# Patient Record
Sex: MALE | Race: WHITE | NOT HISPANIC OR LATINO | URBAN - NONMETROPOLITAN AREA
[De-identification: names, ages, dates, MRNs, and addresses within clinical notes are randomized per-mention and may not be internally consistent; named-entity substitution may affect disease eponyms.]

---

## 2017-02-27 ENCOUNTER — APPOINTMENT (RX ONLY)
Dept: URBAN - NONMETROPOLITAN AREA CLINIC 22 | Facility: CLINIC | Age: 62
Setting detail: DERMATOLOGY
End: 2017-02-27

## 2017-02-27 PROCEDURE — ? PRODUCT LINE (MOISTURIZERS)

## 2017-02-27 NOTE — PROCEDURE: PRODUCT LINE (MOISTURIZERS)
Product 69 Price (In Dollars - Numeric Only, No Special Characters Or $): 0.00
Product 30 Units: 0
Product 1 Units: 1
Product 1 Price (In Dollars - Numeric Only, No Special Characters Or $): 12.72
Render Product Pricing In Note: Yes
Name Of Product 1: ceraVe Healing Ointment
Detail Level: Zone

## 2020-03-03 ENCOUNTER — OFFICE VISIT (OUTPATIENT)
Dept: PULMONOLOGY | Age: 65
End: 2020-03-03
Payer: MEDICARE

## 2020-03-03 VITALS
TEMPERATURE: 98.1 F | RESPIRATION RATE: 16 BRPM | HEART RATE: 67 BPM | DIASTOLIC BLOOD PRESSURE: 62 MMHG | OXYGEN SATURATION: 96 % | HEIGHT: 69 IN | SYSTOLIC BLOOD PRESSURE: 112 MMHG | BODY MASS INDEX: 23.55 KG/M2 | WEIGHT: 159 LBS

## 2020-03-03 PROBLEM — J44.9 MODERATE COPD (CHRONIC OBSTRUCTIVE PULMONARY DISEASE) (HCC): Status: ACTIVE | Noted: 2020-03-03

## 2020-03-03 PROBLEM — F17.210 CIGARETTE NICOTINE DEPENDENCE WITHOUT COMPLICATION: Status: ACTIVE | Noted: 2020-03-03

## 2020-03-03 PROCEDURE — G8484 FLU IMMUNIZE NO ADMIN: HCPCS | Performed by: INTERNAL MEDICINE

## 2020-03-03 PROCEDURE — 99202 OFFICE O/P NEW SF 15 MIN: CPT | Performed by: INTERNAL MEDICINE

## 2020-03-03 PROCEDURE — G8926 SPIRO NO PERF OR DOC: HCPCS | Performed by: INTERNAL MEDICINE

## 2020-03-03 PROCEDURE — G8427 DOCREV CUR MEDS BY ELIG CLIN: HCPCS | Performed by: INTERNAL MEDICINE

## 2020-03-03 PROCEDURE — 4004F PT TOBACCO SCREEN RCVD TLK: CPT | Performed by: INTERNAL MEDICINE

## 2020-03-03 PROCEDURE — G8420 CALC BMI NORM PARAMETERS: HCPCS | Performed by: INTERNAL MEDICINE

## 2020-03-03 PROCEDURE — 3017F COLORECTAL CA SCREEN DOC REV: CPT | Performed by: INTERNAL MEDICINE

## 2020-03-03 PROCEDURE — 3023F SPIROM DOC REV: CPT | Performed by: INTERNAL MEDICINE

## 2020-03-03 RX ORDER — VALSARTAN AND HYDROCHLOROTHIAZIDE 160; 12.5 MG/1; MG/1
1 TABLET, FILM COATED ORAL
Status: ON HOLD | COMMUNITY
Start: 2019-09-13 | End: 2021-02-24 | Stop reason: HOSPADM

## 2020-03-03 RX ORDER — AMLODIPINE BESYLATE 5 MG/1
TABLET ORAL
Status: ON HOLD | COMMUNITY
Start: 2020-02-14 | End: 2021-02-24 | Stop reason: HOSPADM

## 2020-03-03 RX ORDER — POLYETHYLENE GLYCOL 3350 17 G
4 POWDER IN PACKET (EA) ORAL 4 TIMES DAILY PRN
Qty: 90 EACH | Refills: 3 | Status: ON HOLD | OUTPATIENT
Start: 2020-03-03 | End: 2021-02-24 | Stop reason: HOSPADM

## 2020-03-03 RX ORDER — ASPIRIN 325 MG
325 TABLET ORAL
Status: ON HOLD | COMMUNITY
End: 2021-02-24 | Stop reason: HOSPADM

## 2020-03-03 RX ORDER — ALBUTEROL SULFATE 2.5 MG/3ML
2.5 SOLUTION RESPIRATORY (INHALATION) EVERY 6 HOURS PRN
Qty: 120 EACH | Refills: 5 | Status: SHIPPED | OUTPATIENT
Start: 2020-03-03

## 2020-03-03 RX ORDER — ALBUTEROL SULFATE 90 UG/1
2 AEROSOL, METERED RESPIRATORY (INHALATION) EVERY 6 HOURS PRN
Qty: 1 INHALER | Refills: 5 | Status: SHIPPED | OUTPATIENT
Start: 2020-03-03

## 2020-03-03 SDOH — HEALTH STABILITY: MENTAL HEALTH: HOW OFTEN DO YOU HAVE A DRINK CONTAINING ALCOHOL?: NEVER

## 2020-03-03 NOTE — LETTER
[facility] Pulmonary, Critical Care, and Sleep  [address], [address] [place] [address]  Phone: [phone]  Fax: [phone]    March 3, 2020     Patient: Harman Gary   MR Number: <[id number]>   YOB: 1955   Date of Visit: 3/3/2020       Dear Dr. Alysia Patrick: Thank you for the request for consultation for Harman Gary to me for the evaluation of COPD. Below are the relevant portions of my assessment and plan of care. ASSESSMENT:  · Moderately severe COPD  · Cigarette abuse  · CAD with h/o MI s/p PCI  · Thoracic aneurysm s/p sternotomy with graft    PLAN:   · Start Stiolto -sample and instructions provided  · Start PRN albuterol  INH and nebs  · We discussed smoking cessation for >3 minutes. We discussed the association of smoking with the patient's current symptoms and the risks of continued use and the options for achieving cessation. The patient was advised to set a quit date and alert family members and remove smoking paraphernalia before the quit date. He is cutting back. The patient has not set a quit date. Nicotine replacement patches: allergic. Wellbutrin and chantix not tolerated per pt - mood changes. · Rx for lozenges 4mg  · Pulmonary rehab referral placed at [facility]  · LDCT not needed as he has regular CT chest for his aneurysm. I requested he have those results forwarded. · F/u 3 months    If you have questions, please do not hesitate to call me. I look forward to following Prudence Kinds along with you.     Sincerely,      Catalina Spear MD

## 2020-03-03 NOTE — PROGRESS NOTES
Chief Complaint: COPD    Consulting provider: Enrique Martin MD    HPI: 62 y.o. male patient is being seen at the request of Enrique Martin MD for a consultation regarding COPD. The patient has a history of  COPD. The patient does not have SOB at rest but has VALLES. Exercise tolerance on level ground is 2-3 blocks. The patient has a  daily intermittent cough that is usually dry or sometimes productive of sputum. The patient does wheeze most days. No hospitalizations for COPD. He is currently smoking 2-3 cigarettes per day. The patient has smoked 2 PPD for 50 years. Environmental/chemical exposure: Asbestos exposure: no    Patient worked in a [employer] factory  TB exposure: no    The information above included the review and summarization of old records. The history was obtained from these old records and from the patient directly. Outside information from the referring provider regarding the chief complaint was reviewed. I obtained and reviewed pulmonary function tests and his chest CT from outside facility. Shows COPD as well as a thoracic aneurysm. I reviewed outside progress notes from his cardiologist and prior pulmonologist discussing these diagnoses. REVIEW OF SYSTEMS:    See HPI and scanned Review of Systems sheet. Past Medical History:   Diagnosis Date    CAD (coronary artery disease)     [facility]     Heart attack ([facility] [facility]     2013    Hypertension     Lung disease      Past Surgical History      Procedure Laterality Date    DIAGNOSTIC CARDIAC CATH LAB PROCEDURE      THORACIC AORTIC ANEURYSM REPAIR  2000     Social History:    TOBACCO:   reports that he has been smoking cigarettes. He has a 100.00 pack-year smoking history. He has never used smokeless tobacco.  ETOH:   reports no history of alcohol use. Family History -No lung cancer  Allergies:  has no allergies on file.       Current Outpatient Medications:     aspirin 325 MG tablet, Take 325 mg by mouth, Disp: , Rfl:   amLODIPine (NORVASC) 5 MG tablet, TAKE ONE TABLET BY MOUTH DAILY, Disp: , Rfl:     valsartan-hydrochlorothiazide (DIOVAN-HCT) 160-12.5 MG per tablet, Take 1 tablet by mouth, Disp: , Rfl:     pitavastatin (LIVALO) 2 MG TABS tablet, Take 2 mg by mouth, Disp: , Rfl:     tiotropium (SPIRIVA RESPIMAT) 2.5 MCG/ACT AERS inhaler, Inhale 2 puffs into the lungs daily, Disp: , Rfl:     Objective:   PHYSICAL EXAM:   /62 (Site: Left Upper Arm, Position: Sitting)   Pulse 67   Temp 98.1 °F (36.7 °C) (Oral)   Resp 16   Ht 5' 9\" (1.753 m)   Wt 159 lb (72.1 kg)   SpO2 96% Comment: RA  BMI 23.48 kg/m²    Constitutional:  No acute distress. Eyes: PERRL. Conjunctivae anicteric. ENT: Normal nose. Normal tongue. Oropharynx is clear. Neck:  Trachea is midline. No thyroid tenderness. Respiratory: No accessory muscle usage. Decreased breath sounds. No wheezes. No rales. No Rhonchi. Cardiovascular: Normal S1S2. No digit clubbing. No digit cyanosis. No LE edema. Lymphatic: No cervical or supraclavicular adenopathy. Skin: No rash on the exposed extremities. No Nodules or induration on exposed extremities. Psychiatric: No anxiety or Agitation. Alert and Oriented to person, place and time. LABS:  The recent relevant labs were reviewed    PFTs 9/25/18 @ [address]  FVC  (80%) FEV1 1.87 (55%) FEV1/FVC ratio 0/53   TLC  (110%)  RV  (179%)   DLCO (46%) Bronchodilator response: borderline    6MWT: 95%1000ft    IMAGING:  I personally reviewed and interpreted the following imaging today in the office:   CXR:   Chest CT:    ASSESSMENT:  · Moderately severe COPD  · Cigarette abuse  · CAD with h/o MI s/p PCI  · Thoracic aneurysm s/p sternotomy with graft    PLAN:   · Start Stiolto  · Start PRN albuterol  INH and nebs  · We discussed smoking cessation for >3 minutes. We discussed the association of smoking with the patient's current symptoms and the risks of continued use and the options for achieving cessation.  The patient was advised to set a quit date and alert family members and remove smoking paraphernalia before the quit date. He is cutting back. The patient has not set a quit date. Nicotine replacement patches: allergic. Wellbutrin and chantix not tolerated per pt - mood changes. Rx for lozenges 4mg  · Pulmonary rehab referral placed at [facility]  · LDCT not needed as he has regular CT chest for his aneurysm. I requested he have those results forwarded.   · F/u 3 months

## 2020-03-10 ENCOUNTER — TELEPHONE (OUTPATIENT)
Dept: PULMONOLOGY | Age: 65
End: 2020-03-10

## 2020-03-12 RX ORDER — VARENICLINE TARTRATE 0.5 MG/1
TABLET, FILM COATED ORAL
Qty: 319 TABLET | Refills: 0 | OUTPATIENT
Start: 2020-03-12

## 2020-06-08 ENCOUNTER — TELEPHONE (OUTPATIENT)
Dept: PULMONOLOGY | Age: 65
End: 2020-06-08

## 2020-06-08 NOTE — TELEPHONE ENCOUNTER
Pt called and said that he had a CT of his abdomen and chest completed on 6/5/20 at [facility] in [facility]. Pt has upcoming VV on 6/11/20.

## 2020-06-11 ENCOUNTER — VIRTUAL VISIT (OUTPATIENT)
Dept: PULMONOLOGY | Age: 65
End: 2020-06-11
Payer: MEDICARE

## 2020-06-11 PROCEDURE — 99214 OFFICE O/P EST MOD 30 MIN: CPT | Performed by: INTERNAL MEDICINE

## 2020-06-11 RX ORDER — UMECLIDINIUM BROMIDE AND VILANTEROL TRIFENATATE 62.5; 25 UG/1; UG/1
1 POWDER RESPIRATORY (INHALATION) DAILY
Qty: 1 EACH | Refills: 3 | Status: SHIPPED | OUTPATIENT
Start: 2020-06-11 | End: 2022-08-30

## 2020-06-11 NOTE — PROGRESS NOTES
[place] Pulmonary, Sleep and Critical Care    Outpatient Follow Up Note    Chief Complaint: COPD  Consulting provider: No ref. provider found    Interval History: 62 y.o. male last visit the patient has quit smoking using Chantix. He feels like his shortness of breath has not improved despite that. He is using Stiolto daily. Initial HPI:The patient has a history of  COPD. The patient does not have SOB at rest but has VALLES. Exercise tolerance on level ground is 2-3 blocks. The patient has a  daily intermittent cough that is usually dry or sometimes productive of sputum. The patient does wheeze most days. No hospitalizations for COPD. He is currently smoking 2-3 cigarettes per day. The patient has smoked 2 PPD for 50 years. Environmental/chemical exposure: Asbestos exposure: no    Patient worked in a [employer] factory      Current Outpatient Medications:     aspirin 325 MG tablet, Take 325 mg by mouth, Disp: , Rfl:     amLODIPine (NORVASC) 5 MG tablet, TAKE ONE TABLET BY MOUTH DAILY, Disp: , Rfl:     Tiotropium Bromide-Olodaterol (STIOLTO RESPIMAT) 2.5-2.5 MCG/ACT AERS, 2 inhalations daily, Disp: 1 Inhaler, Rfl: 5    albuterol sulfate HFA (VENTOLIN HFA) 108 (90 Base) MCG/ACT inhaler, Inhale 2 puffs into the lungs every 6 hours as needed for Wheezing or Shortness of Breath, Disp: 1 Inhaler, Rfl: 5    albuterol (PROVENTIL) (2.5 MG/3ML) 0.083% nebulizer solution, Take 3 mLs by nebulization every 6 hours as needed for Wheezing, Disp: 120 each, Rfl: 5    valsartan-hydrochlorothiazide (DIOVAN-HCT) 160-12.5 MG per tablet, Take 1 tablet by mouth, Disp: , Rfl:     pitavastatin (LIVALO) 2 MG TABS tablet, Take 2 mg by mouth, Disp: , Rfl:     nicotine polacrilex (NICORETTE) 4 MG lozenge, Take 1 lozenge by mouth 4 times daily as needed for Smoking cessation (Patient not taking: Reported on 6/11/2020), Disp: 90 each, Rfl: 3    Objective:   PHYSICAL EXAM: There were no vitals taken for this visit.    Constitutional:

## 2020-10-06 ENCOUNTER — TELEPHONE (OUTPATIENT)
Dept: PULMONOLOGY | Age: 65
End: 2020-10-06

## 2020-10-06 NOTE — TELEPHONE ENCOUNTER
Patient did not show for PFT/6MW follow-up appointment  with  on 10/6/20. Patient did not show for same day PFT either. 10/1 lm to change to vv. ..need to move appt to next tues after PFT. ..elmira  10/5 lm to change to vv. ...lemira    Patient was also no show on: n/a    LOV   ASSESSMENT:6/11/20  · Moderately severe COPD  · Cigarette smoker  · CAD with h/o MI s/p PCI  · Thoracic aneurysm s/p sternotomy with graft     PLAN:   · Patient would like to trial in order to see if it is more effective than Stiolto   · PRN albuterol  INH and nebs  · Quit smoking 1 month ago on chantix  · Pulmonary rehab referral placed at [facility]  · F/u in Oct with PFT/6mwt and VV  THIS VISIT WAS COMPLETED VIRTUALLY USING DOXY. ME  Pursuant to the emergency declaration under the [facility], [facility] waiver authority and the [employer] Act, this Virtual  Visit was conducted, with patient's consent, to reduce the patient's risk of exposure to COVID-19 and provide continuity of care for an established patient.     Services were provided through a video synchronous discussion virtually to substitute for in-person clinic visit.

## 2020-10-13 NOTE — TELEPHONE ENCOUNTER
Unable to reach patient to nahomy appt. Partially impaired: cannot see medication labels or newsprint, but can see obstacles in path, and the surrounding layout; can count fingers at arm's length

## 2021-02-22 ENCOUNTER — HOSPITAL ENCOUNTER (INPATIENT)
Age: 66
LOS: 2 days | Discharge: HOME | DRG: 246 | End: 2021-02-24
Attending: EMERGENCY MEDICINE | Admitting: FAMILY MEDICINE
Payer: MEDICARE

## 2021-02-22 ENCOUNTER — APPOINTMENT (OUTPATIENT)
Dept: GENERAL RADIOLOGY | Age: 66
DRG: 246 | End: 2021-02-22
Payer: MEDICARE

## 2021-02-22 DIAGNOSIS — I21.3 ST ELEVATION MYOCARDIAL INFARCTION (STEMI), UNSPECIFIED ARTERY (HCC): Primary | ICD-10-CM

## 2021-02-22 LAB
ALBUMIN SERPL-MCNC: 3.6 G/DL (ref 3.4–5)
ANION GAP SERPL CALCULATED.3IONS-SCNC: 10 MMOL/L (ref 3–16)
BUN BLDV-MCNC: 12 MG/DL (ref 7–20)
CALCIUM SERPL-MCNC: 9.1 MG/DL (ref 8.3–10.6)
CHLORIDE BLD-SCNC: 104 MMOL/L (ref 99–110)
CO2: 25 MMOL/L (ref 21–32)
CREAT SERPL-MCNC: 1.4 MG/DL (ref 0.8–1.3)
EKG ATRIAL RATE: 50 BPM
EKG DIAGNOSIS: NORMAL
EKG P AXIS: 81 DEGREES
EKG P-R INTERVAL: 210 MS
EKG Q-T INTERVAL: 412 MS
EKG QRS DURATION: 76 MS
EKG QTC CALCULATION (BAZETT): 375 MS
EKG R AXIS: -4 DEGREES
EKG T AXIS: 91 DEGREES
EKG VENTRICULAR RATE: 50 BPM
GFR AFRICAN AMERICAN: >60
GFR NON-AFRICAN AMERICAN: 51
GLUCOSE BLD-MCNC: 105 MG/DL (ref 70–99)
HCT VFR BLD CALC: 38.9 % (ref 40.5–52.5)
HEMOGLOBIN: 13.1 G/DL (ref 13.5–17.5)
LV EF: 45 %
LVEF MODALITY: NORMAL
MCH RBC QN AUTO: 30.5 PG (ref 26–34)
MCHC RBC AUTO-ENTMCNC: 33.7 G/DL (ref 31–36)
MCV RBC AUTO: 90.4 FL (ref 80–100)
PDW BLD-RTO: 13 % (ref 12.4–15.4)
PHOSPHORUS: 3 MG/DL (ref 2.5–4.9)
PLATELET # BLD: 301 K/UL (ref 135–450)
PMV BLD AUTO: 6.5 FL (ref 5–10.5)
POTASSIUM SERPL-SCNC: 3.8 MMOL/L (ref 3.5–5.1)
RBC # BLD: 4.3 M/UL (ref 4.2–5.9)
SODIUM BLD-SCNC: 139 MMOL/L (ref 136–145)
WBC # BLD: 11.1 K/UL (ref 4–11)

## 2021-02-22 PROCEDURE — 93005 ELECTROCARDIOGRAM TRACING: CPT | Performed by: INTERNAL MEDICINE

## 2021-02-22 PROCEDURE — 6360000002 HC RX W HCPCS: Performed by: FAMILY MEDICINE

## 2021-02-22 PROCEDURE — 99284 EMERGENCY DEPT VISIT MOD MDM: CPT

## 2021-02-22 PROCEDURE — 2580000003 HC RX 258

## 2021-02-22 PROCEDURE — 92973 PRQ TRLUML C MCHN ASP THRMBC: CPT

## 2021-02-22 PROCEDURE — C1769 GUIDE WIRE: HCPCS

## 2021-02-22 PROCEDURE — C1894 INTRO/SHEATH, NON-LASER: HCPCS

## 2021-02-22 PROCEDURE — 6360000002 HC RX W HCPCS

## 2021-02-22 PROCEDURE — 93005 ELECTROCARDIOGRAM TRACING: CPT | Performed by: EMERGENCY MEDICINE

## 2021-02-22 PROCEDURE — 2709999900 HC NON-CHARGEABLE SUPPLY

## 2021-02-22 PROCEDURE — 99291 CRITICAL CARE FIRST HOUR: CPT | Performed by: INTERNAL MEDICINE

## 2021-02-22 PROCEDURE — 92941 PRQ TRLML REVSC TOT OCCL AMI: CPT | Performed by: INTERNAL MEDICINE

## 2021-02-22 PROCEDURE — 99152 MOD SED SAME PHYS/QHP 5/>YRS: CPT

## 2021-02-22 PROCEDURE — 93458 L HRT ARTERY/VENTRICLE ANGIO: CPT

## 2021-02-22 PROCEDURE — 2580000003 HC RX 258: Performed by: FAMILY MEDICINE

## 2021-02-22 PROCEDURE — C1753 CATH, INTRAVAS ULTRASOUND: HCPCS

## 2021-02-22 PROCEDURE — 6370000000 HC RX 637 (ALT 250 FOR IP): Performed by: EMERGENCY MEDICINE

## 2021-02-22 PROCEDURE — 6360000002 HC RX W HCPCS: Performed by: EMERGENCY MEDICINE

## 2021-02-22 PROCEDURE — C1874 STENT, COATED/COV W/DEL SYS: HCPCS

## 2021-02-22 PROCEDURE — C1887 CATHETER, GUIDING: HCPCS

## 2021-02-22 PROCEDURE — B2111ZZ FLUOROSCOPY OF MULTIPLE CORONARY ARTERIES USING LOW OSMOLAR CONTRAST: ICD-10-PCS | Performed by: INTERNAL MEDICINE

## 2021-02-22 PROCEDURE — 6360000002 HC RX W HCPCS: Performed by: INTERNAL MEDICINE

## 2021-02-22 PROCEDURE — C9606 PERC D-E COR REVASC W AMI S: HCPCS

## 2021-02-22 PROCEDURE — 93458 L HRT ARTERY/VENTRICLE ANGIO: CPT | Performed by: INTERNAL MEDICINE

## 2021-02-22 PROCEDURE — 99153 MOD SED SAME PHYS/QHP EA: CPT

## 2021-02-22 PROCEDURE — 6360000004 HC RX CONTRAST MEDICATION

## 2021-02-22 PROCEDURE — 2500000003 HC RX 250 WO HCPCS

## 2021-02-22 PROCEDURE — 6370000000 HC RX 637 (ALT 250 FOR IP): Performed by: FAMILY MEDICINE

## 2021-02-22 PROCEDURE — 4A023N7 MEASUREMENT OF CARDIAC SAMPLING AND PRESSURE, LEFT HEART, PERCUTANEOUS APPROACH: ICD-10-PCS | Performed by: INTERNAL MEDICINE

## 2021-02-22 PROCEDURE — 93010 ELECTROCARDIOGRAM REPORT: CPT | Performed by: INTERNAL MEDICINE

## 2021-02-22 PROCEDURE — 85027 COMPLETE CBC AUTOMATED: CPT

## 2021-02-22 PROCEDURE — C1725 CATH, TRANSLUMIN NON-LASER: HCPCS

## 2021-02-22 PROCEDURE — 71045 X-RAY EXAM CHEST 1 VIEW: CPT

## 2021-02-22 PROCEDURE — 92978 ENDOLUMINL IVUS OCT C 1ST: CPT | Performed by: INTERNAL MEDICINE

## 2021-02-22 PROCEDURE — 6370000000 HC RX 637 (ALT 250 FOR IP): Performed by: INTERNAL MEDICINE

## 2021-02-22 PROCEDURE — 92978 ENDOLUMINL IVUS OCT C 1ST: CPT

## 2021-02-22 PROCEDURE — B2151ZZ FLUOROSCOPY OF LEFT HEART USING LOW OSMOLAR CONTRAST: ICD-10-PCS | Performed by: INTERNAL MEDICINE

## 2021-02-22 PROCEDURE — 92973 PRQ TRLUML C MCHN ASP THRMBC: CPT | Performed by: INTERNAL MEDICINE

## 2021-02-22 PROCEDURE — 2720000010 HC SURG SUPPLY STERILE

## 2021-02-22 PROCEDURE — 80069 RENAL FUNCTION PANEL: CPT

## 2021-02-22 PROCEDURE — 85347 COAGULATION TIME ACTIVATED: CPT

## 2021-02-22 PROCEDURE — 36415 COLL VENOUS BLD VENIPUNCTURE: CPT

## 2021-02-22 PROCEDURE — B240ZZ3 ULTRASONOGRAPHY OF SINGLE CORONARY ARTERY, INTRAVASCULAR: ICD-10-PCS | Performed by: INTERNAL MEDICINE

## 2021-02-22 PROCEDURE — 96374 THER/PROPH/DIAG INJ IV PUSH: CPT

## 2021-02-22 PROCEDURE — 2000000000 HC ICU R&B

## 2021-02-22 PROCEDURE — 94640 AIRWAY INHALATION TREATMENT: CPT

## 2021-02-22 PROCEDURE — 027035Z DILATION OF CORONARY ARTERY, ONE ARTERY WITH TWO DRUG-ELUTING INTRALUMINAL DEVICES, PERCUTANEOUS APPROACH: ICD-10-PCS | Performed by: INTERNAL MEDICINE

## 2021-02-22 PROCEDURE — 02C03ZZ EXTIRPATION OF MATTER FROM CORONARY ARTERY, ONE ARTERY, PERCUTANEOUS APPROACH: ICD-10-PCS | Performed by: INTERNAL MEDICINE

## 2021-02-22 RX ORDER — HYDROXYZINE HYDROCHLORIDE 10 MG/1
25 TABLET, FILM COATED ORAL NIGHTLY PRN
Status: DISCONTINUED | OUTPATIENT
Start: 2021-02-22 | End: 2021-02-24 | Stop reason: HOSPADM

## 2021-02-22 RX ORDER — LOSARTAN POTASSIUM 25 MG/1
25 TABLET ORAL DAILY
Status: DISCONTINUED | OUTPATIENT
Start: 2021-02-22 | End: 2021-02-24 | Stop reason: HOSPADM

## 2021-02-22 RX ORDER — MIDAZOLAM HYDROCHLORIDE 5 MG/ML
INJECTION INTRAMUSCULAR; INTRAVENOUS
Status: COMPLETED | OUTPATIENT
Start: 2021-02-22 | End: 2021-02-22

## 2021-02-22 RX ORDER — HEPARIN SODIUM 1000 [USP'U]/ML
5000 INJECTION, SOLUTION INTRAVENOUS; SUBCUTANEOUS ONCE
Status: COMPLETED | OUTPATIENT
Start: 2021-02-22 | End: 2021-02-22

## 2021-02-22 RX ORDER — ALBUTEROL SULFATE 2.5 MG/3ML
2.5 SOLUTION RESPIRATORY (INHALATION) EVERY 6 HOURS PRN
Status: DISCONTINUED | OUTPATIENT
Start: 2021-02-22 | End: 2021-02-24 | Stop reason: HOSPADM

## 2021-02-22 RX ORDER — CARVEDILOL 6.25 MG/1
6.25 TABLET ORAL 2 TIMES DAILY WITH MEALS
Status: DISCONTINUED | OUTPATIENT
Start: 2021-02-22 | End: 2021-02-24 | Stop reason: HOSPADM

## 2021-02-22 RX ORDER — FENTANYL CITRATE 50 UG/ML
INJECTION, SOLUTION INTRAMUSCULAR; INTRAVENOUS
Status: COMPLETED | OUTPATIENT
Start: 2021-02-22 | End: 2021-02-22

## 2021-02-22 RX ORDER — ATORVASTATIN CALCIUM 80 MG/1
80 TABLET, FILM COATED ORAL NIGHTLY
Status: DISCONTINUED | OUTPATIENT
Start: 2021-02-22 | End: 2021-02-24 | Stop reason: HOSPADM

## 2021-02-22 RX ORDER — ASPIRIN 81 MG/1
81 TABLET, CHEWABLE ORAL DAILY
Status: DISCONTINUED | OUTPATIENT
Start: 2021-02-22 | End: 2021-02-24 | Stop reason: HOSPADM

## 2021-02-22 RX ORDER — EPTIFIBATIDE 0.75 MG/ML
2 INJECTION, SOLUTION INTRAVENOUS CONTINUOUS
Status: DISPENSED | OUTPATIENT
Start: 2021-02-22 | End: 2021-02-23

## 2021-02-22 RX ORDER — SODIUM CHLORIDE 9 MG/ML
INJECTION, SOLUTION INTRAVENOUS CONTINUOUS
Status: DISCONTINUED | OUTPATIENT
Start: 2021-02-22 | End: 2021-02-24 | Stop reason: HOSPADM

## 2021-02-22 RX ADMIN — HYDROXYZINE HYDROCHLORIDE 25 MG: 10 TABLET, FILM COATED ORAL at 20:54

## 2021-02-22 RX ADMIN — PHENYLEPHRINE HYDROCHLORIDE 25 MCG/MIN: 10 INJECTION INTRAVENOUS at 20:52

## 2021-02-22 RX ADMIN — EPTIFIBATIDE 2 MCG/KG/MIN: 0.75 INJECTION INTRAVENOUS at 22:29

## 2021-02-22 RX ADMIN — EPTIFIBATIDE 2 MCG/KG/MIN: 0.75 INJECTION INTRAVENOUS at 14:58

## 2021-02-22 RX ADMIN — FENTANYL CITRATE 25 MCG: 50 INJECTION, SOLUTION INTRAMUSCULAR; INTRAVENOUS at 14:45

## 2021-02-22 RX ADMIN — ATORVASTATIN CALCIUM 80 MG: 80 TABLET, FILM COATED ORAL at 20:53

## 2021-02-22 RX ADMIN — FENTANYL CITRATE 25 MCG: 50 INJECTION, SOLUTION INTRAMUSCULAR; INTRAVENOUS at 14:54

## 2021-02-22 RX ADMIN — SODIUM CHLORIDE: 9 INJECTION, SOLUTION INTRAVENOUS at 22:39

## 2021-02-22 RX ADMIN — FENTANYL CITRATE 50 MCG: 50 INJECTION, SOLUTION INTRAMUSCULAR; INTRAVENOUS at 14:33

## 2021-02-22 RX ADMIN — MIDAZOLAM HYDROCHLORIDE 1 MG: 5 INJECTION INTRAMUSCULAR; INTRAVENOUS at 14:54

## 2021-02-22 RX ADMIN — MIDAZOLAM HYDROCHLORIDE 1 MG: 5 INJECTION INTRAMUSCULAR; INTRAVENOUS at 15:26

## 2021-02-22 RX ADMIN — FENTANYL CITRATE 50 MCG: 50 INJECTION, SOLUTION INTRAMUSCULAR; INTRAVENOUS at 15:25

## 2021-02-22 RX ADMIN — ASPIRIN 81 MG: 81 TABLET, CHEWABLE ORAL at 18:09

## 2021-02-22 RX ADMIN — TICAGRELOR 180 MG: 90 TABLET ORAL at 14:10

## 2021-02-22 RX ADMIN — MIDAZOLAM HYDROCHLORIDE 2 MG: 5 INJECTION INTRAMUSCULAR; INTRAVENOUS at 14:33

## 2021-02-22 RX ADMIN — MIDAZOLAM HYDROCHLORIDE 1 MG: 5 INJECTION INTRAMUSCULAR; INTRAVENOUS at 14:45

## 2021-02-22 RX ADMIN — HEPARIN SODIUM 5000 UNITS: 1000 INJECTION, SOLUTION INTRAVENOUS; SUBCUTANEOUS at 14:10

## 2021-02-22 RX ADMIN — GLYCOPYRROLATE AND FORMOTEROL FUMARATE 2 PUFF: 9; 4.8 AEROSOL, METERED RESPIRATORY (INHALATION) at 20:45

## 2021-02-22 ASSESSMENT — PAIN DESCRIPTION - ORIENTATION: ORIENTATION: DISTAL

## 2021-02-22 ASSESSMENT — PAIN DESCRIPTION - PROGRESSION
CLINICAL_PROGRESSION: NOT CHANGED
CLINICAL_PROGRESSION: NOT CHANGED

## 2021-02-22 NOTE — ED NOTES
Bed: 04  Expected date:   Expected time:   Means of arrival:   Comments:  Air evac     Lacie Contreras RN  02/22/21 6032

## 2021-02-22 NOTE — H&P
Hospital Medicine History & Physical      PCP: No primary care provider on file. Date of Admission: 2/22/2021    Date of Service: Pt seen/examined on 02/22/2021 and Admitted to Inpatient with expected LOS greater than two midnights due to medical therapy. Chief Complaint:  Chest pain and tightness      History Of Present Illness:      60 y.o. male who presented to [facility] with chest pain and tightness that started this morning. Pt states he has been stressed over the past few days due to car issues. This morning while dealing with car issues he became very upset and decided to try and relax by drinking some coffee while sitting on the couch with his feet up. While doing this he began to feel a tightness in his chest. Pt states he has had MI in the past and immediatly knew this was a heart attack. Daniel Adames it felt the exact same as previous MI. He waited about 45 minutes for the tightness to go away, however it never went away so he called the squad. He was transported to [facility] where he was transported by helicopter to [facility]. He was taken to cath lab and cardiology performed cath through the wrist. I saw the pt soon after he was brought to the floor. Pt states he is still having some pain but he has greatly improved since he arrived. Past Medical History:          Diagnosis Date    CAD (coronary artery disease)     [facility]     Heart attack ([facility]     2013    Hypertension     Lung disease        Past Surgical History:          Procedure Laterality Date    DIAGNOSTIC CARDIAC CATH LAB PROCEDURE      THORACIC AORTIC ANEURYSM REPAIR  2000       Medications Prior to Admission:      Prior to Admission medications    Medication Sig Start Date End Date Taking?  Authorizing Provider   umeclidinium-vilanterol (ANORO ELLIPTA) 62.5-25 MCG/INH AEPB inhaler Inhale 1 puff into the lungs daily 6/11/20   Remus Jeans, MD   aspirin 325 MG tablet Take 325 mg by mouth Historical Provider, MD   amLODIPine (NORVASC) 5 MG tablet TAKE ONE TABLET BY MOUTH DAILY 2/14/20   Historical Provider, MD   valsartan-hydrochlorothiazide (DIOVAN-HCT) 160-12.5 MG per tablet Take 1 tablet by mouth 9/13/19 3/11/20  Historical Provider, MD   pitavastatin (LIVALO) 2 MG TABS tablet Take 2 mg by mouth 9/13/19 3/11/20  Historical Provider, MD   albuterol sulfate HFA (VENTOLIN HFA) 108 (90 Base) MCG/ACT inhaler Inhale 2 puffs into the lungs every 6 hours as needed for Wheezing or Shortness of Breath 3/3/20   Keisha Marcano MD   albuterol (PROVENTIL) (2.5 MG/3ML) 0.083% nebulizer solution Take 3 mLs by nebulization every 6 hours as needed for Wheezing 3/3/20   Keisha Marcano MD   nicotine polacrilex (NICORETTE) 4 MG lozenge Take 1 lozenge by mouth 4 times daily as needed for Smoking cessation  Patient not taking: Reported on 6/11/2020 3/3/20   Keisha Marcano MD       Allergies:  Patient has no known allergies. Social History:      The patient currently lives at home an independent     TOBACCO:   reports that he quit smoking about 9 months ago. His smoking use included cigarettes. He has a 100.00 pack-year smoking history. He has never used smokeless tobacco.  ETOH:   reports no history of alcohol use. E-Cigarettes/Vaping Use     Questions Responses    E-Cigarette/Vaping Use     Start Date     Passive Exposure     Quit Date     Counseling Given     Comments             Family History:       Reviewed in detail and negative for DM, CAD, Cancer, CVA. No family history on file. REVIEW OF SYSTEMS:   Pertinent positives as noted in the HPI. All other systems reviewed and negative. PHYSICAL EXAM PERFORMED:    /84   Pulse 75   Temp 97.1 °F (36.2 °C) (Oral)   Resp 13   Ht 5' 9\" (1.753 m)   Wt 145 lb (65.8 kg)   SpO2 100%   BMI 21.41 kg/m²     General appearance:  No apparent distress, appears stated age and cooperative. HEENT:  Normal cephalic, atraumatic without obvious deformity. Pupils equal, round, and reactive to light. Extra ocular muscles intact. Conjunctivae/corneas clear. Neck: Supple, with full range of motion. No jugular venous distention. Trachea midline. Respiratory:  Normal respiratory effort. Clear to auscultation, bilaterally without Rales/Wheezes/Rhonchi. Cardiovascular:  Regular rate and rhythm with normal S1/S2 without murmurs, rubs or gallops. Abdomen: Soft, non-tender, non-distended with normal bowel sounds. Musculoskeletal:  No clubbing, cyanosis or edema bilaterally. Full range of motion without deformity. Skin: Skin color, texture, turgor normal.  No rashes or lesions. Neurologic:  Neurovascularly intact without any focal sensory/motor deficits. Cranial nerves: II-XII intact, grossly non-focal.  Psychiatric:  Alert and oriented, thought content appropriate, normal insight  Capillary Refill: Brisk,< 3 seconds   Peripheral Pulses: +2 palpable, equal bilaterally       Labs:     No results for input(s): WBC, HGB, HCT, PLT in the last 72 hours. No results for input(s): NA, K, CL, CO2, BUN, CREATININE, CALCIUM, PHOS in the last 72 hours. Invalid input(s): MAGNES  No results for input(s): AST, ALT, BILIDIR, BILITOT, ALKPHOS in the last 72 hours. No results for input(s): INR in the last 72 hours. No results for input(s): Kari Vincenzo in the last 72 hours. Urinalysis:    No results found for: Dee Mendoza, BACTERIA, [address] [place] [address], [facility], [address], [facility]    Radiology:     CXR: I have reviewed the CXR with the following interpretation: Negative   EKG:  I have reviewed the EKG with the following interpretation: ST Elevation in inferior leads II, III, and AVF with reciprocal changes. XR CHEST PORTABLE   Final Result   Left basilar atelectasis.   No evidence of CHF             ASSESSMENT:    Active Hospital Problems    Diagnosis Date Noted    STEMI (ST elevation myocardial infarction) ([facility] [I21.3] 02/22/2021    Acute myocardial infarction ([facility] [address] [facility] [I21.9]

## 2021-02-22 NOTE — ED NOTES
ER attending and Dr. Foster Heads at bedside at time of arrival for patient eval.     Jarrell Foreman RN  02/22/21 0756

## 2021-02-22 NOTE — PROGRESS NOTES
Brief Pre-Op Note/Sedation Assessment      Yulia Winston  1955  Cath Pool Rm/NONE      [id number]  2:18 PM    Planned Procedure: Cardiac Catheterization Procedure    Post Procedure Plan: Return to same level of care    Consent: This was felt to be an emergency procedure. Chief Complaint: STEMI      Indications for Cath Procedure:  ACS <= 24 hrs  Anginal Classification within 2 weeks:  CCS IV - Inability to perform any activity without angina or angina at rest, i.e., severe limitation  NYHA Heart Failure Class within 2 weeks: No symptoms  Is Cath Lab Visit Valve-related?: No  Surgical Risk: N/A  Functional Type: N/A    Anti- Anginal Meds within 2 weeks:   Yes: Aspirin and Non-Statin (Any)    Stress or Imaging Studies Performed (within 6 months):  None     Vital Signs:  /74   Pulse 50   Temp 97.1 °F (36.2 °C) (Oral)   Resp 21   Ht 5' 9\" (1.753 m)   Wt 145 lb (65.8 kg)   SpO2 100%   BMI 21.41 kg/m²     Allergies:  No Known Allergies    Past Medical History:  Past Medical History:   Diagnosis Date    CAD (coronary artery disease)     [facility]     Heart attack ([facility] [address] [facility]     2013    Hypertension     Lung disease          Surgical History:  Past Surgical History:   Procedure Laterality Date    DIAGNOSTIC CARDIAC CATH LAB PROCEDURE      THORACIC AORTIC ANEURYSM REPAIR  2000         Medications:  Current Facility-Administered Medications   Medication Dose Route Frequency Provider Last Rate Last Admin    ticagrelor (BRILINTA) tablet 180 mg  180 mg Oral BID Isac Helms MD   180 mg at 02/22/21 1410           Pre-Sedation:    Pre-Sedation Documentation and Exam:  I have personally completed a history, physical exam & review of systems for this patient (see notes).     Prior History of Anesthesia Complications:   none    Modified Mallampati:  III (soft palate, base of uvula visible)    ASA Classification:  Class 4 - A patient with an incapacitating systemic disease that is a constant threat to life      Andres Scale: Activity:  2 - Able to move 4 extremities voluntarily on command  Respiration:  1 - Dyspnic, shallow, or limited breathing  Circulation:  2 - BP+/- 20mmHg of normal  Consciousness:  1 - Arousable on calling  Oxygen Saturation (color):  1 - Needs oxygen to maintain oxygen saturation >90%    Sedation/Anesthesia Plan:  Guard the patient's safety and welfare. Minimize physical discomfort and pain. Minimize negative psychological responses to treatment by providing sedation and analgesia and maximize the potential amnesia. Patient to meet pre-procedure discharge plan.     Medication Planned:  midazolam intravenously and fentanyl intravenously    Patient is an appropriate candidate for plan of sedation: yes      Electronically signed by Karly Dick MD on 2/22/2021 at 2:18 PM

## 2021-02-22 NOTE — ED PROVIDER NOTES
CHIEF COMPLAINT  Chest Pain (air evac from [address], inferior MI per EKG sent prior to arrival)      HISTORY OF PRESENT ILLNESS  Kelley Rush is a 60 y.o. male with a history of CAD, thoracic aortic aneurysm repair who presents to the ED complaining of chest pain. Patient reports onset of severe substernal chest pain around 11 AM.  He received a full dose of aspirin, 3 sublingual nitroglycerin and 1 inch of Nitropaste as well as 100 mcg of fentanyl with EMS prior to arrival.  Still reporting significant active pain. EMS EKG consistent with inferior STEMI. No other complaints, modifying factors or associated symptoms. I have reviewed the following from the nursing documentation. Past Medical History:   Diagnosis Date    CAD (coronary artery disease)     [facility]     Heart attack ([facility] [address] [facility]         Hypertension     Lung disease      Past Surgical History:   Procedure Laterality Date    DIAGNOSTIC CARDIAC CATH LAB PROCEDURE      THORACIC AORTIC ANEURYSM REPAIR       No family history on file.   Social History     Socioeconomic History    Marital status: Unknown     Spouse name: Not on file    Number of children: Not on file    Years of education: Not on file    Highest education level: Not on file   Occupational History    Not on file   Social Needs    Financial resource strain: Not on file    Food insecurity     Worry: Not on file     Inability: Not on file    Transportation needs     Medical: Not on file     Non-medical: Not on file   Tobacco Use    Smoking status: Former Smoker     Packs/day: 2.00     Years: 50.00     Pack years: 100.00     Types: Cigarettes     Quit date: 2020     Years since quittin.7    Smokeless tobacco: Never Used   Substance and Sexual Activity    Alcohol use: Never     Frequency: Never    Drug use: Not on file    Sexual activity: Not on file   Lifestyle    Physical activity     Days per week: Not on file     Minutes per session: Not on file    Stress: Not on file   Relationships    Social connections     Talks on phone: Not on file     Gets together: Not on file     Attends Holiness service: Not on file     Active member of club or organization: Not on file     Attends meetings of clubs or organizations: Not on file     Relationship status: Not on file    Intimate partner violence     Fear of current or ex partner: Not on file     Emotionally abused: Not on file     Physically abused: Not on file     Forced sexual activity: Not on file   Other Topics Concern    Not on file   Social History Narrative    Not on file     Current Facility-Administered Medications   Medication Dose Route Frequency Provider Last Rate Last Admin    ticagrelor (BRILINTA) tablet 180 mg  180 mg Oral BID Abimbola Seo MD   180 mg at 02/22/21 1410     Current Outpatient Medications   Medication Sig Dispense Refill    umeclidinium-vilanterol (ANORO ELLIPTA) 62.5-25 MCG/INH AEPB inhaler Inhale 1 puff into the lungs daily 1 each 3    aspirin 325 MG tablet Take 325 mg by mouth      amLODIPine (NORVASC) 5 MG tablet TAKE ONE TABLET BY MOUTH DAILY      valsartan-hydrochlorothiazide (DIOVAN-HCT) 160-12.5 MG per tablet Take 1 tablet by mouth      pitavastatin (LIVALO) 2 MG TABS tablet Take 2 mg by mouth      albuterol sulfate HFA (VENTOLIN HFA) 108 (90 Base) MCG/ACT inhaler Inhale 2 puffs into the lungs every 6 hours as needed for Wheezing or Shortness of Breath 1 Inhaler 5    albuterol (PROVENTIL) (2.5 MG/3ML) 0.083% nebulizer solution Take 3 mLs by nebulization every 6 hours as needed for Wheezing 120 each 5    nicotine polacrilex (NICORETTE) 4 MG lozenge Take 1 lozenge by mouth 4 times daily as needed for Smoking cessation (Patient not taking: Reported on 6/11/2020) 90 each 3     No Known Allergies    REVIEW OF SYSTEMS  10 systems reviewed, pertinent positives per HPI otherwise noted to be negative.     PHYSICAL EXAM  /74   Pulse 50 Temp 97.1 °F (36.2 °C) (Oral)   Resp 21   Ht 5' 9\" (1.753 m)   Wt 145 lb (65.8 kg)   SpO2 100%   BMI 21.41 kg/m²   GENERAL APPEARANCE: Awake and alert. Cooperative. Appears uncomfortable and seems to be trembling/shivering due to being cold. HEAD: Normocephalic. Atraumatic. EYES: PERRL. EOM's grossly intact. ENT: Mucous membranes are moist.   NECK: Supple, trachea midline. HEART: RRR. Normal S1S2, no rubs, gallops, or murmurs noted  LUNGS: Respirations unlabored. CTAB. Good air exchange. No wheezes, rales, or rhonchi. Speaking comfortably in full sentences. ABDOMEN: Soft. Non-distended. Non-tender. No guarding or rebound. Normal bowel sounds. EXTREMITIES: No peripheral edema. MAEE. No acute deformities. SKIN: Warm and dry. No acute rashes. NEUROLOGICAL: Alert and oriented X 3. CN II-XII intact. No gross facial drooping. Strength 5/5, sensation intact. PSYCHIATRIC: Normal mood and affect. LABS  I have reviewed all labs for this visit. No results found for this visit on 02/22/21. EKG  Normal sinus rhythm, rate 50, leftward axis, QTC within normal limits, acute ST elevation MI in the inferior leads with reciprocal depressions in the anterior leads. No priors available for comparison. RADIOLOGY  X-RAYS:  I have reviewed radiologic plain film image(s). ALL OTHER NON-PLAIN FILM IMAGES SUCH AS CT, ULTRASOUND AND MRI HAVE BEEN READ BY THE RADIOLOGIST. XR CHEST PORTABLE    (Results Pending)              Rechecks: Physical assessment performed. In pain    ED COURSE/MDM  Patient seen and evaluated. Old records reviewed. Labs and imaging reviewed and results discussed with patient. Patient was evaluated at bedside by Dr. Alphia Frankel immediately after his arrival.  Taken directly to the Cath Lab due to EKG demonstrating inferior STEMI as well as active substernal chest pain.     New Prescriptions    No medications on file       CLINICAL IMPRESSION  1. ST elevation myocardial infarction (STEMI), unspecified artery (HCC)        Blood pressure 137/74, pulse 50, temperature 97.1 °F (36.2 °C), temperature source Oral, resp. rate 21, height 5' 9\" (1.753 m), weight 145 lb (65.8 kg), SpO2 100 %. JOSSY Cruz was sent to the cath lab in stable condition.       Abimbola Seo MD  02/22/21 5046

## 2021-02-22 NOTE — ED NOTES
Initial EKG @ 1406  Interventional cardiologist called @ [facility] [id number]  Cath lab charge RN notified [phone]  page of CODE STEMI [address]  Cardiologist Dr. Tung Frias @ bedside Nghia Lubin  02/22/21 0692

## 2021-02-22 NOTE — CONSULTS
[facility]  [phone]      Attending Physician: Abdifatah Zamorano MD  Reason for Consultation/Chief Complaint: Chest pain    Subjective   History of Present Illness:  Man Funez is a 60 y.o. patient who presented to the hospital with complaints of chest pain that began today, patient was brought by helicopter from [facility] to [facility] emergency room, EKG done in route showed inferior ST elevation, code STEMI alert was initiated. Patient is in distress and history cannot be obtained from him. From review of records, patient does have a history of RCA stenting around 2012 at [facility] in [place], he also has had a history of thoracic aortic aneurysm status post repair at [facility] around 20 years ago. Patient states has not been taking any blood thinners. Takes medicine for high blood pressure. He has been getting his usual cardiac care now with Dr. Margarette Medina at [facility].    Past Medical History:   has a past medical history of CAD (coronary artery disease), Emphysema of lung ([facility] [address] Heart attack ([facility] [address] Hypertension, and Lung disease. Surgical History:   has a past surgical history that includes Diagnostic Cardiac Cath Lab Procedure and Thoracic aortic aneurysm repair (2000). Social History:   reports that he quit smoking about 9 months ago. His smoking use included cigarettes. He has a 100.00 pack-year smoking history. He has never used smokeless tobacco. He reports that he does not drink alcohol. Home Medications:  Were reviewed and are listed in nursing record and/or below  Prior to Admission medications    Medication Sig Start Date End Date Taking?  Authorizing Provider   umeclidinium-vilanterol (ANORO ELLIPTA) 62.5-25 MCG/INH AEPB inhaler Inhale 1 puff into the lungs daily 6/11/20   Fatuma Lu MD   aspirin 325 MG tablet Take 325 mg by mouth    Historical Provider, MD   amLODIPine (NORVASC) 5 MG tablet TAKE ONE TABLET BY MOUTH DAILY 2/14/20   Historical Provider, MD   valsartan-hydrochlorothiazide (DIOVAN-HCT) 160-12.5 MG per tablet Take 1 tablet by mouth 9/13/19 3/11/20  Historical Provider, MD   pitavastatin (LIVALO) 2 MG TABS tablet Take 2 mg by mouth 9/13/19 3/11/20  Historical Provider, MD   albuterol sulfate HFA (VENTOLIN HFA) 108 (90 Base) MCG/ACT inhaler Inhale 2 puffs into the lungs every 6 hours as needed for Wheezing or Shortness of Breath 3/3/20   Keisha Marcano MD   albuterol (PROVENTIL) (2.5 MG/3ML) 0.083% nebulizer solution Take 3 mLs by nebulization every 6 hours as needed for Wheezing 3/3/20   Keisha Marcano MD   nicotine polacrilex (NICORETTE) 4 MG lozenge Take 1 lozenge by mouth 4 times daily as needed for Smoking cessation  Patient not taking: Reported on 6/11/2020 3/3/20   Keisha Marcano MD        CURRENT Medications:      ticagrelor (BRILINTA) tablet 180 mg, BID        Allergies:  Patient has no known allergies. Review of Systems:   A 14 point review of symptoms completed. Pertinent positives identified in the HPI, all other review of symptoms negative as below. Objective   PHYSICAL EXAM:    Vitals:    02/22/21 1405   BP: 137/74   Pulse: 50   Resp: 21   Temp: 97.1 °F (36.2 °C)   SpO2: 100%    Weight: 145 lb (65.8 kg)         General Appearance:    In distress, appears stated age   Head:  Normocephalic, without obvious abnormality, atraumatic, positive alopecia   Eyes:  conjunctiva/corneas clear   Nose: Nares normal, no drainage or sinus tenderness   Throat: Lips, mucosa, and tongue dry   Neck: Supple, no JVP   Lungs:   Clear to auscultation bilaterally, respirations labored   Chest Wall:  No deformity or tenderness   Heart:  Regular rate and rhythm, S1, S2 normal, distant heart sounds, bradycardia   Abdomen:   Soft, non-tender, bowel sounds active all four quadrants,  no masses, no organomegaly   Extremities: Extremities normal, atraumatic, no cyanosis or edema   Pulses: 2+ and symmetric in upper extremities Skin: Skin color pale/cool   Pysch:  Anxious mood and affect   Neurologic: Normal gross motor and sensory exam.         Labs   CBC: No results found for: WBC, RBC, HGB, HCT, MCV, RDW, PLT  CMP:No results found for: NA, K, CL, CO2, BUN, CREATININE, GFRAA, AGRATIO, LABGLOM, GLUCOSE, PROT, CALCIUM, BILITOT, ALKPHOS, AST, ALT  PT/INR:  No results found for: PTINR  HgBA1c:No results found for: LABA1C  No results found for: CKTOTAL, CKMB, CKMBINDEX, TROPONINI      Cardiac Data     Last EKG: Normal sinus rhythm/sinus bradycardia, inferior ST elevation, reciprocal changes are noted    Echo:          Transthoracic Echocardiography    Patient:   Dimitris Sams  Gender:     M  MR #:       [id number]  Age:        64  Account:   [de-identified]  :        1955  Study Date: 2020  Room:  BP:         128 / 74    Referring Physician: Bindu Delacruz  Interpreting Physician: Richard Hightower   PERFORMING   Richard Soares   SONOGRAPHER  Rad Nash   ORDERING     Richard Soares   ATTENDING    Bindu Soares    -------------------------------------------------------------------    Procedure:2D ECHO COMPLETE PRN     Order: Accession  CONTR/BUBBLE/3D                           Number:[id number]    Facility: [facility]    -------------------------------------------------------------------  Indications:   Shortness of breath ().    -------------------------------------------------------------------  PMH:   Coronary artery disease.  Congestive heart failure.  Chronic  obstructive pulmonary disease.  PMH:   Myocardial infarction.  Risk  factors:   Current tobacco use. Hypertension. Dyslipidemia.    -------------------------------------------------------------------  Study data:   Study status:  Routine.  Procedure:  Transthoracic  echocardiography. Image quality was adequate.  Scanning was  performed from the parasternal, apical, and subcostal acoustic  windows.          Transthoracic echocardiography.  M-mode, complete  2D, complete spectral Doppler, and color Doppler.  Age:  Patient is  64yr old.  Sex: Miryam Borrero gender: male.  Body mass index:  BMI:  22.7kg/m^2.  Patient status:  Outpatient.  Study date:  Study date:  01/30/2020. Study time: 01:04 PM.  Location: [address] [place] [address]. -------------------------------------------------------------------  Study Conclusions    - Left ventricle: The cavity size is normal. Wall thickness is    normal. Systolic function was normal. The estimated ejection    fraction was in the range of 58% to 63%. Wall motion was normal;    there were no regional wall motion abnormalities. Normal    diastolic function.  - Aortic valve: Thickening, consistent with sclerosis. - Inferior vena cava: The vessel was normal in size; the    respirophasic diameter changes were in the normal range (&gt;= 50%);    findings are consistent with normal central venous pressure.    -------------------------------------------------------------------  Cardiac Anatomy    Left ventricle:    - The cavity size is normal. Wall thickness is normal. Systolic    function was normal. The estimated ejection fraction was in the    range of 58% to 63%. Wall motion was normal; there were no    regional wall motion abnormalities. - The transmitral flow pattern was normal. Normal diastolic    function. Aortic valve:    - The valve was trileaflet. Thickening, consistent with sclerosis.    Cusp separation was normal.    Doppler:    - Transvalvular velocity is within the normal range. There is no    stenosis. There was no significant regurgitation.    - The ratio of LVOT to aortic valve peak velocity is 0.67. AORTA:  Aortic root: The aortic root is not dilated. Mitral valve:    - The leaflets are mildly thickened. Leaflet separation was normal.    No echocardiographic evidence for prolapse. Doppler:    - Transvalvular velocity is within the normal range. There is no    evidence for stenosis. There was trivial regurgitation. Left atrium:    - The atrium is normal in size. Right ventricle:    - The cavity size is normal. Wall thickness is normal. Systolic    function is normal.    Pulmonic valve:    - Poorly visualized. The valve was structurally normal.    Doppler:    - There was trivial regurgitation. Tricuspid valve:    - The valve was structurally normal. Leaflet separation was normal.    Doppler:    - Transvalvular velocity is within the normal range. There is no    evidence for stenosis. There was trivial regurgitation. Pulmonary artery:    - Not well visualized. Right atrium:    - The atrium is normal in size. Pericardium:    - The pericardium was normal in appearance. There is no pericardial    effusion. Systemic veins:  Inferior vena cava:    - The vessel was normal in size; the respirophasic diameter changes    were in the normal range (&gt;= 50%); findings are consistent with    normal central venous pressure. Stress Test:    Cath:    2012    Raheem Witt MD - 03/27/2013  8:44 PM EDT  Formatting of this note might be different from the original.  2020 [facility], [facility] W/ PCI    Admission Date 03/27/2013 Discharge Date  Procedure Date 03/27/2013     CASE #      PROCEDURE  Left heart catheterization with PCI     PROCEDURE DETAILS   The risks and indications were explained, including the risk of heart attack, stroke, vascular damage, arrhythmias, allergy to dye, renal failure, hematoma and death. The patient understands and agrees to proceed. The patient was brought in a fasting state to the cardiac cath lab. Right groin was prepped and draped in usual sterile fashion. After local anesthesia was achieved, a 6-Solomon Islander sheath was placed percutaneously into the right femoral artery. Angiography of the right coronary artery was performed with a 3D right catheter.  Left was visualized with 6-[place] L4 catheter. Pigtail catheter was advanced into the left ventricular cavity. LV angiography was performed in the BACA projection. Pullback was obtained. All catheter exchanges were made with the use of a long wire. The patient has a prior history of descending aortic stenting. At this time, it was elected proceed with RCA stenting. This was performed with a 6-[place]an 3D right guide catheter. Lesion was crossed with a [place] wire. Initially, an [place] thrombectomy catheter was used. Significant amount of thrombus was aspirated. At this time, repeat angiograms now showed the right coronary was patent. It was then dilated with a 2.5 x 12 mm balloon. It was then stented with a 3.5 x 18 mm drug-eluting stent, Promus Element, taken to approximately 10 atmospheres. It was then post-dilated with a 12 mm x 3.5 Quantum high-pressure balloon taken to 14 atmospheres. Repeat angiogram was performed. The patient tolerated the procedure well. The patient received intracoronary nitroglycerin as well as IV Angiomax prior to initiation of the procedure. Postprocedure, femoral arteriography was performed and reviewed, and the arteriotomy was repaired with a Perclose technique. FINDINGS   Hemodynamic data:  Initial central aortic pressure was 119/55. LV pressure is 135/8 with left ventricular end-diastolic pressure of 22. There is no gradient on pullback across the aortic valve. Angiograms: The right coronary artery is mildly irregular; however, it is totally occluded after a large and small RV branch with evidence of residual filling defect. Left main is free of disease. Circumflex is a moderate-caliber vessel with 2 small PL branches and a third moderate-sized PL branch, which have minor irregularities. The LAD has a large diagonal branch in its proximal portion with a moderate-sized diagonal branch at its midportion and has minor irregularities.  There is collateral flow to the distal right from the left system. LV angiography:  Demonstrates a mild degree of inferior basilar hypokinesia. EF is estimated at 60%. There is a slight amount catheter-induced mitral insufficiency. After flow was re-established, the right coronary artery seemed to be a large dominant vessel with minor to moderate irregularities in its mid portion with the posterior descending and posterolateral branch filling well post. There was 0% stenosis of the residual stented segment. IMPRESSION  1. Recent non-Q-wave myocardial infarction. 2. History of prior descending aortic endograft. PLAN  Routine post MI and stent care with ongoing risk factor modification. The patient had ongoing recurrent chest pain suggesting active ischemia from the mid right total occlusion likely related to inadequate collats. Savita Sheehan MD  D: 03/27/2013   03:22 P  T: 03/27/2013 08:44 P  NOREEN/charlie/suri  [id number]  [id number]    Studies:     Chest x-ray, normal cardiac silhouette, normal mediastinum    I have reviewed labs and imaging/xray/diagnostic testing in this note. Assessment and Plan        Patient Active Problem List   Diagnosis    Moderate COPD (chronic obstructive pulmonary disease) ([address]    Cigarette nicotine dependence without complication       Acute inferior STEMI, plan on emergency heart catheterization, patient given heparin and Brilinta in the emergency room    Critical care time 31 minutes    Thank you for allowing us to participate in the care of Mercedes Fernandez. Please call me with any questions [phone].     Katey Ospina MD, [facility]   Interventional Cardiologist  [facility]  [phone] [facility]  [phone] [address]  2/22/2021 2:14 PM

## 2021-02-22 NOTE — ED NOTES
Pt transferred from ED to cath lab. Stable at time of transfer.      Saritha Gaines RN  02/22/21 8916

## 2021-02-22 NOTE — PROCEDURES
CARDIAC CATHETERIZATION REPORT     Procedure Date:  2021  Patient Name: Maritza Sotelo  MRN: [id number] : 1955      INDICATION     Acute inferior STEMI    PROCEDURES PERFORMED     Left heart catheterization  LVgram  Coronary angiogam  Coronary cath  Mechanical thrombectomy of RCA  Atherectomy of RCA  IVUS of RCA  PCI of RCA with 2 drug-eluting stents          PROCEDURE DESCRIPTION   This was felt to be an emergency procedure. Patient was prepped draped in the usual sterile fashion. Local anaesthetic was applied over puncture site. Using a back wall technique, a 6 [place] Terumo sheath was inserted into right radial artery. Nicardipine, nitroglycerin were administered through the sheath. Heparin was administered. Diagnostic 5 Djib[place]ian Medtronic pigtail, ultra catheters were used for diagnostic angiograms which were obtained after PCI, initially a guide catheter was taken and PCI was performed of RCA, see below for details. At the conclusion of the procedure, a TR band was placed over the puncture site and hemostasis was obtained. There were no immediate complications. I supervised sedation with versed 5 mg/fentanyl 150 mcg during the procedure. 210 cc contrast was utilized. <20cc EBL. I attempted to call patient's family on phone numbers are listed, there was no answer, voicemail left to call us back. FINDINGS         LVGRAM    LVEDP  30   GRADIENT ACROSS AORTIC VALVE  less than 10 mmHg   LV FUNCTION EF 45%   WALL MOTION  basal inferior hypokinesis   MITRAL REGURGITATION  mild         CORONARY ARTERIES    LM Less than 10% proximalmiddistal stenosis         LAD Calcified, proximalmid 70% stenosis, mid vessel bridging is noted, distal 20% stenosis. D1 is a medium to large size vessel with proximalmid 80% stenosis.        LCX  10 to 20% proximal stenosis, middistal 30 to 40% stenosis       RCA Dominant, proximalmiddistal 100% stenosis with thrombus           PERCUTANEOUS INTERVENTION DESCRIPTION     Patient was given heparin and Brilinta in the emergency room, initially a 6 Western Nimo JR4 guiding catheter was used to intubate the RCA. A choice floppy wire was initially taken however lesion cannot be crossed with this and was ultimately crossed with an HubHuman prowater wire. Lesion was then treated with mechanical thrombectomy with the penumbra device. Lesion was then treated with angioplasty with 3 mm compliant and noncompliant balloons. Atherectomy was also performed with 3 and 3.5 mm cutting balloons. IVUS was taken but would not advance into the distal vessel and as such the 6 Western Nimo guide catheter was removed and the radial sheath was upsized to a 6/7 Western Nimo sheath and then a 7 Western Nimo AL 0.75 guiding catheter was used to reintubate the RCA. A workhorse wire was then taken and the lesion was crossed again. With this equipment as well as a guide extension catheter, IVUS was able to be performed which showed minimal luminal diameter distally of 3 to 3.5 mm and proximally 3.5 to 4 mm. As such the lesions were stented from distal to proximal with Medtronic resolute Termo 3 x 38 mm drug-eluting stent as well as a 3 x 34 mm drug-eluting stent. Stents were postdilated with 3, 3.5 and 4 mm noncompliant balloons. Follow-up IVUS showed good stent apposition/expansion. There was 0% residual stenosis. There was RADHA 0 flow before PCI and RADHA-3 flow after PCI. Integrilin was given during the procedure, this will run for 12 hours. During procedure, there is bradycardia and hypotension, patient was given atropine and supported with phenylephrine, this was able to be weaned at the end of the procedure. CONCLUSIONS:     Successful PCI of RCA with 2 drug-eluting stents    Consider staged PCI of LAD and D1 as outpatient, patient would like to follow-up with [facility] pending discharge in the next 1 to 2 days.

## 2021-02-22 NOTE — PROGRESS NOTES
2 mL of air removed from TR band. Pt immediately started bleeding. 2 mL of air put back in. Will monitor.

## 2021-02-23 PROBLEM — E43 SEVERE MALNUTRITION (HCC): Chronic | Status: ACTIVE | Noted: 2021-02-23

## 2021-02-23 LAB
ANION GAP SERPL CALCULATED.3IONS-SCNC: 8 MMOL/L (ref 3–16)
BASOPHILS ABSOLUTE: 0.2 K/UL (ref 0–0.2)
BASOPHILS RELATIVE PERCENT: 2.1 %
BUN BLDV-MCNC: 12 MG/DL (ref 7–20)
CALCIUM SERPL-MCNC: 8.7 MG/DL (ref 8.3–10.6)
CHLORIDE BLD-SCNC: 106 MMOL/L (ref 99–110)
CO2: 25 MMOL/L (ref 21–32)
CREAT SERPL-MCNC: 1.3 MG/DL (ref 0.8–1.3)
EKG ATRIAL RATE: 80 BPM
EKG ATRIAL RATE: 83 BPM
EKG DIAGNOSIS: NORMAL
EKG DIAGNOSIS: NORMAL
EKG P AXIS: 61 DEGREES
EKG P AXIS: 69 DEGREES
EKG P-R INTERVAL: 146 MS
EKG P-R INTERVAL: 152 MS
EKG Q-T INTERVAL: 370 MS
EKG Q-T INTERVAL: 384 MS
EKG QRS DURATION: 74 MS
EKG QRS DURATION: 76 MS
EKG QTC CALCULATION (BAZETT): 434 MS
EKG QTC CALCULATION (BAZETT): 442 MS
EKG R AXIS: -35 DEGREES
EKG R AXIS: -35 DEGREES
EKG T AXIS: 76 DEGREES
EKG T AXIS: 79 DEGREES
EKG VENTRICULAR RATE: 80 BPM
EKG VENTRICULAR RATE: 83 BPM
EOSINOPHILS ABSOLUTE: 0.2 K/UL (ref 0–0.6)
EOSINOPHILS RELATIVE PERCENT: 1.6 %
GFR AFRICAN AMERICAN: >60
GFR NON-AFRICAN AMERICAN: 55
GLUCOSE BLD-MCNC: 92 MG/DL (ref 70–99)
HCT VFR BLD CALC: 38.4 % (ref 40.5–52.5)
HEMOGLOBIN: 12.8 G/DL (ref 13.5–17.5)
LYMPHOCYTES ABSOLUTE: 1.8 K/UL (ref 1–5.1)
LYMPHOCYTES RELATIVE PERCENT: 15.8 %
MCH RBC QN AUTO: 30.4 PG (ref 26–34)
MCHC RBC AUTO-ENTMCNC: 33.2 G/DL (ref 31–36)
MCV RBC AUTO: 91.4 FL (ref 80–100)
MONOCYTES ABSOLUTE: 0.7 K/UL (ref 0–1.3)
MONOCYTES RELATIVE PERCENT: 6.1 %
NEUTROPHILS ABSOLUTE: 8.5 K/UL (ref 1.7–7.7)
NEUTROPHILS RELATIVE PERCENT: 74.4 %
PDW BLD-RTO: 13.3 % (ref 12.4–15.4)
PLATELET # BLD: 272 K/UL (ref 135–450)
PMV BLD AUTO: 6.6 FL (ref 5–10.5)
POTASSIUM SERPL-SCNC: 4.4 MMOL/L (ref 3.5–5.1)
RBC # BLD: 4.2 M/UL (ref 4.2–5.9)
SODIUM BLD-SCNC: 139 MMOL/L (ref 136–145)
WBC # BLD: 11.5 K/UL (ref 4–11)

## 2021-02-23 PROCEDURE — 99232 SBSQ HOSP IP/OBS MODERATE 35: CPT | Performed by: NURSE PRACTITIONER

## 2021-02-23 PROCEDURE — 94640 AIRWAY INHALATION TREATMENT: CPT

## 2021-02-23 PROCEDURE — 6370000000 HC RX 637 (ALT 250 FOR IP): Performed by: INTERNAL MEDICINE

## 2021-02-23 PROCEDURE — 94761 N-INVAS EAR/PLS OXIMETRY MLT: CPT

## 2021-02-23 PROCEDURE — 6360000002 HC RX W HCPCS

## 2021-02-23 PROCEDURE — 2700000000 HC OXYGEN THERAPY PER DAY

## 2021-02-23 PROCEDURE — 6370000000 HC RX 637 (ALT 250 FOR IP): Performed by: FAMILY MEDICINE

## 2021-02-23 PROCEDURE — 97166 OT EVAL MOD COMPLEX 45 MIN: CPT

## 2021-02-23 PROCEDURE — 36415 COLL VENOUS BLD VENIPUNCTURE: CPT

## 2021-02-23 PROCEDURE — 2580000003 HC RX 258: Performed by: FAMILY MEDICINE

## 2021-02-23 PROCEDURE — 97162 PT EVAL MOD COMPLEX 30 MIN: CPT

## 2021-02-23 PROCEDURE — 2000000000 HC ICU R&B

## 2021-02-23 PROCEDURE — 93010 ELECTROCARDIOGRAM REPORT: CPT | Performed by: INTERNAL MEDICINE

## 2021-02-23 PROCEDURE — 85025 COMPLETE CBC W/AUTO DIFF WBC: CPT

## 2021-02-23 PROCEDURE — 80048 BASIC METABOLIC PNL TOTAL CA: CPT

## 2021-02-23 RX ORDER — ONDANSETRON 2 MG/ML
INJECTION INTRAMUSCULAR; INTRAVENOUS
Status: COMPLETED
Start: 2021-02-23 | End: 2021-02-23

## 2021-02-23 RX ADMIN — MUPIROCIN: 20 OINTMENT TOPICAL at 19:54

## 2021-02-23 RX ADMIN — TICAGRELOR 90 MG: 90 TABLET ORAL at 00:45

## 2021-02-23 RX ADMIN — TICAGRELOR 90 MG: 90 TABLET ORAL at 19:54

## 2021-02-23 RX ADMIN — TICAGRELOR 90 MG: 90 TABLET ORAL at 08:59

## 2021-02-23 RX ADMIN — SODIUM CHLORIDE: 9 INJECTION, SOLUTION INTRAVENOUS at 07:42

## 2021-02-23 RX ADMIN — ASPIRIN 81 MG: 81 TABLET, CHEWABLE ORAL at 08:59

## 2021-02-23 RX ADMIN — LOSARTAN POTASSIUM 25 MG: 25 TABLET, FILM COATED ORAL at 08:58

## 2021-02-23 RX ADMIN — GLYCOPYRROLATE AND FORMOTEROL FUMARATE 2 PUFF: 9; 4.8 AEROSOL, METERED RESPIRATORY (INHALATION) at 20:17

## 2021-02-23 RX ADMIN — ONDANSETRON 4 MG: 2 INJECTION INTRAMUSCULAR; INTRAVENOUS at 06:48

## 2021-02-23 RX ADMIN — CARVEDILOL 6.25 MG: 6.25 TABLET, FILM COATED ORAL at 17:45

## 2021-02-23 RX ADMIN — MUPIROCIN: 20 OINTMENT TOPICAL at 08:59

## 2021-02-23 RX ADMIN — ATORVASTATIN CALCIUM 80 MG: 80 TABLET, FILM COATED ORAL at 19:54

## 2021-02-23 RX ADMIN — CARVEDILOL 6.25 MG: 6.25 TABLET, FILM COATED ORAL at 08:59

## 2021-02-23 RX ADMIN — GLYCOPYRROLATE AND FORMOTEROL FUMARATE 2 PUFF: 9; 4.8 AEROSOL, METERED RESPIRATORY (INHALATION) at 08:41

## 2021-02-23 ASSESSMENT — ENCOUNTER SYMPTOMS
GASTROINTESTINAL NEGATIVE: 1
RESPIRATORY NEGATIVE: 1

## 2021-02-23 ASSESSMENT — PAIN SCALES - GENERAL
PAINLEVEL_OUTOF10: 0

## 2021-02-23 ASSESSMENT — PAIN DESCRIPTION - PROGRESSION: CLINICAL_PROGRESSION: NOT CHANGED

## 2021-02-23 NOTE — CARE COORDINATION
CASE MANAGEMENT INITIAL ASSESSMENT      Reviewed chart and completed assessment via telephone with:  Explained Case Management role/services. To patient    Primary contact information:see below    Health Care Decision Maker :   Primary Decision Maker: Bertha Bautista - Brother/Sister - [phone]    Secondary Decision Maker: Adán Rhodes - Brother/Sister - [phone]          Can this person be reached and be able to respond quickly, such as within a few minutes or hours? Yes    Admit date/status:Inpatient 2/22/2021  Diagnosis:STEMI   Is this a Readmission?:  No      Insurance:Medicare   Precert required for SNF: No       3 night stay required: Yes    Living arrangements, Adls, care needs, prior to admission:Lives alone still drives    Transportation:TBD    Durable Medical Equipment at home:  Walker__Cane_X_RTS__ BSC__Shower Chair__  02__ HHN__ CPAP__  BiPap__  Hospital Bed__ W/C___ Other__________    Services in the home and/or outpatient, prior to admission:None identified    PT/OT recs:N/A    Hospital Exemption Notification (HEN):N/A    Barriers to discharge:None identified    Plan/comments: To return home to previous level of support and independence. Patient denies need for services. He sees Dr. Jillian Santiago at [facility]. Patient here for STEMI with 2 SAMM to RCA. Subsequently he also is having nausea and anorexia and GI was consulted for possible EGD in AM. Will follow and assist with discharge plan as needed.      ECOC on chart for MD signature

## 2021-02-23 NOTE — CONSULTS
Gastroenterology Consult Note    Patient:   Sukhdeep Torres   YOB: 1955   Facility:   [facility]   Referring/PCP: No primary care provider on file. Date:     2/23/2021  Consultant:   Holly Meza MD    Subjective: This 60 y.o. male was admitted 2/22/2021 with a diagnosis of \"STEMI (ST elevation myocardial infarction) ([facility] [address] [I21.3]\" and is seen in consultation regarding \"nausea\". Information was obtained from interview of  the patient, examination of the patient, and review of records. I did  update the past medical, surgical, social and / or family history. Nausea for months moderate in GI tract assoc w wt loss    Current status  Present  Diet Order: DIET CARDIAC; Dietary Nutrition Supplements: Frozen Oral Supplement and he is tolerating diet. Recently, he has experienced no abdominal  Pain and he has not required Intravenous narcotic analgesics. The patient has also experienced no diarrhea, fever, hematochezia, melena and vomiting      Prior to Admission medications    Medication Sig Start Date End Date Taking?  Authorizing Provider   umeclidinium-vilanterol (ANORO ELLIPTA) 62.5-25 MCG/INH AEPB inhaler Inhale 1 puff into the lungs daily 6/11/20   Vinod Isaac MD   aspirin 325 MG tablet Take 325 mg by mouth    Historical Provider, MD   amLODIPine (NORVASC) 5 MG tablet TAKE ONE TABLET BY MOUTH DAILY 2/14/20   Historical Provider, MD   valsartan-hydrochlorothiazide (DIOVAN-HCT) 160-12.5 MG per tablet Take 1 tablet by mouth 9/13/19 3/11/20  Historical Provider, MD   pitavastatin (LIVALO) 2 MG TABS tablet Take 2 mg by mouth 9/13/19 3/11/20  Historical Provider, MD   albuterol sulfate HFA (VENTOLIN HFA) 108 (90 Base) MCG/ACT inhaler Inhale 2 puffs into the lungs every 6 hours as needed for Wheezing or Shortness of Breath 3/3/20   Vinod Isaac MD   albuterol (PROVENTIL) (2.5 MG/3ML) 0.083% nebulizer solution Take 3 mLs by nebulization every 6 hours as needed for Wheezing 3/3/20   Salvador Eden MD   nicotine polacrilex (NICORETTE) 4 MG lozenge Take 1 lozenge by mouth 4 times daily as needed for Smoking cessation  Patient not taking: Reported on 2020 3/3/20   Salvador Eden MD      Scheduled Medications:    mupirocin   Nasal BID    aspirin  81 mg Oral Daily    ticagrelor  90 mg Oral BID    losartan  25 mg Oral Daily    carvedilol  6.25 mg Oral BID WC    atorvastatin  80 mg Oral Nightly    glycopyrrolate-formoterol  2 puff Inhalation BID     Infusions:    phenylephrine (WENDY-SYNEPHRINE) 50mg/250mL infusion Stopped (21 2300)    sodium chloride 100 mL/hr at 21 0742     PRN Medications: perflutren lipid microspheres, albuterol, hydrOXYzine  Allergies: No Known Allergies    Past Medical History:   Diagnosis Date    CAD (coronary artery disease)     Emphyse[facility] lung ([facility] [address]     Heart attack ([facility] [address]     2013    Hypertension     Lung disease      Past Surgical History:   Procedure Laterality Date    DIAGNOSTIC CARDIAC CATH LAB PROCEDURE      THORACIC AORTIC ANEURYSM REPAIR         Social:   Social History     Tobacco Use    Smoking status: Former Smoker     Packs/day: 2.00     Years: 50.00     Pack years: 100.00     Types: Cigarettes     Quit date: 2020     Years since quittin.7    Smokeless tobacco: Never Used   Substance Use Topics    Alcohol use: Never     Frequency: Never     Family: No family history on file. ROS: Pertinent items are noted in HPI.     Objective:   Vital Signs:  Temp (24hrs), Av °F (36.7 °C), Min:97.7 °F (36.5 °C), Max:98.7 °F (92.0 °C)     Systolic (29GBN), PQD:811 , Min:83 , HHZ:137      Diastolic (71SGW), VU, Min:40, Max:92     Pulse  Av.3  Min: 55  Max: 93  /79   Pulse 55   Temp 98.7 °F (37.1 °C) (Axillary)   Resp 16   Ht 5' 9\" (1.753 m)   Wt 139 lb 1.8 oz (63.1 kg)   SpO2 98%   BMI 20.54 kg/m²      Physical Exam:   BP (!) 158/97   Pulse 61   Temp 98.8 °F (37.1 °C) (Oral)   Resp 16 Ht 5' 9\" (1.753 m)   Wt 141 lb 1.5 oz (64 kg)   SpO2 97%   BMI 20.84 kg/m²   General appearance: alert, appears stated age and cooperative  Lungs: clear to auscultation bilaterally  Chest wall: no tenderness  Heart: regular rate and rhythm, S1, S2 normal, no murmur, click, rub or gallop  Abdomen: soft, non-tender; bowel sounds normal; no masses,  no organomegaly  Extremities: extremities normal, atraumatic, no cyanosis or edema  Skin: Skin color, texture, turgor normal. No rashes or lesions  Neurologic: Grossly normal    Lab and Imaging Review   Recent Labs     02/22/21 2136 02/23/21  0419   WBC 11.1* 11.5*   HGB 13.1* 12.8*   MCV 90.4 91.4    272    139   K 3.8 4.4    106   CO2 25 25   BUN 12 12   CREATININE 1.4* 1.3   GLUCOSE 105* 92   CALCIUM 9.1 8.7   LABALBU 3.6  --        Assessment:     Patient Active Problem List    Diagnosis Date Noted    STEMI (ST elevation myocardial infarction) ([facility] [address] 02/22/2021    Acute myocardial infarction (HCC)     Mod[facility]ch[facility] 03/03/2020    Cigarette nicotine dependence without complication 23/54/1253     61 yo w COPD, HTN, CAD admitted for STEMI and underwent PCI/stent placement on 2/22/21. He reports many months of nausea, anorexia and wt loss. Had reportedly colon polyps 9 yrs ago. Plan:   1. Agree w supportive care  2. Needs wt loss w/u starting w EGD when cleared by Cardiology to do in am, as well as CT and colonoscopy in near future  3.  Will follow    Rafael Aldana MD       O) [phone]

## 2021-02-23 NOTE — PROGRESS NOTES
Consult received and appreciated  Full note to follow    61 yo w COPD, HTN, CAD admitted for STEMI and underwent PCI/stent placement on 2/22/21. He reports many months of nausea, anorexia and wt loss. Plan:   1. Agree w supportive care  2. Needs wt loss w/u starting w EGD when cleared by Cardiology, as well as CT and colonoscopy if not recently done  3.  Will follow    Mer Louis MD       (O) [phone]

## 2021-02-23 NOTE — PROGRESS NOTES
RESPIRATORY THERAPY ASSESSMENT    Name:  Lebron Almonte  Medical Record Number:  [id number]  Age: 60 y.o. Gender: male  : 1955  Today's Date:  2021  Room:  [address] [place] [address]-    Assessment     Is the patient being admitted for a COPD or Asthma exacerbation? No   (If yes the patient will be seen every 4 hours for the first 24 hours and then reassessed)    Patient Admission Diagnosis      Allergies  No Known Allergies    Minimum Predicted Vital Capacity:     n/a          Actual Vital Capacity:      n/a              Pulmonary History:COPD and CAD  Home Oxygen Therapy:  room air  Home Respiratory Therapy:Albuterol 06prn, Anora Ellipta  Current Respiratory Therapy:  Albuterol q6prn HHN, Bevespi BID  Treatment Type: MDI  Medications: Formoterol Fumarate/Glycopyrrolate    Respiratory Severity Index(RSI)   Patients with orders for inhalation medications, oxygen, or any therapeutic treatment modality will be placed on Respiratory Protocol. They will be assessed with the first treatment and at least every 72 hours thereafter. The following severity scale will be used to determine frequency of treatment intervention.     Smoking History: Pulmonary Disease or Smoking History, Greater than 15 pack year = 2    Social History  Social History     Tobacco Use    Smoking status: Former Smoker     Packs/day: 2.00     Years: 50.00     Pack years: 100.00     Types: Cigarettes     Quit date: 2020     Years since quittin.7    Smokeless tobacco: Never Used   Substance Use Topics    Alcohol use: Never     Frequency: Never    Drug use: Not on file       Recent Surgical History: Surgery of Extremities = 1  Past Surgical History  Past Surgical History:   Procedure Laterality Date    DIAGNOSTIC CARDIAC CATH LAB PROCEDURE      THORACIC AORTIC ANEURYSM REPAIR         Level of Consciousness: Alert, Oriented, and Cooperative = 0    Level of Activity: Walking unassisted = 0    Respiratory Pattern: Regular Pattern; RR 8-20 = 0    Breath Sounds: Clear = 0    Sputum   ,  ,    Cough: Strong, spontaneous, non-productive = 0    Vital Signs   /67   Pulse 61   Temp 97.9 °F (36.6 °C) (Axillary)   Resp 21   Ht 5' 9\" (1.753 m)   Wt 145 lb (65.8 kg)   SpO2 99%   BMI 21.41 kg/m²   SPO2 (COPD values may differ): Greater than or equal to 92% on room air = 0    Peak Flow (asthma only): not applicable = 0    RSI: 5-6 = Q4hr PRN (every four hours as needed) for dyspnea        Plan       Goals: medication delivery    Patient/caregiver was educated on the proper method of use for Respiratory Care Devices:  Yes      Level of patient/caregiver understanding able to:   ? Verbalize understanding   ? Demonstrate understanding       ? Teach back        ? Needs reinforcement       ? No available caregiver               ? Other:     Response to education:  Excellent     Is patient being placed on Home Treatment Regimen? Yes     Does the patient have everything they need prior to discharge? NA     Comments: Patient and chart assessed    Plan of Care: Home regimen    Electronically signed by Cassi Castillo RCP on 2/22/2021 at 8:50 PM    Respiratory Protocol Guidelines     1. Assessment and treatment by Respiratory Therapy will be initiated for medication and therapeutic interventions upon initiation of aerosolized medication. 2. Physician will be contacted for respiratory rate (RR) greater than 35 breaths per minute. Therapy will be held for heart rate (HR) greater than 140 beats per minute, pending direction from physician. 3. Bronchodilators will be administered via Metered Dose Inhaler (MDI) with spacer when the following criteria are met:  a. Alert and cooperative     b. HR < 140 bpm  c. RR < 30 bpm                d. Can demonstrate a 2-3 second inspiratory hold  4. Bronchodilators will be administered via Hand Held Nebulizer [facility]) to patients when ANY of the following criteria are met  a. Incognizant or uncooperative          b.  Patients

## 2021-02-23 NOTE — PROGRESS NOTES
Occupational Therapy   Occupational Therapy Initial Assessment  Date: 2021   Patient Name: Yulia Maloney  MRN: [id number]     : 1955    Date of Service: 2021    Discharge Recommendations:  Subacute/Skilled Nursing Facility       Assessment   Performance deficits / Impairments: Decreased balance;Decreased safe awareness;Decreased functional mobility ; Decreased ADL status; Decreased endurance;Decreased high-level IADLs;Decreased strength  Assessment: pt reports normally independent with high level IADL's & functional mobility without AD, but falling at home; now s/p STEMI requiring mod assist with bathroom mobility with significant Loss of balance & increased SOB with minimal exertion; pt to benefit from skilled OT services  OT Education: OT Role;Plan of Care  REQUIRES OT FOLLOW UP: Yes  Activity Tolerance  Activity Tolerance: Patient limited by fatigue  Activity Tolerance: vitals stable on RA:  BP = supine:  BP = 122/92, 99 % O 2 sats on RA, HR = 103;  sitting EOB: BP = 122/79, HR = 61, 99 % O 2 sats on RA  Safety Devices  Safety Devices in place: Yes  Type of devices: Call light within reach; Left in bed           Patient Diagnosis(es): The encounter diagnosis was ST elevation myocardial infarction (STEMI), unspecified artery ([facility] [address] has a past medical history of CAD (coronary artery disease), Emphysema of lung ([facility] [address] Heart attack ([facility] [address] Hypertension, and Lung disease. has a past surgical history that includes Diagnostic Cardiac Cath Lab Procedure and Thoracic aortic aneurysm repair ().            Restrictions  Restrictions/Precautions  Restrictions/Precautions: General Precautions, Fall Risk  Position Activity Restriction  Other position/activity restrictions: IV, telemetry    Subjective   General  Chart Reviewed: Yes  Patient assessed for rehabilitation services?: Yes  Family / Caregiver Present: No  Referring Practitioner: Dr. Bianca Martinez  Diagnosis: STEMI; s/p cardiac cath 21  General Comment  Comments: RN clerared pt for OT eval; pt resting in bed, agreeable to OT eval  Patient Currently in Pain: Denies    Social/Functional History  Social/Functional History  Lives With: Alone  Type of Home: House  Home Layout: One level, Laundry in basement  Home Access: Stairs to enter with rails  Entrance Stairs - Number of Steps: 3 JUDI  Bathroom Shower/Tub: Tub/Shower unit  Bathroom Toilet: Standard  Bathroom Equipment: Grab bars in shower, Shower chair  ADL Assistance: Independent  Homemaking Assistance: Independent  Homemaking Responsibilities: Yes  Ambulation Assistance: Independent  Transfer Assistance: Independent  Active : Yes  Occupation: On disability  Additional Comments: Pt reports having several falls in last several months       Objective        Orientation  Overall Orientation Status: Within Functional Limits     Balance  Sitting Balance: Supervision  Standing Balance: Moderate assistance(due to multiple significant loss of balance with turns)  Standing Balance  Time: 30 seconds x 2  Activity: to/from bathroom  Functional Mobility  Functional - Mobility Device: No device(refused at this time)  Activity: To/from bathroom  Assist Level: Moderate assistance  Toilet Transfers  Toilet - Technique: Ambulating(mod assist without AD)  Equipment Used: Standard toilet  Toilet Transfer: Minimal assistance  ADL  Feeding: Independent  LE Dressing:  Moderate assistance  Toileting: Contact guard assistance(to min assist due to significant loss of balance with turns)    RUE Tone: Normotonic  LUE Tone: Normotonic  Coordination  Movements Are Fluid And Coordinated: No  Coordination and Movement description: Gross motor impairments;Right UE(due IV arm board on Right UE)        Transfers  Sit to stand: Minimal assistance  Stand to sit: Minimal assistance  Transfer Comments: loss of balance with postural changes     Vision - Basic Assessment  Prior Vision: Wears glasses only for reading     Cognition  Overall
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
## 2021-02-23 NOTE — PLAN OF CARE
TR Band air weaned off and band removed. No bleeding, oozing, no hematoma, and a small amount of bruising is present. Peripheral pulses WNL +2 with good capillary refill. Cleaned the area with Chloraprep and placed a sterile dressing over the PCI site. Placed a wrist stabilizer on the patient , as he continues to bend , flex, push and pull with his wrist despite continued education. Patient is not receptive to education at this time. Will continue to monitor.

## 2021-02-23 NOTE — PLAN OF CARE
Problem: Falls - Risk of:  Goal: Will remain free from falls  Description: Will remain free from falls  Outcome: Ongoing - patient's bed is in the lowest and locked position. Hourly rounding for pain, potty, and positioning      Problem: Pain:  Goal: Pain level will decrease  Description: Pain level will decrease  Outcome: Ongoing - patient not complaining of pain, but he his is very grumpy and not communicating his needs right now     Problem: Fluid Volume - Imbalance:  Goal: Absence of imbalanced fluid volume signs and symptoms  Description: Absence of imbalanced fluid volume signs and symptoms  Outcome: Ongoing - patient is receiveing 100ml wor     Problem: Anxiety:  Goal: Level of anxiety will decrease  Description: Level of anxiety will decrease  Outcome: Ongoing - patient is verbally combative toward discussing his plan of care for the shift.  Hopefully, this will resolve with t a good night      Problem: Tissue Perfusion - Cardiopulmonary, Altered:  Goal: Absence of angina  Description: Absence of angina  Outcome: Ongoing - patient without complaints of chest pain

## 2021-02-23 NOTE — PROGRESS NOTES
Comprehensive Nutrition Assessment    Type and Reason for Visit:  Positive Nutrition Screen(Significant weight loss (14-23lbs), decreased appetite)    Nutrition Recommendations/Plan:   1. Continue current diet (Cardiac Diet)   2. Begin Magic Cups TID (breakfast, lunch, dinner, preferably chocolate per pt request)  3. Monitor PO intake, nutrition adequacy, pertinent labs, bowel habits, wt changes, and clinical progress    Nutrition Assessment:  Pt admitted d/t chest pain. Hx of CAD, HTN. Cardiac cath procedure 2/22. C/o chronic constipation and nausea that has been occuring ~6 months with ~ 25# wt loss per pt report. Pt lives alone and says he usually cooks for self and saves the left overs for later, but says in the past few months that he has not been cooking much or saving food for later. Discussed with pt the importance of PO intake and the possibiliy of ONS. Pt says he is favorable to try ONS, but is wary. Continue to monitor. Malnutrition Assessment:  Malnutrition Status:  Severe malnutrition    Context:  Chronic Illness     Findings of the 6 clinical characteristics of malnutrition:  Energy Intake:  7 - 75% or less estimated energy requirements for 1 month or longer  Weight Loss:  7 - Greater than 10% over 6 months       Estimated Daily Nutrient Needs:  Energy (kcal):  1446-1808kcal; Weight Used for Energy Requirements:  Ideal(72.3kg)     Protein (g):  70-101g; Weight Used for Protein Requirements:  Ideal(1.0-1.4g/kg)        Fluid (ml/day):   ; Method Used for Fluid Requirements:  1 ml/kcal      Nutrition Related Findings:  NC @ 2L/min. BM 2/21 per pt report. Wounds:  Surgical Incision       Current Nutrition Therapies:    DIET CARDIAC;   Dietary Nutrition Supplements: Frozen Oral Supplement    Anthropometric Measures:  · Height: 5' 9\" (175.3 cm)  · Current Body Weight: 139 lb (63 kg)   · Usual Body Weight: 162 lb (73.5 kg)     · Ideal Body Weight: 160 lbs; % Ideal Body Weight 86.9 %   · BMI: 20.5  · BMI Categories: Normal Weight (BMI 18.5-24. 9)       Nutrition Diagnosis:   · Inadequate oral intake related to pain, acute injury/trauma as evidenced by poor intake prior to admission, intake 0-25%, weight loss, nausea(14% weight loss in 6 months)      Nutrition Interventions:   Food and/or Nutrient Delivery:  Continue Oral Nutrition Supplement  Nutrition Education/Counseling:  No recommendation at this time   Coordination of Nutrition Care:  Continue to monitor while inpatient    Goals:  Patient will consume at least 50% of meals and ONS this admission. Nutrition Monitoring and Evaluation:   Behavioral-Environmental Outcomes:  None Identified   Food/Nutrient Intake Outcomes:  Food and Nutrient Intake, Supplement Intake  Physical Signs/Symptoms Outcomes:  Constipation, GI Status, Nausea or Vomiting, Weight, Biochemical Data     Discharge Planning:     Too soon to determine     Electronically signed by Latha Gunter Dietetic Intern on 2/23/21 at 2:48 PM EST    Contact: [phone]

## 2021-02-23 NOTE — PROCEDURES
CARDIAC CATHETERIZATION REPORT     Procedure Date:  2021  Patient Name: Laya Wright  MRN: [id number] : 1955      INDICATION     Acute inferior STEMI    PROCEDURES PERFORMED     Left heart catheterization  LVgram  Coronary angiogam  Coronary cath  Mechanical thrombectomy of RCA  Atherectomy of RCA  IVUS of RCA  PCI of RCA with 2 drug-eluting stents          PROCEDURE DESCRIPTION   This was felt to be an emergency procedure. Patient was prepped draped in the usual sterile fashion. Local anaesthetic was applied over puncture site. Using a back wall technique, a 6 Arabic Terumo sheath was inserted into right radial artery. Nicardipine, nitroglycerin were administered through the sheath. Heparin was administered. Diagnostic 5 [place]ic Medtronic pigtail, ultra catheters were used for diagnostic angiograms which were obtained after PCI, initially a guide catheter was taken and PCI was performed of RCA, see below for details. At the conclusion of the procedure, a TR band was placed over the puncture site and hemostasis was obtained. There were no immediate complications. I supervised sedation with versed 5 mg/fentanyl 150 mcg during the procedure. 210 cc contrast was utilized. <20cc EBL. I attempted to call patient's family on phone numbers are listed, there was no answer, voicemail left to call us back. FINDINGS         LVGRAM    LVEDP  30   GRADIENT ACROSS AORTIC VALVE  less than 10 mmHg   LV FUNCTION EF 45%   WALL MOTION  basal inferior hypokinesis   MITRAL REGURGITATION  mild         CORONARY ARTERIES    LM Less than 10% thkilxek-wxy-dofevs stenosis         LAD Calcified, proximal-mid 70% stenosis, mid vessel bridging is noted, distal 20% stenosis. D1 is a medium to large size vessel with proximal-mid 80% stenosis.        LCX  10 to 20% proximal stenosis, mid-distal 30 to 40% stenosis       RCA Dominant, [id number]-ezg-tjqqma 100% stenosis with thrombus           PERCUTANEOUS INTERVENTION DESCRIPTION     Patient was given heparin and Brilinta in the emergency room, initially a 6 Western Nimo JR4 guiding catheter was used to intubate the RCA. A choice floppy wire was initially taken however lesion cannot be crossed with this and was ultimately crossed with an [employer] prowater wire. Lesion was then treated with mechanical thrombectomy with the penumbra device. Lesion was then treated with angioplasty with 3 mm compliant and noncompliant balloons. Atherectomy was also performed with 3 and 3.5 mm cutting balloons. IVUS was taken but would not advance into the distal vessel and as such the 6 Western Nimo guide catheter was removed and the radial sheath was upsized to a 6/7 Western Nimo sheath and then a 7 Western Nimo AL 0.75 guiding catheter was used to reintubate the RCA. A workhorse wire was then taken and the lesion was crossed again. With this equipment as well as a guide extension catheter, IVUS was able to be performed which showed minimal luminal diameter distally of 3 to 3.5 mm and proximally 3.5 to 4 mm. As such the lesions were stented from distal to proximal with Medtronic resolute [place] 3 x 38 mm drug-eluting stent as well as a 3 x 34 mm drug-eluting stent. Stents were postdilated with 3, 3.5 and 4 mm noncompliant balloons. Follow-up IVUS showed good stent apposition/expansion. There was 0% residual stenosis. There was RADHA 0 flow before PCI and RADHA-3 flow after PCI. Integrilin was given during the procedure, this will run for 12 hours. During procedure, there is bradycardia and hypotension, patient was given atropine and supported with phenylephrine, this was able to be weaned at the end of the procedure. CONCLUSIONS:     Successful PCI of RCA with 2 drug-eluting stents    Consider staged PCI of LAD and D1 as outpatient, patient would like to follow-up with [facility] pending discharge in the next 1 to 2 days.

## 2021-02-23 NOTE — PROGRESS NOTES
Physical Therapy    Facility/Department: [facility] C2 CARD TELEMETRY  Initial Assessment    NAME: Brando Marquis  : 1955  MRN: [id number]    Date of Service: 2021    Discharge Recommendations:  Subacute/Skilled Nursing Facility   PT Equipment Recommendations  Equipment Needed: No    Assessment   Body structures, Functions, Activity limitations: Decreased functional mobility ; Decreased balance;Decreased safe awareness;Decreased cognition;Decreased endurance  Assessment: Pt is a 59 y/o male who presents with STEMI. Pt was independent prior to admit living alone in single story home. Pt has a history of multiple falls. Pt currently requires CGA for transfers and up to mod A for ambulation without device secondary to loss of balance. Pt is unsteady and fatigues quickly with activity. Pt would benefit from continued skilled PT to address these limitations. Recommend SNF for continued therapy due to current limitations. Treatment Diagnosis: impaired functional mobility  Prognosis: Good  Decision Making: Medium Complexity  PT Education: Goals;PT Role;General Safety; Disease Specific Education; Functional Mobility Training;Transfer Training;Energy Conservation  Patient Education: Educated on initiating rest breaks secondary to fatigue and limited endurance - will require reinforcement  REQUIRES PT FOLLOW UP: Yes  Activity Tolerance  Activity Tolerance: Patient limited by fatigue;Patient limited by endurance  Activity Tolerance: /92, HR 64, SpO2 100%       Patient Diagnosis(es): The encounter diagnosis was ST elevation myocardial infarction (STEMI), unspecified artery ([facility] [address] has a past medical history of CAD (coronary artery disease), Emphysema of lung ([facility] [address] Heart attack ([facility] [address] Hypertension, and Lung disease. has a past surgical history that includes Diagnostic Cardiac Cath Lab Procedure and Thoracic aortic aneurysm repair ().     Restrictions  Restrictions/Precautions  Restrictions/Precautions: General Precautions, Fall Risk  Position Activity Restriction  Other position/activity restrictions: IV, telemetry  Vision/Hearing  Vision: Impaired  Vision Exceptions: Wears glasses for reading  Hearing: Within functional limits     Subjective  General  Chart Reviewed: Yes  Patient assessed for rehabilitation services?: Yes  Additional Pertinent Hx: Per H&P, \"59 y.o. male who presented to [facility] with chest pain and tightness that started this morning. Pt states he has been stressed over the past few days due to car issues. This morning while dealing with car issues he became very upset and decided to try and relax by drinking some coffee while sitting on the couch with his feet up. While doing this he began to feel a tightness in his chest. Pt states he has had MI in the past and immediatly knew this was a heart attack. Janet Greens it felt the exact same as previous MI. He waited about 45 minutes for the tightness to go away, however it never went away so he called the squad. He was transported to [facility] where he was transported by helicopter to [facility]. He was taken to cath lab and cardiology performed cath through the wrist. I saw the pt soon after he was brought to the floor. Pt states he is still having some pain but he has greatly improved since he arrived\"  Family / Caregiver Present: No  Referring Practitioner: Cheryl Brasher MD  Referral Date : 02/23/21  Diagnosis: STEMI  Follows Commands: Within Functional Limits  General Comment  Comments: Pt supine in bed upon arrival. RN cleared pt for therapy. Subjective  Subjective: Pt agreeable to minimal activity. Kept eyes closed for most of session.   Pain Screening  Patient Currently in Pain: Denies  Vital Signs  Patient Currently in Pain: Denies     Orientation  Orientation  Overall Orientation Status: Within Functional Limits  Social/Functional History  Social/Functional History  Lives With: Alone  Type of Home: House  Home Layout: One Score  AM[facility] Inpatient Mobility Raw Score : 16 (02/23/21 1617)  AM-PAC Inpatient T-Scale Score : 40.78 (02/23/21 1617)  Mobility Inpatient CMS 0-100% Score: 54.16 (02/23/21 1617)  Mobility Inpatient CMS G-Code Modifier : CK (02/23/21 1617)        Goals  Short term goals  Time Frame for Short term goals: 3/2/21  Short term goal 1: Pt will perform bed mobility mod I  Short term goal 2: Pt will perform transfers with supervision  Short term goal 3: Pt will ambulate 100' with appropriate AD and SBA  Short term goal 4: By 2/26/21, pt will tolerate 15 reps of LE ther ex for improved strength and activity tolerance  Patient Goals   Patient goals : \"to go back home\"     Therapy Time   Individual Concurrent Group Co-treatment   Time In [facility]         Time Out 272 [facility]         Minutes [address],   [facility]

## 2021-02-23 NOTE — PLAN OF CARE
Patient OOB to chair, with c/o nausea and SOB Resolved with 4mg Zofran and placed the patient on 2L nc for comfort.

## 2021-02-23 NOTE — PROGRESS NOTES
[facility]  Cardiology  Progress Note    Admission date:  2021    Reason for follow up visit:     HPI/CC: Lebron Almonte is a 60 y.o. male who was admitted 2021 for chest pain. EKG showed inferior STEMI. LHC showed 100% RCA treated with thrombectomy and SAMM x2. EF 45% on LV gram. Rhythm has been sinus. Subjective: Denies chest pain, shortness of breath, palpitations and dizziness. Vitals:  Blood pressure 110/62, pulse 70, temperature 98.7 °F (37.1 °C), temperature source Axillary, resp. rate 17, height 5' 9\" (1.753 m), weight 139 lb 1.8 oz (63.1 kg), SpO2 98 %.   Temp  Av.9 °F (36.6 °C)  Min: 97.1 °F (36.2 °C)  Max: 98.7 °F (37.1 °C)  Pulse  Av.6  Min: 50  Max: 93  BP  Min: 83/40  Max: 142/73  SpO2  Av.8 %  Min: 97 %  Max: 100 %    24 hour I/O    Intake/Output Summary (Last 24 hours) at 2021 1044  Last data filed at 2021 0400  Gross per 24 hour   Intake 273.13 ml   Output 1225 ml   Net -951.87 ml     Current Facility-Administered Medications   Medication Dose Route Frequency Provider Last Rate Last Admin    mupirocin (BACTROBAN) 2 % ointment   Nasal BID Oren Correa MD   Given at 21 0859    perflutren lipid microspheres (DEFINITY) injection 1.65 mg  1.5 mL Intravenous ONCE PRN Karly Dick MD        aspirin chewable tablet 81 mg  81 mg Oral Daily Karly Dick MD   81 mg at 21 8070    ticagrelor (BRILINTA) tablet 90 mg  90 mg Oral BID Karly Dick MD   90 mg at 21 4981    losartan (COZAAR) tablet 25 mg  25 mg Oral Daily Karly Dick MD   25 mg at 21 0858    carvedilol (COREG) tablet 6.25 mg  6.25 mg Oral BID  Karly Dick MD   6.25 mg at 21 0859    atorvastatin (LIPITOR) tablet 80 mg  80 mg Oral Nightly Karly Dick MD   80 mg at 21    albuterol (PROVENTIL) nebulizer solution 2.5 mg  2.5 mg Nebulization Q6H PRN Oren Correa MD        glycopyrrolate-formoterol (BEVESPI) 9-4.8 MCG/ACT inhaler 2 puff  2 puff Inhalation BID Armani Gage MD   2 puff at 02/23/21 0841    hydrOXYzine (ATARAX) tablet 25 mg  25 mg Oral Nightly PRN Armani Gage MD   25 mg at 02/22/21 2054    phenylephrine (WENDY-SYNEPHRINE) 50 mg in dextrose 5 % 250 mL infusion   mcg/min Intravenous Continuous Armani Gage MD   Stopped at 02/22/21 2300    0.9 % sodium chloride infusion   Intravenous Continuous Armani Gage  mL/hr at 02/23/21 0742 New Bag at 02/23/21 4409     Review of Systems   Constitutional: Negative. Respiratory: Negative. Cardiovascular: Negative. Gastrointestinal: Negative. Neurological: Negative. Objective:     Telemetry monitor: SR    Physical Exam:  Constitutional:  Comfortable and alert, NAD, appears stated age  Eyes: PERRL, sclera nonicteric  Neck:  Supple, no masses, no thyroidmegaly, no JVD  Skin:  Warm and dry; no rash or lesions  Heart: Regular, normal apex, S1 and S2 normal, no M/G/R  Lungs:  Normal respiratory effort; clear; no wheezing/rhonchi/rales  Abdomen: soft, non tender, + bowel sounds  Extremities:  No edema or cyanosis; no clubbing  Neuro: alert and oriented, moves legs and arms equally, normal mood and affect  Right radial site soft, no hematoma, 2+ pulse    Data Reviewed:    Coronary angiogram 2/22/2021:  Acute inferior STEMI  PROCEDURES PERFORMED    Left heart catheterization  LVgram  Coronary angiogam  Coronary cath  Mechanical thrombectomy of RCA  Atherectomy of RCA  IVUS of RCA  PCI of RCA with 2 drug-eluting stents  PROCEDURE DESCRIPTION   This was felt to be an emergency procedure. Patient was prepped draped in the usual sterile fashion. Local anaesthetic was applied over puncture site. Using a back wall technique, a 6 Kyrgyz Terumo sheath was inserted into right radial artery. Nicardipine, nitroglycerin were administered through the sheath. Heparin was administered.   Diagnostic 5 Cypriot Medtronic pigtail, ultra catheters were used for diagnostic angiograms which were obtained after PCI, initially a guide catheter was taken and PCI was performed of RCA, see below for details. At the conclusion of the procedure, a TR band was placed over the puncture site and hemostasis was obtained. There were no immediate complications. I supervised sedation with versed 5 mg/fentanyl 150 mcg during the procedure. 210 cc contrast was utilized. <20cc EBL.  I attempted to call patient's family on phone numbers are listed, there was no answer, voicemail left to call us back. FINDINGS     LVEDP  30   GRADIENT ACROSS AORTIC VALVE  less than 10 mmHg   LV FUNCTION EF 45%   WALL MOTION  basal inferior hypokinesis   MITRAL REGURGITATION  mild     LM Less than 10% qrajmiqr-ebu-gpjqee stenosis            LAD Calcified, proximal-mid 70% stenosis, mid vessel bridging is noted, distal 20% stenosis.     D1 is a medium to large size vessel with proximal-mid 80% stenosis.       LCX  10 to 20% proximal stenosis, mid-distal 30 to 40% stenosis         RCA Dominant, mfhdyycj-hig-usnwkf 100% stenosis with thrombus      PERCUTANEOUS INTERVENTION DESCRIPTION    Patient was given heparin and Brilinta in the emergency room, initially a 6 [place] guiding catheter was used to intubate the RCA. A choice floppy wire was initially taken however lesion cannot be crossed with this and was ultimately crossed with an [email or web address] wire. Lesion was then treated with mechanical thrombectomy with the penumbra device. Lesion was then treated with angioplasty with 3 mm compliant and noncompliant balloons. Atherectomy was also performed with 3 and 3.5 mm cutting balloons. IVUS was taken but would not advance into the distal vessel and as such the 6 Western Nimo guide catheter was removed and the radial sheath was upsized to a 6/7 Western Nimo sheath and then a 7 Western Nimo AL 0.75 guiding catheter was used to reintubate the RCA. A workhorse wire was then taken and the lesion was crossed again.   With this equipment as well as a guide extension catheter, IVUS was able to be performed which showed minimal luminal diameter distally of 3 to 3.5 mm and proximally 3.5 to 4 mm. As such the lesions were stented from distal to proximal with Medtronic resolute [place] 3 x 38 mm drug-eluting stent as well as a 3 x 34 mm drug-eluting stent. Stents were postdilated with 3, 3.5 and 4 mm noncompliant balloons. Follow-up IVUS showed good stent apposition/expansion. There was 0% residual stenosis. There was RADHA 0 flow before PCI and RADHA-3 flow after PCI. Integrilin was given during the procedure, this will run for 12 hours. During procedure, there is bradycardia and hypotension, patient was given atropine and supported with phenylephrine, this was able to be weaned at the end of the procedure. CONCLUSIONS:    Successful PCI of RCA with 2 drug-eluting stents  Consider staged PCI of LAD and D1 as outpatient, patient would like to follow-up with [facility] pending discharge in the next 1 to 2 days. Echo 1/2020:  - Left ventricle: The cavity size is normal. Wall thickness is    normal. Systolic function was normal. The estimated ejection    fraction was in the range of 58% to 63%. Wall motion was normal;    there were no regional wall motion abnormalities. Normal    diastolic function.  - Aortic valve: Thickening, consistent with sclerosis. - Inferior vena cava: The vessel was normal in size; the    respirophasic diameter changes were in the normal range (&gt;= 50%);    findings are consistent with normal central venous pressure.     Lab Reviewed:     Renal Profile:  Lab Results   Component Value Date    CREATININE 1.3 02/23/2021    BUN 12 02/23/2021     02/23/2021    K 4.4 02/23/2021     02/23/2021    CO2 25 02/23/2021     CBC:    Lab Results   Component Value Date    WBC 11.5 02/23/2021    RBC 4.20 02/23/2021    HGB 12.8 02/23/2021    HCT 38.4 02/23/2021    MCV 91.4 02/23/2021    RDW 13.3 02/23/2021     02/23/2021     BNP:  No results found for: PROBNP  Fasting Lipid Panel:  No results found for: CHOL, HDL, TRIG  Cardiac Enzymes:  CK/MbTroponinNo results found for: CKTOTAL, CKMB, CKMBINDEX, TROPONINI  PT/ INR No results found for: INR, PROTIME  PTT No results found for: PTT No results found for: MG No results found for: TSH    All labs and imaging reviewed today    Assessment:  CAD: angina resolved; s/p thrombectomy and SAMM x2 RCA 2/22/2021; s/p PCI RCA 2012  Ischemic cardiomyopathy: EF 45% on LV gram, await echo  HTN: stable  HLD: need labs, continue statin  History of remote TAA repair  Nausea/anorexia/weight loss: GI evaluation    Plan:   1. Await echo  2. Continue aspirin, statin, carvedilol, losartan, brilinta  3. Check lipids  4. [facility] for GI testing/evaluation; do not hold aspirin or brilinta  5. Ok to discharge from cardiology perspective when ok with others. Follow up will be arranged.      Rafal Awad, SKYLAR-CNP  [facility]  [phone]

## 2021-02-24 VITALS
HEIGHT: 69 IN | SYSTOLIC BLOOD PRESSURE: 159 MMHG | RESPIRATION RATE: 16 BRPM | WEIGHT: 141.09 LBS | DIASTOLIC BLOOD PRESSURE: 83 MMHG | BODY MASS INDEX: 20.9 KG/M2 | OXYGEN SATURATION: 97 % | HEART RATE: 65 BPM | TEMPERATURE: 98.8 F

## 2021-02-24 LAB
ANION GAP SERPL CALCULATED.3IONS-SCNC: 5 MMOL/L (ref 3–16)
BUN BLDV-MCNC: 11 MG/DL (ref 7–20)
CALCIUM SERPL-MCNC: 8.7 MG/DL (ref 8.3–10.6)
CHLORIDE BLD-SCNC: 110 MMOL/L (ref 99–110)
CO2: 25 MMOL/L (ref 21–32)
CREAT SERPL-MCNC: 1.4 MG/DL (ref 0.8–1.3)
GFR AFRICAN AMERICAN: >60
GFR NON-AFRICAN AMERICAN: 51
GLUCOSE BLD-MCNC: 87 MG/DL (ref 70–99)
LV EF: 48 %
LVEF MODALITY: NORMAL
POTASSIUM SERPL-SCNC: 5 MMOL/L (ref 3.5–5.1)
SODIUM BLD-SCNC: 140 MMOL/L (ref 136–145)

## 2021-02-24 PROCEDURE — 80048 BASIC METABOLIC PNL TOTAL CA: CPT

## 2021-02-24 PROCEDURE — 6360000004 HC RX CONTRAST MEDICATION: Performed by: INTERNAL MEDICINE

## 2021-02-24 PROCEDURE — 6370000000 HC RX 637 (ALT 250 FOR IP): Performed by: INTERNAL MEDICINE

## 2021-02-24 PROCEDURE — C8929 TTE W OR WO FOL WCON,DOPPLER: HCPCS

## 2021-02-24 PROCEDURE — 99232 SBSQ HOSP IP/OBS MODERATE 35: CPT | Performed by: NURSE PRACTITIONER

## 2021-02-24 PROCEDURE — 94640 AIRWAY INHALATION TREATMENT: CPT

## 2021-02-24 PROCEDURE — 36415 COLL VENOUS BLD VENIPUNCTURE: CPT

## 2021-02-24 PROCEDURE — 2580000003 HC RX 258: Performed by: FAMILY MEDICINE

## 2021-02-24 PROCEDURE — 6370000000 HC RX 637 (ALT 250 FOR IP): Performed by: FAMILY MEDICINE

## 2021-02-24 RX ORDER — CARVEDILOL 6.25 MG/1
6.25 TABLET ORAL 2 TIMES DAILY WITH MEALS
Qty: 60 TABLET | Refills: 3 | Status: SHIPPED | OUTPATIENT
Start: 2021-02-24 | End: 2021-08-13

## 2021-02-24 RX ORDER — LOSARTAN POTASSIUM 25 MG/1
25 TABLET ORAL DAILY
Qty: 30 TABLET | Refills: 3 | Status: SHIPPED | OUTPATIENT
Start: 2021-02-25 | End: 2021-08-13

## 2021-02-24 RX ORDER — ATORVASTATIN CALCIUM 80 MG/1
80 TABLET, FILM COATED ORAL NIGHTLY
Qty: 30 TABLET | Refills: 3 | Status: ON HOLD | OUTPATIENT
Start: 2021-02-24 | End: 2021-08-11 | Stop reason: SDUPTHER

## 2021-02-24 RX ORDER — ASPIRIN 81 MG/1
81 TABLET, CHEWABLE ORAL DAILY
Qty: 30 TABLET | Refills: 3 | Status: SHIPPED | OUTPATIENT
Start: 2021-02-25

## 2021-02-24 RX ADMIN — CARVEDILOL 6.25 MG: 6.25 TABLET, FILM COATED ORAL at 08:11

## 2021-02-24 RX ADMIN — GLYCOPYRROLATE AND FORMOTEROL FUMARATE 2 PUFF: 9; 4.8 AEROSOL, METERED RESPIRATORY (INHALATION) at 07:35

## 2021-02-24 RX ADMIN — TICAGRELOR 90 MG: 90 TABLET ORAL at 08:11

## 2021-02-24 RX ADMIN — PERFLUTREN 1.65 MG: 6.52 INJECTION, SUSPENSION INTRAVENOUS at 07:28

## 2021-02-24 RX ADMIN — SODIUM CHLORIDE: 9 INJECTION, SOLUTION INTRAVENOUS at 03:45

## 2021-02-24 RX ADMIN — MUPIROCIN: 20 OINTMENT TOPICAL at 08:11

## 2021-02-24 RX ADMIN — LOSARTAN POTASSIUM 25 MG: 25 TABLET, FILM COATED ORAL at 08:11

## 2021-02-24 RX ADMIN — ASPIRIN 81 MG: 81 TABLET, CHEWABLE ORAL at 08:11

## 2021-02-24 ASSESSMENT — ENCOUNTER SYMPTOMS
RESPIRATORY NEGATIVE: 1
GASTROINTESTINAL NEGATIVE: 1

## 2021-02-24 NOTE — DISCHARGE SUMMARY
Hospital Medicine Discharge Summary    Patient ID: Siva Mares      Patient's PCP: No primary care provider on file. Admit Date: 2/22/2021     Discharge Date: 2/24/2021      Admitting Physician: Praveena Briscoe MD     Discharge Physician: Mirna Cee MD     Discharge Diagnoses: Active Hospital Problems    Diagnosis    Severe malnutrition ([facility] [E43]    STEMI (ST elevation myocardial infarction) ([facility] [I21.3]    Acute myocardial infarction [facility]) [I21.9]       The patient was seen and examined on day of discharge and this discharge summary is in conjunction with any daily progress note from day of discharge. Hospital Course:     60 y.o. male who presented to [facility] with chest pain and tightness that started this morning. Pt states he has been stressed over the past few days due to car issues. This morning while dealing with car issues he became very upset and decided to try and relax by drinking some coffee while sitting on the couch with his feet up. While doing this he began to feel a tightness in his chest. Pt states he has had MI in the past and immediatly knew this was a heart attack. Madeleine Castelan it felt the exact same as previous MI. He waited about 45 minutes for the tightness to go away, however it never went away so he called the squad. He was transported to [facility] where he was transported by helicopter to [facility]. He was taken to cath lab and cardiology performed cath through the wrist. I saw the pt soon after he was brought to the floor. Pt states he is still having some pain but he has greatly improved since he arrived. 1. STEMI-PCI of RCA with 2 SAMM. Consider staged PCI of LAD and D1 as outpatient. Cardiology followed. Continued aspirin, statin, beta-blocker, brilinta. ECHO reviewed.      2. COPD- Stable on room air. Continued home ellipta. Albuterol as needed.     3. Tobacco use- encouraged cessation.     4. HTN- Controlled on current meds.      5. follow-up the following most recent labs are provided:      CBC:    Lab Results   Component Value Date    WBC 11.5 02/23/2021    HGB 12.8 02/23/2021    HCT 38.4 02/23/2021     02/23/2021       Renal:    Lab Results   Component Value Date     02/24/2021    K 5.0 02/24/2021     02/24/2021    CO2 25 02/24/2021    BUN 11 02/24/2021    CREATININE 1.4 02/24/2021    CALCIUM 8.7 02/24/2021    PHOS 3.0 02/22/2021         Significant Diagnostic Studies    Radiology:   XR CHEST PORTABLE   Final Result   Left basilar atelectasis. No evidence of CHF                Consults:     IP CONSULT TO HOSPITALIST  IP CONSULT TO GI  IP CONSULT TO HOME CARE NEEDS    Disposition:  Home with home healthcare     Condition at Discharge: Stable    Discharge Instructions/Follow-up: Follow up with GI as out patient. Follow up with cardiology established appointments.      Code Status:  Full Code    Activity: activity as tolerated    Diet: regular diet      Discharge Medications:     Discharge Medication List as of 2/24/2021 12:24 PM           Details   aspirin 81 MG chewable tablet Take 1 tablet by mouth daily, Disp-30 tablet, R-3Normal      atorvastatin (LIPITOR) 80 MG tablet Take 1 tablet by mouth nightly, Disp-30 tablet, R-3Normal      losartan (COZAAR) 25 MG tablet Take 1 tablet by mouth daily, Disp-30 tablet, R-3Normal      carvedilol (COREG) 6.25 MG tablet Take 1 tablet by mouth 2 times daily (with meals), Disp-60 tablet, R-3Normal      ticagrelor (BRILINTA) 90 MG TABS tablet Take 1 tablet by mouth 2 times daily, Disp-60 tablet, R-1Normal              Details   umeclidinium-vilanterol (ANORO ELLIPTA) 62.5-25 MCG/INH AEPB inhaler Inhale 1 puff into the lungs daily, Disp-1 each, R-3Normal      albuterol sulfate HFA (VENTOLIN HFA) 108 (90 Base) MCG/ACT inhaler Inhale 2 puffs into the lungs every 6 hours as needed for Wheezing or Shortness of Breath, Disp-1 Inhaler, R-5Normal      albuterol (PROVENTIL) (2.5 MG/3ML) 0.083% nebulizer solution Take 3 mLs by nebulization every 6 hours as needed for Wheezing, Disp-120 each, R-5Normal             Time Spent on discharge is more than 30 minutes in the examination, evaluation, counseling and review of medications and discharge plan. Signed:    Gustavo Celeste MD   2/24/2021      Thank you No primary care provider on file. for the opportunity to be involved in this patient's care. If you have any questions or concerns please feel free to contact me at [phone].

## 2021-02-24 NOTE — PROGRESS NOTES
[address]  Cardiology  Progress Note    Admission date:  2021    Reason for follow up visit:     HPI/CC: Kalia Reynoso is a 60 y.o. male who was admitted 2021 for chest pain. EKG showed inferior STEMI. LHC showed 100% RCA treated with thrombectomy and SAMM x2. Echo showed EF 45-50%. Rhythm has been sinus. Subjective: Brief shortness of breath today, no chest pain. Vitals:  Blood pressure (!) 159/83, pulse 65, temperature 98.8 °F (37.1 °C), temperature source Oral, resp. rate 16, height 5' 9\" (1.753 m), weight 141 lb 1.5 oz (64 kg), SpO2 97 %.   Temp  Av °F (36.7 °C)  Min: 97.6 °F (36.4 °C)  Max: 98.8 °F (37.1 °C)  Pulse  Av  Min: 54  Max: 79  BP  Min: 100/67  Max: 159/83  SpO2  Av %  Min: 95 %  Max: 99 %    24 hour I/O    Intake/Output Summary (Last 24 hours) at 2021 1227  Last data filed at 2021 0810  Gross per 24 hour   Intake 2640 ml   Output 975 ml   Net 1665 ml     Current Facility-Administered Medications   Medication Dose Route Frequency Provider Last Rate Last Admin    mupirocin (BACTROBAN) 2 % ointment   Nasal BID Radha Rolon MD   Given at 21 2721    aspirin chewable tablet 81 mg  81 mg Oral Daily Jocelin Griffin MD   81 mg at 21 8090    ticagrelor (BRILINTA) tablet 90 mg  90 mg Oral BID Jocelin Griffin MD   90 mg at 21 3118    losartan (COZAAR) tablet 25 mg  25 mg Oral Daily Jocelin Griffin MD   25 mg at 21 5929    carvedilol (COREG) tablet 6.25 mg  6.25 mg Oral BID WC Jocelin Griffin MD   6.25 mg at 21    atorvastatin (LIPITOR) tablet 80 mg  80 mg Oral Nightly Jocelin Griffin MD   80 mg at 21    albuterol (PROVENTIL) nebulizer solution 2.5 mg  2.5 mg Nebulization Q6H PRN Radha Rolon MD        glycopyrrolate-formoterol (BEVESPI) 9-4.8 MCG/ACT inhaler 2 puff  2 puff Inhalation BID Radha Rolon MD   2 puff at 21 0735    hydrOXYzine (ATARAX) tablet 25 mg  25 mg Oral Nightly PRN Olive MURRELL MD Cristóbal   25 mg at 02/22/21 2054    phenylephrine (WENDY-SYNEPHRINE) 50 mg in dextrose 5 % 250 mL infusion   mcg/min Intravenous Continuous Mirna Perez MD   Stopped at 02/22/21 2300    0.9 % sodium chloride infusion   Intravenous Continuous Mirna Perez  mL/hr at 02/24/21 0345 New Bag at 02/24/21 0345     Review of Systems   Constitutional: Negative. Respiratory: Negative. Cardiovascular: Negative. Gastrointestinal: Negative. Neurological: Negative. Objective:     Telemetry monitor: SR    Physical Exam:  Constitutional:  Comfortable and alert, NAD, appears stated age  Eyes: PERRL, sclera nonicteric  Neck:  Supple, no masses, no thyroidmegaly, no JVD  Skin:  Warm and dry; no rash or lesions  Heart: Regular, normal apex, S1 and S2 normal, no M/G/R  Lungs:  Normal respiratory effort; clear; no wheezing/rhonchi/rales  Abdomen: soft, non tender, + bowel sounds  Extremities:  No edema or cyanosis; no clubbing  Neuro: alert and oriented, moves legs and arms equally, normal mood and affect  Right radial site soft, no hematoma, 2+ pulse    Data Reviewed:    Echo 2/24/2021:  The left ventricular systolic function is mildly reduced with an ejection   fraction of 45 - 50 %. There is hypokinesis of the inferolateral, inferior and basal inferior   walls. Left ventricular diastolic filling pressure is normal.   Mild mitral regurgitation. Coronary angiogram 2/22/2021:  Acute inferior STEMI  PROCEDURES PERFORMED    Left heart catheterization  LVgram  Coronary angiogam  Coronary cath  Mechanical thrombectomy of RCA  Atherectomy of RCA  IVUS of RCA  PCI of RCA with 2 drug-eluting stents  PROCEDURE DESCRIPTION   This was felt to be an emergency procedure. Patient was prepped draped in the usual sterile fashion. Local anaesthetic was applied over puncture site. Using a back wall technique, a 6 Icelandic Terumo sheath was inserted into right radial artery.   Nicardipine, nitroglycerin were administered through the sheath. Heparin was administered. Diagnostic 5 Upper sorbian Medtronic pigtail, ultra catheters were used for diagnostic angiograms which were obtained after PCI, initially a guide catheter was taken and PCI was performed of RCA, see below for details. At the conclusion of the procedure, a TR band was placed over the puncture site and hemostasis was obtained. There were no immediate complications. I supervised sedation with versed 5 mg/fentanyl 150 mcg during the procedure. 210 cc contrast was utilized. <20cc EBL.  I attempted to call patient's family on phone numbers are listed, there was no answer, voicemail left to call us back. FINDINGS     LVEDP  30   GRADIENT ACROSS AORTIC VALVE  less than 10 mmHg   LV FUNCTION EF 45%   WALL MOTION  basal inferior hypokinesis   MITRAL REGURGITATION  mild     LM Less than 10% clhhwstw-zqm-kenflc stenosis            LAD Calcified, proximal-mid 70% stenosis, mid vessel bridging is noted, distal 20% stenosis.     D1 is a medium to large size vessel with proximal-mid 80% stenosis.       LCX  10 to 20% proximal stenosis, mid-distal 30 to 40% stenosis         RCA Dominant, imudoxfv-nzb-pvanho 100% stenosis with thrombus      PERCUTANEOUS INTERVENTION DESCRIPTION    Patient was given heparin and Brilinta in the emergency room, initially a 6 Banjo guiding catheter was used to intubate the RCA. A choice floppy wire was initially taken however lesion cannot be crossed with this and was ultimately crossed with an Corsair wire. Lesion was then treated with mechanical thrombectomy with the penumbra device. Lesion was then treated with angioplasty with 3 mm compliant and noncompliant balloons. Atherectomy was also performed with 3 and 3.5 mm cutting balloons.   IVUS was taken but would not advance into the distal vessel and as such the 6 Western Nimo guide catheter was removed and the radial sheath was upsized to a 6/7 Western Nimo sheath and then a 7 Western Nimo AL 0.75 guiding catheter was used to reintubate the RCA. A workhorse wire was then taken and the lesion was crossed again. With this equipment as well as a guide extension catheter, IVUS was able to be performed which showed minimal luminal diameter distally of 3 to 3.5 mm and proximally 3.5 to 4 mm. As such the lesions were stented from distal to proximal with Medtronic resolute [place] 3 x 38 mm drug-eluting stent as well as a 3 x 34 mm drug-eluting stent. Stents were postdilated with 3, 3.5 and 4 mm noncompliant balloons. Follow-up IVUS showed good stent apposition/expansion. There was 0% residual stenosis. There was RADHA 0 flow before PCI and ARDHA-3 flow after PCI. Integrilin was given during the procedure, this will run for 12 hours. During procedure, there is bradycardia and hypotension, patient was given atropine and supported with phenylephrine, this was able to be weaned at the end of the procedure. CONCLUSIONS:    Successful PCI of RCA with 2 drug-eluting stents  Consider staged PCI of LAD and D1 as outpatient, [facility] in the next 1 to 2 days. Echo 1/2020:  - Left ventricle: The cavity size is normal. Wall thickness is    normal. Systolic function was normal. The estimated ejection    fraction was in the range of 58% to 63%. Wall motion was normal;    there were no regional wall motion abnormalities. Normal    diastolic function.  - Aortic valve: Thickening, consistent with sclerosis. - Inferior vena cava: The vessel was normal in size; the    respirophasic diameter changes were in the normal range (&gt;= 50%);    findings are consistent with normal central venous pressure.     Lab Reviewed:     Renal Profile:  Lab Results   Component Value Date    CREATININE 1.4 02/24/2021    BUN 11 02/24/2021     02/24/2021    K 5.0 02/24/2021     02/24/2021    CO2 25 02/24/2021     CBC:    Lab Results   Component Value Date    WBC 11.5 02/23/2021    RBC 4.20 02/23/2021    HGB 12.8 02/23/2021    HCT 38.4 02/23/2021    MCV 91.4 02/23/2021    RDW 13.3 02/23/2021     02/23/2021     BNP:  No results found for: PROBNP  Fasting Lipid Panel:  No results found for: CHOL, HDL, TRIG  Cardiac Enzymes:  CK/MbTroponinNo results found for: CKTOTAL, CKMB, CKMBINDEX, TROPONINI  PT/ INR No results found for: INR, PROTIME  PTT No results found for: PTT No results found for: MG No results found for: TSH    All labs and imaging reviewed today    Assessment:  Dyspnea: possibly related to brilinta  CAD: angina resolved; s/p thrombectomy and SAMM x2 RCA 2/22/2021; s/p PCI RCA 2012  Ischemic cardiomyopathy: EF 45-50% on echo 2/2021  HTN: stable  HLD: need labs, continue statin  History of remote TAA repair  Nausea/anorexia/weight loss: GI evaluation    Plan:   1. Change brilinta if dyspnea worsens  2. Continue aspirin, statin, carvedilol, losartan  3. Check lipids  4. Consider staged PCI LAD/Diag in follow up  5. [address] Lisa Krause for GI testing/evaluation; do not hold aspirin or brilinta  6. Ok to discharge from cardiology perspective. Follow up as planned 3/2/2021 in [address] per patient preference. Previously followed by Dr. Ej Graec.      Yesi Jaeger, APRN-CNP  [facility]  [phone]

## 2021-02-24 NOTE — DISCHARGE INSTR - COC
ADLs:058299900:::0}  Bathing  {CHP DME ADLs:008991084:::0}  Dressing  {CHP DME ADLs:636218587:::0}  Toileting  {CHP DME ADLs:684108790:::0}  Feeding  {CHP DME ADLs:826058355:::0}  Med Admin  {CHP DME ADLs:433120022:::0}  Med Delivery   { BERTARND MED Delivery:326867625:::0}    Wound Care Documentation and Therapy:        Elimination:  Continence:   · Bowel: {YES / KH:88201}  · Bladder: {YES / RX:09070}  Urinary Catheter: {Urinary Catheter:763775378:::0}   Colostomy/Ileostomy/Ileal Conduit: {YES / KA:45992}       Date of Last BM: ***    Intake/Output Summary (Last 24 hours) at 2021 1224  Last data filed at 2021 0810  Gross per 24 hour   Intake 2640 ml   Output 975 ml   Net 1665 ml     I/O last 3 completed shifts:   In: 2640 [I.V.:2640]  Out: 775 [Urine:775]    Safety Concerns:     [address] Safety Concerns:425762438:::0}    Impairments/Disabilities:      [address] Impairments/Disabilities:753387697:::0}    Nutrition Therapy:  Current Nutrition Therapy:   [address] Diet List:610701471:::0}    Routes of Feeding: {CHP DME Other Feedings:040502114:::0}  Liquids: {Slp liquid thickness:15855}  Daily Fluid Restriction: {CHP DME Yes amt example:585699531:::0}  Last Modified Barium Swallow with Video (Video Swallowing Test): {Done Not Done QOPU:794543702:::4}    Treatments at the Time of Hospital Discharge:   Respiratory Treatments: ***  Oxygen Therapy:  {Therapy; copd oxygen:72530:::0}  Ventilator:    { CC Vent List:403108578:::0}    Rehab Therapies: {THERAPEUTIC INTERVENTION:9974998992}  Weight Bearing Status/Restrictions: [address]  Weight Bearin:::0}  Other Medical Equipment (for information only, NOT a DME order):  {EQUIPMENT:710801211}  Other Treatments: ***    Patient's personal belongings (please select all that are sent with patient):  {CHP DME Belongings:240553252:::0}    RN SIGNATURE:  {Esignature:816284559:::0}    CASE MANAGEMENT/SOCIAL WORK SECTION    Inpatient Status Date: 21    Readmission Risk Assessment Score:  Readmission Risk              Risk of Unplanned Readmission:        11           Discharging to Facility/ Agency   · Name: [facility]   · Address:  · Phone:  · Fax:    Dialysis Facility (if applicable)   · Name:  · Address:  · Dialysis Schedule:  · Phone:  · Fax:    / signature: Electronically signed by Slade Medina RN on 2/24/21 at 12:37 PM EST    PHYSICIAN SECTION    Prognosis: Good    Condition at Discharge: Stable    Rehab Potential (if transferring to Rehab): Good    Recommended Labs or Other Treatments After Discharge: PT/OT, monitor vitals    Physician Certification: I certify the above information and transfer of Maribell Dean  is necessary for the continuing treatment of the diagnosis listed and that he requires [address] for less 30 days.      Update Admission H&P: No change in H&P    PHYSICIAN SIGNATURE:  Electronically signed by Jayson Cornelius MD on 2/24/21 at 12:25 PM EST

## 2021-02-24 NOTE — CARE COORDINATION
CM aware of discharge orders. Spoke to [facility] [employer] who states that patient has been threatening to leave AMA most of the morning. He is anxious to return home. Lives alone. CM spoke to patient via phone. Discussed therapy recommendations of SNF. Pt declined stating he has a  and friends that check on him. CM offered HHC as alternative. Pt agreeable without preference on agency. Call placed to home health of [facility] with referral.  Due to patient previous recommendations of SNF and history of falls they are unable to accept patient. Writer made referral to [facility] and able to accept patient. CASE MANAGEMENT DISCHARGE SUMMARY      Discharge to: home with [address] Equipment ordered/agency:     Transportation: private   Family/car: family to transport home. Confirmed discharge plan with:   Patient: yes   Family, name and contact number: pt to contact family   Facility/Agency, name:  [facility] faxed to [facility] by carmella Galvan RN, name:  Maryanne Molina RN awre of the above.        Roberto Roman RN

## 2021-02-24 NOTE — PROGRESS NOTES
Physical Therapy  Attempted to see pt for therapy. Breakfast tray arriving and pt requesting to eat breakfast prior to therapy. Declines concerns about mobility at home. Will re-attempt as schedule permits. Thank you.   Jenna Hansen, PT, DPT

## 2021-02-24 NOTE — PROGRESS NOTES
Discharge instructions given to pt including cardiology follow up appointment. Prescriptions picked up by pt at outpatient pharmacy. Discharged home with friend via private vehicle.

## 2021-02-25 ENCOUNTER — CARE COORDINATION (OUTPATIENT)
Dept: CASE MANAGEMENT | Age: 66
End: 2021-02-25

## 2021-02-25 LAB
POC ACT LR: 234 SEC
POC ACT LR: 266 SEC
POC ACT LR: >400 SEC
POC ACT LR: >400 SEC

## 2021-02-25 NOTE — CARE COORDINATION
[facility] Initial Outreach    Call within 2 business days of discharge: Yes    Patient: Rey Kevin Patient : 1955   MRN: [id number]  Discharge Date: 21   RARS: Readmission Risk Score: 11      Last Discharge [address] [address]       Complaint Diagnosis Description Type Department Provider    21 Chest Pain ST elevation myocardial infarction (STEMI), unspecified artery [facility]) ED to Hosp-Admission (Discharged) (ADMIT) Rafael Friedman MD; Antonia. .. Spoke with: Rey Kevin (patient)    Non-face-to-face services provided:  Obtained and reviewed discharge summary and/or continuity of care documents  Education of patient/family/caregiver/guardian to support self-management-monitor BP; go to follow up appointments; how to get med refills  Assessment and support for treatment adherence and medication management-med review completed    Challenges to be reviewed by the provider   Additional needs identified to be addressed with provider No    Patient contacted regarding [address] COVID-19 testing not done. Method of communication with provider : none    Advance Care Planning:   Does patient have an Advance Directive:  not on file. Was this a readmission? No  Patient stated reason for admission: cp  Patients top risk factors for readmission: medical condition    Care Transition Nurse (CTN) contacted the patient by telephone regarding COVID-19 to perform post hospital discharge assessment. Verified name and  with patient as identifiers. Provided introduction to self, and explanation of the CTN role. CTN reviewed discharge instructions, medical action plan and red flags with patient who verbalized understanding. Patient given an opportunity to ask questions and does not have any further questions or concerns at this time. Were discharge instructions available to patient? Yes.  Reviewed appropriate site of care based on symptoms and resources available to patient including: PCP, Specialist and Home health. The patient agrees to contact the PCP office for questions related to their healthcare. Medication reconciliation was performed with patient, who verbalizes understanding of administration of home medications. Covid Risk Education    Patient has the following COVID-19 risk factors: CAD, COPD, Smoker. Education provided regarding infection prevention, and signs and symptoms of COVID-19 and when to seek medical attention with patient who verbalized understanding. Discussed exposure protocols and quarantine From CDC: Are you at higher risk for severe illness?   and given an opportunity for questions and concerns. The patient agrees to contact the COVID-19 hotline [phone] or PCP office for questions related to COVID-19. Symptoms reviewed with patient who verbalized the following symptoms: no new symptoms and no worsening symptoms. Due to no new or worsening symptoms encounter was not routed to provider for escalation. Discussed follow-up appointments. If no appointment was previously scheduled, appointment scheduling offered: Yes. Is follow up appointment scheduled within 7 days of discharge? Yes  Non-[facility] follow up appointment(s): PCP through [address] [place] [address] for follow-up call in 7-10 days based on severity of symptoms and risk factors. Plan for next call: self management-     Follows with [address] [place] [address] for cardiology (doesn't know name of doctor) but plans to see Dr Molly Corbett as scheduled below. Reviewed date/time and COVID rules. PCP is Dr Carlos Alberto Maier out of [facility] clinic. Info added to cc note since this physician is not in epic choice list. Monitors BP at home but hasn't checked today. Feeling well today only c/o weakness, tired. Denies fever, chills, SOB, cough, headache. [facility] [employer]. to arrive tomorrow morning. CTN provided contact information for future needs.     Follow Up  Future Appointments   Date Time Provider Shaila Sanders

## 2021-03-01 PROBLEM — Z98.890 H/O THORACIC AORTIC ANEURYSM REPAIR: Status: ACTIVE | Noted: 2021-03-01

## 2021-03-01 PROBLEM — I25.119 CORONARY ARTERY DISEASE INVOLVING NATIVE CORONARY ARTERY OF NATIVE HEART WITH ANGINA PECTORIS (HCC): Status: ACTIVE | Noted: 2021-03-01

## 2021-03-01 PROBLEM — Z86.79 H/O THORACIC AORTIC ANEURYSM REPAIR: Status: ACTIVE | Noted: 2021-03-01

## 2021-03-01 NOTE — PROGRESS NOTES
[facility] Office Note  3/2/2021     Subjective:  Mr. Avel Angelucci is here today for hospital follow up STEMI, s/p PCI to RCA x2 SAMM. [place]:    He was air evac from [facility] to [facility] on 21 for STEMI. He underwent LHC and PCI  to RCA, mechanical thrombectomy, cutting balloon, x2 He has disease in LAD and Diagonal both are significant 70% and 80% repectively. ECHO 21 showed EF 45-50%. Today he reports fatigue is better. Denies chest pain, shortness of breath, edema, dizziness, palpitations and syncope. He has many questions. He is inquiring about staged PCI to fix other blockages, he wants to get it fixed as soon as possible. He hs nausea reports losing 25 lbs in last 2 months. He is being evaluated by GI and reports upcoming endoscopy procedures. He is concerned about blood thinners being on board. He has shoulder pain needing surgery  For which he is on weekly pain patch. PMH:   CAD, HTN, HLD, thoracic aortic arch  aneurysm repair , COPD, CKD  Previously followed Dr. Jerome Jaeger [facility]. He then went to [place] and was sent to [facility] who told him he has no aneurysm and not worry about it. Stent placed in RCA in . Remote thoracic aortic aneurysm repair in . Admitted [facility] 20 for STEMI. He underwent LHC and PCI  to RCA, mechanical thrombectomy, cutting balloon, x2 SAMM. Review of Systems:         12 point ROS negative in all areas as listed below except as in [place]  Constitutional, EENT,  pulmonary,  , Musculoskeletal, skin, neurological, hematological, endocrine, Psychiatric    Reviewed past medical history, social, and family history.    He quit smoking in 2020  MOM- no cardiac disease   DAD- PAD,  lung cancer, no cardiac disease   Past Medical History:   Diagnosis Date    CAD (coronary artery disease)     Emphysema of lung ([address]     Heart attack ([facility]     2013    Hypertension     Lung disease      Past Surgical History:   Procedure Laterality Date    DIAGNOSTIC CARDIAC CATH LAB PROCEDURE      THORACIC AORTIC ANEURYSM REPAIR  2000       Objective:   /80   Pulse 78   Temp 97.3 °F (36.3 °C)   Ht 5' 9\" (1.753 m)   Wt 141 lb 3.2 oz (64 kg)   SpO2 98%   BMI 20.85 kg/m²     Wt Readings from Last 3 Encounters:   03/02/21 141 lb 3.2 oz (64 kg)   02/24/21 141 lb 1.5 oz (64 kg)   03/03/20 159 lb (72.1 kg)       Physical Exam:  General: No Respiratory distress, appears well developed and well nourished. Eyes:  Sclera nonicteric  Nose/Sinuses:  negative findings: nose shows no deformity, asymmetry, or inflammation, nasal mucosa normal, septum midline with no perforation or bleeding  Back:  no pain to palpation  Joint:  no active joint inflammation  Musculoskeletal:  negative  Skin:  Warm and dry  Neck:  Negative for JVD and Carotid Bruits. Chest:  Clear to auscultation, respiration easy  Cardiovascular:  RRR, S1S2 normal, no murmur, no rub or thrill. Abdomen:  Soft normal liver and spleen  Extremities:   No edema, clubbing, cyanosis,cath site right wrist healed well. neurovasc intact.   Pulses:  Normal right radial pulse  Neuro: intact    Medications:   Outpatient Encounter Medications as of 3/2/2021   Medication Sig Dispense Refill    NONFORMULARY Apply 1 patch topically once a week      aspirin 81 MG chewable tablet Take 1 tablet by mouth daily 30 tablet 3    atorvastatin (LIPITOR) 80 MG tablet Take 1 tablet by mouth nightly 30 tablet 3    losartan (COZAAR) 25 MG tablet Take 1 tablet by mouth daily 30 tablet 3    carvedilol (COREG) 6.25 MG tablet Take 1 tablet by mouth 2 times daily (with meals) 60 tablet 3    ticagrelor (BRILINTA) 90 MG TABS tablet Take 1 tablet by mouth 2 times daily 60 tablet 1    umeclidinium-vilanterol (ANORO ELLIPTA) 62.5-25 MCG/INH AEPB inhaler Inhale 1 puff into the lungs daily 1 each 3    albuterol sulfate HFA (VENTOLIN HFA) 108 (90 Base) MCG/ACT inhaler Inhale 2 puffs into the lungs every 6 hours as needed for Wheezing or Shortness of Breath 1 Inhaler 5    albuterol (PROVENTIL) (2.5 MG/3ML) 0.083% nebulizer solution Take 3 mLs by nebulization every 6 hours as needed for Wheezing 120 each 5     No facility-administered encounter medications on file as of 3/2/2021. Lab Data:  CBC: No results for input(s): WBC, HGB, HCT, MCV, PLT in the last 72 hours. BMP: No results for input(s): NA, K, CL, CO2, PHOS, BUN, CREATININE in the last 72 hours. Invalid input(s): CA  LIVER PROFILE: No results for input(s): AST, ALT, LIPASE, BILIDIR, BILITOT, ALKPHOS in the last 72 hours. Invalid input(s): AMYLASE,  ALB  LIPID: No results found for: CHOL  No results found for: TRIG  No results found for: HDL  No results found for: LDLCHOLESTEROL, LDLCALC  No results found for: LABVLDL, VLDL  No results found for: CHOLHDLRATIO  PT/INR: No results for input(s): PROTIME, INR in the last 72 hours. A1C: No results found for: LABA1C  BNP:  No results for input(s): BNP in the last 72 hours. IMAGING:   I have reviewed the following tests and documented in this encounter as follows:   Discussed with patient  ECHO 2/24/21  Summary   The left ventricular systolic function is mildly reduced with an ejection   fraction of 45 - 50 %. There is hypokinesis of the inferolateral, inferior and basal inferior   walls. Left ventricular diastolic filling pressure is normal.   Mild mitral regurgitation. EKG 2/22/21   Normal sinus rhythmLeft axis deviationInferior infarct (cited on or before 22-FEB-2021)Abnormal ECGWhen compared with ECG of 22-FEB-2021 18:03,No significant change was foundConfirmed by María López MD, Sally Martinez ([id number]) on 2/23/2021 5:41:24 PM     CXR 2/22/21   Left basilar atelectasis.   No evidence of CHF       [facility] 2/22/21   LVGRAM     LVEDP  30   GRADIENT ACROSS AORTIC VALVE  less than 10 mmHg   LV FUNCTION EF 45%   WALL MOTION  basal inferior hypokinesis   MITRAL REGURGITATION  mild            CORONARY ARTERIES     LM Less than 10% [id number] stenosis            LAD Calcified, proximal-mid 70% stenosis, mid vessel bridging is noted, distal 20% stenosis.     D1 is a medium to large size vessel with proximal-mid 80% stenosis.       LCX  10 to 20% proximal stenosis, mid-distal 30 to 40% stenosis         RCA Dominant, hoyaienz-hat-kwlysg 100% stenosis with thrombus               PERCUTANEOUS INTERVENTION DESCRIPTION      Patient was given heparin and Brilinta in the emergency room, initially a 6 Jamalon guiding catheter was used to intubate the RCA. A choice floppy wire was initially taken however lesion cannot be crossed with this and was ultimately crossed with an Green Clean prowater wire. Lesion was then treated with mechanical thrombectomy with the penumbra device. Lesion was then treated with angioplasty with 3 mm compliant and noncompliant balloons. Atherectomy was also performed with 3 and 3.5 mm cutting balloons. IVUS was taken but would not advance into the distal vessel and as such the 6 Western Nimo guide catheter was removed and the radial sheath was upsized to a 6/7 Western Nimo sheath and then a 7 Western Nimo AL 0.75 guiding catheter was used to reintubate the RCA. A workhorse wire was then taken and the lesion was crossed again. With this equipment as well as a guide extension catheter, IVUS was able to be performed which showed minimal luminal diameter distally of 3 to 3.5 mm and proximally 3.5 to 4 mm. As such the lesions were stented from distal to proximal with Medtronic resolute Bebeto 3 x 38 mm drug-eluting stent as well as a 3 x 34 mm drug-eluting stent. Stents were postdilated with 3, 3.5 and 4 mm noncompliant balloons. Follow-up IVUS showed good stent apposition/expansion. There was 0% residual stenosis. There was RADHA 0 flow before PCI and RADHA-3 flow after PCI. Integrilin was given during the procedure, this will run for 12 hours.   During procedure, there is bradycardia and hypotension, patient was given atropine and supported with phenylephrine, this was able to be weaned at the end of the procedure.              CONCLUSIONS:      Successful PCI of RCA with 2 drug-eluting stents     Consider staged PCI of LAD and D1 as outpatient, patient would like to follow-up with [facility] pending discharge in the next 1 to 2 days. CT chest 6/5/20   IMPRESSION:  1. 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 2. IV contrast could not be administered because of the patient's elevated creatinine of 1.7. ECHO 1/31/20   Study Conclusions    - Left ventricle: The cavity size is normal. Wall thickness is    normal. Systolic function was normal. The estimated ejection    fraction was in the range of 58% to 63%. Wall motion was normal;    there were no regional wall motion abnormalities. Normal    diastolic function.  - Aortic valve: Thickening, consistent with sclerosis. - Inferior vena cava: The vessel was normal in size; the    respirophasic diameter changes were in the normal range (&gt;= 50%);    findings are consistent with normal central venous pressure. [address] [place] [address] 3/27/13   The patient was brought in a fasting state to the cardiac cath lab. Right groin was prepped and draped in usual sterile fashion. After local anesthesia was achieved, a 6-Burkinan sheath was placed percutaneously into the right femoral artery. Angiography of the right coronary artery was performed with a 3D right catheter. Left was visualized with 6-Burkinan L4 catheter. Pigtail catheter was advanced into the left ventricular cavity. LV angiography was performed in the BACA projection. Pullback was obtained. All catheter exchanges were made with the use of a long wire. The patient has a prior history of descending aortic stenting. At this time, it was elected proceed with RCA stenting. This was performed with a 6-Burkinan 3D right guide catheter.  Lesion was crossed with a [place] wire. Initially, an [place] thrombectomy catheter was used. Significant amount of thrombus was aspirated. At this time, repeat angiograms now showed the right coronary was patent. It was then dilated with a 2.5 x 12 mm balloon. It was then stented with a 3.5 x 18 mm drug-eluting stent, Promus Element, taken to approximately 10 atmospheres. It was then post-dilated with a 12 mm x 3.5 Quantum high-pressure balloon taken to 14 atmospheres. Repeat angiogram was performed. The patient tolerated the procedure well. The patient received intracoronary nitroglycerin as well as IV Angiomax prior to initiation of the procedure. Postprocedure, femoral arteriography was performed and reviewed, and the arteriotomy was repaired with a Perclose technique. FINDINGS   Hemodynamic data:  Initial central aortic pressure was 119/55. LV pressure is 135/8 with left ventricular end-diastolic pressure of 22. There is no gradient on pullback across the aortic valve. Angiograms: The right coronary artery is mildly irregular; however, it is totally occluded after a large and small RV branch with evidence of residual filling defect. Left main is free of disease. Circumflex is a moderate-caliber vessel with 2 small PL branches and a third moderate-sized PL branch, which have minor irregularities. The LAD has a large diagonal branch in its proximal portion with a moderate-sized diagonal branch at its midportion and has minor irregularities. There is collateral flow to the distal right from the left system. LV angiography:  Demonstrates a mild degree of inferior basilar hypokinesia. EF is estimated at 60%. There is a slight amount catheter-induced mitral insufficiency.  After flow was re-established, the right coronary artery seemed to be a large dominant vessel with minor to moderate irregularities in its mid portion with the posterior descending and posterolateral branch filling well post. There was 0% stenosis of the residual stented segment. IMPRESSION  1. Recent non-Q-wave myocardial infarction. 2. History of prior descending aortic endograft. Assessment:  1. ST elevation myocardial infarction involving right coronary artery ([address]    2. Coronary artery disease involving native coronary artery of native heart without angina pectoris    3. H/O thoracic aortic aneurysm repair    4. Nausea in adult         Plan:  Orders Placed This Encounter   Procedures    Lipid, Fasting     3. Meds reviewed. Refills as warranted   4. Someone from our office will call to schedule cardiac cath and second stage angioplasty. Dennis Chung will talk to Dr Jocelin Brown tomorrow and then set up according to his advice. 5. Recommend trial off of pain patch to see if this could be cause of nausea   6. If he needs endoscopy go ahead with it but no interruption in antiplatelets for atleast next 6 months  7. He should wait for shoulder surgery at least for another 6 months   8. Follow up with me in 2 months   9. Labs- fasting lipids     This note was scribed in the presence of Katty Mondragon MD by Stephanie Contreras RN      QUALITY MEASURES  1. Tobacco Cessation Counseling: NA   2. Retake of BP if >140/90: NA   3. Documentation to PCP/referring for new patient:  Sent to PCP at close of office visit  4. CAD patient on anti-platelet: YES- Brilinta _asa  5. CAD patient on STATIN therapy: YES- Statin   6. Patient with CHF and aFib on anticoagulation: NA   I, Dr. Jessica Sy, personally performed the services described in this documentation, as scribed by the above signed scribe in my presence. It is both accurate and complete to my knowledge. I agree with the details independently gathered by the clinical support staff, while the remaining scribed note accurately describes my personal service to the patient.         [address] [place], MD 3/2/2021 2:54 PM

## 2021-03-02 ENCOUNTER — OFFICE VISIT (OUTPATIENT)
Dept: CARDIOLOGY CLINIC | Age: 66
End: 2021-03-02
Payer: MEDICARE

## 2021-03-02 VITALS
WEIGHT: 141.2 LBS | DIASTOLIC BLOOD PRESSURE: 80 MMHG | TEMPERATURE: 97.3 F | BODY MASS INDEX: 20.91 KG/M2 | HEART RATE: 78 BPM | HEIGHT: 69 IN | SYSTOLIC BLOOD PRESSURE: 128 MMHG | OXYGEN SATURATION: 98 %

## 2021-03-02 DIAGNOSIS — Z86.79 H/O THORACIC AORTIC ANEURYSM REPAIR: ICD-10-CM

## 2021-03-02 DIAGNOSIS — I21.11 ST ELEVATION MYOCARDIAL INFARCTION INVOLVING RIGHT CORONARY ARTERY (HCC): Primary | ICD-10-CM

## 2021-03-02 DIAGNOSIS — R11.0 NAUSEA IN ADULT: ICD-10-CM

## 2021-03-02 DIAGNOSIS — Z98.890 H/O THORACIC AORTIC ANEURYSM REPAIR: ICD-10-CM

## 2021-03-02 DIAGNOSIS — I25.10 CORONARY ARTERY DISEASE INVOLVING NATIVE CORONARY ARTERY OF NATIVE HEART WITHOUT ANGINA PECTORIS: ICD-10-CM

## 2021-03-02 PROCEDURE — G8420 CALC BMI NORM PARAMETERS: HCPCS | Performed by: INTERNAL MEDICINE

## 2021-03-02 PROCEDURE — G8427 DOCREV CUR MEDS BY ELIG CLIN: HCPCS | Performed by: INTERNAL MEDICINE

## 2021-03-02 PROCEDURE — 1111F DSCHRG MED/CURRENT MED MERGE: CPT | Performed by: INTERNAL MEDICINE

## 2021-03-02 PROCEDURE — 99214 OFFICE O/P EST MOD 30 MIN: CPT | Performed by: INTERNAL MEDICINE

## 2021-03-02 PROCEDURE — 1036F TOBACCO NON-USER: CPT | Performed by: INTERNAL MEDICINE

## 2021-03-02 PROCEDURE — 3017F COLORECTAL CA SCREEN DOC REV: CPT | Performed by: INTERNAL MEDICINE

## 2021-03-02 PROCEDURE — G8484 FLU IMMUNIZE NO ADMIN: HCPCS | Performed by: INTERNAL MEDICINE

## 2021-03-02 NOTE — PATIENT INSTRUCTIONS
3. Meds reviewed. Refills as warranted   4. Someone from our office will call to schedule cardiac cath   5. Recommend trial off of pain patch to see if this could be cause of nausea   6. Follow up with me in 2 months   7.  Labs- fasting lipids

## 2021-03-03 ENCOUNTER — TELEPHONE (OUTPATIENT)
Dept: CARDIOLOGY CLINIC | Age: 66
End: 2021-03-03

## 2021-03-03 DIAGNOSIS — Z11.52 ENCOUNTER FOR SCREENING FOR COVID-19: ICD-10-CM

## 2021-03-03 DIAGNOSIS — I25.10 CORONARY ARTERY DISEASE INVOLVING NATIVE CORONARY ARTERY OF NATIVE HEART WITHOUT ANGINA PECTORIS: Primary | ICD-10-CM

## 2021-03-03 NOTE — TELEPHONE ENCOUNTER
Pt was seen in office yesterday by Dr. Miriam Moore at [facility] ([facility]) office. Pt had LHC with PCI to RCA 2/22/21. He also had 70-80% stenosis of LAD and D1. I personally spoke to Dr. Saniya Rodriguez today. He confirmed OK to schedule pt for LHC with PCI of LAD in next 2-3 weeks at patients convenience. Karen can you please contact pt and schedule.

## 2021-03-04 NOTE — TELEPHONE ENCOUNTER
Spoke with patient. Voiced understanding. Med reviewed. None need held. Voiced understanding. Order placed for Covid test.  Understands to go to [facility] ED between 3/12/ and 3/15.

## 2021-03-04 NOTE — TELEPHONE ENCOUNTER
Spoke with patient. He is asking if both parts would be fixed or just the one? He has a lot of medical issues going on and is trying to plan what he may expect. Patient is scheduled with Dr. Juan Dior for PCI LAD on 3/18/21 at [facility], arrival time of 6:30am to the Cath Lab. Please have patient arrive to the main entrance of [facility] and check in with the registration desk. Please call patient regarding medication instructions. Remind patient to be NPO after midnight (8 hours prior). Do not apply lotions/creams on skin the day of procedure. COVID testing - please put order in chart and [facility] will get [facility] 3/12 & 3/15.

## 2021-03-04 NOTE — PROGRESS NOTES
Physician Progress Note      PATIENT:               Cally Singh  CSN #:                  [id number]  :                       1955  ADMIT DATE:       2021 2:11 PM  [address] [place] DATE:        2021 1:24 PM  RESPONDING  PROVIDER #:        Emma Nazario MD          QUERY TEXT:    Patient admitted with a STEMI. Noted documentation of severe protein count   malnutrition. In order to support the diagnosis of severe protein count   malnutrition please include additional clinical indicators in your   documentation. Or please document if the diagnosis of severe protein count   malnutrition has been ruled out after further study. The medical record reflects the following:  Risk Factors: STEMI, Nausea, vomiting, anorexia  Clinical Indicators: Reported weight loss, BMI 20.85 last recorded BMI from   10/20 >24. Per GI consult note \"Nausea for months moderate in GI tract assoc w   wt loss. Needs wt loss w/u starting w EGD \" Per OT progress note \"Pt reports   having several falls in last several months\". Per PT note \"Activity   Tolerance: Patient limited by fatigue;Patient limited by endurance\". Treatment: GI consult, serial labs, supportive care, daily weights. Options provided:  -- Severe protein count malnutrition present as evidenced by, Please document   evidence. -- Severe protein count malnutrition was ruled out  -- Other - I will add my own diagnosis  -- Disagree - Not applicable / Not valid  -- Disagree - Clinically unable to determine / Unknown  -- Refer to Clinical Documentation Reviewer    PROVIDER RESPONSE TEXT:    Severe protein count malnutrition is present as evidenced by Low BMI and poor   PO intake.     Query created by: Jocelyn Mercado on 3/2/2021 4:17 PM      Electronically signed by:  Emma Nazario MD 3/4/2021 8:02 AM

## 2021-03-05 ENCOUNTER — CARE COORDINATION (OUTPATIENT)
Dept: CASE MANAGEMENT | Age: 66
End: 2021-03-05

## 2021-03-05 NOTE — CARE COORDINATION
Patient resolved from the Care Transitions episode on 3/5/2021. Patient/family has been provided the following resources and education related to COVID-19:                         Signs, symptoms and red flags related to COVID-19            CDC exposure and quarantine guidelines            [facility] exposure contact - [phone]            Contact for their local Department of Health               Patient currently reports that the following symptoms have improved:  no new/worsening symptoms     He denies cp, palpitations, SOB, fever, chills, cough. He has plan for cath lab procedure on 3/18/2021 and will be COVID tested prior to procedure. He denies needs/concerns at this time. No further outreach scheduled with this CTN. Episode of Care resolved. Patient has this CTN contact information if future needs arise.     Marcos Balbuena RN  Care Transition Nurse  [phone] mobile    Future Appointments   Date Time Provider Shaila Sanders   3/18/2021  8:00 AM SCHEDULE, [facility] CATH LAB ROOM 2 [facility] CATH Tarun Ya   5/7/2021  1:15 PM MD CHRIS Perez

## 2021-03-15 ENCOUNTER — HOSPITAL ENCOUNTER (OUTPATIENT)
Age: 66
Discharge: HOME | End: 2021-03-15
Payer: OTHER GOVERNMENT

## 2021-03-15 DIAGNOSIS — Z11.52 ENCOUNTER FOR SCREENING FOR COVID-19: ICD-10-CM

## 2021-03-15 LAB — SARS-COV-2: NOT DETECTED

## 2021-03-15 PROCEDURE — U0003 INFECTIOUS AGENT DETECTION BY NUCLEIC ACID (DNA OR RNA); SEVERE ACUTE RESPIRATORY SYNDROME CORONAVIRUS 2 (SARS-COV-2) (CORONAVIRUS DISEASE [COVID-19]), AMPLIFIED PROBE TECHNIQUE, MAKING USE OF HIGH THROUGHPUT TECHNOLOGIES AS DESCRIBED BY CMS-2020-01-R: HCPCS

## 2021-03-18 ENCOUNTER — HOSPITAL ENCOUNTER (INPATIENT)
Dept: CARDIAC CATH/INVASIVE PROCEDURES | Age: 66
LOS: 5 days | Discharge: HOME HEALTH | DRG: 391 | End: 2021-03-23
Attending: INTERNAL MEDICINE | Admitting: INTERNAL MEDICINE
Payer: OTHER GOVERNMENT

## 2021-03-18 ENCOUNTER — TELEPHONE (OUTPATIENT)
Dept: CARDIOLOGY CLINIC | Age: 66
End: 2021-03-18

## 2021-03-18 DIAGNOSIS — F41.9 ANXIETY: Primary | ICD-10-CM

## 2021-03-18 PROBLEM — R63.4 WEIGHT LOSS: Status: ACTIVE | Noted: 2021-03-18

## 2021-03-18 LAB
ANION GAP SERPL CALCULATED.3IONS-SCNC: 9 MMOL/L (ref 3–16)
BUN BLDV-MCNC: 11 MG/DL (ref 7–20)
CALCIUM SERPL-MCNC: 9.3 MG/DL (ref 8.3–10.6)
CHLORIDE BLD-SCNC: 105 MMOL/L (ref 99–110)
CHOLESTEROL, TOTAL: 82 MG/DL (ref 0–199)
CO2: 25 MMOL/L (ref 21–32)
CREAT SERPL-MCNC: 1.2 MG/DL (ref 0.8–1.3)
GFR AFRICAN AMERICAN: >60
GFR NON-AFRICAN AMERICAN: >60
GLUCOSE BLD-MCNC: 90 MG/DL (ref 70–99)
HCT VFR BLD CALC: 38.7 % (ref 40.5–52.5)
HDLC SERPL-MCNC: 28 MG/DL (ref 40–60)
HEMOGLOBIN: 13 G/DL (ref 13.5–17.5)
INR BLD: 1.14 (ref 0.86–1.14)
LDL CHOLESTEROL CALCULATED: 41 MG/DL
MCH RBC QN AUTO: 30.2 PG (ref 26–34)
MCHC RBC AUTO-ENTMCNC: 33.7 G/DL (ref 31–36)
MCV RBC AUTO: 89.6 FL (ref 80–100)
PDW BLD-RTO: 13.3 % (ref 12.4–15.4)
PLATELET # BLD: 353 K/UL (ref 135–450)
PMV BLD AUTO: 6.3 FL (ref 5–10.5)
POTASSIUM SERPL-SCNC: 4 MMOL/L (ref 3.5–5.1)
PROTHROMBIN TIME: 13.2 SEC (ref 10–13.2)
RBC # BLD: 4.32 M/UL (ref 4.2–5.9)
SODIUM BLD-SCNC: 139 MMOL/L (ref 136–145)
TRIGL SERPL-MCNC: 65 MG/DL (ref 0–150)
VLDLC SERPL CALC-MCNC: 13 MG/DL
WBC # BLD: 9.9 K/UL (ref 4–11)

## 2021-03-18 PROCEDURE — 6370000000 HC RX 637 (ALT 250 FOR IP): Performed by: NURSE PRACTITIONER

## 2021-03-18 PROCEDURE — 6370000000 HC RX 637 (ALT 250 FOR IP): Performed by: INTERNAL MEDICINE

## 2021-03-18 PROCEDURE — 2580000003 HC RX 258: Performed by: NURSE PRACTITIONER

## 2021-03-18 PROCEDURE — 85027 COMPLETE CBC AUTOMATED: CPT

## 2021-03-18 PROCEDURE — 85610 PROTHROMBIN TIME: CPT

## 2021-03-18 PROCEDURE — 93005 ELECTROCARDIOGRAM TRACING: CPT | Performed by: INTERNAL MEDICINE

## 2021-03-18 PROCEDURE — 1200000000 HC SEMI PRIVATE

## 2021-03-18 PROCEDURE — 6360000002 HC RX W HCPCS: Performed by: NURSE PRACTITIONER

## 2021-03-18 PROCEDURE — 6370000000 HC RX 637 (ALT 250 FOR IP)

## 2021-03-18 PROCEDURE — 94640 AIRWAY INHALATION TREATMENT: CPT

## 2021-03-18 PROCEDURE — 80048 BASIC METABOLIC PNL TOTAL CA: CPT

## 2021-03-18 PROCEDURE — 99222 1ST HOSP IP/OBS MODERATE 55: CPT | Performed by: INTERNAL MEDICINE

## 2021-03-18 PROCEDURE — 80061 LIPID PANEL: CPT

## 2021-03-18 RX ORDER — PROMETHAZINE HYDROCHLORIDE 25 MG/1
12.5 TABLET ORAL EVERY 6 HOURS PRN
Status: DISCONTINUED | OUTPATIENT
Start: 2021-03-18 | End: 2021-03-23 | Stop reason: HOSPADM

## 2021-03-18 RX ORDER — ONDANSETRON 2 MG/ML
4 INJECTION INTRAMUSCULAR; INTRAVENOUS EVERY 6 HOURS PRN
Status: DISCONTINUED | OUTPATIENT
Start: 2021-03-18 | End: 2021-03-23 | Stop reason: HOSPADM

## 2021-03-18 RX ORDER — ACETAMINOPHEN 650 MG/1
650 SUPPOSITORY RECTAL EVERY 6 HOURS PRN
Status: DISCONTINUED | OUTPATIENT
Start: 2021-03-18 | End: 2021-03-23 | Stop reason: HOSPADM

## 2021-03-18 RX ORDER — SODIUM CHLORIDE 9 MG/ML
1000 INJECTION, SOLUTION INTRAVENOUS CONTINUOUS
Status: DISCONTINUED | OUTPATIENT
Start: 2021-03-18 | End: 2021-03-22

## 2021-03-18 RX ORDER — POLYETHYLENE GLYCOL 3350 17 G/17G
17 POWDER, FOR SOLUTION ORAL DAILY PRN
Status: DISCONTINUED | OUTPATIENT
Start: 2021-03-18 | End: 2021-03-23 | Stop reason: HOSPADM

## 2021-03-18 RX ORDER — SODIUM CHLORIDE 0.9 % (FLUSH) 0.9 %
10 SYRINGE (ML) INJECTION EVERY 12 HOURS SCHEDULED
Status: DISCONTINUED | OUTPATIENT
Start: 2021-03-18 | End: 2021-03-23 | Stop reason: HOSPADM

## 2021-03-18 RX ORDER — SODIUM CHLORIDE 0.9 % (FLUSH) 0.9 %
10 SYRINGE (ML) INJECTION PRN
Status: DISCONTINUED | OUTPATIENT
Start: 2021-03-18 | End: 2021-03-23 | Stop reason: HOSPADM

## 2021-03-18 RX ORDER — PANTOPRAZOLE SODIUM 40 MG/1
40 TABLET, DELAYED RELEASE ORAL
Status: DISCONTINUED | OUTPATIENT
Start: 2021-03-19 | End: 2021-03-23 | Stop reason: HOSPADM

## 2021-03-18 RX ORDER — CARVEDILOL 6.25 MG/1
6.25 TABLET ORAL 2 TIMES DAILY WITH MEALS
Status: DISCONTINUED | OUTPATIENT
Start: 2021-03-18 | End: 2021-03-23 | Stop reason: HOSPADM

## 2021-03-18 RX ORDER — ASPIRIN 81 MG/1
81 TABLET, CHEWABLE ORAL ONCE
Status: COMPLETED | OUTPATIENT
Start: 2021-03-18 | End: 2021-03-18

## 2021-03-18 RX ORDER — NICOTINE 21 MG/24HR
1 PATCH, TRANSDERMAL 24 HOURS TRANSDERMAL DAILY
Status: DISCONTINUED | OUTPATIENT
Start: 2021-03-18 | End: 2021-03-23 | Stop reason: HOSPADM

## 2021-03-18 RX ORDER — ATORVASTATIN CALCIUM 80 MG/1
80 TABLET, FILM COATED ORAL NIGHTLY
Status: DISCONTINUED | OUTPATIENT
Start: 2021-03-18 | End: 2021-03-23 | Stop reason: HOSPADM

## 2021-03-18 RX ORDER — ACETAMINOPHEN 325 MG/1
650 TABLET ORAL EVERY 6 HOURS PRN
Status: DISCONTINUED | OUTPATIENT
Start: 2021-03-18 | End: 2021-03-23 | Stop reason: HOSPADM

## 2021-03-18 RX ORDER — LOSARTAN POTASSIUM 25 MG/1
25 TABLET ORAL DAILY
Status: DISCONTINUED | OUTPATIENT
Start: 2021-03-18 | End: 2021-03-23 | Stop reason: HOSPADM

## 2021-03-18 RX ORDER — ALBUTEROL SULFATE 2.5 MG/3ML
2.5 SOLUTION RESPIRATORY (INHALATION) EVERY 6 HOURS PRN
Status: DISCONTINUED | OUTPATIENT
Start: 2021-03-18 | End: 2021-03-23 | Stop reason: HOSPADM

## 2021-03-18 RX ORDER — ASPIRIN 81 MG/1
81 TABLET, CHEWABLE ORAL DAILY
Status: DISCONTINUED | OUTPATIENT
Start: 2021-03-19 | End: 2021-03-23 | Stop reason: HOSPADM

## 2021-03-18 RX ADMIN — TICAGRELOR 90 MG: 90 TABLET ORAL at 21:52

## 2021-03-18 RX ADMIN — GLYCOPYRROLATE AND FORMOTEROL FUMARATE 2 PUFF: 9; 4.8 AEROSOL, METERED RESPIRATORY (INHALATION) at 19:55

## 2021-03-18 RX ADMIN — ASPIRIN 81 MG: 81 TABLET, CHEWABLE ORAL at 07:16

## 2021-03-18 RX ADMIN — TICAGRELOR 90 MG: 90 TABLET ORAL at 07:16

## 2021-03-18 RX ADMIN — ATORVASTATIN CALCIUM 80 MG: 80 TABLET, FILM COATED ORAL at 21:52

## 2021-03-18 RX ADMIN — ONDANSETRON 4 MG: 2 INJECTION INTRAMUSCULAR; INTRAVENOUS at 18:10

## 2021-03-18 RX ADMIN — SODIUM CHLORIDE 1000 ML: 9 INJECTION, SOLUTION INTRAVENOUS at 07:16

## 2021-03-18 RX ADMIN — Medication 10 ML: at 21:53

## 2021-03-18 NOTE — TELEPHONE ENCOUNTER
LMOVM for pt to call us back regarding lab results. Per [facility] lipid test is stable, continue current meds, no new orders or changes at this time.      TY

## 2021-03-18 NOTE — PLAN OF CARE
PS message received regarding admission. Pt here for elective cath that was deferred. PS message with personal contact number left for Dr. Florentino Nolan. Awaiting call back.

## 2021-03-18 NOTE — PROGRESS NOTES
Occupational Therapy/Physical Therapy    Orders received, with limited documentation available at this time. Will await completed H&P and attempt therapy evaluations as appropriate. Thank you!     Constance Kam OTR/NYASIA Hinds  PT, DPT #[id number]

## 2021-03-18 NOTE — PROGRESS NOTES
RESPIRATORY THERAPY ASSESSMENT    Name:  Miller Gould  Medical Record Number:  [id number]  Age: 60 y.o. Gender: male  : 1955  Today's Date:  3/18/2021  Room:  [address] [place] [address]    Assessment     Is the patient being admitted for a COPD or Asthma exacerbation? No   (If yes the patient will be seen every 4 hours for the first 24 hours and then reassessed)    Patient Admission Diagnosis      Allergies  No Known Allergies    Minimum Predicted Vital Capacity:     NA          Actual Vital Capacity:      NA              Pulmonary History:COPD  Home Oxygen Therapy:  room air  Home Respiratory Therapy:Albuterol prn  Current Respiratory Therapy:  ALBUTEROL mdi prn          Respiratory Severity Index(RSI)   Patients with orders for inhalation medications, oxygen, or any therapeutic treatment modality will be placed on Respiratory Protocol. They will be assessed with the first treatment and at least every 72 hours thereafter. The following severity scale will be used to determine frequency of treatment intervention.     Smoking History: Pulmonary Disease or Smoking History, Greater than 15 pack year = 2    Social History  Social History     Tobacco Use    Smoking status: Former Smoker     Packs/day: 2.00     Years: 50.00     Pack years: 100.00     Types: Cigarettes     Quit date: 2020     Years since quittin.8    Smokeless tobacco: Never Used   Substance Use Topics    Alcohol use: Never     Frequency: Never    Drug use: Not on file       Recent Surgical History: None = 0  Past Surgical History  Past Surgical History:   Procedure Laterality Date    CORONARY ANGIOPLASTY WITH STENT PLACEMENT  2021    SAMM- 3.0 x 38 and 3.0 x 34 to pRCA    CORONARY ANGIOPLASTY WITH STENT PLACEMENT  2013    SAMM- 3.5 x 18 to RCA    DIAGNOSTIC CARDIAC CATH LAB PROCEDURE  12/10/2019    Non Obs CAD    THORACIC AORTIC ANEURYSM REPAIR         Level of Consciousness: Alert, Oriented, and Cooperative = 0    Level of Activity: Walking with assistance = 1    Respiratory Pattern: Regular Pattern; RR 8-20 = 0    Breath Sounds: Diminshed bilaterally and/or crackles = 2    Sputum   ,  ,    Cough: Strong, spontaneous, non-productive = 0    Vital Signs   Ht 5' 9\" (1.753 m)   Wt 139 lb (63 kg)   BMI 20.53 kg/m²   SPO2 (COPD values may differ): Greater than or equal to 92% on room air = 0    Peak Flow (asthma only): not applicable = 0    RSI: 5-6 = Q4hr PRN (every four hours as needed) for dyspnea        Plan       Goals: medication delivery, mobilize retained secretions, volume expansion and improve oxygenation    Patient/caregiver was educated on the proper method of use for Respiratory Care Devices:  Yes      Level of patient/caregiver understanding able to:   ? Verbalize understanding   ? Demonstrate understanding       ? Teach back        ? Needs reinforcement       ? No available caregiver               ? Other:     Response to education:  Good     Is patient being placed on Home Treatment Regimen? Yes     Does the patient have everything they need prior to discharge? Yes     Comments: PT Assessed     Plan of Care: ALB PRN MDI    Electronically signed by Zane Rose RCP on 3/18/2021 at 4:59 PM    Respiratory Protocol Guidelines     1. Assessment and treatment by Respiratory Therapy will be initiated for medication and therapeutic interventions upon initiation of aerosolized medication. 2. Physician will be contacted for respiratory rate (RR) greater than 35 breaths per minute. Therapy will be held for heart rate (HR) greater than 140 beats per minute, pending direction from physician. 3. Bronchodilators will be administered via Metered Dose Inhaler (MDI) with spacer when the following criteria are met:  a. Alert and cooperative     b. HR < 140 bpm  c. RR < 30 bpm                d. Can demonstrate a 2-3 second inspiratory hold  4.  Bronchodilators will be administered via Hand Held Nebulizer REBECCA [facility]) to patients when ANY of the following criteria are met  a. Incognizant or uncooperative          b. Patients treated with HHN at Home        c. Unable to demonstrate proper use of MDI with spacer     d. RR > 30 bpm   5. Bronchodilators will be delivered via Metered Dose Inhaler (MDI), HHN, Aerogen to intubated patients on mechanical ventilation. 6. Inhalation medication orders will be delivered and/or substituted as outlined below. Aerosolized Medications Ordering and Administration Guidelines:    1. All Medications will be ordered by a physician, and their frequency and/or modality will be adjusted as defined by the patients Respiratory Severity Index (RSI) score. 2. If the patient does not have documented COPD, consider discontinuing anticholinergics when RSI is less than 9.  3. If the bronchospasm worsens (increased RSI), then the bronchodilator frequency can be increased to a maximum of every 4 hours. If greater than every 4 hours is required, the physician will be contacted. 4. If the bronchospasm improves, the frequency of the bronchodilator can be decreased, based on the patient's RSI, but not less than home treatment regimen frequency. 5. Bronchodilator(s) will be discontinued if patient has a RSI less than 9 and has received no scheduled or as needed treatment for 72  Hrs. Patients Ordered on a Mucolytic Agent:    1. Must always be administered with a bronchodilator. 2. Discontinue if patient experiences worsened bronchospasm, or secretions have lessened to the point that the patient is able to clear them with a cough. Anti-inflammatory and Combination Medications:    1. If the patient lacks prior history of lung disease, is not using inhaled anti-inflammatory medication at home, and lacks wheezing by examination or by history for at least 24 hours, contact physician for possible discontinuation.

## 2021-03-18 NOTE — TELEPHONE ENCOUNTER
----- Message from Hernan Davis MD sent at 3/18/2021  3:21 PM EDT -----  Let patient know their lipid test is stable, continue current meds, no new orders or changes at this time. Thanks.

## 2021-03-18 NOTE — CONSULTS
[facility]  [phone]      Attending Physician: Rey Amor MD  Reason for Consultation/Chief Complaint: Weight loss, poor appetite    Subjective   History of Present Illness:  Miller Gould is a 60 y.o. patient who presented to the hospital with complaints of weight loss and poor appetite, patient presented today for elective heart catheterization with possible LAD PCI, patient however states that he is not been doing well for the last few weeks and has been losing weight and not able to eat. He is here with his brother who confirms his history. He denies chest pain. He has had chronic shortness of breath. He had a cardiac history which dates back to 2012, he had history of RCA stenting at [facility] and has also had a history of thoracic aortic aneurysm repair at [facility] around 20 years ago. He presented in transfer to our hospital in February 2021 with acute inferior ST elevation. He was taken emergently to the cardiac Cath Lab and had PCI of his RCA. Patient did follow-up in our office on March 2, 2021 with Dr. Mane Nelson and follow-up catheterization plans were made at that office visit. At that time, it was reported he had lost 25 pounds over the preceding year. Past Medical History:   has a past medical history of CAD (coronary artery disease), Cardiomyopathy ([address] Emphysema of lung ([address] Heart attack ([address] High cholesterol, HTN (hypertension), and Hypertension. Surgical History:   has a past surgical history that includes Diagnostic Cardiac Cath Lab Procedure (12/10/2019); Thoracic aortic aneurysm repair (2000); Coronary angioplasty with stent (02/22/2021); and Coronary angioplasty with stent (03/28/2013). Social History:   reports that he quit smoking about 10 months ago. His smoking use included cigarettes. He has a 100.00 pack-year smoking history. He has never used smokeless tobacco. He reports that he does not drink alcohol. mg, Q6H PRN  polyethylene glycol (GLYCOLAX) packet 17 g, Daily PRN  acetaminophen (TYLENOL) tablet 650 mg, Q6H PRN    Or  acetaminophen (TYLENOL) suppository 650 mg, Q6H PRN  [START ON 3/19/2021] pantoprazole (PROTONIX) tablet 40 mg, QAM AC  nicotine (NICODERM CQ) 21 MG/24HR 1 patch, Daily        Allergies:  Patient has no known allergies. Review of Systems:   A 14 point review of symptoms completed. Pertinent positives identified in the HPI, all other review of symptoms negative as below.       Objective   PHYSICAL EXAM:    Vitals:    03/18/21 1715   BP: 122/74   Pulse: 65   Resp: 18   Temp: 98.3 °F (36.8 °C)   SpO2: 96%    Weight: 139 lb (63 kg)         General Appearance:  Alert, cooperative, no distress, appears stated age   Head:  Normocephalic, without obvious abnormality, atraumatic   Eyes:  PERRL, conjunctiva/corneas clear   Nose: Nares normal, no drainage or sinus tenderness   Throat: Lips, mucosa, and tongue normal   Neck: Supple, symmetrical, trachea midline, no adenopathy, thyroid: not enlarged, symmetric, no tenderness/mass/nodules, no carotid bruit or JVD   Lungs:   Clear to auscultation bilaterally, respirations unlabored   Chest Wall:  No deformity or tenderness   Heart:  Regular rate and rhythm, S1, S2 normal, 2/6 sm   Abdomen:   Soft, non-tender, bowel sounds active all four quadrants,  no masses, no organomegaly   Extremities: Extremities normal, atraumatic, no cyanosis or edema, overall decreased muscle mass   Pulses: 2+ and symmetric in upper extremities   Skin: Skin color, texture, turgor normal, no rashes or lesions   Pysch: Normal mood and affect   Neurologic: Normal gross motor and sensory exam.         Labs   CBC:   Lab Results   Component Value Date    WBC 9.9 03/18/2021    RBC 4.32 03/18/2021    HGB 13.0 03/18/2021    HCT 38.7 03/18/2021    MCV 89.6 03/18/2021    RDW 13.3 03/18/2021     03/18/2021     CMP:  Lab Results   Component Value Date     03/18/2021    K 4.0 2021     2021    CO2 25 2021    BUN 11 2021    CREATININE 1.2 2021    GFRAA >60 2021    LABGLOM >60 2021    GLUCOSE 90 2021    CALCIUM 9.3 2021     PT/INR:  No results found for: PTINR  HgBA1c:No results found for: LABA1C  No results found for: CKTOTAL, CKMB, CKMBINDEX, TROPONINI      Cardiac Data     Last EKG: Normal sinus rhythm, inferior infarct, overall improved as compared EKG from 2021    Echo:    2021     The left ventricular systolic function is mildly reduced with an ejection   fraction of 45 - 50 %. There is hypokinesis of the inferolateral, inferior and basal inferior   walls. Left ventricular diastolic filling pressure is normal.   Mild mitral regurgitation. Stress Test:    Cath:    2021    CARDIAC CATHETERIZATION REPORT      Procedure Date:  2021  Patient Name: Oanh Migeul  MRN: [id number]      : 1955        INDICATION      Acute inferior STEMI     PROCEDURES PERFORMED      Left heart catheterization  LVgram  Coronary angiogam  Coronary cath  Mechanical thrombectomy of RCA  Atherectomy of RCA  IVUS of RCA  PCI of RCA with 2 drug-eluting stents              PROCEDURE DESCRIPTION   This was felt to be an emergency procedure. Patient was prepped draped in the usual sterile fashion. Local anaesthetic was applied over puncture site. Using a back wall technique, a 6 Kittitian Terumo sheath was inserted into right radial artery. Nicardipine, nitroglycerin were administered through the sheath. Heparin was administered. Diagnostic 5 Nepalese Medtronic pigtail, ultra catheters were used for diagnostic angiograms which were obtained after PCI, initially a guide catheter was taken and PCI was performed of RCA, see below for details. At the conclusion of the procedure, a TR band was placed over the puncture site and hemostasis was obtained. There were no immediate complications.  I supervised sedation with versed 5 mg/fentanyl 150 mcg during the procedure. 210 cc contrast was utilized. <20cc EBL.     I attempted to call patient's family on phone numbers are listed, there was no answer, voicemail left to call us back.        FINDINGS            LVGRAM     LVEDP  30   GRADIENT ACROSS AORTIC VALVE  less than 10 mmHg   LV FUNCTION EF 45%   WALL MOTION  basal inferior hypokinesis   MITRAL REGURGITATION  mild            CORONARY ARTERIES     LM Less than 10% [id number] stenosis            LAD Calcified, proximal-mid 70% stenosis, mid vessel bridging is noted, distal 20% stenosis.     D1 is a medium to large size vessel with proximal-mid 80% stenosis.       LCX  10 to 20% proximal stenosis, mid-distal 30 to 40% stenosis         RCA Dominant, [id number] 100% stenosis with thrombus               PERCUTANEOUS INTERVENTION DESCRIPTION      Patient was given heparin and Brilinta in the emergency room, initially a 6 yavalu guiding catheter was used to intubate the RCA. A choice floppy wire was initially taken however lesion cannot be crossed with this and was ultimately crossed with an TIMPIK wire. Lesion was then treated with mechanical thrombectomy with the penumbra device. Lesion was then treated with angioplasty with 3 mm compliant and noncompliant balloons. Atherectomy was also performed with 3 and 3.5 mm cutting balloons. IVUS was taken but would not advance into the distal vessel and as such the 6 Western Nimo guide catheter was removed and the radial sheath was upsized to a 6/7 Western Nimo sheath and then a 7 Western Nimo AL 0.75 guiding catheter was used to reintubate the RCA. A workhorse wire was then taken and the lesion was crossed again. With this equipment as well as a guide extension catheter, IVUS was able to be performed which showed minimal luminal diameter distally of 3 to 3.5 mm and proximally 3.5 to 4 mm.   As such the lesions were stented from distal to proximal with Medtronic resolute [place] 3 x 38 mm drug-eluting stent as well as a 3 x 34 mm drug-eluting stent. Stents were postdilated with 3, 3.5 and 4 mm noncompliant balloons. Follow-up IVUS showed good stent apposition/expansion. There was 0% residual stenosis. There was RADHA 0 flow before PCI and RADHA-3 flow after PCI. Integrilin was given during the procedure, this will run for 12 hours. During procedure, there is bradycardia and hypotension, patient was given atropine and supported with phenylephrine, this was able to be weaned at the end of the procedure.              CONCLUSIONS:      Successful PCI of RCA with 2 drug-eluting stents     Consider staged PCI of LAD and D1 as outpatient, patient would like to follow-up with [facility] pending discharge in the next 1 to 2 days.               Studies:     cxr       Impression   Left basilar atelectasis.  No evidence of CHF             I have reviewed labs and imaging/xray/diagnostic testing in this note. Assessment and Plan        Patient Active Problem List   Diagnosis    Moderate COPD (chronic obstructive pulmonary disease) (HCC)    Cigarette nicotine dependence without complication    Acute myocardial infarction ([address]    STEMI (ST elevation myocardial infarction) ([address]    Severe malnutrition ([address]    Coronary artery disease involving native coronary artery of native heart with angina pectoris ([address]    H/O thoracic aortic aneurysm repair    Weight loss       Failure to thrive, cardiac catheterization has been canceled, he is requesting admission to the hospital for further work-up, he had planned GI work-up which had been put on hold due to recent MI. Case discussed with the hospital service, they will be admitting patient to the hospital and consulting GI. Preop cardiovascular risk assessment, patient may proceed with GI work-up. Would recommend that  dual antiplatelet therapy not be interrupted.     Ischemic cardiomyopathy, continue losartan and carvedilol    Hypercholesterolemia, continue statin    CAD/ASHD, continue dual antiplatelet therapy    We will sign off, please call with questions    Thank you for allowing us to participate in the care of Kvng Antonio. Please call me with any questions [phone].     Roro Guillen MD, [facility]   Interventional Cardiologist  [facility]  [phone] [facility]  [phone] [address] [place] [address]  3/18/2021 6:25 PM

## 2021-03-18 NOTE — PROGRESS NOTES
Telephone report received by isabelle NANCE from cath lab. Pt transferred to room 201.  Electronically signed by Shelley Green RN on 3/18/2021 at 2:34 PM

## 2021-03-18 NOTE — CONSULTS
Gastroenterology Consult Note    Patient:   Adali Ball   :    1955   Facility:     [address] TELEMETRY  [address] [address]   Referring/PCP: No primary care provider on file. Date:     3/18/2021  Consultant:   Queta Tariq DO    Subjective: This 60 y.o. male was admitted 3/18/2021 with a diagnosis of \"Atherosclerotic heart disease of native coronary artery without angina pectoris [I25.10]  Weight loss [R63.4]\" and is seen in consultation regarding  Weight loss and poor appetite    59 yo male with weight loss and poor appetite who presented for elective heart cath with possible LAD PCI. Had RCA stenting in . Had acute inferior St elevation MI in 2021. Patient has lost 25 ls over the previousy year. Further catheterization is planned however it has been deemed by cardiology that GI workup should occure prior to repeat cath. Has had constant nausea for some time. States has had dark stools on several occasionss.   Unsure he will be able to tolerate a prep    Past Medical History:   Diagnosis Date    CAD (coronary artery disease) 2013    Cardiomyopathy ([address] 2021    EF= 45%    Emphysema of lung ([address]     Heart [facility] 2013    3/28/2013 and 2021    High cholesterol     HTN (hypertension)     Hypertension      Past Surgical History:   Procedure Laterality Date    CORONARY ANGIOPLASTY WITH STENT PLACEMENT  2021    SAMM- 3.0 x 38 and 3.0 x 34 to pRCA    CORONARY ANGIOPLASTY WITH STENT PLACEMENT  2013    SAMM- 3.5 x 18 to RCA    DIAGNOSTIC CARDIAC CATH LAB PROCEDURE  12/10/2019    Non Obs CAD    THORACIC AORTIC ANEURYSM REPAIR         Social:   Social History     Tobacco Use    Smoking status: Former Smoker     Packs/day: 2.00     Years: 50.00     Pack years: 100.00     Types: Cigarettes     Quit date: 2020     Years since quittin.8    Smokeless tobacco: Never Used   Substance Use Topics    Alcohol use: Never     Frequency: Never     Family: No family history on file. Scheduled Medications:    ticagrelor  90 mg Oral BID    [START ON 3/19/2021] aspirin  81 mg Oral Daily    atorvastatin  80 mg Oral Nightly    carvedilol  6.25 mg Oral BID WC    losartan  25 mg Oral Daily    ticagrelor  90 mg Oral BID    glycopyrrolate-formoterol  2 puff Inhalation BID    sodium chloride flush  10 mL Intravenous 2 times per day    [START ON 3/19/2021] pantoprazole  40 mg Oral QAM AC    nicotine  1 patch Transdermal Daily     Infusions:    sodium chloride 1,000 mL (03/18/21 0716)     PRN Medications: albuterol, sodium chloride flush, promethazine **OR** ondansetron, polyethylene glycol, acetaminophen **OR** acetaminophen  Allergies: No Known Allergies    ROS:   Review of Systems    Objective:     Physical Exam:   Vitals:    03/18/21 1715   BP: 122/74   Pulse: 65   Resp: 18   Temp: 98.3 °F (36.8 °C)   SpO2: 96%     No intake/output data recorded. General appearance: alert, appears stated age and cooperative  HEENT: PERRLA  Neck: no adenopathy, no carotid bruit, no JVD, supple, symmetrical, trachea midline and thyroid not enlarged, symmetric, no tenderness/mass/nodules  Lungs: clear to auscultation bilaterally  Heart: regular rate and rhythm, S1, S2 normal, no murmur, click, rub or gallop  Abdomen: soft, non-tender; bowel sounds normal; no masses,  no organomegaly  Extremities: extremities normal, atraumatic, no cyanosis or edema     Lab and Imaging Review   Labs:  CBC:   Recent Labs     03/18/21  0705   WBC 9.9   HGB 13.0*   HCT 38.7*   MCV 89.6        BMP:   Recent Labs     03/18/21  0705      K 4.0      CO2 25   BUN 11   CREATININE 1.2     LIVER PROFILE: No results for input(s): AST, ALT, LIPASE, PROT, BILIDIR, BILITOT, ALKPHOS in the last 72 hours. Invalid input(s): AMYLASE,  ALB  PT/INR:   Recent Labs     03/18/21  0705   INR 1.14       IMAGING:  No results found. Hospital Problems           Last Modified POA    * (Principal) Weight loss 3/18/2021 Yes    Moderate COPD (chronic obstructive pulmonary disease) (HCC) 3/18/2021 Yes    Cigarette nicotine dependence without complication 7/09/3801 Yes    Severe malnutrition (HCC) (Chronic) 3/18/2021 Yes    Coronary artery disease involving native coronary artery of native heart with angina pectoris ([address] 3/18/2021 Yes    H/O thoracic aortic aneurysm repair 3/18/2021 Yes    Overview Signed 3/1/2021  9:56 AM by Mandy Mcmillan RN     Repaired 2000              Assessment:   61 yo male with COPD, CAD s/p recent MI with request for further GI workup per cardiology    Plan:   1. Given persistent nausea will plan EGD to evaluate for PUD  2.  Consider colonoscopy as nausea improves      Neil Rodarte DO  7:28 PM 3/18/2021            [facility]    [address]      [address] [place] [address]     Phone: [phone]     Fax: [phone]

## 2021-03-18 NOTE — H&P
Hospital Medicine History & Physical      PCP: No primary care provider on file. Date of Admission: 3/18/2021    Date of Service: Pt seen/examined on 3/18/21 and Admitted to Inpatient with expected LOS greater than two midnights due to medical therapy. Chief Complaint:  Weight loss failure to thrive      History Of Present Illness: 60 y.o. male who presented to [facility] today for elective angiogram, this was cancelled because pt has no chest pain or sob. Cardiology asked that he would be admitted for GI consult for 30# weight loss and nausea. EMR and notes reviewed pt seen and examined in the cath lab. He has no complaints he is accompanied by his brother who is concerned regarding his decline over the las 6 month. He reports No current fever, chills, no chest pain or palpitations. Denies any vomiting. Notes recent constipation issues and had started using supp. and stool softeners        He has a Hx significant of CAD, recent SAMM, COPD, current smoker, HTN, HLD,  Hx very remote of IV/Po drug use. Past Medical History:          Diagnosis Date    CAD (coronary artery disease) 03/28/2013    Cardiomyopathy ([address] 02/22/2021    EF= 45%    [facility]     Heart attack ([facility] 03/28/2013    3/28/2013 and 2/22/2021    High cholesterol     HTN (hypertension)     Hypertension        Past Surgical History:          Procedure Laterality Date    CORONARY ANGIOPLASTY WITH STENT PLACEMENT  02/22/2021    SAMM- 3.0 x 38 and 3.0 x 34 to pRCA    CORONARY ANGIOPLASTY WITH STENT PLACEMENT  03/28/2013    SAMM- 3.5 x 18 to RCA    DIAGNOSTIC CARDIAC CATH LAB PROCEDURE  12/10/2019    Non Obs CAD    THORACIC AORTIC ANEURYSM REPAIR  2000       Medications Prior to Admission:      Prior to Admission medications    Medication Sig Start Date End Date Taking?  Authorizing Provider   NONFORMULARY Apply 1 patch topically once a week   Yes Historical Provider, MD   aspirin 81 MG chewable tablet Take 1 tablet by mouth daily 2/25/21  Yes Gamaliel Huff MD   atorvastatin (LIPITOR) 80 MG tablet Take 1 tablet by mouth nightly 2/24/21  Yes Gamaliel Huff MD   losartan (COZAAR) 25 MG tablet Take 1 tablet by mouth daily 2/25/21  Yes Gamaliel Huff MD   carvedilol (COREG) 6.25 MG tablet Take 1 tablet by mouth 2 times daily (with meals) 2/24/21  Yes Gamaliel Huff MD   ticagrelor (BRILINTA) 90 MG TABS tablet Take 1 tablet by mouth 2 times daily 2/24/21  Yes Gamaliel Huff MD   umeclidinium-vilanterol (ANORO ELLIPTA) 62.5-25 MCG/INH AEPB inhaler Inhale 1 puff into the lungs daily 6/11/20  Yes Enrico Ivory MD   albuterol sulfate HFA (VENTOLIN HFA) 108 (90 Base) MCG/ACT inhaler Inhale 2 puffs into the lungs every 6 hours as needed for Wheezing or Shortness of Breath 3/3/20  Yes Enrico Ivory MD   albuterol (PROVENTIL) (2.5 MG/3ML) 0.083% nebulizer solution Take 3 mLs by nebulization every 6 hours as needed for Wheezing 3/3/20  Yes Enrico Ivory MD       Allergies:  Patient has no known allergies. Social History:      The patient currently lives alone     TOBACCO:   reports that he quit smoking about 10 months ago. His smoking use included cigarettes. He has a 100.00 pack-year smoking history. He has never used smokeless tobacco.  ETOH:   reports no history of alcohol use. E-Cigarettes/Vaping Use     Questions Responses    E-Cigarette/Vaping Use     Start Date     Passive Exposure     Quit Date     Counseling Given     Comments             Family History:     Reviewed in detail and negative for DM, CAD, Cancer, CVA. Positive as follows:    No family history on file. REVIEW OF SYSTEMS:   Pertinent positives as noted in the HPI. All other systems reviewed and negative.     PHYSICAL EXAM PERFORMED:    /74   Pulse 65   Temp 98.3 °F (36.8 °C) (Oral)   Resp 18   Ht 5' 9\" (1.753 m)   Wt 139 lb (63 kg)   SpO2 96%   BMI 20.53 kg/m²     General appearance:  No apparent distress, appears stated age [Z22.352, Z86.79] 03/01/2021    Coronary artery disease involving native coronary artery of native heart with angina pectoris ([facility] [address] [I25.119] 03/01/2021    Severe malnutrition ([facility] [E43] 02/23/2021    Moderate COPD (chronic obstructive pulmonary disease) (HCC) [J44.9] 03/03/2020    Cigarette nicotine dependence without complication [M57.359] 47/41/2988         PLAN:    Weight loss: uncertain etiology   - reports has lost 30 lbs in the las 6 month, with nausea and vomiting   - seen in consult by GI on last visit 2/22/21  - recommended EGD, CT and colonoscopy   - Pt has started outpatient workup with PCP. He has had difficulty arranging for EGD/Colonscopy. GI consult to address concerns and patient will likely need follow up in their office as outpatient. Pt did not want to wait for EGD and elected to continue workup as an outpatient. This did not occur.   - have pre consulted GI for IP work up if available   - dietary consult   - check tsh, t4, LDH, pre- albumin       CAD hx of STEMI-PCI of RCA with 2 SAMM. Consider staged PCI of LAD and D1 as outpatient. This was to occur today but was canceled   - Cardiology following.   - Continue aspirin, statin, beta-blocker,brilinta. - ECHO EF 45-50%hypokinesis of the inferolateral, inferior and basal inferior   walls.     COPD- Stable on room air. Will continue home ellipta. Albuterol as needed.     Tobacco use- encourage cessation.     HTN- Controlled on current meds.      Depression- not currently on medication but mood is stable.     Opioid dependence in setting of chronic pain- Continue buprenorphine 10 microgram per hr patch. Reviewed OARRS. Continue at current dose to avoid opioid withdrawal.     DVT Prophylaxis: Per cardiology   DVT Prophylaxis: DAPT  Diet: DIET CARDIAC;  Code Status: Full Code    PT/OT Eval Status: ordered     Dispo - uncertain        SKYLAR Ravi - CNP    Thank you No primary care provider on file.  for the opportunity to be involved in this patient's care. If you have any questions or concerns please feel free to contact me at [phone].

## 2021-03-19 LAB
A/G RATIO: 1.2 (ref 1.1–2.2)
ALBUMIN SERPL-MCNC: 3.5 G/DL (ref 3.4–5)
ALBUMIN SERPL-MCNC: 3.6 G/DL (ref 3.4–5)
ALP BLD-CCNC: 84 U/L (ref 40–129)
ALT SERPL-CCNC: <5 U/L (ref 10–40)
ANION GAP SERPL CALCULATED.3IONS-SCNC: 8 MMOL/L (ref 3–16)
AST SERPL-CCNC: 9 U/L (ref 15–37)
BILIRUB SERPL-MCNC: 0.6 MG/DL (ref 0–1)
BUN BLDV-MCNC: 10 MG/DL (ref 7–20)
CALCIUM SERPL-MCNC: 9.4 MG/DL (ref 8.3–10.6)
CHLORIDE BLD-SCNC: 105 MMOL/L (ref 99–110)
CO2: 26 MMOL/L (ref 21–32)
CREAT SERPL-MCNC: 1.3 MG/DL (ref 0.8–1.3)
EKG ATRIAL RATE: 63 BPM
EKG DIAGNOSIS: NORMAL
EKG P AXIS: 54 DEGREES
EKG P-R INTERVAL: 166 MS
EKG Q-T INTERVAL: 376 MS
EKG QRS DURATION: 82 MS
EKG QTC CALCULATION (BAZETT): 384 MS
EKG R AXIS: -34 DEGREES
EKG T AXIS: -9 DEGREES
EKG VENTRICULAR RATE: 63 BPM
GFR AFRICAN AMERICAN: >60
GFR NON-AFRICAN AMERICAN: 55
GLOBULIN: 3 G/DL
GLUCOSE BLD-MCNC: 87 MG/DL (ref 70–99)
HCT VFR BLD CALC: 37.6 % (ref 40.5–52.5)
HEMOGLOBIN: 12.5 G/DL (ref 13.5–17.5)
LACTATE DEHYDROGENASE: 110 U/L (ref 100–190)
MCH RBC QN AUTO: 29.5 PG (ref 26–34)
MCHC RBC AUTO-ENTMCNC: 33.2 G/DL (ref 31–36)
MCV RBC AUTO: 89 FL (ref 80–100)
PDW BLD-RTO: 13.1 % (ref 12.4–15.4)
PLATELET # BLD: 351 K/UL (ref 135–450)
PMV BLD AUTO: 6.5 FL (ref 5–10.5)
POTASSIUM REFLEX MAGNESIUM: 4.7 MMOL/L (ref 3.5–5.1)
RBC # BLD: 4.23 M/UL (ref 4.2–5.9)
SODIUM BLD-SCNC: 139 MMOL/L (ref 136–145)
TOTAL PROTEIN: 6.6 G/DL (ref 6.4–8.2)
WBC # BLD: 10.1 K/UL (ref 4–11)

## 2021-03-19 PROCEDURE — 1200000000 HC SEMI PRIVATE

## 2021-03-19 PROCEDURE — 88305 TISSUE EXAM BY PATHOLOGIST: CPT

## 2021-03-19 PROCEDURE — 3609012400 HC EGD TRANSORAL BIOPSY SINGLE/MULTIPLE: Performed by: INTERNAL MEDICINE

## 2021-03-19 PROCEDURE — 80053 COMPREHEN METABOLIC PANEL: CPT

## 2021-03-19 PROCEDURE — 0DB98ZX EXCISION OF DUODENUM, VIA NATURAL OR ARTIFICIAL OPENING ENDOSCOPIC, DIAGNOSTIC: ICD-10-PCS | Performed by: INTERNAL MEDICINE

## 2021-03-19 PROCEDURE — 85027 COMPLETE CBC AUTOMATED: CPT

## 2021-03-19 PROCEDURE — 84443 ASSAY THYROID STIM HORMONE: CPT

## 2021-03-19 PROCEDURE — 97166 OT EVAL MOD COMPLEX 45 MIN: CPT

## 2021-03-19 PROCEDURE — 2709999900 HC NON-CHARGEABLE SUPPLY: Performed by: INTERNAL MEDICINE

## 2021-03-19 PROCEDURE — 7100000011 HC PHASE II RECOVERY - ADDTL 15 MIN: Performed by: INTERNAL MEDICINE

## 2021-03-19 PROCEDURE — 2580000003 HC RX 258: Performed by: INTERNAL MEDICINE

## 2021-03-19 PROCEDURE — 93010 ELECTROCARDIOGRAM REPORT: CPT | Performed by: INTERNAL MEDICINE

## 2021-03-19 PROCEDURE — 0DB78ZX EXCISION OF STOMACH, PYLORUS, VIA NATURAL OR ARTIFICIAL OPENING ENDOSCOPIC, DIAGNOSTIC: ICD-10-PCS | Performed by: INTERNAL MEDICINE

## 2021-03-19 PROCEDURE — 94640 AIRWAY INHALATION TREATMENT: CPT

## 2021-03-19 PROCEDURE — 97162 PT EVAL MOD COMPLEX 30 MIN: CPT

## 2021-03-19 PROCEDURE — 84153 ASSAY OF PSA TOTAL: CPT

## 2021-03-19 PROCEDURE — 82040 ASSAY OF SERUM ALBUMIN: CPT

## 2021-03-19 PROCEDURE — 97116 GAIT TRAINING THERAPY: CPT

## 2021-03-19 PROCEDURE — 6370000000 HC RX 637 (ALT 250 FOR IP): Performed by: NURSE PRACTITIONER

## 2021-03-19 PROCEDURE — 83615 LACTATE (LD) (LDH) ENZYME: CPT

## 2021-03-19 PROCEDURE — 6370000000 HC RX 637 (ALT 250 FOR IP): Performed by: INTERNAL MEDICINE

## 2021-03-19 PROCEDURE — 7100000010 HC PHASE II RECOVERY - FIRST 15 MIN: Performed by: INTERNAL MEDICINE

## 2021-03-19 PROCEDURE — 97530 THERAPEUTIC ACTIVITIES: CPT

## 2021-03-19 PROCEDURE — 36415 COLL VENOUS BLD VENIPUNCTURE: CPT

## 2021-03-19 PROCEDURE — 99152 MOD SED SAME PHYS/QHP 5/>YRS: CPT | Performed by: INTERNAL MEDICINE

## 2021-03-19 PROCEDURE — 6360000002 HC RX W HCPCS: Performed by: INTERNAL MEDICINE

## 2021-03-19 RX ORDER — FENTANYL CITRATE 50 UG/ML
INJECTION, SOLUTION INTRAMUSCULAR; INTRAVENOUS PRN
Status: DISCONTINUED | OUTPATIENT
Start: 2021-03-19 | End: 2021-03-19 | Stop reason: ALTCHOICE

## 2021-03-19 RX ORDER — MIDAZOLAM HYDROCHLORIDE 5 MG/ML
INJECTION INTRAMUSCULAR; INTRAVENOUS PRN
Status: DISCONTINUED | OUTPATIENT
Start: 2021-03-19 | End: 2021-03-19 | Stop reason: ALTCHOICE

## 2021-03-19 RX ORDER — SODIUM CHLORIDE, SODIUM LACTATE, POTASSIUM CHLORIDE, CALCIUM CHLORIDE 600; 310; 30; 20 MG/100ML; MG/100ML; MG/100ML; MG/100ML
INJECTION, SOLUTION INTRAVENOUS CONTINUOUS
Status: CANCELLED | OUTPATIENT
Start: 2021-03-19

## 2021-03-19 RX ORDER — SODIUM CHLORIDE, SODIUM LACTATE, POTASSIUM CHLORIDE, CALCIUM CHLORIDE 600; 310; 30; 20 MG/100ML; MG/100ML; MG/100ML; MG/100ML
INJECTION, SOLUTION INTRAVENOUS CONTINUOUS PRN
Status: COMPLETED | OUTPATIENT
Start: 2021-03-19 | End: 2021-03-19

## 2021-03-19 RX ADMIN — ASPIRIN 81 MG: 81 TABLET, CHEWABLE ORAL at 09:07

## 2021-03-19 RX ADMIN — GLYCOPYRROLATE AND FORMOTEROL FUMARATE 2 PUFF: 9; 4.8 AEROSOL, METERED RESPIRATORY (INHALATION) at 19:25

## 2021-03-19 RX ADMIN — SODIUM CHLORIDE 1000 ML: 9 INJECTION, SOLUTION INTRAVENOUS at 09:07

## 2021-03-19 RX ADMIN — TICAGRELOR 90 MG: 90 TABLET ORAL at 09:07

## 2021-03-19 RX ADMIN — ATORVASTATIN CALCIUM 80 MG: 80 TABLET, FILM COATED ORAL at 21:54

## 2021-03-19 RX ADMIN — LOSARTAN POTASSIUM 25 MG: 25 TABLET, FILM COATED ORAL at 10:09

## 2021-03-19 RX ADMIN — TICAGRELOR 90 MG: 90 TABLET ORAL at 21:54

## 2021-03-19 ASSESSMENT — PAIN SCALES - GENERAL
PAINLEVEL_OUTOF10: 0
PAINLEVEL_OUTOF10: 0

## 2021-03-19 NOTE — PROGRESS NOTES
Comprehensive Nutrition Assessment    Type and Reason for Visit:  Initial, Consult(unintentional weight loss)    Nutrition Recommendations/Plan:   1. Recommend liberalizing diet to general when medically possible due to malnutrition  2. Patient stated can eat pudding but not shakes or ice cream, will send Boost Pudding bid  3. Monitor appropriateness of alternate nutrition needs especially is po intake continues less than 50% meals    Nutrition Assessment:  Patient admitted with weight loss with history of COPD. Currently on a cardiac diet. Po intake mostly less than 50% meals. EGD completed this admission due to reported nausea and weight loss. Mild gastritis found with need for colonoscopy; biopsy taken. Normal saline at 30 ml/hr. Will continue to monitor. Malnutrition Assessment:  Malnutrition Status:  Severe malnutrition    Context:  Chronic Illness     Findings of the 6 clinical characteristics of malnutrition:  Energy Intake:  7 - 75% or less estimated energy requirements for 1 month or longer  Weight Loss:  (18.1% weight loss in the past year)     Body Fat Loss:  7 - Severe body fat loss Orbital(hollowing face)   Muscle Mass Loss:  Unable to assess    Fluid Accumulation:  Unable to assess     Strength:  Not Performed    Estimated Daily Nutrient Needs:  Energy (kcal):  8210-3882; Weight Used for Energy Requirements:  Current(61.9 kg)     Protein (g):  80-93; Weight Used for Protein Requirements:  (61.9 kg;  1.3-1.5)        Fluid (ml/day):   ; Method Used for Fluid Requirements:  1 ml/kcal      Nutrition Related Findings:  no BM noted;  lytes stable; Wounds:  (scattered abrasions)       Current Nutrition Therapies:    DIET CARDIAC;   Dietary Nutrition Supplements: Other Oral Supplement (see comment)    Anthropometric Measures:  · Height: 5' 9\" (175.3 cm)  · Current Body Weight: 136 lb 7 oz (61.9 kg)    · Ideal Body Weight: 160 lbs; % Ideal Body Weight     · BMI: 20.1    · BMI Categories: Normal Weight (BMI 18.5-24. 9)       Nutrition Diagnosis:   · Inadequate energy intake related to increase demand for energy/nutrients as evidenced by intake 26-50%    Nutrition Interventions:   Food and/or Nutrient Delivery:  Continue Current Diet, Start Oral Nutrition Supplement  Nutrition Education/Counseling:  No recommendation at this time   Coordination of Nutrition Care:  Continue to monitor while inpatient    Goals:  Patient will eat 50% or greater of meals and supplements. Nutrition Monitoring and Evaluation:   Behavioral-Environmental Outcomes:      Food/Nutrient Intake Outcomes:  Food and Nutrient Intake, Supplement Intake  Physical Signs/Symptoms Outcomes:  Biochemical Data     Discharge Planning:     Too soon to determine     Electronically signed by Tiara Stevenson, [address],  on 3/19/21 at 2:08 PM EDT    Contact: Office: [phone]; [address]: [facility]  '

## 2021-03-19 NOTE — H&P
Pre-sedation Assessment    History and Physical / Pre-Sedation Assessment  Patient:  George Ricketts   :   1955     Intended Procedure: EGD      HPI: 59 yo w COPD, HTN, CAD admitted 2021 for STEMI and underwent PCI/stent placement on 21. He reports many months of nausea, anorexia and significant wt loss w occ black stools. He refused EGD during that admission, but Cardiology want him to have GI w/u before any further cardiac management.   Had reportedly colon polyps 9 yrs ago.     Current Facility-Administered Medications   Medication Dose Route Frequency Provider Last Rate Last Admin    0.9 % sodium chloride infusion  1,000 mL Intravenous Continuous Prmanolo Cavazos MD 30 mL/hr at 21 0716 1,000 mL at 21    ticagrelor (BRILINTA) tablet 90 mg  90 mg Oral BID Prmanolo Cavazos MD   90 mg at 21    albuterol (PROVENTIL) nebulizer solution 2.5 mg  2.5 mg Nebulization Q6H PRN SKYLAR Singleton CNP        aspirin chewable tablet 81 mg  81 mg Oral Daily SKYLAR Baig CNP        atorvastatin (LIPITOR) tablet 80 mg  80 mg Oral Nightly SKYLAR Singleton CNP   80 mg at 21    carvedilol (COREG) tablet 6.25 mg  6.25 mg Oral BID WC SKYLAR Singleton CNP   Stopped at 21    losartan (COZAAR) tablet 25 mg  25 mg Oral Daily SKYLAR Baig CNP        glycopyrrolate-formoterol (BEVESPI) 9-4.8 MCG/ACT inhaler 2 puff  2 puff Inhalation BID SKYLAR Singleton CNP   2 puff at 21    sodium chloride flush 0.9 % injection 10 mL  10 mL Intravenous 2 times per day SKYLAR Singleton CNP   10 mL at 21    sodium chloride flush 0.9 % injection 10 mL  10 mL Intravenous PRN SKYLAR Singleton CNP        promethazine (PHENERGAN) tablet 12.5 mg  12.5 mg Oral Q6H PRN SKYLAR Singleton CNP        Or    ondansetron (ZOFRAN) injection 4 mg  4 mg Intravenous Q6H PRN SKYLAR Singleton - [place]   4 mg at 03/18/21 1810    polyethylene glycol (GLYCOLAX) packet 17 g  17 g Oral Daily PRN Makenzie John, APRN - CNP        acetaminophen (TYLENOL) tablet 650 mg  650 mg Oral Q6H PRN Makenzie John, APRN - CNP        Or    acetaminophen (TYLENOL) suppository 650 mg  650 mg Rectal Q6H PRN Makenzie John, APRN - CNP        pantoprazole (PROTONIX) tablet 40 mg  40 mg Oral QAM AC Makenzie John, APRN - CNP        nicotine (NICODERM CQ) 21 MG/24HR 1 patch  1 patch Transdermal Daily Makenzie John, APRN - CNP         Past Medical History:   Diagnosis Date    CAD (coronary artery disease) 03/28/2013    Cardiomyopathy ([facility] 02/22/2021    EF= 45%    Emphysema of lung ([facility] [address] [facility]     Heart attack ([facility] [address] 03/28/2013    3/28/2013 and 2/22/2021    High cholesterol     HTN (hypertension)     Hypertension      Past Surgical History:   Procedure Laterality Date    CORONARY ANGIOPLASTY WITH STENT PLACEMENT  02/22/2021    SAMM- 3.0 x 38 and 3.0 x 34 to pRCA    CORONARY ANGIOPLASTY WITH STENT PLACEMENT  03/28/2013    SAMM- 3.5 x 18 to RCA    DIAGNOSTIC CARDIAC CATH LAB PROCEDURE  12/10/2019    Non Obs CAD    THORACIC AORTIC ANEURYSM REPAIR  2000       Nurses notes reviewed and agreed. Medications reviewed  Allergies: No Known Allergies        Physical Exam:  Vital Signs: /74   Pulse 75   Temp 97.4 °F (36.3 °C) (Temporal)   Resp 18   Ht 5' 9\" (1.753 m)   Wt 136 lb 7.4 oz (61.9 kg)   SpO2 98%   BMI 20.15 kg/m²  Body mass index is 20.15 kg/m². Airway:Normal  Cardiac:Normal  Pulmonary:Normal  Abdomen:Normal  Specific to procedure: none      Pre-Procedure Assessment/Plan:  ASA 3 - Patient with moderate systemic disease with functional limitations  Mallampati I  Level of Sedation Plan: Moderate sedation    Post Procedure plan: Return to same level of care    I assessed the patient and find that the patient is in satisfactory condition to proceed with the planned procedure and sedation plan.     I have explained the risk,

## 2021-03-19 NOTE — PROGRESS NOTES
Physician Progress Note      PATIENT:               Duyen Schultz  CSN #:                  [id number]  :                       1955  ADMIT DATE:       3/18/2021 6:24 AM  DISCH DATE:  RESPONDING  PROVIDER #:        Lakesha Wise CNP          QUERY TEXT:    Pt admitted with abnormal weight loss. Per dietician consult note   \"Malnutrition Status:  Severe malnutrition\". If possible, please document in   progress notes and discharge summary:    The medical record reflects the following:  Risk Factors: Nausea and vomiting, recent STEMI, CAD, COPD  Clinical Indicators: BMI 20.15, 30lb weight loss in last six months, Per   dietician consult note \"Malnutrition Status:  Severe malnutrition\". Treatment: Dietician consult, EGD, GI consult, oral supplements, serial labs,   supportive care. Options provided:  -- Severe malnutrition  confirmed present on admission  -- Severe malnutrition  ruled out  -- Other - I will add my own diagnosis  -- Disagree - Not applicable / Not valid  -- Disagree - Clinically unable to determine / Unknown  -- Refer to Clinical Documentation Reviewer    PROVIDER RESPONSE TEXT:    The diagnosis of severe malnutrition was confirmed as present on admission.     Query created by: Carole Gu on 3/19/2021 3:13 PM      Electronically signed by:  Lakehsa Wise CNP 3/19/2021 7:48 PM

## 2021-03-19 NOTE — PROGRESS NOTES
Spoke to family in [place] let them know that Michael Mariscal will be to room to speak to him sometime today. Brother aware that pt is being transferred back to room.

## 2021-03-19 NOTE — PROGRESS NOTES
Occupational Therapy   Occupational Therapy Initial Assessment/Treatment   Date: 3/19/2021   Patient Name: Angeli Daniels  MRN: [id number]     : 1955    Date of Service: 3/19/2021    Discharge Recommendations:  [address], Continue to assess pending progress       Assessment   Performance deficits / Impairments: Decreased functional mobility ; Decreased strength;Decreased endurance;Decreased balance;Decreased cognition;Decreased ADL status; Decreased high-level IADLs;Decreased safe awareness    Assessment: Pt 61 yo male functioning with deficits in the areas listed above following failure to thrive. Pt is a questionable historian and agitated with all questions. Pt demo'd some self limiting behaviors and reports he does not want therapy at this time. Pt was moderately unsteady for functional mobility. Pt required mod A to correct multiple LOB. Pt declined wanting therapy at this time. Pt would benefit from therapy while in acute care based on history of falls and generalized weakness. Pt would benefit from SNF level of care at discharge based on current level of functioning and no 24 hour assist. Pt is resistive to therapy at this time with increased education on the role while in acute care. Prognosis: Fair  Decision Making: Medium Complexity  OT Education: OT Role;Plan of Care;Transfer Training;Precautions  Patient Education: role of therapy while in acute, balance awareness  REQUIRES OT FOLLOW UP: Yes  Activity Tolerance  Activity Tolerance: Treatment limited secondary to agitation  Activity Tolerance: /87 HR 66 O2 95%  Safety Devices  Safety Devices in place: Yes  Type of devices: Call light within reach;Nurse notified; Bed alarm in place;Gait belt;Left in bed           Patient Diagnosis(es): There were no encounter diagnoses.      has a past medical history of CAD (coronary artery disease), Cardiomyopathy ([address] Emphysema of lung ([facility] Heart attack ([facility] [address] High cholesterol, HTN (hypertension), and Hypertension. has a past surgical history that includes Diagnostic Cardiac Cath Lab Procedure (12/10/2019); Thoracic aortic aneurysm repair (2000); Coronary angioplasty with stent (02/22/2021); and Coronary angioplasty with stent (03/28/2013). Restrictions  Restrictions/Precautions  Restrictions/Precautions: General Precautions, Fall Risk, Up as Tolerated    Subjective   General  Chart Reviewed: Yes  Patient assessed for rehabilitation services?: Yes  Family / Caregiver Present: No  Referring Practitioner: SKYLAR Mas CNP  Diagnosis: failure to thrive, weight loss  Subjective  Subjective: Pt agitated and refusing all therapy  General Comment  Comments: RN approved therapy and notified that pt does not want therapy at this time  Patient Currently in Pain: Denies  Vital Signs  Pulse: 66  BP: 132/87  Patient Currently in Pain: Denies  Oxygen Therapy  SpO2: 95 %     Social/Functional History  Social/Functional History  Lives With: Alone  Type of Home: House  Home Layout: One level, Laundry in basement(Pt states he stopped going to the basement)  Home Access: Stairs to enter with rails  Entrance Stairs - Number of Steps: 3  Bathroom Shower/Tub: Tub/Shower unit  Bathroom Toilet: Standard  Bathroom Equipment: Grab bars in shower, Shower chair  Home Equipment: (unsure of what type of walker at home)  ADL Assistance: [facility] [address]: Independent  Homemaking Responsibilities: Yes  Ambulation Assistance: Independent  Transfer Assistance: Independent  Additional Comments: reports he has not been doing laundry in basement because unable to do stairs. Admits to multiple falls but \"its been awhile\".        Objective   Vision: Impaired  Vision Exceptions: Wears glasses for reading  Hearing: Within functional limits    Orientation  Overall Orientation Status: Within Functional Limits(agitated with questions)  Observation/Palpation  Posture: Fair  Observation: moderately unsteady with functional mobility  Balance  Sitting Balance: Stand by assistance(seated EOB)  Standing Balance: Minimal assistance(declining all AD)  Standing Balance  Time: ~5 minutes  Activity: standing for transfers and functional mobility  Functional Mobility  Functional - Mobility Device: No device  Activity: Other  Assist Level:  Moderate assistance  Functional Mobility Comments: multiple LOB with all mobility but refusing to work on balance with therapy at this time  ADL  Additional Comments: pt declined all adls  Tone RUE  RUE Tone: Normotonic  Tone LUE  LUE Tone: Normotonic  Coordination  Movements Are Fluid And Coordinated: Yes     Bed mobility  Supine to Sit: Supervision  Sit to Supine: Supervision  Transfers  Sit to stand: Contact guard assistance  Stand to sit: Contact guard assistance     Cognition  Overall Cognitive Status: Exceptions  Safety Judgement: Decreased awareness of need for assistance  Insights: Decreased awareness of deficits                 LUE AROM (degrees)  LUE AROM : WFL  RUE AROM (degrees)  RUE AROM : WFL  LUE Strength  L Hand General: 4/5  LUE Strength Comment: generalized weakness noted through observation  RUE Strength  R Hand General: 4/5  RUE Strength Comment: generalized weakness noted through observation                   Plan   Plan  Times per week: 1-2      AM-PAC Score        AM-[facility] Inpatient Daily Activity Raw Score: 15 (03/19/21 1218)  AM-PAC Inpatient ADL T-Scale Score : 34.69 (03/19/21 1218)  ADL Inpatient CMS 0-100% Score: 56.46 (03/19/21 1218)  ADL Inpatient CMS G-Code Modifier : CK (03/19/21 1218)    Goals  Short term goals  Time Frame for Short term goals: 1-2 times  Short term goal 1: Pt will complete toileting with S.  Short term goal 2: Pt will complete LB dressing with s       Therapy Time   Individual Concurrent Group Co-treatment   Time In 1045         Time Out 1105         Minutes 20         Timed Code Treatment Minutes: 10 Minutes(10 minutes for evaluation) Deric Garzon OTR/L    If pt is unable to be seen after this session, please let this note serve as discharge summary. Please see case management note for discharge disposition. Thank you.

## 2021-03-19 NOTE — PROGRESS NOTES
Physical Therapy    Facility/Department: [facility] A2 CARD TELEMETRY  Initial Assessment    NAME: Miller Gould  : 1955  MRN: [id number]    Date of Service: 3/19/2021    Discharge Recommendations:  Continue to assess pending progress, Subacute/Skilled Nursing Facility, 24 hour supervision or assist   PT Equipment Recommendations  Equipment Needed: No  If pt is unable to be seen after this session, please let this note serve as discharge summary. Please see case management note for discharge disposition. Thank you. Assessment   Body structures, Functions, Activity limitations: Decreased balance;Decreased endurance  Assessment: Pt admitted for FTT in adult with reported deconditioning. Per pt and chart review, pt living alone with increasing falls and difficulty managing daily tasks. Pt agitated upon entry stating he has not eaten in 6 mo and needs calories before thinking about participating in therapy but also admits to not being able to manage stairs at home and h/o falls. Pt had a breakfast tray that he had not touched in his room. Pt clearly unsteady with brief ambulation in room needing Min-Mod A to maintain balance but pt refused further therapy again, focusing on his caloric needs vs any concern with mobility. Will continue to faciliate increased mobility and safety as pt is willing to participate. Pt will need 24/7 assist if going home or SNF. Treatment Diagnosis: deconditioning  Prognosis: Fair  Decision Making: Medium Complexity  PT Education: Goals; Disease Specific Education;Plan of Care;PT Role  Patient Education: pt not receptive to education on benefits of therapy. Will need reinforcement  Barriers to Learning: readiness to learn  REQUIRES PT FOLLOW UP: Yes  Activity Tolerance  Activity Tolerance: Treatment limited secondary to agitation       Patient Diagnosis(es): There were no encounter diagnoses.      has a past medical history of CAD (coronary artery disease), Cardiomyopathy ([facility] [address] Emphysema of lung ([facility] [address] Heart attack ([facility] [address] High cholesterol, HTN (hypertension), and Hypertension. has a past surgical history that includes Diagnostic Cardiac Cath Lab Procedure (12/10/2019); Thoracic aortic aneurysm repair (2000); Coronary angioplasty with stent (02/22/2021); and Coronary angioplasty with stent (03/28/2013). Restrictions  Restrictions/Precautions  Restrictions/Precautions: General Precautions, Fall Risk, Up as Tolerated  Vision/Hearing        Subjective  General  Chart Reviewed: Yes  Patient assessed for rehabilitation services?: Yes  Response To Previous Treatment: Not applicable  Family / Caregiver Present: No  Referring Practitioner: SKYLAR Singleton CNP  Referral Date : 03/18/21  Diagnosis: FTT  Follows Commands: Within Functional Limits  General Comment  Comments: cleared by nursing  Subjective  Subjective: pt resting in bed. Denies pain. Reports he hasn't eaten anything in 6 mo  Pain Screening  Patient Currently in Pain: Denies  Vital Signs  Pulse: 66  BP: 132/87  Oxygen Therapy  SpO2: 95 %       Orientation  Orientation  Overall Orientation Status: Within Functional Limits  Social/Functional History  Social/Functional History  Lives With: Alone  Type of Home: House  Home Layout: One level, Laundry in basement(Pt states he stopped going to the basement)  Home Access: Stairs to enter with rails  Entrance Stairs - Number of Steps: 3  Bathroom Shower/Tub: Tub/Shower unit  Bathroom Toilet: Standard  Bathroom Equipment: Grab bars in shower, Shower chair  Home Equipment: (unsure of what type of walker at home)  ADL Assistance: [facility] [address]: Independent  Homemaking Responsibilities: Yes  Ambulation Assistance: Independent  Transfer Assistance: Independent  Additional Comments: reports he has not been doing laundry in basement because unable to do stairs. Admits to multiple falls but \"its been awhile\".   Cognition        Objective          PROM RLE (degrees)  RLE PROM: WFL  AROM RLE (degrees)  RLE AROM: WFL  PROM LLE (degrees)  LLE PROM: WFL  AROM LLE (degrees)  LLE AROM : WFL  Strength RLE  Strength RLE: WFL  Strength LLE  Strength LLE: WFL  Tone RLE  RLE Tone: Normotonic  Tone LLE  LLE Tone: Normotonic  Motor Control  Gross Motor?: WFL     Bed mobility  Supine to Sit: Supervision  Sit to Supine: Supervision  Transfers  Sit to Stand: Minimal Assistance(LOB upon standing)  Stand to sit: Minimal Assistance  Ambulation  Ambulation?: Yes  More Ambulation?: No  Ambulation 1  Surface: level tile  Device: No Device  Assistance: Minimal assistance; Moderate assistance  Quality of Gait: unsteady due to quick/hurried gait. LOB needing Min-mod A to correct. Refused using AD  Distance: 20' in room  Stairs/Curb  Stairs?: No     Balance  Posture: Fair  Sitting - Static: Good  Sitting - Dynamic: Good  Standing - Static: Poor  Standing - Dynamic: Poor        Plan   Plan  Times per week: 1-2  Current Treatment Recommendations: Balance Training, Endurance Training, Transfer Training, Gait Training  Safety Devices  Type of devices:  All fall risk precautions in place, Bed alarm in place, Call light within reach, Gait belt, Patient at risk for falls, Left in bed, Nurse notified  Restraints  Initially in place: No      AM-PAC Score  AM-PAC Inpatient Mobility Raw Score : 15 (03/19/21 1127)  AM-PAC Inpatient T-Scale Score : 39.45 (03/19/21 1127)  Mobility Inpatient CMS 0-100% Score: 57.7 (03/19/21 1127)  Mobility Inpatient CMS G-Code Modifier : CK (03/19/21 1127)          Goals  Short term goals  Time Frame for Short term goals: 3/24  Short term goal 1: Pt will complete sit to stand transfer with supervision  Short term goal 2: Pt will ambulate x 48' with LRAD with supervision  Short term goal 3: Pt will complete B LE exercises to improve functional strength by 3/22  Patient Goals   Patient goals : none stated as pt declining PT services       Therapy Time   Individual Concurrent Group Co-treatment

## 2021-03-19 NOTE — OP NOTE
Esophagogastroduodenoscopy Note    Patient:   Brijesh James    YOB: 1955    Facility:   [facility] [Inpatient]   Referring/PCP: No primary care provider on file. Procedure:   Esophagogastroduodenoscopy --diagnostic  Date:     3/19/2021   Endoscopist:  Annmarie Torres     Preoperative Diagnosis:   Nausea, wt loss, ?melena  Postoperative Diagnosis:  Minimal gastritis in antrum, biopsied, and ?mild PHG in fundus    Anesthesia:  Versed 5 mg IV, fentanyl 75 mcg IV  Estimated blood loss: Estimated amount of blood loss is <1ml. Complications: None    Description of Procedure:  Informed consent was obtained from the patient after explanation of the procedure including indications, description of the procedure,  benefits and possible risks and complications of the procedure, and alternatives. Questions were answered. The patient's history was reviewed and a directed physical examination was performed prior to the procedure. Patient was monitored throughout the procedure with pulse oximetry and periodic assessment of vital signs. Patient was sedated as noted above. The Nursing staff and I performed a time out. With the patient in the left lateral decubitus position, the Olympus videoendoscope was placed in the patient's mouth and under direct visualization passed into the esophagus. The scope was ultimately passed to the third portion of the duodenum. Visualization was performed during both introduction and withdrawal of the endoscope and retroflexed view of the proximal stomach was obtained. Findings[de-identified]   Esophagus: normal. The findings do not support a diagnosis of Tatum's Esophagus. Stomach: Minimal gastritis in antrum, biopsied, and ?mild PHG in fundus  Duodenum: normal, biopsied    Recommendations: -Acid suppression with a proton pump inhibitor. , -Await pathology. , -Will need colonoscopy when he can tolerate the prep, but may need a GES first vs possible trial of Arthur Sanz MD       [phone]          Kaitlynn Sanz MD

## 2021-03-19 NOTE — PLAN OF CARE
Problem: Pain:  Goal: Pain level will decrease  Description: Pain level will decrease  Outcome: Ongoing  Note: Pt will be satisfied with pain control. Pt uses numeric pain rating scale with reassessments after pain med administration. Patient denies pain at this time. Will continue to monitor progression throughout shift. Problem: Falls - Risk of:  Goal: Will remain free from falls  Description: Will remain free from falls  Outcome: Ongoing  Note: Pt will remain free from falls throughout hospital stay. Fall precautions in place, bed alarm on, bed in lowest position with wheels locked and side rails 2/4 up. Room door open and hourly rounding completed. Family at bedside. Will continue to monitor throughout shift. Problem: Skin Integrity:  Goal: Will show no infection signs and symptoms  Description: Will show no infection signs and symptoms  Outcome: Ongoing  Note: Pt is at risk for skin breakdown. Pt will have skin assessments every shift, encourage safe ambulation and weight shifting in the bed, heels elevated off of the bed, and friction and shear prevented when possible. Will continue to monitor for signs of skin breakdown and enforce prevention measures.

## 2021-03-19 NOTE — CARE COORDINATION
CASE MANAGEMENT INITIAL ASSESSMENT        Reviewed chart and completed assessment via telephone with: Pt brother Nadeem Burgess via phone   Explained Case Management role/services. To patient        Health Care Decision Maker :   Primary Decision Maker: Francisco Her - [phone] Brother/Sister    Secondary Decision Maker: Madeline Hughes - Brother/Sister - [phone]       Admit date/status: 3/18/21  Diagnosis: weight loss  Is this a Readmission?:  Yes      Insurance: [employer] - Had Medicare Listed during prior stays and card scanned in but not listed on face sheet    Precert required for SNF: Yes       3 night stay required: No     Living arrangements, Adls, care needs, prior to admission: Pt lives alone at home. He has been functioning on his own and drives occasionally, but mostly has friends and family transport. Unclear the level of assistance that he is truly requiring as no one lives with pt and he is not forthcoming at this time.      Transportation:TBD     Durable Medical Equipment at home:  Walker__Cane_X_RTS__ BSC__Shower Chair__  02__ HHN__ CPAP__  BiPap__  Hospital Bed__ W/C___ Other__________     Services in the home and/or outpatient, prior to admission: Was active with [place] for SN/ PT/OT     PT/OT recs: [address] Exemption Notification (HEN): Needed for DC to SNF     Barriers to discharge: [facility] insurance listed only - however has Medicare Card on file 3/15/21     Plan/comments: Per pt brother Nadeem Burgess, pt is not going to be able to return home alone and he lives in [place] here visiting. Nadeem Burgess is aware of PT/OT SNF recommendations and feels that this will be ideal for DC given pts deconditioned state. He would like to speak with a friend who is an RN in [place] for facility names and will call weekend DCP with 3 facilities to place referrals to. This CM will f/u with pt and family Monday.     Heather Caldwell RN       ECOC on chart for MD signature

## 2021-03-19 NOTE — PROGRESS NOTES
Hospitalist Progress Note      PCP: No primary care provider on file. Date of Admission: 3/18/2021    Chief Complaint: Weight loss failure to thrive    Hospital Course:      60 y.o. male who presented to [facility] today for elective angiogram, this was cancelled because pt has no chest pain or sob. Cardiology asked that he would be admitted for GI consult for 30# weight loss and nausea.       EMR and notes reviewed pt seen and examined in the cath lab. He has no complaints he is accompanied by his brother who is concerned regarding his decline over the las 6 month. He reports No current fever, chills, no chest pain or palpitations. Denies any vomiting. Notes recent constipation issues and had started using supp. and stool softeners          He has a Hx significant of CAD, recent SAMM, COPD, current smoker, HTN, HLD,  Hx very remote of IV/Po drug use.        Subjective: Emr and notes reviewed pt seen and examined.  Affect on the flat side possibly secondary to recent conscious sedation Remains with some nausea was able to eat 1/2 hamburger and few bites of his sides       Medications:  Reviewed    Infusion Medications    sodium chloride 1,000 mL (03/19/21 0907)     Scheduled Medications    ticagrelor  90 mg Oral BID    aspirin  81 mg Oral Daily    atorvastatin  80 mg Oral Nightly    carvedilol  6.25 mg Oral BID WC    losartan  25 mg Oral Daily    glycopyrrolate-formoterol  2 puff Inhalation BID    sodium chloride flush  10 mL Intravenous 2 times per day    pantoprazole  40 mg Oral QAM AC    nicotine  1 patch Transdermal Daily     PRN Meds: albuterol, sodium chloride flush, promethazine **OR** ondansetron, polyethylene glycol, acetaminophen **OR** acetaminophen      Intake/Output Summary (Last 24 hours) at 3/19/2021 1258  Last data filed at 3/19/2021 1230  Gross per 24 hour   Intake 150 ml   Output 850 ml   Net -700 ml       Physical Exam Performed:    /87   Pulse 66   Temp 98.1 °F (36.7 pectoris ([address] [I25.119]    Severe malnutrition (HCC) [E43]    Moderate COPD (chronic obstructive pulmonary disease) (HCC) [J44.9]    Cigarette nicotine dependence without complication [J27.421]     Weight loss: uncertain etiology   - reports has lost 30 lbs in the las 6 month, with nausea and vomiting   - seen in consult by GI on last visit 2/22/21  - recommended EGD, CT and colonoscopy   - Pt has started outpatient workup with PCP. He has had difficulty arranging for EGD/Colonscopy. GI consult to address concerns and patient will likely need follow up in their office as outpatient. Pt did not want to wait for EGD and elected to continue workup as an outpatient. This did not occur.   - consulted GI for IP work up if available. - dietary consult   - check tsh, t4, LDH, pre- albumin - pending  - 3/19 underwent EGD   Findings[de-identified]   Esophagus: normal. The findings do not support a diagnosis of Tatum's Esophagus. Stomach: Minimal gastritis in antrum, biopsied, and ?mild PHG in fundus  Duodenum: normal, biopsied  - plans for C scope when can tolerated prep   - GED versus reglan trial per GI      CAD hx of STEMI-PCI of RCA with 2 SAMM. Consider staged PCI of LAD and D1 as outpatient. This was to occur today but was canceled   - Cardiology following.   - Continue aspirin, statin, beta-blocker,brilinta. - ECHO EF 45-50%hypokinesis of the inferolateral, inferior and basal inferior   walls.     COPD- Stable on room air. Will continue home ellipta. Albuterol as needed.     Tobacco use- encourage cessation.     HTN- Controlled on current meds.      Depression- not currently on medication but mood is stable.     Opioid dependence in setting of chronic pain- Continue buprenorphine 10 microgram per hr patch. Reviewed OARRS.  Continue at current dose to avoid opioid withdrawal.     DVT Prophylaxis: Per cardiology   DVT Prophylaxis: DAPT  Diet: DIET CARDIAC;  Code Status: Full Code     PT/OT Eval Status: ordered      Dispo - uncertain          Isabell Cheung, SKYLAR - CNP         Isabell Cheung, SKYLAR - CNP

## 2021-03-20 LAB
PROSTATE SPECIFIC ANTIGEN: 1.25 NG/ML (ref 0–4)
TSH REFLEX: 0.48 UIU/ML (ref 0.27–4.2)

## 2021-03-20 PROCEDURE — 6370000000 HC RX 637 (ALT 250 FOR IP): Performed by: INTERNAL MEDICINE

## 2021-03-20 PROCEDURE — 6360000002 HC RX W HCPCS: Performed by: INTERNAL MEDICINE

## 2021-03-20 PROCEDURE — 2580000003 HC RX 258: Performed by: NURSE PRACTITIONER

## 2021-03-20 PROCEDURE — 6370000000 HC RX 637 (ALT 250 FOR IP): Performed by: NURSE PRACTITIONER

## 2021-03-20 PROCEDURE — 1200000000 HC SEMI PRIVATE

## 2021-03-20 PROCEDURE — 94640 AIRWAY INHALATION TREATMENT: CPT

## 2021-03-20 PROCEDURE — 6360000002 HC RX W HCPCS: Performed by: NURSE PRACTITIONER

## 2021-03-20 PROCEDURE — 2580000003 HC RX 258: Performed by: INTERNAL MEDICINE

## 2021-03-20 RX ORDER — METOCLOPRAMIDE HYDROCHLORIDE 5 MG/ML
5 INJECTION INTRAMUSCULAR; INTRAVENOUS
Status: DISCONTINUED | OUTPATIENT
Start: 2021-03-20 | End: 2021-03-23 | Stop reason: HOSPADM

## 2021-03-20 RX ADMIN — TICAGRELOR 90 MG: 90 TABLET ORAL at 20:25

## 2021-03-20 RX ADMIN — SODIUM CHLORIDE 1000 ML: 9 INJECTION, SOLUTION INTRAVENOUS at 17:18

## 2021-03-20 RX ADMIN — ATORVASTATIN CALCIUM 80 MG: 80 TABLET, FILM COATED ORAL at 20:25

## 2021-03-20 RX ADMIN — Medication 10 ML: at 09:22

## 2021-03-20 RX ADMIN — METOCLOPRAMIDE HYDROCHLORIDE 5 MG: 5 INJECTION INTRAMUSCULAR; INTRAVENOUS at 20:25

## 2021-03-20 RX ADMIN — TICAGRELOR 90 MG: 90 TABLET ORAL at 09:21

## 2021-03-20 RX ADMIN — LOSARTAN POTASSIUM 25 MG: 25 TABLET, FILM COATED ORAL at 09:21

## 2021-03-20 RX ADMIN — METOCLOPRAMIDE HYDROCHLORIDE 5 MG: 5 INJECTION INTRAMUSCULAR; INTRAVENOUS at 17:18

## 2021-03-20 RX ADMIN — METOCLOPRAMIDE HYDROCHLORIDE 5 MG: 5 INJECTION INTRAMUSCULAR; INTRAVENOUS at 11:47

## 2021-03-20 RX ADMIN — CARVEDILOL 6.25 MG: 6.25 TABLET, FILM COATED ORAL at 09:21

## 2021-03-20 RX ADMIN — PANTOPRAZOLE SODIUM 40 MG: 40 TABLET, DELAYED RELEASE ORAL at 05:58

## 2021-03-20 RX ADMIN — ASPIRIN 81 MG: 81 TABLET, CHEWABLE ORAL at 09:21

## 2021-03-20 RX ADMIN — GLYCOPYRROLATE AND FORMOTEROL FUMARATE 2 PUFF: 9; 4.8 AEROSOL, METERED RESPIRATORY (INHALATION) at 08:27

## 2021-03-20 RX ADMIN — CARVEDILOL 6.25 MG: 6.25 TABLET, FILM COATED ORAL at 17:17

## 2021-03-20 RX ADMIN — GLYCOPYRROLATE AND FORMOTEROL FUMARATE 2 PUFF: 9; 4.8 AEROSOL, METERED RESPIRATORY (INHALATION) at 19:37

## 2021-03-20 RX ADMIN — ONDANSETRON 4 MG: 2 INJECTION INTRAMUSCULAR; INTRAVENOUS at 07:46

## 2021-03-20 ASSESSMENT — PAIN SCALES - GENERAL: PAINLEVEL_OUTOF10: 0

## 2021-03-20 NOTE — PROGRESS NOTES
Physical Therapy Attempt Note  Chart reviewed for the patient and attempted to see the pt on 3/20/21 at [phone]. Unable to see the pt at this time d/t pt declining therapy. Pt stating he is feeling weak and does not feel like working with therapy. Re-educated on the role of therapy and importance of mobility. Pt continues to decline. Will re-attempt as pt is available and as schedule allows at a later date.     Vargas Kaufman, PT, DPT

## 2021-03-20 NOTE — PROGRESS NOTES
Hospitalist Progress Note      PCP: No primary care provider on file. Date of Admission: 3/18/2021    Chief Complaint: Weight loss failure to thrive    Hospital Course:      60 y.o. male who presented to [facility] today for elective angiogram, this was cancelled because pt has no chest pain or sob. Cardiology asked that he would be admitted for GI consult for 30# weight loss and nausea.       EMR and notes reviewed pt seen and examined in the cath lab. He has no complaints he is accompanied by his brother who is concerned regarding his decline over the las 6 month. He reports No current fever, chills, no chest pain or palpitations. Denies any vomiting. Notes recent constipation issues and had started using supp. and stool softeners          He has a Hx significant of CAD, recent SAMM, COPD, current smoker, HTN, HLD,  Hx very remote of IV/Po drug use.        Subjective: Emr and notes reviewed pt seen and examined. Continues with nausea this am but reports its a ;ittle better.  Tells me plan for C scope on Monday     Medications:  Reviewed    Infusion Medications    sodium chloride 1,000 mL (03/19/21 0907)     Scheduled Medications    ticagrelor  90 mg Oral BID    aspirin  81 mg Oral Daily    atorvastatin  80 mg Oral Nightly    carvedilol  6.25 mg Oral BID WC    losartan  25 mg Oral Daily    glycopyrrolate-formoterol  2 puff Inhalation BID    sodium chloride flush  10 mL Intravenous 2 times per day    pantoprazole  40 mg Oral QAM AC    nicotine  1 patch Transdermal Daily     PRN Meds: albuterol, sodium chloride flush, promethazine **OR** ondansetron, polyethylene glycol, acetaminophen **OR** acetaminophen      Intake/Output Summary (Last 24 hours) at 3/20/2021 0838  Last data filed at 3/20/2021 0600  Gross per 24 hour   Intake 957.5 ml   Output 1045 ml   Net -87.5 ml       Physical Exam Performed:    BP (!) 144/86   Pulse 72   Temp 98 °F (36.7 °C) (Oral)   Resp 18   Ht 5' 9\" (1.753 m)   Wt 129 lb 9.6 oz (58.8 kg)   SpO2 98%   BMI 19.14 kg/m²     General appearance: No apparent distress, appears stated age and cooperative. HEENT: Pupils equal, round, and reactive to light. Conjunctivae/corneas clear. Neck: Supple, with full range of motion. No jugular venous distention. Trachea midline. Respiratory:  Normal respiratory effort. Clear to auscultation, bilaterally without Rales/Wheezes/Rhonchi. Cardiovascular: Regular rate and rhythm with normal S1/S2 without murmurs, rubs or gallops. Abdomen: Soft, non-tender, non-distended with normal bowel sounds. Musculoskeletal: No clubbing, cyanosis or edema bilaterally. Full range of motion without deformity. Skin: Skin color, texture, turgor normal.  No rashes or lesions. Neurologic:  Neurovascularly intact without any focal sensory/motor deficits. Cranial nerves: II-XII intact, grossly non-focal.  Psychiatric: Alert and oriented, thought content appropriate, normal insight Flat affect   Capillary Refill: Brisk,< 3 seconds   Peripheral Pulses: +2 palpable, equal bilaterally       Labs:   Recent Labs     03/18/21  0705 03/19/21  0555   WBC 9.9 10.1   HGB 13.0* 12.5*   HCT 38.7* 37.6*    351     Recent Labs     03/18/21  0705 03/19/21  0555    139   K 4.0 4.7    105   CO2 25 26   BUN 11 10   CREATININE 1.2 1.3   CALCIUM 9.3 9.4     Recent Labs     03/19/21  0555   AST 9*   ALT <5*   BILITOT 0.6   ALKPHOS 84     Recent Labs     03/18/21  0705   INR 1.14     No results for input(s): Ethelene Soulier in the last 72 hours.     Urinalysis:    No results found for: Rand Jeffrey, BACTERIA, RBCUA, BLOODU, [address], [address]    Radiology:  No orders to display           Assessment/Plan:    Active Hospital Problems    Diagnosis    Weight loss [R63.4]    H/O thoracic aortic aneurysm repair [T43.574, Z86.79]    Coronary artery disease involving native coronary artery of native heart with angina pectoris ([facility] [address] [I25.119]    Severe malnutrition ([facility] [E43]    Moderate COPD (chronic obstructive pulmonary disease) ([facility] [J44.9]    Cigarette nicotine dependence without complication [H07.733]     Weight loss: uncertain etiology   - reports has lost 30 lbs in the las 6 month, with nausea and vomiting   - seen in consult by GI on last visit 2/22/21  - recommended EGD, CT and colonoscopy   - Pt has started outpatient workup with PCP. He has had difficulty arranging for EGD/Colonscopy. GI consult to address concerns and patient will likely need follow up in their office as outpatient. Pt did not want to wait for EGD and elected to continue workup as an outpatient. This did not occur.   - consulted GI for IP work up if available. - dietary consult   - check tsh, t4, LDH, pre- albumin - pending  - 3/19 underwent EGD   Findings[de-identified]   Esophagus: normal. The findings do not support a diagnosis of Tatum's Esophagus. Stomach: Minimal gastritis in antrum, biopsied, and ?mild PHG in fundus  Duodenum: normal, biopsied  - plans for C scope when can tolerated prep - ? Monday   - GED versus reglan trial per GI      CAD hx of STEMI-PCI of RCA with 2 SAMM. Consider staged PCI of LAD and D1 as outpatient. This was to occur today but was canceled   - Cardiology following.   - Continue aspirin, statin, beta-blocker,brilinta. - ECHO EF 45-50%hypokinesis of the inferolateral, inferior and basal inferior   walls.     COPD- Stable on room air. Will continue home ellipta. Albuterol as needed.     Tobacco use- encourage cessation.     HTN- Controlled on current meds.      Depression- not currently on medication but mood is stable. - may benefit from Remeron for appetite stimulant and for depression      Opioid dependence in setting of chronic pain- Continue buprenorphine 10 microgram per hr patch. Reviewed OARRS.  Continue at current dose to avoid opioid withdrawal.     DVT Prophylaxis: Per cardiology   DVT Prophylaxis: DAPT  Diet: DIET CARDIAC;  Code Status: Full Code     PT/OT Eval Status: ordered      Dispo - uncertain          Melany Segura, SKYLAR - CATARINA Segura, SKYLAR - CNP

## 2021-03-20 NOTE — PROGRESS NOTES
Lola Zelaya is a 60 y.o. male patient.     Current Facility-Administered Medications   Medication Dose Route Frequency Provider Last Rate Last Admin    0.9 % sodium chloride infusion  1,000 mL Intravenous Continuous Jessica Manrique MD 30 mL/hr at 03/19/21 0907 1,000 mL at 03/19/21 0907    ticagrelor (BRILINTA) tablet 90 mg  90 mg Oral BID Jessica Manrique MD   90 mg at 03/19/21 2154    albuterol (PROVENTIL) nebulizer solution 2.5 mg  2.5 mg Nebulization Q6H PRN SKYLAR Seaman - CNP        aspirin chewable tablet 81 mg  81 mg Oral Daily SKYLAR Seaman - CNP   81 mg at 03/19/21 0799    atorvastatin (LIPITOR) tablet 80 mg  80 mg Oral Nightly SKYLAR Seaman - CNP   80 mg at 03/19/21 2154    carvedilol (COREG) tablet 6.25 mg  6.25 mg Oral BID WC SKYLAR Seaman - CNP   Stopped at 03/18/21 1941    losartan (COZAAR) tablet 25 mg  25 mg Oral Daily SKYLAR Seaman - CNP   25 mg at 03/19/21 1009    glycopyrrolate-formoterol (BEVESPI) 9-4.8 MCG/ACT inhaler 2 puff  2 puff Inhalation BID SKYLAR Seaman - CNP   2 puff at 03/19/21 1925    sodium chloride flush 0.9 % injection 10 mL  10 mL Intravenous 2 times per day SKYLAR Seaman - CNP   10 mL at 03/18/21 2153    sodium chloride flush 0.9 % injection 10 mL  10 mL Intravenous PRN SKYLAR Seaman - CATARINA        promethazine (PHENERGAN) tablet 12.5 mg  12.5 mg Oral Q6H PRN SKYLAR Seaman - CNP        Or    ondansetron (ZOFRAN) injection 4 mg  4 mg Intravenous Q6H PRN SKYLAR Seaman - CNP   4 mg at 03/18/21 1810    polyethylene glycol (GLYCOLAX) packet 17 g  17 g Oral Daily PRN Harman House APRN - CNP        acetaminophen (TYLENOL) tablet 650 mg  650 mg Oral Q6H PRN SKYLAR Seaman CNP        Or    acetaminophen (TYLENOL) suppository 650 mg  650 mg Rectal Q6H PRN SKYLAR Seaman CNP        pantoprazole (PROTONIX) tablet 40 mg  40 mg Oral QAM AC SKYLAR Seaman CNP  nicotine (NICODERM CQ) 21 MG/24HR 1 patch  1 patch Transdermal Daily Stormy Ochoa, APRN - CNP         No Known Allergies  Principal Problem:    Weight loss  Active Problems: Moderate COPD (chronic obstructive pulmonary disease) (HCC)    Cigarette nicotine dependence without complication    Severe malnutrition (HCC)    Coronary artery disease involving native coronary artery of native heart with angina pectoris [facility])    H/O thoracic aortic aneurysm repair  Resolved Problems:    * No resolved hospital problems. *    Blood pressure 136/88, pulse 71, temperature 98.2 °F (36.8 °C), temperature source Oral, resp. rate 18, height 5' 9\" (1.753 m), weight 136 lb 7.4 oz (61.9 kg), SpO2 95 %. Subjective:  Symptoms:  Stable. Pain:  He reports no pain. Objective:  General Appearance:  Not in pain and in no acute distress. Vital signs: (most recent): Blood pressure 136/88, pulse 71, temperature 98.2 °F (36.8 °C), temperature source Oral, resp. rate 18, height 5' 9\" (1.753 m), weight 136 lb 7.4 oz (61.9 kg), SpO2 95 %. Heart: Normal rate. Regular rhythm. S1 normal and S2 normal.    Abdomen: Abdomen is soft. Bowel sounds are normal.   There is no abdominal tenderness. Assessment & Plan  61 yo w COPD, HTN, CAD admitted 2/2021 for STEMI and underwent PCI/stent placement on 2/22/21. He reports many months of nausea, anorexia and significant wt loss w occ black stools. He refused EGD during that admission, but Cardiology want him to have GI w/u before any further cardiac management. EGD w mild gastritis and ? PHG, biopsied. Had reportedly colon polyps 9 yrs ago. Plan:   1. Agree w supportive care  2. Awaiting gastric and duod Bx results  3. Needs wt loss w/u including abd CT to R/O pancreatic CA, and colonoscopy in near future  4. Consider GES but will start a trial of Reglan in order to hopefully be able to tolerate colonoscopy prep  5.  Will follow  Mira Escobedo MD       (O)

## 2021-03-21 PROCEDURE — 2580000003 HC RX 258: Performed by: INTERNAL MEDICINE

## 2021-03-21 PROCEDURE — 94640 AIRWAY INHALATION TREATMENT: CPT

## 2021-03-21 PROCEDURE — 6370000000 HC RX 637 (ALT 250 FOR IP): Performed by: INTERNAL MEDICINE

## 2021-03-21 PROCEDURE — 6370000000 HC RX 637 (ALT 250 FOR IP): Performed by: NURSE PRACTITIONER

## 2021-03-21 PROCEDURE — 1200000000 HC SEMI PRIVATE

## 2021-03-21 PROCEDURE — 6360000002 HC RX W HCPCS: Performed by: INTERNAL MEDICINE

## 2021-03-21 PROCEDURE — 6360000002 HC RX W HCPCS: Performed by: NURSE PRACTITIONER

## 2021-03-21 RX ORDER — SODIUM PHOSPHATE,MONO-DIBASIC 19G-7G/118
1 ENEMA (ML) RECTAL ONCE
Status: COMPLETED | OUTPATIENT
Start: 2021-03-21 | End: 2021-03-21

## 2021-03-21 RX ORDER — POLYETHYLENE GLYCOL 3350 17 G/17G
238 POWDER, FOR SOLUTION ORAL ONCE
Status: COMPLETED | OUTPATIENT
Start: 2021-03-21 | End: 2021-03-21

## 2021-03-21 RX ORDER — POLYETHYLENE GLYCOL 3350, SODIUM CHLORIDE, SODIUM BICARBONATE, POTASSIUM CHLORIDE 420; 11.2; 5.72; 1.48 G/4L; G/4L; G/4L; G/4L
2000 POWDER, FOR SOLUTION ORAL ONCE
Status: DISCONTINUED | OUTPATIENT
Start: 2021-03-21 | End: 2021-03-21 | Stop reason: ALTCHOICE

## 2021-03-21 RX ADMIN — TICAGRELOR 90 MG: 90 TABLET ORAL at 21:07

## 2021-03-21 RX ADMIN — GLYCOPYRROLATE AND FORMOTEROL FUMARATE 2 PUFF: 9; 4.8 AEROSOL, METERED RESPIRATORY (INHALATION) at 20:44

## 2021-03-21 RX ADMIN — METOCLOPRAMIDE HYDROCHLORIDE 5 MG: 5 INJECTION INTRAMUSCULAR; INTRAVENOUS at 17:04

## 2021-03-21 RX ADMIN — SODIUM CHLORIDE 1000 ML: 9 INJECTION, SOLUTION INTRAVENOUS at 21:06

## 2021-03-21 RX ADMIN — CARVEDILOL 6.25 MG: 6.25 TABLET, FILM COATED ORAL at 08:47

## 2021-03-21 RX ADMIN — ATORVASTATIN CALCIUM 80 MG: 80 TABLET, FILM COATED ORAL at 21:06

## 2021-03-21 RX ADMIN — ONDANSETRON 4 MG: 2 INJECTION INTRAMUSCULAR; INTRAVENOUS at 12:25

## 2021-03-21 RX ADMIN — GLYCOPYRROLATE AND FORMOTEROL FUMARATE 2 PUFF: 9; 4.8 AEROSOL, METERED RESPIRATORY (INHALATION) at 09:43

## 2021-03-21 RX ADMIN — PROMETHAZINE HYDROCHLORIDE 12.5 MG: 25 TABLET ORAL at 15:31

## 2021-03-21 RX ADMIN — BISACODYL 20 MG: 5 TABLET, COATED ORAL at 11:13

## 2021-03-21 RX ADMIN — CARVEDILOL 6.25 MG: 6.25 TABLET, FILM COATED ORAL at 17:04

## 2021-03-21 RX ADMIN — TICAGRELOR 90 MG: 90 TABLET ORAL at 08:47

## 2021-03-21 RX ADMIN — PANTOPRAZOLE SODIUM 40 MG: 40 TABLET, DELAYED RELEASE ORAL at 06:02

## 2021-03-21 RX ADMIN — POLYETHYLENE GLYCOL 3350 238 G: 17 POWDER, FOR SOLUTION ORAL at 13:41

## 2021-03-21 RX ADMIN — METOCLOPRAMIDE HYDROCHLORIDE 5 MG: 5 INJECTION INTRAMUSCULAR; INTRAVENOUS at 06:02

## 2021-03-21 RX ADMIN — SODIUM PHOSPHATE 1 ENEMA: 7; 19 ENEMA RECTAL at 11:15

## 2021-03-21 RX ADMIN — METOCLOPRAMIDE HYDROCHLORIDE 5 MG: 5 INJECTION INTRAMUSCULAR; INTRAVENOUS at 21:07

## 2021-03-21 RX ADMIN — LOSARTAN POTASSIUM 25 MG: 25 TABLET, FILM COATED ORAL at 08:47

## 2021-03-21 RX ADMIN — ASPIRIN 81 MG: 81 TABLET, CHEWABLE ORAL at 08:47

## 2021-03-21 RX ADMIN — METOCLOPRAMIDE HYDROCHLORIDE 5 MG: 5 INJECTION INTRAMUSCULAR; INTRAVENOUS at 11:13

## 2021-03-21 ASSESSMENT — PAIN SCALES - GENERAL: PAINLEVEL_OUTOF10: 0

## 2021-03-21 NOTE — PROGRESS NOTES
Kvng Antonio is a 60 y.o. male patient.     Current Facility-Administered Medications   Medication Dose Route Frequency Provider Last Rate Last Admin    bisacodyl (DULCOLAX) EC tablet 20 mg  20 mg Oral Once Reinaldo Pandya MD        polyethylene glycol (GoLYTELY) solution 2,000 mL  2,000 mL Oral Once Reinaldo Pandya MD        sodium phosphate (FLEET) enema 1 enema  1 enema Rectal Once Reinaldo Pandya MD        metoclopramide (REGLAN) injection 5 mg  5 mg Intravenous 4x Daily AC & HS Reinaldo Pandya MD   5 mg at 03/21/21 0602    0.9 % sodium chloride infusion  1,000 mL Intravenous Continuous Wendy Chacon MD 30 mL/hr at 03/20/21 1718 1,000 mL at 03/20/21 1718    ticagrelor (BRILINTA) tablet 90 mg  90 mg Oral BID Wendy Chacon MD   90 mg at 03/21/21 0847    albuterol (PROVENTIL) nebulizer solution 2.5 mg  2.5 mg Nebulization Q6H PRN Darian Hug, APRN - CNP        aspirin chewable tablet 81 mg  81 mg Oral Daily Darian Hug, APRN - CNP   81 mg at 03/21/21 0847    atorvastatin (LIPITOR) tablet 80 mg  80 mg Oral Nightly Darian Hug, APRN - CNP   80 mg at 03/20/21 2025    carvedilol (COREG) tablet 6.25 mg  6.25 mg Oral BID WC Darian Hug, APRN - CNP   6.25 mg at 03/21/21 0847    losartan (COZAAR) tablet 25 mg  25 mg Oral Daily Darian Hug, APRN - CNP   25 mg at 03/21/21 0847    glycopyrrolate-formoterol (BEVESPI) 9-4.8 MCG/ACT inhaler 2 puff  2 puff Inhalation BID Darian Hug, APRN - CNP   2 puff at 03/21/21 3553    sodium chloride flush 0.9 % injection 10 mL  10 mL Intravenous 2 times per day Darian Hug, APRN - CNP   10 mL at 03/20/21 6193    sodium chloride flush 0.9 % injection 10 mL  10 mL Intravenous PRN Darian Hug, APRN - CNP        promethazine (PHENERGAN) tablet 12.5 mg  12.5 mg Oral Q6H PRN Darian Hug, APRN - CNP        Or    ondansetron [facility]) injection 4 mg  4 mg Intravenous Q6H PRN Darian Hug, APRN - CNP   4 mg at 03/20/21 2745    polyethylene glycol (GLYCOLAX) packet 17 g  17 g Oral Daily PRN Ekaterina Cooler, APRN - CNP        acetaminophen (TYLENOL) tablet 650 mg  650 mg Oral Q6H PRN Ekaterina Cooler, APRN - CNP        Or    acetaminophen (TYLENOL) suppository 650 mg  650 mg Rectal Q6H PRN Ekaterina Cooler, APRN - CNP        pantoprazole (PROTONIX) tablet 40 mg  40 mg Oral QAM AC Ekaterina Cooler, APRN - CNP   40 mg at 03/21/21 0602    nicotine (NICODERM CQ) 21 MG/24HR 1 patch  1 patch Transdermal Daily Ekaterina Cooler, APRN - CNP         No Known Allergies  Principal Problem:    Weight loss  Active Problems: Moderate COPD (chronic obstructive pulmonary disease) (HCC)    Cigarette nicotine dependence without complication    Severe malnutrition (HCC)    Coronary artery disease involving native coronary artery of native heart with angina pectoris [facility])    H/O thoracic aortic aneurysm repair  Resolved Problems:    * No resolved hospital problems. *    Blood pressure 120/82, pulse 68, temperature 97.6 °F (36.4 °C), temperature source Oral, resp. rate 16, height 5' 9\" (1.753 m), weight 128 lb 8 oz (58.3 kg), SpO2 97 %. Subjective:  Symptoms:  Stable. Pain:  He reports no pain. Objective:  General Appearance: In no acute distress and not in pain. Vital signs: (most recent): Blood pressure 120/82, pulse 68, temperature 97.6 °F (36.4 °C), temperature source Oral, resp. rate 16, height 5' 9\" (1.753 m), weight 128 lb 8 oz (58.3 kg), SpO2 97 %. Heart: Normal rate. Regular rhythm. S1 normal and S2 normal.    Abdomen: Abdomen is soft. Bowel sounds are normal.   There is no abdominal tenderness. Assessment & Plan  59 yo w COPD, HTN, CAD admitted 2/2021 for STEMI and underwent PCI/stent placement on 2/22/21. He reports many months of nausea, anorexia and markedt wt loss w occ black stools. He refused EGD during that admission, but Cardiology wanted him to have GI w/u before any further cardiac management.  EGD only showed mild gastritis and ?PHG, biopsied. Had reportedly colon polyps 9 yrs ago.     Plan:   1. Agree w supportive care  2. Awaiting gastric and duod Bx results  3. Colonoscopy in am  4. Needs wt loss w/u including abd CT to R/O pancreatic CA if colonoscopy is neg. 5. Consider GES but will continue a trial of Reglan in order to hopefully be able to tolerate colonoscopy prep (it seems to be helping the nausea)  6.  Will follow     Andria Padron MD       (O) [phone]    Andria Padron MD  3/21/2021

## 2021-03-21 NOTE — PROGRESS NOTES
Hospitalist Progress Note      PCP: No primary care provider on file. Date of Admission: 3/18/2021    Chief Complaint: Weight loss failure to thrive    Hospital Course:      60 y.o. male who presented to [facility] today for elective angiogram, this was cancelled because pt has no chest pain or sob. Cardiology asked that he would be admitted for GI consult for 30# weight loss and nausea.       EMR and notes reviewed pt seen and examined in the cath lab. He has no complaints he is accompanied by his brother who is concerned regarding his decline over the las 6 month. He reports No current fever, chills, no chest pain or palpitations. Denies any vomiting. Notes recent constipation issues and had started using supp. and stool softeners          He has a Hx significant of CAD, recent SAMM, COPD, current smoker, HTN, HLD,  Hx very remote of IV/Po drug use.        Subjective: Emr and notes reviewed pt seen and examined.  No new complaints or issues       Medications:  Reviewed    Infusion Medications    sodium chloride 1,000 mL (03/20/21 1718)     Scheduled Medications    metoclopramide  5 mg Intravenous 4x Daily AC & HS    ticagrelor  90 mg Oral BID    aspirin  81 mg Oral Daily    atorvastatin  80 mg Oral Nightly    carvedilol  6.25 mg Oral BID WC    losartan  25 mg Oral Daily    glycopyrrolate-formoterol  2 puff Inhalation BID    sodium chloride flush  10 mL Intravenous 2 times per day    pantoprazole  40 mg Oral QAM AC    nicotine  1 patch Transdermal Daily     PRN Meds: albuterol, sodium chloride flush, promethazine **OR** ondansetron, polyethylene glycol, acetaminophen **OR** acetaminophen      Intake/Output Summary (Last 24 hours) at 3/21/2021 0921  Last data filed at 3/21/2021 0806  Gross per 24 hour   Intake 931 ml   Output 1850 ml   Net -919 ml       Physical Exam Performed:    /82   Pulse 68   Temp 97.6 °F (36.4 °C) (Oral)   Resp 16   Ht 5' 9\" (1.753 m)   Wt 128 lb 8 oz (58.3 kg) SpO2 98%   BMI 18.98 kg/m²     General appearance: No apparent distress, appears stated age and cooperative. HEENT: Pupils equal, round, and reactive to light. Conjunctivae/corneas clear. Neck: Supple, with full range of motion. No jugular venous distention. Trachea midline. Respiratory:  Normal respiratory effort. Clear to auscultation, bilaterally without Rales/Wheezes/Rhonchi. Cardiovascular: Regular rate and rhythm with normal S1/S2 without murmurs, rubs or gallops. Abdomen: Soft, non-tender, non-distended with normal bowel sounds. Musculoskeletal: No clubbing, cyanosis or edema bilaterally. Full range of motion without deformity. Skin: Skin color, texture, turgor normal.  No rashes or lesions. Neurologic:  Neurovascularly intact without any focal sensory/motor deficits. Cranial nerves: II-XII intact, grossly non-focal.  Psychiatric: Alert and oriented, thought content appropriate, normal insight Flat affect   Capillary Refill: Brisk,< 3 seconds   Peripheral Pulses: +2 palpable, equal bilaterally       Labs:   Recent Labs     03/19/21  0555   WBC 10.1   HGB 12.5*   HCT 37.6*        Recent Labs     03/19/21  0555      K 4.7      CO2 26   BUN 10   CREATININE 1.3   CALCIUM 9.4     Recent Labs     03/19/21  0555   AST 9*   ALT <5*   BILITOT 0.6   ALKPHOS 84     No results for input(s): INR in the last 72 hours. No results for input(s): Arielle Castor in the last 72 hours.     Urinalysis:    No results found for: Jose Pendleton, BACTERIA, [address] [place] [address], [facility], [facility], [facility] 994    Radiology:  No orders to display           Assessment/Plan:    Active Hospital Problems    Diagnosis    Weight loss [R63.4]    H/O thoracic aortic aneurysm repair [E61.920, Z86.79]    Coronary artery disease involving native coronary artery of native heart with angina pectoris ([address] [I25.119]    Severe malnutrition ([address] [E43]    Moderate COPD (chronic obstructive pulmonary disease) ([address] [J44.9]    Cigarette nicotine dependence without complication [M76.070]     Weight loss: uncertain etiology   - reports has lost 30 lbs in the las 6 month, with nausea and vomiting   - seen in consult by GI on last visit 2/22/21  - recommended EGD, CT and colonoscopy   - Pt has started outpatient workup with PCP. He has had difficulty arranging for EGD/Colonscopy. GI consult to address concerns and patient will likely need follow up in their office as outpatient. Pt did not want to wait for EGD and elected to continue workup as an outpatient. This did not occur.   - consulted GI for IP work up if available. - dietary consult   - check tsh, t4, LDH, pre- albumin - pending  - 3/19 underwent EGD   Findings[de-identified]   Esophagus: normal. The findings do not support a diagnosis of Tatum's Esophagus. Stomach: Minimal gastritis in antrum, biopsied, and ?mild PHG in fundus  Duodenum: normal, biopsied  - plans for C scope when can tolerated prep - ? Monday   - GED versus reglan trial per GI      CAD hx of STEMI-PCI of RCA with 2 SAMM. Consider staged PCI of LAD and D1 as outpatient. This was to occur today but was canceled   - Cardiology following.   - Continue aspirin, statin, beta-blocker,brilinta. - ECHO EF 45-50%hypokinesis of the inferolateral, inferior and basal inferior   walls.     COPD- Stable on room air. Will continue home ellipta. Albuterol as needed.     Tobacco use- encourage cessation.     HTN- Controlled on current meds.      Depression- not currently on medication but mood is stable. - may benefit from Remeron for appetite stimulant and for depression      Opioid dependence in setting of chronic pain- Continue buprenorphine 10 microgram per hr patch. Reviewed [place].  Continue at current dose to avoid opioid withdrawal.     DVT Prophylaxis: Per cardiology   DVT Prophylaxis: DAPT  Diet: DIET CARDIAC;  Code Status: Full Code     PT/OT Eval Status: ordered      Dispo - uncertain          SKYLAR Christianson - CATARINA Ochoa, APRN - CNP

## 2021-03-22 ENCOUNTER — ANESTHESIA (OUTPATIENT)
Dept: ENDOSCOPY | Age: 66
DRG: 391 | End: 2021-03-22
Payer: OTHER GOVERNMENT

## 2021-03-22 ENCOUNTER — ANESTHESIA EVENT (OUTPATIENT)
Dept: ENDOSCOPY | Age: 66
DRG: 391 | End: 2021-03-22
Payer: OTHER GOVERNMENT

## 2021-03-22 ENCOUNTER — APPOINTMENT (OUTPATIENT)
Dept: GENERAL RADIOLOGY | Age: 66
DRG: 391 | End: 2021-03-22
Attending: INTERNAL MEDICINE
Payer: OTHER GOVERNMENT

## 2021-03-22 ENCOUNTER — APPOINTMENT (OUTPATIENT)
Dept: CT IMAGING | Age: 66
DRG: 391 | End: 2021-03-22
Attending: INTERNAL MEDICINE
Payer: OTHER GOVERNMENT

## 2021-03-22 VITALS — SYSTOLIC BLOOD PRESSURE: 71 MMHG | OXYGEN SATURATION: 97 % | DIASTOLIC BLOOD PRESSURE: 34 MMHG

## 2021-03-22 LAB
A/G RATIO: 1.1 (ref 1.1–2.2)
ALBUMIN SERPL-MCNC: 4 G/DL (ref 3.4–5)
ALP BLD-CCNC: 99 U/L (ref 40–129)
ALT SERPL-CCNC: 7 U/L (ref 10–40)
ANION GAP SERPL CALCULATED.3IONS-SCNC: 12 MMOL/L (ref 3–16)
AST SERPL-CCNC: 20 U/L (ref 15–37)
BILIRUB SERPL-MCNC: 0.4 MG/DL (ref 0–1)
BUN BLDV-MCNC: 16 MG/DL (ref 7–20)
CALCIUM SERPL-MCNC: 10 MG/DL (ref 8.3–10.6)
CHLORIDE BLD-SCNC: 103 MMOL/L (ref 99–110)
CO2: 22 MMOL/L (ref 21–32)
CREAT SERPL-MCNC: 1.4 MG/DL (ref 0.8–1.3)
GFR AFRICAN AMERICAN: >60
GFR NON-AFRICAN AMERICAN: 51
GLOBULIN: 3.7 G/DL
GLUCOSE BLD-MCNC: 88 MG/DL (ref 70–99)
HCT VFR BLD CALC: 43.4 % (ref 40.5–52.5)
HEMOGLOBIN: 14.6 G/DL (ref 13.5–17.5)
LIPASE: 44 U/L (ref 13–60)
MCH RBC QN AUTO: 29.9 PG (ref 26–34)
MCHC RBC AUTO-ENTMCNC: 33.6 G/DL (ref 31–36)
MCV RBC AUTO: 88.9 FL (ref 80–100)
PDW BLD-RTO: 13.1 % (ref 12.4–15.4)
PLATELET # BLD: 425 K/UL (ref 135–450)
PMV BLD AUTO: 6.3 FL (ref 5–10.5)
POTASSIUM REFLEX MAGNESIUM: 4.6 MMOL/L (ref 3.5–5.1)
RBC # BLD: 4.88 M/UL (ref 4.2–5.9)
SODIUM BLD-SCNC: 137 MMOL/L (ref 136–145)
TOTAL PROTEIN: 7.7 G/DL (ref 6.4–8.2)
WBC # BLD: 9.3 K/UL (ref 4–11)

## 2021-03-22 PROCEDURE — 6370000000 HC RX 637 (ALT 250 FOR IP): Performed by: NURSE PRACTITIONER

## 2021-03-22 PROCEDURE — 3700000001 HC ADD 15 MINUTES (ANESTHESIA): Performed by: INTERNAL MEDICINE

## 2021-03-22 PROCEDURE — 2580000003 HC RX 258: Performed by: NURSE ANESTHETIST, CERTIFIED REGISTERED

## 2021-03-22 PROCEDURE — 6360000004 HC RX CONTRAST MEDICATION: Performed by: INTERNAL MEDICINE

## 2021-03-22 PROCEDURE — 6360000002 HC RX W HCPCS: Performed by: INTERNAL MEDICINE

## 2021-03-22 PROCEDURE — 73590 X-RAY EXAM OF LOWER LEG: CPT

## 2021-03-22 PROCEDURE — 6360000004 HC RX CONTRAST MEDICATION

## 2021-03-22 PROCEDURE — 3609010300 HC COLONOSCOPY W/BIOPSY SINGLE/MULTIPLE: Performed by: INTERNAL MEDICINE

## 2021-03-22 PROCEDURE — 3700000000 HC ANESTHESIA ATTENDED CARE: Performed by: INTERNAL MEDICINE

## 2021-03-22 PROCEDURE — 94761 N-INVAS EAR/PLS OXIMETRY MLT: CPT

## 2021-03-22 PROCEDURE — 85027 COMPLETE CBC AUTOMATED: CPT

## 2021-03-22 PROCEDURE — 2709999900 HC NON-CHARGEABLE SUPPLY: Performed by: INTERNAL MEDICINE

## 2021-03-22 PROCEDURE — 6360000002 HC RX W HCPCS: Performed by: REGISTERED NURSE

## 2021-03-22 PROCEDURE — 2580000003 HC RX 258: Performed by: REGISTERED NURSE

## 2021-03-22 PROCEDURE — 6360000002 HC RX W HCPCS: Performed by: NURSE ANESTHETIST, CERTIFIED REGISTERED

## 2021-03-22 PROCEDURE — 7100000010 HC PHASE II RECOVERY - FIRST 15 MIN: Performed by: INTERNAL MEDICINE

## 2021-03-22 PROCEDURE — 1200000000 HC SEMI PRIVATE

## 2021-03-22 PROCEDURE — 71260 CT THORAX DX C+: CPT

## 2021-03-22 PROCEDURE — 7100000011 HC PHASE II RECOVERY - ADDTL 15 MIN: Performed by: INTERNAL MEDICINE

## 2021-03-22 PROCEDURE — 6370000000 HC RX 637 (ALT 250 FOR IP): Performed by: INTERNAL MEDICINE

## 2021-03-22 PROCEDURE — 2500000003 HC RX 250 WO HCPCS: Performed by: NURSE ANESTHETIST, CERTIFIED REGISTERED

## 2021-03-22 PROCEDURE — 0DBG8ZX EXCISION OF LEFT LARGE INTESTINE, VIA NATURAL OR ARTIFICIAL OPENING ENDOSCOPIC, DIAGNOSTIC: ICD-10-PCS | Performed by: INTERNAL MEDICINE

## 2021-03-22 PROCEDURE — 2700000000 HC OXYGEN THERAPY PER DAY

## 2021-03-22 PROCEDURE — 74177 CT ABD & PELVIS W/CONTRAST: CPT

## 2021-03-22 PROCEDURE — 36415 COLL VENOUS BLD VENIPUNCTURE: CPT

## 2021-03-22 PROCEDURE — 83690 ASSAY OF LIPASE: CPT

## 2021-03-22 PROCEDURE — 6360000002 HC RX W HCPCS: Performed by: NURSE PRACTITIONER

## 2021-03-22 PROCEDURE — 2580000003 HC RX 258: Performed by: NURSE PRACTITIONER

## 2021-03-22 PROCEDURE — 94640 AIRWAY INHALATION TREATMENT: CPT

## 2021-03-22 PROCEDURE — 84153 ASSAY OF PSA TOTAL: CPT

## 2021-03-22 PROCEDURE — 88305 TISSUE EXAM BY PATHOLOGIST: CPT

## 2021-03-22 PROCEDURE — 80053 COMPREHEN METABOLIC PANEL: CPT

## 2021-03-22 RX ORDER — LORAZEPAM 2 MG/ML
0.25 INJECTION INTRAMUSCULAR EVERY 6 HOURS PRN
Status: DISCONTINUED | OUTPATIENT
Start: 2021-03-22 | End: 2021-03-23 | Stop reason: HOSPADM

## 2021-03-22 RX ORDER — OXYCODONE HYDROCHLORIDE AND ACETAMINOPHEN 5; 325 MG/1; MG/1
1 TABLET ORAL PRN
Status: DISCONTINUED | OUTPATIENT
Start: 2021-03-22 | End: 2021-03-22 | Stop reason: HOSPADM

## 2021-03-22 RX ORDER — SODIUM CHLORIDE, SODIUM LACTATE, POTASSIUM CHLORIDE, CALCIUM CHLORIDE 600; 310; 30; 20 MG/100ML; MG/100ML; MG/100ML; MG/100ML
INJECTION, SOLUTION INTRAVENOUS CONTINUOUS PRN
Status: DISCONTINUED | OUTPATIENT
Start: 2021-03-22 | End: 2021-03-22 | Stop reason: SDUPTHER

## 2021-03-22 RX ORDER — SODIUM CHLORIDE 9 MG/ML
1000 INJECTION, SOLUTION INTRAVENOUS CONTINUOUS
Status: DISCONTINUED | OUTPATIENT
Start: 2021-03-22 | End: 2021-03-22

## 2021-03-22 RX ORDER — MORPHINE SULFATE 2 MG/ML
1 INJECTION, SOLUTION INTRAMUSCULAR; INTRAVENOUS EVERY 5 MIN PRN
Status: DISCONTINUED | OUTPATIENT
Start: 2021-03-22 | End: 2021-03-22 | Stop reason: HOSPADM

## 2021-03-22 RX ORDER — HYDRALAZINE HYDROCHLORIDE 20 MG/ML
5 INJECTION INTRAMUSCULAR; INTRAVENOUS EVERY 10 MIN PRN
Status: DISCONTINUED | OUTPATIENT
Start: 2021-03-22 | End: 2021-03-22 | Stop reason: HOSPADM

## 2021-03-22 RX ORDER — OXYCODONE HYDROCHLORIDE AND ACETAMINOPHEN 5; 325 MG/1; MG/1
2 TABLET ORAL PRN
Status: DISCONTINUED | OUTPATIENT
Start: 2021-03-22 | End: 2021-03-22 | Stop reason: HOSPADM

## 2021-03-22 RX ORDER — LIDOCAINE HYDROCHLORIDE 10 MG/ML
INJECTION, SOLUTION INFILTRATION; PERINEURAL PRN
Status: DISCONTINUED | OUTPATIENT
Start: 2021-03-22 | End: 2021-03-22 | Stop reason: SDUPTHER

## 2021-03-22 RX ORDER — PROPOFOL 10 MG/ML
INJECTION, EMULSION INTRAVENOUS PRN
Status: DISCONTINUED | OUTPATIENT
Start: 2021-03-22 | End: 2021-03-22 | Stop reason: SDUPTHER

## 2021-03-22 RX ORDER — LABETALOL HYDROCHLORIDE 5 MG/ML
5 INJECTION, SOLUTION INTRAVENOUS EVERY 10 MIN PRN
Status: DISCONTINUED | OUTPATIENT
Start: 2021-03-22 | End: 2021-03-22 | Stop reason: HOSPADM

## 2021-03-22 RX ORDER — PROMETHAZINE HYDROCHLORIDE 25 MG/ML
6.25 INJECTION, SOLUTION INTRAMUSCULAR; INTRAVENOUS
Status: DISCONTINUED | OUTPATIENT
Start: 2021-03-22 | End: 2021-03-22 | Stop reason: HOSPADM

## 2021-03-22 RX ORDER — ONDANSETRON 2 MG/ML
4 INJECTION INTRAMUSCULAR; INTRAVENOUS PRN
Status: DISCONTINUED | OUTPATIENT
Start: 2021-03-22 | End: 2021-03-22 | Stop reason: HOSPADM

## 2021-03-22 RX ORDER — SODIUM CHLORIDE 9 MG/ML
1000 INJECTION, SOLUTION INTRAVENOUS CONTINUOUS
Status: ACTIVE | OUTPATIENT
Start: 2021-03-22 | End: 2021-03-23

## 2021-03-22 RX ORDER — DIPHENHYDRAMINE HYDROCHLORIDE 50 MG/ML
12.5 INJECTION INTRAMUSCULAR; INTRAVENOUS
Status: DISCONTINUED | OUTPATIENT
Start: 2021-03-22 | End: 2021-03-22 | Stop reason: HOSPADM

## 2021-03-22 RX ORDER — MORPHINE SULFATE 2 MG/ML
2 INJECTION, SOLUTION INTRAMUSCULAR; INTRAVENOUS EVERY 5 MIN PRN
Status: DISCONTINUED | OUTPATIENT
Start: 2021-03-22 | End: 2021-03-22 | Stop reason: HOSPADM

## 2021-03-22 RX ORDER — PHENYLEPHRINE HCL IN 0.9% NACL 1 MG/10 ML
SYRINGE (ML) INTRAVENOUS PRN
Status: DISCONTINUED | OUTPATIENT
Start: 2021-03-22 | End: 2021-03-22 | Stop reason: SDUPTHER

## 2021-03-22 RX ORDER — MEPERIDINE HYDROCHLORIDE 50 MG/ML
12.5 INJECTION INTRAMUSCULAR; INTRAVENOUS; SUBCUTANEOUS EVERY 5 MIN PRN
Status: DISCONTINUED | OUTPATIENT
Start: 2021-03-22 | End: 2021-03-22 | Stop reason: HOSPADM

## 2021-03-22 RX ADMIN — PROPOFOL 100 MG: 10 INJECTION, EMULSION INTRAVENOUS at 12:20

## 2021-03-22 RX ADMIN — ONDANSETRON 4 MG: 2 INJECTION INTRAMUSCULAR; INTRAVENOUS at 21:46

## 2021-03-22 RX ADMIN — ATORVASTATIN CALCIUM 80 MG: 80 TABLET, FILM COATED ORAL at 20:40

## 2021-03-22 RX ADMIN — LIDOCAINE HYDROCHLORIDE 40 MG: 10 INJECTION, SOLUTION INFILTRATION; PERINEURAL at 12:20

## 2021-03-22 RX ADMIN — METOCLOPRAMIDE HYDROCHLORIDE 5 MG: 5 INJECTION INTRAMUSCULAR; INTRAVENOUS at 17:40

## 2021-03-22 RX ADMIN — IOHEXOL 50 ML: 240 INJECTION, SOLUTION INTRATHECAL; INTRAVASCULAR; INTRAVENOUS; ORAL at 13:55

## 2021-03-22 RX ADMIN — CARVEDILOL 6.25 MG: 6.25 TABLET, FILM COATED ORAL at 17:43

## 2021-03-22 RX ADMIN — SODIUM CHLORIDE, SODIUM LACTATE, POTASSIUM CHLORIDE, AND CALCIUM CHLORIDE: .6; .31; .03; .02 INJECTION, SOLUTION INTRAVENOUS at 12:07

## 2021-03-22 RX ADMIN — Medication 200 MCG: at 12:44

## 2021-03-22 RX ADMIN — LORAZEPAM 0.25 MG: 2 INJECTION, SOLUTION INTRAMUSCULAR; INTRAVENOUS at 21:46

## 2021-03-22 RX ADMIN — GLYCOPYRROLATE AND FORMOTEROL FUMARATE 2 PUFF: 9; 4.8 AEROSOL, METERED RESPIRATORY (INHALATION) at 19:24

## 2021-03-22 RX ADMIN — TICAGRELOR 90 MG: 90 TABLET ORAL at 20:40

## 2021-03-22 RX ADMIN — LOSARTAN POTASSIUM 25 MG: 25 TABLET, FILM COATED ORAL at 08:54

## 2021-03-22 RX ADMIN — METOCLOPRAMIDE HYDROCHLORIDE 5 MG: 5 INJECTION INTRAMUSCULAR; INTRAVENOUS at 20:40

## 2021-03-22 RX ADMIN — GLYCOPYRROLATE AND FORMOTEROL FUMARATE 2 PUFF: 9; 4.8 AEROSOL, METERED RESPIRATORY (INHALATION) at 08:14

## 2021-03-22 RX ADMIN — LORAZEPAM 0.25 MG: 2 INJECTION, SOLUTION INTRAMUSCULAR; INTRAVENOUS at 15:31

## 2021-03-22 RX ADMIN — SODIUM CHLORIDE 1000 ML: 9 INJECTION, SOLUTION INTRAVENOUS at 20:40

## 2021-03-22 RX ADMIN — METOCLOPRAMIDE HYDROCHLORIDE 5 MG: 5 INJECTION INTRAMUSCULAR; INTRAVENOUS at 08:16

## 2021-03-22 RX ADMIN — Medication 10 ML: at 20:40

## 2021-03-22 RX ADMIN — CARVEDILOL 6.25 MG: 6.25 TABLET, FILM COATED ORAL at 08:54

## 2021-03-22 RX ADMIN — METOCLOPRAMIDE HYDROCHLORIDE 5 MG: 5 INJECTION INTRAMUSCULAR; INTRAVENOUS at 10:59

## 2021-03-22 RX ADMIN — IOPAMIDOL 75 ML: 755 INJECTION, SOLUTION INTRAVENOUS at 16:30

## 2021-03-22 ASSESSMENT — COPD QUESTIONNAIRES: CAT_SEVERITY: MODERATE

## 2021-03-22 ASSESSMENT — PAIN - FUNCTIONAL ASSESSMENT: PAIN_FUNCTIONAL_ASSESSMENT: 0-10

## 2021-03-22 NOTE — CARE COORDINATION
CM placed call to pt brother Elise Gilmore to obtain SNF choices. He states that he and family have decided they would like referral to [facility]. Referral called to [facility] in admissions to ensure receipt ([phone]). She states that facility can accept [place] at [place] and is currently pulling chart from Epic. CM will continue to follow and provide updates to family and [facility] as they become available. Pt is scheduled for colonoscopy today. Further testing/ dispo pending results.      Carie Hernandez, RN

## 2021-03-22 NOTE — PROGRESS NOTES
Hospitalist Progress Note      PCP: No primary care provider on file. Date of Admission: 3/18/2021    Chief Complaint: Weight loss, failure to thrive     Hospital Course:   60 y. o. male with PMH of CAD with recent SAMM, COPD, current smoker, HTN, HLD, very remote use of IV/PO drug use, 50 pack year smoker who presented to [facility] for elective angiogram, this was cancelled because pt had no chest pain or SOB. Cardiology asked that he be admitted for GI consult due to 30# weight loss and nausea. He has no complaints, he is accompanied by his brother who is concerned regarding his decline over the last 6 month. No fever, chills, no chest pain or palpitations. Denies any vomiting. Notes recent constipation issues and had started using suppository and stool softeners. Subjective:   Pt is on RA. Afebrile. VSS. No dyspnea or chest pain. +nausea. No vomiting. No abdominal pain.        Medications:  Reviewed    Infusion Medications    sodium chloride       Scheduled Medications    metoclopramide  5 mg Intravenous 4x Daily AC & HS    ticagrelor  90 mg Oral BID    aspirin  81 mg Oral Daily    atorvastatin  80 mg Oral Nightly    carvedilol  6.25 mg Oral BID WC    losartan  25 mg Oral Daily    glycopyrrolate-formoterol  2 puff Inhalation BID    sodium chloride flush  10 mL Intravenous 2 times per day    pantoprazole  40 mg Oral QAM AC    nicotine  1 patch Transdermal Daily     PRN Meds: LORazepam, albuterol, sodium chloride flush, promethazine **OR** ondansetron, polyethylene glycol, acetaminophen **OR** acetaminophen      Intake/Output Summary (Last 24 hours) at 3/22/2021 1504  Last data filed at 3/22/2021 1245  Gross per 24 hour   Intake 2257 ml   Output --   Net 2257 ml       Physical Exam Performed:    /82   Pulse 65   Temp 98.7 °F (37.1 °C) (Oral)   Resp 16   Ht 5' 9\" (1.753 m)   Wt 124 lb 11.2 oz (56.6 kg)   SpO2 98%   BMI 18.41 kg/m²     General appearance: No apparent distress, appears stated age and cooperative. HEENT: Pupils equal, round, and reactive to light. Conjunctivae/corneas clear. Neck: Supple, with full range of motion. No jugular venous distention. Trachea midline. Respiratory:  Normal respiratory effort. Clear to auscultation, bilaterally without Rales/Wheezes/Rhonchi. Cardiovascular: Regular rate and rhythm with normal S1/S2 without murmurs, rubs or gallops. Abdomen: Soft, non-tender, non-distended with normal bowel sounds. Musculoskeletal: No clubbing, cyanosis or edema bilaterally. Full range of motion without deformity. Skin: Skin color, texture, turgor normal.  No rashes or lesions. Neurologic:  Neurovascularly intact without any focal sensory/motor deficits. Cranial nerves: II-XII intact, grossly non-focal.  Psychiatric: Alert and oriented, thought content appropriate, normal insight  Capillary Refill: Brisk,< 3 seconds   Peripheral Pulses: +2 palpable, equal bilaterally       Labs:   No results for input(s): WBC, HGB, HCT, PLT in the last 72 hours. No results for input(s): NA, K, CL, CO2, BUN, CREATININE, CALCIUM, PHOS in the last 72 hours. Invalid input(s): MAGNES  No results for input(s): AST, ALT, BILIDIR, BILITOT, ALKPHOS in the last 72 hours. No results for input(s): INR in the last 72 hours. No results for input(s): Jem Pippins in the last 72 hours.     Radiology:  CT ABDOMEN PELVIS W IV CONTRAST Additional Contrast? Oral    (Results Pending)   XR TIBIA FIBULA LEFT (2 VIEWS)    (Results Pending)   CT CHEST W CONTRAST    (Results Pending)     Assessment/Plan:    Active Hospital Problems    Diagnosis    Weight loss [R63.4]    H/O thoracic aortic aneurysm repair [C67.963, Z86.79]    Coronary artery disease involving native coronary artery of native heart with angina pectoris ([address] [I25.119]    Severe malnutrition (HCC) [E43]    Moderate COPD (chronic obstructive pulmonary disease) ([facility]) [J44.9]    Cigarette nicotine dependence without

## 2021-03-22 NOTE — PROGRESS NOTES
Occupational Therapy    Pt currently off the floor for colonoscopy. Will follow up at later time as schedule allows.      Ian Malagon OTR/L

## 2021-03-22 NOTE — ANESTHESIA POSTPROCEDURE EVALUATION
03/19/2021 05:55 AM        BUN                      10                  03/19/2021 05:55 AM        CREATININE               1.3                 03/19/2021 05:55 AM        GLUCOSE                  87                  03/19/2021 05:55 AM   COAGS  Lab Results       Component                Value               Date/Time                  PROTIME                  13.2                03/18/2021 07:05 AM        INR                      1.14                03/18/2021 07:05 AM     Intake & Output:  @46FPBD@    Nausea & Vomiting:  No    Level of Consciousness:  Awake    Pain Assessment:  Adequate analgesia    Anesthesia Complications:  No apparent anesthetic complications    SUMMARY      Vital signs stable  OK to discharge from Stage I post anesthesia care.   Care transferred from Anesthesiology department on discharge from perioperative area

## 2021-03-22 NOTE — H&P
injection 10 mL  10 mL Intravenous 2 times per day Bekah Merry, APRN - CNP   10 mL at 03/20/21 8451    sodium chloride flush 0.9 % injection 10 mL  10 mL Intravenous PRN Bekah Merry, APRN - CNP        promethazine (PHENERGAN) tablet 12.5 mg  12.5 mg Oral Q6H PRN Bekah Merry, APRN - CNP   12.5 mg at 03/21/21 1531    Or    ondansetron (ZOFRAN) injection 4 mg  4 mg Intravenous Q6H PRN Bekah Merry, APRN - CNP   4 mg at 03/21/21 1225    polyethylene glycol (GLYCOLAX) packet 17 g  17 g Oral Daily PRN Bekah Merry, APRN - CNP        acetaminophen (TYLENOL) tablet 650 mg  650 mg Oral Q6H PRN Bekah Merry, APRN - CNP        Or    acetaminophen (TYLENOL) suppository 650 mg  650 mg Rectal Q6H PRN Bekah Merry, APRN - CNP        pantoprazole (PROTONIX) tablet 40 mg  40 mg Oral QAM AC Bekah Merry, APRN - CNP   40 mg at 03/21/21 0602    nicotine (NICODERM CQ) 21 MG/24HR 1 patch  1 patch Transdermal Daily Bekah Merry, APRN - CNP         Past Medical History:   Diagnosis Date    CAD (coronary artery disease) 03/28/2013    Cardiomy[facility] 02/22/2021    EF= 45%    Emphyse[facility] of lung [facility]     Heart attack ([facility] 03/28/2013    3/28/2013 and 2/22/2021    High cholesterol     HTN (hypertension)     Hypertension      Past Surgical History:   Procedure Laterality Date    CORONARY ANGIOPLASTY WITH STENT PLACEMENT  02/22/2021    SAMM- 3.0 x 38 and 3.0 x 34 to [address]  03/28/2013    SAMM- 3.5 x 18 to RCA    DIAGNOSTIC CARDIAC CATH LAB PROCEDURE  12/10/2019    Non Obs CAD    THORACIC AORTIC ANEURYSM REPAIR  2000       Nurses notes reviewed and agreed.   Medications reviewed  Allergies: No Known Allergies        Physical Exam:  Vital Signs: BP (!) 94/57   Pulse 70   Temp 98.7 °F (37.1 °C) (Oral)   Resp 16   Ht 5' 9\" (1.753 m)   Wt 128 lb 8 oz (58.3 kg)   SpO2 95%   BMI 18.98 kg/m²  Body mass index is 18.98 kg/m². Airway:Normal  Cardiac:Normal  Pulmonary:Normal  Abdomen:Normal  Specific to procedure: none      Pre-Procedure Assessment/Plan:  ASA 3 - Patient with moderate systemic disease with functional limitations  Mallampati I  Level of Sedation Plan:Deep sedation    Post Procedure plan: Return to same level of care    I assessed the patient and find that the patient is in satisfactory condition to proceed with the planned procedure and sedation plan. I have explained the risk, benefits, and alternatives to the procedure. The patient understands and agrees to proceed.   Yes    Leo Krause MD       (O) [phone]        Leo Krause  6:05 AM 3/22/2021

## 2021-03-22 NOTE — OP NOTE
Colonoscopy Note    Patient:   Reji De Jesus    YOB: 1955    Facility:   [facility] [Inpatient]  Referring/PCP: No primary care provider on file. Procedure:   Colonoscopy --diagnostic  Date:     3/22/2021  Endoscopist:  Jose A Barton MD    Preoperative Diagnosis: Wt loss/nausea and neg. EGD. Postoperative Diagnosis:  normal    Anesthesia: Propofol  Estimated blood loss: < 5 ml  Complications:  None    Description of Procedure:  Informed consent was obtained from the patient after explanation of the procedure including indications, description of the procedure,  benefits and possible risks and complications of the procedure, and alternatives. Questions were answered. The patient's history was reviewed and a directed physical examination was performed prior to the procedure. Patient was monitored throughout the procedure with pulse oximetry and periodic assessment of vital signs. Patient was sedated as noted above. The Nursing staff and I performed a time out. With the patient initially in the left lateral decubitus position, a digital rectal examination was performed and revealed negative without mass, lesions or tenderness. The Olympus video colonoscope was placed in the patient's rectum and advanced without difficulty  to the cecum, which was identified by the ileocecal valve and appendiceal orifice. Photographs were taken. The prep was good. Examination of the mucosa was performed during both introduction and withdrawal of the colonoscope. Retroflexed view of the rectum was performed. Withdrawal time was 10 minutes. Findings:     1. Normal colon w inability to get to TI, so the TI opening was biopsied  2.  Mild Lt colonic diverticulosis     Recommendations:  CT today before D/C     Jose A Barton MD       [phone]) [phone]          Jose A Barton MD

## 2021-03-22 NOTE — ANESTHESIA PRE PROCEDURE
Department of Anesthesiology  Preprocedure Note       Name:  Sancho Jonas   Age:  60 y.o.  :  1955                                          MRN:  [id number]         Date:  3/22/2021      Surgeon: Joe Mtz):  Emily Jennings MD    Procedure: Procedure(s):  COLONOSCOPY DIAGNOSTIC    Medications prior to admission:   Prior to Admission medications    Medication Sig Start Date End Date Taking?  Authorizing Provider   NONFORMULARY Apply 1 patch topically once a week   Yes Historical Provider, MD   aspirin 81 MG chewable tablet Take 1 tablet by mouth daily 21  Yes David Brothers MD   atorvastatin (LIPITOR) 80 MG tablet Take 1 tablet by mouth nightly 21  Yes David Brothers MD   losartan (COZAAR) 25 MG tablet Take 1 tablet by mouth daily 21  Yes David Brothers MD   carvedilol (COREG) 6.25 MG tablet Take 1 tablet by mouth 2 times daily (with meals) 21  Yes David Brothers MD   ticagrelor (BRILINTA) 90 MG TABS tablet Take 1 tablet by mouth 2 times daily 21  Yes David Brothers MD   umeclidinium-vilanterol (ANORO ELLIPTA) 62.5-25 MCG/INH AEPB inhaler Inhale 1 puff into the lungs daily 20  Yes Hussein Grijalva MD   albuterol sulfate HFA (VENTOLIN HFA) 108 (90 Base) MCG/ACT inhaler Inhale 2 puffs into the lungs every 6 hours as needed for Wheezing or Shortness of Breath 3/3/20  Yes Hussein Grijalva MD   albuterol (PROVENTIL) (2.5 MG/3ML) 0.083% nebulizer solution Take 3 mLs by nebulization every 6 hours as needed for Wheezing 3/3/20  Yes Hussein Grijalva MD       Current medications:    Current Facility-Administered Medications   Medication Dose Route Frequency Provider Last Rate Last Admin    metoclopramide (REGLAN) injection 5 mg  5 mg Intravenous 4x Daily AC & HS Emily Jennings MD   5 mg at 21 1059    0.9 % sodium chloride infusion  1,000 mL Intravenous Continuous Nilton Champagne MD 30 mL/hr at 21 1,000 mL at 03/21/21 2106    ticagrelor (BRILINTA) tablet 90 mg  90 mg Oral BID Paulina Chawla MD   Stopped at 03/22/21 8040    albuterol (PROVENTIL) nebulizer solution 2.5 mg  2.5 mg Nebulization Q6H PRN Ardine Chamber, APRN - CNP        aspirin chewable tablet 81 mg  81 mg Oral Daily Ardine Chamber, APRN - CNP   Stopped at 03/22/21 0843    atorvastatin (LIPITOR) tablet 80 mg  80 mg Oral Nightly Ardine Chamber, APRN - CNP   80 mg at 03/21/21 2106    carvedilol (COREG) tablet 6.25 mg  6.25 mg Oral BID WC Ardine Chamber, APRN - CNP   6.25 mg at 03/22/21 0854    losartan (COZAAR) tablet 25 mg  25 mg Oral Daily Ardine Chamber, APRN - CNP   25 mg at 03/22/21 0854    glycopyrrolate-formoterol (BEVESPI) 9-4.8 MCG/ACT inhaler 2 puff  2 puff Inhalation BID Ardine Chamber, APRN - CNP   2 puff at 03/22/21 4058    sodium chloride flush 0.9 % injection 10 mL  10 mL Intravenous 2 times per day Ardine Chamber, APRN - CNP   10 mL at 03/20/21 9614    sodium chloride flush 0.9 % injection 10 mL  10 mL Intravenous PRN Ardine Chamber, APRN - CNP        promethazine (PHENERGAN) tablet 12.5 mg  12.5 mg Oral Q6H PRN Ardine Chamber, APRN - CNP   12.5 mg at 03/21/21 1531    Or    ondansetron (ZOFRAN) injection 4 mg  4 mg Intravenous Q6H PRN Ardine Chamber, APRN - CNP   4 mg at 03/21/21 1225    polyethylene glycol (GLYCOLAX) packet 17 g  17 g Oral Daily PRN Ardine Chamber, APRN - CNP        acetaminophen (TYLENOL) tablet 650 mg  650 mg Oral Q6H PRN Ardine Chamber, APRN - CNP        Or    acetaminophen (TYLENOL) suppository 650 mg  650 mg Rectal Q6H PRN Ardine Chamber, APRN - CNP        pantoprazole (PROTONIX) tablet 40 mg  40 mg Oral QAM AC Ardine Chamber, APRN - CNP   40 mg at 03/21/21 0602    nicotine (NICODERM CQ) 21 MG/24HR 1 patch  1 patch Transdermal Daily Ardine Chamber, APRN - CNP           Allergies:  No Known Allergies    Problem List:    Patient Active Problem List   Diagnosis Code    Moderate COPD (chronic obstructive pulmonary disease) ([facility] J44.9    Cigarette nicotine dependence without complication C01.428    Acute myocardial infarction (HCC) I21.9    STEMI (ST elevation myocardial infarction) ([facility]) I21.3    Severe malnutrition ([facility] E43    Coronary artery disease involving native coronary artery of native heart with angina pectoris ([facility]) I25.119    H/O thoracic aortic aneurysm repair Z98.890, Z86.79    Weight loss R63.4       Past Medical History:        Diagnosis Date    CAD (coronary artery disease) 2013    Cardiomyopathy ([facility] 2021    EF= 45%    [facility]     Heart attack ([facility] 2013    3/28/2013 and 2021    High cholesterol     HTN (hypertension)     Hypertension        Past Surgical History:        Procedure Laterality Date    CORONARY ANGIOPLASTY WITH STENT PLACEMENT  2021    SAMM- 3.0 x 38 and 3.0 x 34 to pRCA    CORONARY ANGIOPLASTY WITH STENT PLACEMENT  2013    SAMM- 3.5 x 18 to RCA    DIAGNOSTIC CARDIAC CATH LAB PROCEDURE  12/10/2019    Non Obs CAD    THORACIC AORTIC ANEURYSM REPAIR         Social History:    Social History     Tobacco Use    Smoking status: Former Smoker     Packs/day: 2.00     Years: 50.00     Pack years: 100.00     Types: Cigarettes     Quit date: 2020     Years since quittin.8    Smokeless tobacco: Never Used   Substance Use Topics    Alcohol use: Never     Frequency: Never                                Counseling given: Not Answered      Vital Signs (Current):   Vitals:    21 0630 21 0636 21 0817 21 0845   BP: 116/78   131/85   Pulse: 80   71   Resp: 16  16 16   Temp: 98.4 °F (36.9 °C)   97.6 °F (36.4 °C)   TempSrc: Oral   Oral   SpO2: 96%  98% 97%   Weight:  124 lb 11.2 oz (56.6 kg)     Height:                                                  BP Readings from Last 3 Encounters:   21 131/85   21 128/80   21 (!) 159/83       NPO Status: Time of last liquid consumption: 2200                        Time of last solid consumption: 1800                        Date of last liquid consumption: 03/18/21                        Date of last solid food consumption: 03/18/21    BMI:   Wt Readings from Last 3 Encounters:   03/22/21 124 lb 11.2 oz (56.6 kg)   03/02/21 141 lb 3.2 oz (64 kg)   02/24/21 141 lb 1.5 oz (64 kg)     Body mass index is 18.41 kg/m². CBC:   Lab Results   Component Value Date    WBC 10.1 03/19/2021    RBC 4.23 03/19/2021    HGB 12.5 03/19/2021    HCT 37.6 03/19/2021    MCV 89.0 03/19/2021    RDW 13.1 03/19/2021     03/19/2021       CMP:   Lab Results   Component Value Date     03/19/2021    K 4.7 03/19/2021     03/19/2021    CO2 26 03/19/2021    BUN 10 03/19/2021    CREATININE 1.3 03/19/2021    GFRAA >60 03/19/2021    AGRATIO 1.2 03/19/2021    LABGLOM 55 03/19/2021    GLUCOSE 87 03/19/2021    PROT 6.6 03/19/2021    CALCIUM 9.4 03/19/2021    BILITOT 0.6 03/19/2021    ALKPHOS 84 03/19/2021    AST 9 03/19/2021    ALT <5 03/19/2021       POC Tests: No results for input(s): POCGLU, POCNA, POCK, POCCL, POCBUN, POCHEMO, POCHCT in the last 72 hours.     Coags:   Lab Results   Component Value Date    PROTIME 13.2 03/18/2021    INR 1.14 03/18/2021       HCG (If Applicable): No results found for: PREGTESTUR, PREGSERUM, HCG, HCGQUANT     ABGs: No results found for: PHART, PO2ART, OQZ2XPK, KNE1QVT, BEART, P1YSBBGB     Type & Screen (If Applicable):  No results found for: LABABO, LABRH    Drug/Infectious Status (If Applicable):  No results found for: HIV, HEPCAB    COVID-19 Screening (If Applicable):   Lab Results   Component Value Date    COVID19 Not Detected 03/15/2021           Anesthesia Evaluation  Patient summary reviewed no history of anesthetic complications:   Airway: Mallampati: II  TM distance: >3 FB   Neck ROM: full  Mouth opening: > = 3 FB Dental: normal exam   (+) upper dentures and lower dentures      Pulmonary:Negative Pulmonary ROS and normal exam  breath sounds clear to auscultation  (+) COPD: moderate,                             Cardiovascular:Negative CV ROS  Exercise tolerance: good (>4 METS),   (+) hypertension:, angina:, past MI:, CAD:, CABG/stent (pt set to get additional stents once medically stabilized):,         Rhythm: regular  Rate: normal                    Neuro/Psych:   Negative Neuro/Psych ROS  (+) neuromuscular disease:,             GI/Hepatic/Renal: Neg GI/Hepatic/Renal ROS            Endo/Other: Negative Endo/Other ROS                    Abdominal:           Vascular: negative vascular ROS. Pre-Operative Diagnosis: Atherosclerotic heart disease of native coronary artery without angina pectoris [I25.10]; Weight loss [R63.4]    60 y.o.   BMI:  Body mass index is 18.41 kg/m².      Vitals:    21 0630 21 0636 21 0817 21 0845   BP: 116/78   131/85   Pulse: 80   71   Resp: 16  16 16   Temp: 98.4 °F (36.9 °C)   97.6 °F (36.4 °C)   TempSrc: Oral   Oral   SpO2: 96%  98% 97%   Weight:  124 lb 11.2 oz (56.6 kg)     Height:           No Known Allergies    Social History     Tobacco Use    Smoking status: Former Smoker     Packs/day: 2.00     Years: 50.00     Pack years: 100.00     Types: Cigarettes     Quit date: 2020     Years since quittin.8    Smokeless tobacco: Never Used   Substance Use Topics    Alcohol use: Never     Frequency: Never       LABS:    CBC  Lab Results   Component Value Date/Time    WBC 10.1 2021 05:55 AM    HGB 12.5 (L) 2021 05:55 AM    HCT 37.6 (L) 2021 05:55 AM     2021 05:55 AM     RENAL  Lab Results   Component Value Date/Time     2021 05:55 AM    K 4.7 2021 05:55 AM     2021 05:55 AM    CO2 26 2021 05:55 AM    BUN 10 2021 05:55 AM    CREATININE 1.3 2021 05:55 AM    GLUCOSE 87 2021 05:55 AM     COAGS  Lab Results   Component Value Date/Time    PROTIME 13.2 2021 07:05 AM    INR 1.14 2021 07:05 AM       EKG 3/19/21  Normal sinus rhythmLeft axis deviationInferior infarct (cited on or before 22-FEB-2021)Abnormal ECGWhen compared with ECG of 22-FEB-2021 18:04,ST no longer elevated in Inferior leadsT wave inversion now evident in Inferior leadsQT has shortenedConfirmed by Reema Zuñiga ([id number]) on 3/19/2021 5:02:58 PM     Anesthesia Plan      general     ASA 3     (I discussed with the patient the risks and benefits of PIV, anesthesia, IV Narcotics, PACU. All questions were answered the patient agrees with the plan and wishes to proceed)  Induction: intravenous.                           Alba Billy MD   3/22/2021

## 2021-03-23 VITALS
TEMPERATURE: 97.9 F | HEIGHT: 69 IN | OXYGEN SATURATION: 96 % | BODY MASS INDEX: 18.88 KG/M2 | HEART RATE: 68 BPM | WEIGHT: 127.5 LBS | DIASTOLIC BLOOD PRESSURE: 85 MMHG | RESPIRATION RATE: 14 BRPM | SYSTOLIC BLOOD PRESSURE: 145 MMHG

## 2021-03-23 LAB — PROSTATE SPECIFIC ANTIGEN: 1.57 NG/ML (ref 0–4)

## 2021-03-23 PROCEDURE — 94640 AIRWAY INHALATION TREATMENT: CPT

## 2021-03-23 PROCEDURE — 6360000002 HC RX W HCPCS: Performed by: REGISTERED NURSE

## 2021-03-23 PROCEDURE — 2580000003 HC RX 258: Performed by: NURSE PRACTITIONER

## 2021-03-23 PROCEDURE — 6370000000 HC RX 637 (ALT 250 FOR IP): Performed by: INTERNAL MEDICINE

## 2021-03-23 PROCEDURE — 97530 THERAPEUTIC ACTIVITIES: CPT

## 2021-03-23 PROCEDURE — 6370000000 HC RX 637 (ALT 250 FOR IP): Performed by: NURSE PRACTITIONER

## 2021-03-23 PROCEDURE — 2580000003 HC RX 258: Performed by: REGISTERED NURSE

## 2021-03-23 PROCEDURE — 97535 SELF CARE MNGMENT TRAINING: CPT

## 2021-03-23 PROCEDURE — 6360000002 HC RX W HCPCS: Performed by: INTERNAL MEDICINE

## 2021-03-23 RX ORDER — ONDANSETRON 4 MG/1
4 TABLET, ORALLY DISINTEGRATING ORAL EVERY 8 HOURS PRN
Qty: 30 TABLET | Refills: 0 | Status: SHIPPED | OUTPATIENT
Start: 2021-03-23 | End: 2021-04-02

## 2021-03-23 RX ORDER — METOCLOPRAMIDE 5 MG/1
5 TABLET ORAL 3 TIMES DAILY
Qty: 120 TABLET | Refills: 3 | Status: ON HOLD | OUTPATIENT
Start: 2021-03-23 | End: 2022-08-24

## 2021-03-23 RX ORDER — ALPRAZOLAM 0.5 MG/1
0.5 TABLET ORAL 3 TIMES DAILY PRN
Qty: 9 TABLET | Refills: 0 | Status: SHIPPED | OUTPATIENT
Start: 2021-03-23 | End: 2021-03-26

## 2021-03-23 RX ORDER — PANTOPRAZOLE SODIUM 40 MG/1
40 TABLET, DELAYED RELEASE ORAL
Qty: 30 TABLET | Refills: 3 | Status: ON HOLD | OUTPATIENT
Start: 2021-03-24 | End: 2022-08-24

## 2021-03-23 RX ADMIN — SODIUM CHLORIDE 1000 ML: 9 INJECTION, SOLUTION INTRAVENOUS at 06:06

## 2021-03-23 RX ADMIN — ASPIRIN 81 MG: 81 TABLET, CHEWABLE ORAL at 09:54

## 2021-03-23 RX ADMIN — LORAZEPAM 0.25 MG: 2 INJECTION, SOLUTION INTRAMUSCULAR; INTRAVENOUS at 06:09

## 2021-03-23 RX ADMIN — METOCLOPRAMIDE HYDROCHLORIDE 5 MG: 5 INJECTION INTRAMUSCULAR; INTRAVENOUS at 06:06

## 2021-03-23 RX ADMIN — PANTOPRAZOLE SODIUM 40 MG: 40 TABLET, DELAYED RELEASE ORAL at 06:06

## 2021-03-23 RX ADMIN — LORAZEPAM 0.25 MG: 2 INJECTION, SOLUTION INTRAMUSCULAR; INTRAVENOUS at 12:07

## 2021-03-23 RX ADMIN — LOSARTAN POTASSIUM 25 MG: 25 TABLET, FILM COATED ORAL at 09:54

## 2021-03-23 RX ADMIN — Medication 10 ML: at 09:53

## 2021-03-23 RX ADMIN — CARVEDILOL 6.25 MG: 6.25 TABLET, FILM COATED ORAL at 09:54

## 2021-03-23 RX ADMIN — METOCLOPRAMIDE HYDROCHLORIDE 5 MG: 5 INJECTION INTRAMUSCULAR; INTRAVENOUS at 12:03

## 2021-03-23 RX ADMIN — GLYCOPYRROLATE AND FORMOTEROL FUMARATE 2 PUFF: 9; 4.8 AEROSOL, METERED RESPIRATORY (INHALATION) at 08:17

## 2021-03-23 RX ADMIN — TICAGRELOR 90 MG: 90 TABLET ORAL at 09:53

## 2021-03-23 NOTE — DISCHARGE INSTR - COC
Continuity of Care Form    Patient Name: Nohemi Mora   :  1955  MRN:  [id number]    Admit date:  3/18/2021  Discharge date:  ***    Code Status Order: Full Code   Advance Directives:   Advance Care Flowsheet Documentation       Date/Time Healthcare Directive Type of Healthcare Directive Copy in [address] Agent's Name Healthcare Agent's Phone Number    21 1146  Yes, patient has an advance directive for healthcare treatment  --  No, copy requested from family  --  --  --    21 1106  Yes, patient has an advance directive for healthcare treatment  Durable power of  for health care  No, copy requested from family  --  --  --            Admitting Physician:  Nadeem Mendez MD  PCP: No primary care provider on file. Discharging Nurse: [facility] Unit/Room#: 0201/0201-01  Discharging Unit Phone Number: ***    Emergency Contact:   Extended Emergency Contact Information  Primary Emergency Contact: Howard Martel [facility] Phone: [phone]  Mobile Phone: [phone]  Relation: Brother/Sister   needed?  No  Secondary Emergency Contact: [facility]  [place] Phone: [phone]  Mobile Phone: [phone]  Relation: Brother/Sister    Past Surgical History:  Past Surgical History:   Procedure Laterality Date    CORONARY ANGIOPLASTY WITH STENT PLACEMENT  2021    SAMM- 3.0 x 38 and 3.0 x 34 to pRCA    CORONARY ANGIOPLASTY WITH STENT PLACEMENT  2013    SAMM- 3.5 x 18 to RCA    DIAGNOSTIC CARDIAC CATH LAB PROCEDURE  12/10/2019    Non Obs CAD    THORACIC AORTIC ANEURYSM REPAIR         Immunization History:   Immunization History   Administered Date(s) Administered    Pneumococcal Polysaccharide ([id number]) 2016       Active Problems:  Patient Active Problem List   Diagnosis Code    Moderate COPD (chronic obstructive pulmonary disease) ([facility]) J44.9    Cigarette nicotine dependence without complication H37.951    Acute myocardial infarction (HCC) I21.9    STEMI (ST elevation myocardial infarction) (HCC) I21.3    Severe malnutrition ([facility] E43    Coronary artery disease involving native coronary artery of native heart with angina pectoris (HCC) I25.119    H/O thoracic aortic aneurysm repair Z98.890, Z86.79    Weight loss R63.4       Isolation/Infection:   Isolation            No Isolation          Patient Infection Status       Infection Onset Added Last Indicated Last Indicated By Review Planned Expiration Resolved Resolved By    None active    Resolved    COVID-19 Rule Out 03/15/21 03/15/21 03/15/21 COVID-19 (Ordered)   03/15/21 Rule-Out Test Resulted            Nurse Assessment:  Last Vital Signs: BP (!) 145/85   Pulse 68   Temp 97.9 °F (36.6 °C) (Oral)   Resp 14   Ht 5' 9\" (1.753 m)   Wt 127 lb 8 oz (57.8 kg)   SpO2 96%   BMI 18.83 kg/m²     Last documented pain score (0-10 scale): Pain Level: 0  Last Weight:   Wt Readings from Last 1 Encounters:   03/22/21 127 lb 8 oz (57.8 kg)     Mental Status:  {IP PT MENTAL STATUS:20030:::0}    IV Access:  { BERTRAND IV ACCESS:039721263:::0}    Nursing Mobility/ADLs:  Walking   {CHP DME ADLs:929261086:::0}  Transfer  {CHP DME ADLs:868838153:::0}  Bathing  {CHP DME ADLs:711682681:::0}  Dressing  {CHP DME ADLs:053140532:::0}  Toileting  {CHP DME ADLs:789746173:::0}  Feeding  {CHP DME ADLs:487761058:::0}  Med Admin  {CHP DME ADLs:897962889:::0}  Med Delivery   { BERTRAND MED Delivery:358284643:::0}    Wound Care Documentation and Therapy:        Elimination:  Continence:   · Bowel: {YES / ZX:02884}  · Bladder: {YES / VS:36535}  Urinary Catheter: {Urinary Catheter:106527066:::0}   Colostomy/Ileostomy/Ileal Conduit: {YES / KU:36651}       Date of Last BM: ***    Intake/Output Summary (Last 24 hours) at 3/23/2021 1236  Last data filed at 3/22/2021 1541  Gross per 24 hour   Intake 1200 ml   Output 0 ml   Net 1200 ml     I/O last 3 completed shifts: In: 4775 [P.O.:1020;  I.V.:700]  Out: 0 Safety Concerns:     [address] BERTRAND Safety Concerns:806706596:::0}    Impairments/Disabilities:      [address] BERTRAND Impairments/Disabilities:866845333:::0}    Nutrition Therapy:  Current Nutrition Therapy:   [address] BERTRAND Diet List:061282492:::0}    Routes of Feeding: {CHP DME Other Feedings:604493932:::0}  Liquids: {Slp liquid thickness:34095}  Daily Fluid Restriction: {CHP DME Yes amt example:896177699:::0}  Last Modified Barium Swallow with Video (Video Swallowing Test): {Done Not Done GIEW:119107231:::0}    Treatments at the Time of Hospital Discharge:   Respiratory Treatments: ***  Oxygen Therapy:  {Therapy; copd oxygen:26504:::0}  Ventilator:    {[facility] Vent List:559273655:::0}    Rehab Therapies: {THERAPEUTIC INTERVENTION:7019124787}  Weight Bearing Status/Restrictions: {[facility] Weight Bearin:::0}  Other Medical Equipment (for information only, NOT a DME order):  {EQUIPMENT:517895483}  Other Treatments: ***    Patient's personal belongings (please select all that are sent with patient):  {CHP DME Belongings:296618067:::0}    RN SIGNATURE:  {Esignature:710963026:::0}    CASE MANAGEMENT/SOCIAL WORK SECTION    Inpatient Status Date: ***    Readmission Risk Assessment Score:  Readmission Risk              Risk of Unplanned Readmission:        11           Discharging to Facility/ Agency   · Name: [facility]   · Address:  · Phone:  · Fax: [phone]    Dialysis Facility (if applicable)   · Name:  · Address:  · Dialysis Schedule:  · Phone:  · Fax:    / signature: Electronically signed by Glenn Tamayo RN on 3/23/21 at 1:04 PM EDT    PHYSICIAN SECTION    Prognosis: Fair    Condition at Discharge: Stable    Rehab Potential (if transferring to Rehab): N/A    Recommended Labs or Other Treatments After Discharge: Home health care PT/OT    Physician Certification: I certify the above information and transfer of [place]  is necessary for the continuing treatment of the diagnosis listed and that he requires Home Care for greater 30 days.      Update Admission H&P: No change in H&P    PHYSICIAN SIGNATURE:  Electronically signed by SKYLAR Voss CNP on 3/23/21 at 12:37 PM EDT

## 2021-03-23 NOTE — CARE COORDINATION
CM spoke with pt at bedside about plan for DC as he is likely medically ready today. Pt states that he is aware his brother Qing Wells would like for him to go to [facility], but that he is going to home with [facility]. Pt would like referral to [facility] as he has been active with them in the past and happy with services provided.  Referral placed

## 2021-03-23 NOTE — PROGRESS NOTES
Occupational Therapy  Facility/Department: [facility] A2 CARD TELEMETRY  Daily Treatment Note  NAME: Kvng Antonio  : 1955  MRN: [id number]    Date of Service: 3/23/2021    Discharge Recommendations:  Subacute/Skilled Nursing Facility, Continue to assess pending progress    If pt discharges prior to next session, this note will serve as discharge summary. See case management note for discharge disposition. Assessment   Performance deficits / Impairments: Decreased functional mobility ; Decreased strength;Decreased endurance;Decreased balance;Decreased cognition;Decreased ADL status; Decreased high-level IADLs;Decreased safe awareness  Assessment: Pt very pleasant and cooperative, reports he is going home this date. Pt able to complete item retrieval from closet and complete UB/LB dressing independently. Pt declines grooming or toileting. Pt talkative about recent events leading up to hospital admit, supportive listening provided. Pt reports leaving \"shortly\". REQUIRES OT FOLLOW UP: No  Activity Tolerance  Activity Tolerance: Patient Tolerated treatment well  Activity Tolerance: /87 HR 66 O2 95%  Safety Devices  Safety Devices in place: Yes  Type of devices: Call light within reach; Left in chair(no alarms on upon entry)         Patient Diagnosis(es): The encounter diagnosis was Anxiety. has a past medical history of CAD (coronary artery disease), Cardiomyopathy ([address] Emphysema of lung ([address] Heart attack ([address] High cholesterol, HTN (hypertension), and Hypertension. has a past surgical history that includes Diagnostic Cardiac Cath Lab Procedure (12/10/2019); Thoracic aortic aneurysm repair (); Coronary angioplasty with stent (2021); Coronary angioplasty with stent (2013); Upper gastrointestinal endoscopy (N/A, 3/19/2021); and Colonoscopy (N/A, 3/22/2021).     Restrictions  Restrictions/Precautions  Restrictions/Precautions: General Precautions, Fall Risk, Up as Tolerated  Position Activity Restriction  Other position/activity restrictions: IV  Subjective   General  Chart Reviewed: Yes  Patient assessed for rehabilitation services?: Yes  Family / Caregiver Present: No  Referring Practitioner: SKYLAR Regalado CNP  Diagnosis: failure to thrive, weight loss  Subjective  Subjective: Pt up in chair reports he is going home today  General Comment  Comments: RN approved therapy and notified that pt does not want therapy at this time  Vital Signs  Patient Currently in Pain: Denies   Orientation  Orientation  Overall Orientation Status: Within Functional Limits  Objective    ADL  UE Dressing: Independent  LE Dressing: Independent  Additional Comments: pt able to retrieve bags and clothing from closet, giancarlo jacket and shoes independently        Balance  Sitting Balance: Independent  Standing Balance: Independent  Standing Balance  Time: 30-45 sec x multiple attempts, 1-2 min  Activity: standing for ADL tasks, item retrieval  Functional Mobility  Functional - Mobility Device: No device  Activity: Retrieve items;Transport items  Assist Level: Independent  Functional Mobility Comments: able to retrieve items from closet and transport this date     Transfers  Sit to stand: Independent  Stand to sit:  Independent                       Cognition  Overall Cognitive Status: Exceptions  Arousal/Alertness: Appropriate responses to stimuli  Cognition Comment: pt cooperative, but reports going home, UE/LE dressing d/t discharging home        Plan   Plan  Times per week: 1-2    Goals  Short term goals  Time Frame for Short term goals: 1-2 times  Short term goal 1: Pt will complete toileting with S.  Short term goal 2: Pt will complete LB dressing with s-MET 3/23 independently completes LB dressing       Therapy Time   Individual Concurrent Group Co-treatment   Time In 1351         Time Out 1414         Minutes 23         Timed Code Treatment Minutes: Roy[address] 285, OT

## 2021-03-23 NOTE — PROGRESS NOTES
Tamara Arteaga is a 60 y.o. male patient.     Current Facility-Administered Medications   Medication Dose Route Frequency Provider Last Rate Last Admin    LORazepam (ATIVAN) injection 0.25 mg  0.25 mg Intravenous Q6H PRN SKYLAR Dong CNP   0.25 mg at 03/23/21 4982    metoclopramide (REGLAN) injection 5 mg  5 mg Intravenous 4x Daily AC & HS Franc Parra MD   5 mg at 03/23/21 0606    ticagrelor (BRILINTA) tablet 90 mg  90 mg Oral BID Devin Jackson MD   90 mg at 03/22/21 2040    albuterol (PROVENTIL) nebulizer solution 2.5 mg  2.5 mg Nebulization Q6H PRN Farooq Emery APRN - CNP        aspirin chewable tablet 81 mg  81 mg Oral Daily Farooq Emery APRN - CNP   Stopped at 03/22/21 4155    atorvastatin (LIPITOR) tablet 80 mg  80 mg Oral Nightly Farooq Emery APRN - CNP   80 mg at 03/22/21 2040    carvedilol (COREG) tablet 6.25 mg  6.25 mg Oral BID WC Farooq Emery APRN - CNP   6.25 mg at 03/22/21 1743    losartan (COZAAR) tablet 25 mg  25 mg Oral Daily Farooq Emery APRN - CNP   25 mg at 03/22/21 0854    glycopyrrolate-formoterol (BEVESPI) 9-4.8 MCG/ACT inhaler 2 puff  2 puff Inhalation BID SKYLAR Regalado - CNP   2 puff at 03/22/21 1924    sodium chloride flush 0.9 % injection 10 mL  10 mL Intravenous 2 times per day Farooq Emery APRN - CNP   10 mL at 03/22/21 2040    sodium chloride flush 0.9 % injection 10 mL  10 mL Intravenous PRN Farooq Emery APRN - CNP        promethazine (PHENERGAN) tablet 12.5 mg  12.5 mg Oral Q6H PRN Farooq Rupert, APRN - CNP   12.5 mg at 03/21/21 1531    Or    ondansetron (ZOFRAN) injection 4 mg  4 mg Intravenous Q6H PRN Farooq Emery APRN - CNP   4 mg at 03/22/21 2146    polyethylene glycol (GLYCOLAX) packet 17 g  17 g Oral Daily PRN SKYLAR Regalado - CNP        acetaminophen (TYLENOL) tablet 650 mg  650 mg Oral Q6H PRN SKYLAR Regalado CNP        Or    acetaminophen (TYLENOL) suppository 650 mg  650 mg continue a trial of Reglan (it seems to be helping the nausea)  4. Consider outpatient Capsule Endoscopy to fully evaluate the SI  5.  Will follow     Dorothy Love MD       ([phone]) [phone]    Dorothy Love MD  3/23/2021

## 2021-03-23 NOTE — PROGRESS NOTES
Comprehensive Nutrition Assessment    Type and Reason for Visit:  Reassess    Nutrition Recommendations/Plan:   1. Continue current diet (Cardiac)   2. Recommend if PO intake does not improve, consider alternative forms of nutrition  3. Monitor nutrition adequacy, pertinent labs, bowel habits, wt changes, and clinical progress    Nutrition Assessment:  Follow up: Pt remains nutritionally at risk AEB poor PO intake. Pt reports fine appetite. BMs normal, no N/V. Pt declined need for ONS, stated he was going home today and would try ONS when he went home. Pt had no other nutritional questions or concerns at this time. Continue to monitor. Malnutrition Assessment:  Malnutrition Status:  Severe malnutrition    Context:  Chronic Illness     Findings of the 6 clinical characteristics of malnutrition:  Energy Intake:  7 - 75% or less estimated energy requirements for 1 month or longer  Weight Loss:  (18.1% weight loss in the past year)     Body Fat Loss:  7 - Severe body fat loss Orbital(hollowing face)     Estimated Daily Nutrient Needs:  Energy (kcal):  1734-2023kcal; Weight Used for Energy Requirements:  Current(57.8kg)     Protein (g):  69-81g; Weight Used for Protein Requirements:  Current(1.2-1.4g/kg)        Fluid (ml/day):   ; Method Used for Fluid Requirements:  1 ml/kcal      Nutrition Related Findings:  +BM 3/22. +2.2L since admission. Colonoscopy 3/22. Wounds:  (Abrasion, bruising)       Current Nutrition Therapies:    DIET CARDIAC; Anthropometric Measures:  · Height: 5' 9\" (175.3 cm)  · Current Body Weight: 127 lb (57.6 kg)    · Ideal Body Weight: 160 lbs; % Ideal Body Weight 79.4 %   · BMI: 18.7  · BMI Categories: Normal Weight (BMI 18.5-24. 9)       Nutrition Diagnosis:   · Inadequate energy intake related to increase demand for energy/nutrients as evidenced by intake 0-25%, intake 26-50%, weight loss(Chief complaint of Failure to Thrive)      Nutrition Interventions:   Food and/or Nutrient Delivery: Continue Current Diet  Nutrition Education/Counseling:  No recommendation at this time   Coordination of Nutrition Care:  Continue to monitor while inpatient    Goals:  Patient will eat 50% or greater of meals and supplements. Nutrition Monitoring and Evaluation:   Behavioral-Environmental Outcomes:  None Identified   Food/Nutrient Intake Outcomes:  Food and Nutrient Intake  Physical Signs/Symptoms Outcomes:  Nutrition Focused Physical Findings, Biochemical Data, Weight, Nausea or Vomiting     Discharge Planning:     Too soon to determine     Electronically signed by Etienne Booth Dietetic Intern on 3/23/21 at 12:24 PM EDT    Contact: [phone]

## 2021-03-23 NOTE — DISCHARGE SUMMARY
Hospital Medicine Discharge Summary    Patient ID: Tamara Arteaga      Patient's PCP: No primary care provider on file. Admit Date: 3/18/2021     Discharge Date:   3/23/2021    Admitting Physician: Jeny Dyer MD     Discharge Physician: SKYLAR Dong - CNP     Discharge Diagnoses: Active Hospital Problems    Diagnosis    Weight loss [R63.4]    H/O thoracic aortic aneurysm repair [K55.843, Z86.79]    Coronary artery disease involving native coronary artery of native heart with angina pectoris ([address] [I25.119]    Severe malnutrition (HCC) [E43]    Moderate COPD (chronic obstructive pulmonary disease) (HCC) [J44.9]    Cigarette nicotine dependence without complication [A17.988]       The patient was seen and examined on day of discharge and this discharge summary is in conjunction with any daily progress note from day of discharge. Hospital Course:   60 y. o. male with PMH of CAD with recent SAMM, COPD, HTN, HLD, very remote use of IV/PO drug use, 50 pack year smoker who presented to [facility] for elective angiogram, this was cancelled because pt had no chest pain or SOB. Cardiology asked that he be admitted for GI consult due to 30# weight loss and nausea. He has no complaints, he is accompanied by his brother who is concerned regarding his decline over the last 6 month. No fever, chills, no chest pain or palpitations. Denies any vomiting. Notes recent constipation issues and had started using suppository and stool softeners. Weight loss of uncertain etiology:  - Reports 30 lb weight loss in the last 6 months associated with nausea, anorexia with occasional melena with intermittent epigastric pain, no vomiting.  - Pt was seen by GI on previous admission who recommended EGD however pt wanted to pursue as an outpt which was not completed. - GI consulted. S/p EGD on 3/19 which showed minimal gastritis in antrum and mild PHG in fundus, biopsy was negative.  S/p colonoscopy on 3/22 which showed normal colon with inability to get to the TI, TI opening was biopsied -- pending, mild left colonic diverticulosis. Plan for outpt capsule endoscopy and gastric emptying study as an outpt  - Pt started on reglan and PPI. Continue prn anti-emetic.   - CT chest unremarkable. CT A/P showed 1.4 cm indeterminate left renal cyst (similar to measurement to 2017). Pt is already following-up with Nephrologist for this. - Pt reports that he had a PSA a few months ago which was normal. TSH is normal.      CAD with hx of STEMI s/p PCI of RCA with 2 SAMM:  - Cardiology to consider staged PCI of LAD and D1 as outpatient. This was to occur on 3/18 but was canceled due to above. - Continue aspirin, statin, beta-blocker, losartan, brilinta. - ECHO with LVEF 45-50% hypokinesis of the inferolateral, inferior and basal inferior   walls.     COPD:  - Stable on room air. Will continue home ellipta. Albuterol as needed.     Chronic kidney disease:  - Stable. Baseline Cr ~1.3.      Tobacco use:  - Encouraged cessation.  - Declined nicotine patch.      Hypertension:  - Controlled on current meds.      Depression:  - Not currently on medication but mood is stable. - May benefit from Remeron for appetite stimulant and for depression.     Opioid dependence in setting of chronic pain:  - He is on no medications currently.      Anxiety:  - Continue prn xanax. Advised pt that he needs to follow-up with PCP/psychiatry. Physical Exam Performed:     BP (!) 145/85   Pulse 68   Temp 97.9 °F (36.6 °C) (Oral)   Resp 14   Ht 5' 9\" (1.753 m)   Wt 127 lb 8 oz (57.8 kg)   SpO2 96%   BMI 18.83 kg/m²       General appearance:  No apparent distress, appears stated age and cooperative. HEENT:  Normal cephalic, atraumatic without obvious deformity. Pupils equal, round, and reactive to light. Extra ocular muscles intact. Conjunctivae/corneas clear. Neck: Supple, with full range of motion. No jugular venous distention. Trachea midline. Respiratory:  Normal respiratory effort. Clear to auscultation, bilaterally without Rales/Wheezes/Rhonchi. Cardiovascular:  Regular rate and rhythm with normal S1/S2 without murmurs, rubs or gallops. Abdomen: Soft, non-tender, non-distended with normal bowel sounds. Musculoskeletal:  No clubbing, cyanosis or edema bilaterally. Full range of motion without deformity. Skin: Skin color, texture, turgor normal.  No rashes or lesions. Neurologic:  Neurovascularly intact without any focal sensory/motor deficits. Cranial nerves: II-XII intact, grossly non-focal.  Psychiatric:  Alert and oriented, thought content appropriate, normal insight  Capillary Refill: Brisk,< 3 seconds   Peripheral Pulses: +2 palpable, equal bilaterally       Labs: For convenience and continuity at follow-up the following most recent labs are provided:      CBC:    Lab Results   Component Value Date    WBC 9.3 03/22/2021    HGB 14.6 03/22/2021    HCT 43.4 03/22/2021     03/22/2021       Renal:    Lab Results   Component Value Date     03/22/2021    K 4.6 03/22/2021     03/22/2021    CO2 22 03/22/2021    BUN 16 03/22/2021    CREATININE 1.4 03/22/2021    CALCIUM 10.0 03/22/2021    PHOS 3.0 02/22/2021         Significant Diagnostic Studies    Radiology:   CT CHEST W CONTRAST   Final Result   Chest-      Descending thoracic aortic fusiform aneurysm with maximal diameter 46 mm. Per report 2017, this appears stable. Mild bibasilar atelectasis. Moderately severe emphysema. ---      Abdomen pelvis-      No acute inflammatory abnormality of the abdomen or pelvis is identified. 1.4 cm indeterminate left renal cyst, versus nodule. (This also has similar   measurement to 2017, reported as 13 mm). Cystic neoplasm is still   considered. When feasible, follow-up MRI with gadolinium is recommended. Alternatively, multiphase CT is suggested.          CT ABDOMEN PELVIS W IV CONTRAST Additional Contrast? Oral   Final Result   Chest-      Descending thoracic aortic fusiform aneurysm with maximal diameter 46 mm. Per report 2017, this appears stable. Mild bibasilar atelectasis. Moderately severe emphysema. ---      Abdomen pelvis-      No acute inflammatory abnormality of the abdomen or pelvis is identified. 1.4 cm indeterminate left renal cyst, versus nodule. (This also has similar   measurement to 2017, reported as 13 mm). Cystic neoplasm is still   considered. When feasible, follow-up MRI with gadolinium is recommended. Alternatively, multiphase CT is suggested. XR TIBIA FIBULA LEFT (2 VIEWS)   Final Result   No acute osseous abnormality                Consults:     IP CONSULT TO GI  IP CONSULT TO CASE MANAGEMENT  IP CONSULT TO DIETITIAN  IP CONSULT TO HOME CARE NEEDS    Disposition:  Home with home health care     Condition at Discharge: Stable     Discharge Instructions/Follow-up:  Follow-up with PCP, GI and Nephrology     Code Status:  Full Code     Activity: activity as tolerated    Diet: Cardiac diet       Discharge Medications:     Current Discharge Medication List           Details   pantoprazole (PROTONIX) 40 MG tablet Take 1 tablet by mouth every morning (before breakfast)  Qty: 30 tablet, Refills: 3      metoclopramide (REGLAN) 5 MG tablet Take 1 tablet by mouth 3 times daily  Qty: 120 tablet, Refills: 3      ALPRAZolam (XANAX) 0.5 MG tablet Take 1 tablet by mouth 3 times daily as needed for Anxiety for up to 3 days.   Qty: 9 tablet, Refills: 0    Associated Diagnoses: Anxiety      ondansetron (ZOFRAN ODT) 4 MG disintegrating tablet Take 1 tablet by mouth every 8 hours as needed for Nausea or Vomiting  Qty: 30 tablet, Refills: 0              Details   NONFORMULARY Apply 1 patch topically once a week      aspirin 81 MG chewable tablet Take 1 tablet by mouth daily  Qty: 30 tablet, Refills: 3      atorvastatin (LIPITOR) 80 MG tablet Take 1 tablet by mouth nightly  Qty: 30 tablet, Refills: 3      losartan (COZAAR) 25 MG tablet Take 1 tablet by mouth daily  Qty: 30 tablet, Refills: 3      carvedilol (COREG) 6.25 MG tablet Take 1 tablet by mouth 2 times daily (with meals)  Qty: 60 tablet, Refills: 3      ticagrelor (BRILINTA) 90 MG TABS tablet Take 1 tablet by mouth 2 times daily  Qty: 60 tablet, Refills: 1      umeclidinium-vilanterol (ANORO ELLIPTA) 62.5-25 MCG/INH AEPB inhaler Inhale 1 puff into the lungs daily  Qty: 1 each, Refills: 3      albuterol sulfate HFA (VENTOLIN HFA) 108 (90 Base) MCG/ACT inhaler Inhale 2 puffs into the lungs every 6 hours as needed for Wheezing or Shortness of Breath  Qty: 1 Inhaler, Refills: 5    Associated Diagnoses: Moderate COPD (chronic obstructive pulmonary disease) ([facility])      albuterol (PROVENTIL) (2.5 MG/3ML) 0.083% nebulizer solution Take 3 mLs by nebulization every 6 hours as needed for Wheezing  Qty: 120 each, Refills: 5    Associated Diagnoses: Moderate COPD (chronic obstructive pulmonary disease) ([facility] [address]             Time Spent on discharge is more than 45 minutes in the examination, evaluation, counseling and review of medications and discharge plan. Signed:    SKYLAR Hernadez - CNP   3/23/2021      Thank you No primary care provider on file. for the opportunity to be involved in this patient's care. If you have any questions or concerns please feel free to contact me at [phone].

## 2021-03-23 NOTE — CARE COORDINATION
CM spoke with pt at bedside about plan for DC as he is likely medically ready today. Pt states that he is aware his brother Calderon Amor would like for him to go to [facility], but that he is going to home with [facility]. Pt would like referral to [facility] as he has been active with them in the past and happy with services provided. Referral placed via phone; agency is able to restart services with pt at DC. CM faxed requested information to [facility] and confirmed plan with pt. He states that he will have a ride home and denies further needs.      Asim Bennett RN

## 2021-03-24 ENCOUNTER — CARE COORDINATION (OUTPATIENT)
Dept: CASE MANAGEMENT | Age: 66
End: 2021-03-24

## 2021-03-24 NOTE — CARE COORDINATION
[facility] Transitions Initial Follow Up Call    Call within 2 business days of discharge: Yes    Patient: Sancho Jonas Patient : 1955   MRN: [id number]  Reason for Admission: weight loss  Discharge Date: 3/23/21 RARS: Readmission Risk Score: 12      Last Discharge [facility]       Complaint Diagnosis Description Type Department Provider    3/18/21  Anxiety Admission (Discharged) from [facility] [address], [place]           Spoke with: [address]: [facility]      Was this an external facility discharge? No Discharge Facility: na    Challenges to be reviewed by the provider   Additional needs identified to be addressed with provider No  none             Method of communication with provider : phone    Discussed COVID-19 related testing which was available at this time. Test results were negative. Patient informed of results, if available? Yes    Advance Care Planning:   Does patient have an Advance Directive:  not on file. Was this a readmission? Yes  Patient stated reason for admission: anxiety/weight loss  Patients top risk factors for readmission: medical condition    Care Transition Nurse (CTN) contacted the patient by telephone to perform post hospital discharge assessment. Verified name and  with patient as identifiers. Provided introduction to self, and explanation of the CTN role. CTN reviewed discharge instructions, medical action plan and red flags with patient who verbalized understanding. Patient given an opportunity to ask questions and does not have any further questions or concerns at this time. Were discharge instructions available to patient? Yes. Reviewed appropriate site of care based on symptoms and resources available to patient including: Specialist. The patient agrees to contact the PCP office for questions related to their healthcare.      Medication reconciliation was performed with patient, who verbalizes understanding of administration of home medications. Advised obtaining a 90-day supply of all daily and as-needed medications. Discussed follow-up appointments. If no appointment was previously scheduled, appointment scheduling offered: Yes. Is follow up appointment scheduled within 7 days of discharge? No    Plan for follow-up call in 7-10 days based on severity of symptoms and risk factors. Patient answered call and verified . Patient discussed recent hospitalization and states that he has ate 3 partial meals so far today. Patient was snacking on potato chips during call. Patient reviewed new medications and aware of purpose. Patient declined full medication reconciliation. Patient has follow up appt scheduled with cardiologist and has call out to GI MD. Patient waiting to hear from GI MD to get follow up appt scheduled. patient confirms that [facility] home care nurse visited earlier today. patient familiar with home care and has used services in the past. Denies any acute needs at present time. Agreeable to f/u calls. Educated on the use of urgent care or physicians 24 hr access line if assistance is needed after hours    CTN provided contact information for future needs.           Care Transitions 24 Hour Call    Do you have any ongoing symptoms?: No  Do you have a copy of your discharge instructions?: Yes  Do you have all of your prescriptions and are they filled?: Yes  Have you been contacted by a [address]?: No  Have you scheduled your follow up appointment?: No  Were you discharged with any Home Care or Post Acute Services: Yes  Post Acute Services: [address] [place] [address] you feel like you have everything you need to keep you well at home?: Yes  Care Transitions Interventions         Follow Up  Future Appointments   Date Time Provider Shaila Sanders   2021 10:15 AM MD CHRIS Vail   2021  1:15 PM MD CHRIS Vail, GOYO

## 2021-03-31 ENCOUNTER — CARE COORDINATION (OUTPATIENT)
Dept: CASE MANAGEMENT | Age: 66
End: 2021-03-31

## 2021-03-31 NOTE — CARE COORDINATION
[facility] Transitions Follow Up Call    3/31/2021    Patient: Sukhdeep Torres  Patient : 1955   MRN: [id number]  Reason for Admission: weight loss   Discharge Date: 3/23/21 RARS: Readmission Risk Score: 12         Attempted to reach patient via phone for care transition. VM left stating purpose of call along with my contact information requesting a return call.             Follow Up  Future Appointments   Date Time Provider Shaila Sanders   2021 10:15 AM MD CHRIS Akhtar   2021  1:15 PM MD CHRIS Akhtar, RN

## 2021-04-05 NOTE — PROGRESS NOTES
[facility] Office Note  2021     Subjective:  Mr. Leanne Avilez is here today for cardiology  follow up STEMI, CAD     [place]:    He was air evac from [facility] to [facility] on 21 for STEMI. He underwent LHC and PCI  to RCA, mechanical thrombectomy, cutting balloon, x2. Also disease in LAD and Diagonal both are significant 70% and 80% repectively. ECHO 21 showed EF 45-50%. He was scheduled for staged PCI of LAD on 3/18/21. Due to not feeling well nausea, vomiting, significant weight loss, and failure to thrive. Cath was canceled and he was admitted. He had EGD 3/19/21 which showed minimal gastritis. He had colonoscopy 3/22/21 which revealed mild diverticulosis otherwise normal.   Today reports he has gained 5lbs since being discharged from the hospital. The nausea is better. Reports he is eating better. Now eating 3 small meals a day. Previously reports he was unable to eat anything without developing nausea. He has occasional chest pressure. He would like to have cardiac catheterization scheduled  For staged PCI of LAD         PMH:   CAD, HTN, HLD, thoracic aortic arch  aneurysm repair , COPD, CKD  Previously followed Dr. Bolivar Fink [facility]. He then went to [place] and was sent to [facility] who told him he has no aneurysm and not worry about it. Stent placed in RCA in . Remote thoracic aortic aneurysm repair in . Admitted [facility] 20 for STEMI. He underwent LHC and PCI  to RCA, mechanical thrombectomy, cutting balloon, x2 SAMM. Review of Systems:         12 point ROS negative in all areas as listed below except as in [place]  Constitutional, EENT,  pulmonary,  , Musculoskeletal, skin, neurological, hematological, endocrine, Psychiatric    Reviewed past medical history, social, and family history.    He quit smoking in 2020  MOM- no cardiac disease   DAD- PAD,  lung cancer, no cardiac disease   Past Medical History:   Diagnosis Date    CAD (coronary artery disease) 2013    Cardiomyopathy ([facility] [address] 02/22/2021    EF= 45%    Emphysema of lung ([facility] [address]     Heart attack ([facility] [address] 03/28/2013    3/28/2013 and 2/22/2021    High cholesterol     HTN (hypertension)     Hypertension      Past Surgical History:   Procedure Laterality Date    COLONOSCOPY N/A 3/22/2021    COLONOSCOPY WITH BIOPSY performed by Pernell Goddard MD at [address]  02/22/2021    SAMM- 3.0 x 38 and 3.0 x 34 to pRCA    CORONARY ANGIOPLASTY WITH STENT PLACEMENT  03/28/2013    SAMM- 3.5 x 18 to RCA    DIAGNOSTIC CARDIAC CATH LAB PROCEDURE  12/10/2019    Non Obs CAD    THORACIC AORTIC ANEURYSM REPAIR  2000    UPPER GASTROINTESTINAL ENDOSCOPY N/A 3/19/2021    EGD BIOPSY performed by Pernell Goddard MD at [address]       Objective:   /72   Pulse 66   Temp 97.5 °F (36.4 °C)   Ht 5' 9\" (1.753 m)   Wt 132 lb 3.2 oz (60 kg)   SpO2 100%   BMI 19.52 kg/m²     Wt Readings from Last 3 Encounters:   04/06/21 132 lb 3.2 oz (60 kg)   03/22/21 127 lb 8 oz (57.8 kg)   03/02/21 141 lb 3.2 oz (64 kg)       Physical Exam:  General: No Respiratory distress, appears well developed and well nourished. Eyes:  Sclera nonicteric  Nose/Sinuses:  negative findings: nose shows no deformity, asymmetry, or inflammation, nasal mucosa normal, septum midline with no perforation or bleeding  Back:  no pain to palpation  Joint:  no active joint inflammation  Musculoskeletal:  negative  Skin:  Warm and dry  Neck:  Negative for JVD and Carotid Bruits. Chest:  Clear to auscultation, respiration easy  Cardiovascular:  RRR, S1S2 normal, no murmur, no rub or thrill. Extremities:   No edema, clubbing, cyanosis,cath site right wrist healed well. neurovasc intact.   Pulses:  Normal radial pulses  Neuro: intact    Medications:   Outpatient Encounter Medications as of 4/6/2021   Medication Sig Dispense Refill    pantoprazole (PROTONIX) 40 MG tablet Take 1 tablet by mouth every morning (before breakfast) 30 tablet 3    metoclopramide (REGLAN) 5 MG tablet Take 1 tablet by mouth 3 times daily 120 tablet 3    aspirin 81 MG chewable tablet Take 1 tablet by mouth daily 30 tablet 3    atorvastatin (LIPITOR) 80 MG tablet Take 1 tablet by mouth nightly 30 tablet 3    losartan (COZAAR) 25 MG tablet Take 1 tablet by mouth daily 30 tablet 3    carvedilol (COREG) 6.25 MG tablet Take 1 tablet by mouth 2 times daily (with meals) 60 tablet 3    ticagrelor (BRILINTA) 90 MG TABS tablet Take 1 tablet by mouth 2 times daily 60 tablet 1    umeclidinium-vilanterol (ANORO ELLIPTA) 62.5-25 MCG/INH AEPB inhaler Inhale 1 puff into the lungs daily 1 each 3    albuterol sulfate HFA (VENTOLIN HFA) 108 (90 Base) MCG/ACT inhaler Inhale 2 puffs into the lungs every 6 hours as needed for Wheezing or Shortness of Breath 1 Inhaler 5    albuterol (PROVENTIL) (2.5 MG/3ML) 0.083% nebulizer solution Take 3 mLs by nebulization every 6 hours as needed for Wheezing 120 each 5    NONFORMULARY Apply 1 patch topically once a week       No facility-administered encounter medications on file as of 4/6/2021. Lab Data:  CBC: No results for input(s): WBC, HGB, HCT, MCV, PLT in the last 72 hours. BMP: No results for input(s): NA, K, CL, CO2, PHOS, BUN, CREATININE in the last 72 hours. Invalid input(s): CA  LIVER PROFILE: No results for input(s): AST, ALT, LIPASE, BILIDIR, BILITOT, ALKPHOS in the last 72 hours. Invalid input(s): AMYLASE,  ALB  LIPID:   Lab Results   Component Value Date    CHOL 82 03/18/2021     Lab Results   Component Value Date    TRIG 65 03/18/2021     Lab Results   Component Value Date    HDL 28 (L) 03/18/2021     Lab Results   Component Value Date    LDLCALC 41 03/18/2021     Lab Results   Component Value Date    LABVLDL 13 03/18/2021     No results found for: CHOLHDLRATIO  PT/INR: No results for input(s): PROTIME, INR in the last 72 hours.   A1C: No results found for: LABA1C  BNP:  No results for input(s): BNP in the last 72 hours.    IMAGING:   I have reviewed the following tests and documented in this encounter as follows:   Discussed with patient  CT chest 3/22/21   Descending thoracic aortic fusiform aneurysm with maximal diameter 46 mm. Per report 2017, this appears stable. Mild bibasilar atelectasis. Moderately severe emphysema. ---   Abdomen pelvis-   No acute inflammatory abnormality of the abdomen or pelvis is identified. 1.4 cm indeterminate left renal cyst, versus nodule. (This also has similar measurement to 2017, reported as 13 mm). Cystic neoplasm is still considered. When feasible, follow-up MRI with gadolinium is recommended. Alternatively, multiphase CT is suggested. EKG 3/18/21   Normal sinus rhythmLeft axis deviationInferior infarct (cited on or before 22-FEB-2021)Abnormal ECGWhen compared with ECG of 22-FEB-2021 18:04,ST no longer elevated in Inferior leadsT wave inversion now evident in Inferior leadsQT has shortenedConfirmed by Miguelina George ([id number]) on 3/19/2021 5:02:58 PM       ECHO 2/24/21  Summary   The left ventricular systolic function is mildly reduced with an ejection   fraction of 45 - 50 %. There is hypokinesis of the inferolateral, inferior and basal inferior   walls. Left ventricular diastolic filling pressure is normal.   Mild mitral regurgitation. EKG 2/22/21   Normal sinus rhythmLeft axis deviationInferior infarct (cited on or before 22-FEB-2021)Abnormal ECGWhen compared with ECG of 22-FEB-2021 18:03,No significant change was foundConfirmed by Lois Roman MD, Bertin Anna ([id number]) on 2/23/2021 5:41:24 PM     CXR 2/22/21   Left basilar atelectasis.   No evidence of CHF       [facility] 2/22/21   LVGRAM     LVEDP  30   GRADIENT ACROSS AORTIC VALVE  less than 10 mmHg   LV FUNCTION EF 45%   WALL MOTION  basal inferior hypokinesis   MITRAL REGURGITATION  mild            CORONARY ARTERIES     LM Less than 10% wvhqyknq-cjd-hgrgmq stenosis            LAD Calcified, proximal-mid 70% stenosis, mid vessel bridging is noted, distal 20% stenosis.     D1 is a medium to large size vessel with proximal-mid 80% stenosis.       LCX  10 to 20% proximal stenosis, mid-distal 30 to 40% stenosis         RCA Dominant, adylohoo-flk-wmnazu 100% stenosis with thrombus               PERCUTANEOUS INTERVENTION DESCRIPTION      Patient was given heparin and Brilinta in the emergency room, initially a 6 [place] guiding catheter was used to intubate the RCA. A choice floppy wire was initially taken however lesion cannot be crossed with this and was ultimately crossed with an [employer] prowater wire. Lesion was then treated with mechanical thrombectomy with the penumbra device. Lesion was then treated with angioplasty with 3 mm compliant and noncompliant balloons. Atherectomy was also performed with 3 and 3.5 mm cutting balloons. IVUS was taken but would not advance into the distal vessel and as such the 6 [place]imo guide catheter was removed and the radial sheath was upsized to a 6/7 Western Nimo sheath and then a 7 Western Nimo AL 0.75 guiding catheter was used to reintubate the RCA. A workhorse wire was then taken and the lesion was crossed again. With this equipment as well as a guide extension catheter, IVUS was able to be performed which showed minimal luminal diameter distally of 3 to 3.5 mm and proximally 3.5 to 4 mm. As such the lesions were stented from distal to proximal with Medtronic resolute Bebeto 3 x 38 mm drug-eluting stent as well as a 3 x 34 mm drug-eluting stent. Stents were postdilated with 3, 3.5 and 4 mm noncompliant balloons. Follow-up IVUS showed good stent apposition/expansion. There was 0% residual stenosis. There was RADHA 0 flow before PCI and RADHA-3 flow after PCI. Integrilin was given during the procedure, this will run for 12 hours.   During procedure, there is bradycardia and hypotension, patient was given atropine and supported with phenylephrine, this was able to be weaned at the end of the procedure.              CONCLUSIONS:      Successful PCI of RCA with 2 drug-eluting stents     Consider staged PCI of LAD and D1 as outpatient, [facility] discharge in the next 1 to 2 days. CT chest 6/5/20   IMPRESSION:  1. 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 2. IV contrast could not be administered because of the patient's elevated creatinine of 1.7. ECHO 1/31/20   Study Conclusions    - Left ventricle: The cavity size is normal. Wall thickness is    normal. Systolic function was normal. The estimated ejection    fraction was in the range of 58% to 63%. Wall motion was normal;    there were no regional wall motion abnormalities. Normal    diastolic function.  - Aortic valve: Thickening, consistent with sclerosis. - Inferior vena cava: The vessel was normal in size; the    respirophasic diameter changes were in the normal range (&gt;= 50%);    findings are consistent with normal central venous pressure. [address] [place] [address] 3/27/13   The patient was brought in a fasting state to the cardiac cath lab. Right groin was prepped and draped in usual sterile fashion. After local anesthesia was achieved, a 6-Kenyan sheath was placed percutaneously into the right femoral artery. Angiography of the right coronary artery was performed with a 3D right catheter. Left was visualized with 6-Kenyan L4 catheter. Pigtail catheter was advanced into the left ventricular cavity. LV angiography was performed in the BACA projection. Pullback was obtained. All catheter exchanges were made with the use of a long wire. The patient has a prior history of descending aortic stenting. At this time, it was elected proceed with RCA stenting. This was performed with a 6-Kenyan 3D right guide catheter. Lesion was crossed with a [place] wire.  Initially, an [place]anger thrombectomy catheter was used. Significant amount of thrombus was aspirated. At this time, repeat angiograms now showed the right coronary was patent. It was then dilated with a 2.5 x 12 mm balloon. It was then stented with a 3.5 x 18 mm drug-eluting stent, Promus Element, taken to approximately 10 atmospheres. It was then post-dilated with a 12 mm x 3.5 Quantum high-pressure balloon taken to 14 atmospheres. Repeat angiogram was performed. The patient tolerated the procedure well. The patient received intracoronary nitroglycerin as well as IV Angiomax prior to initiation of the procedure. Postprocedure, femoral arteriography was performed and reviewed, and the arteriotomy was repaired with a Perclose technique. FINDINGS   Hemodynamic data:  Initial central aortic pressure was 119/55. LV pressure is 135/8 with left ventricular end-diastolic pressure of 22. There is no gradient on pullback across the aortic valve. Angiograms: The right coronary artery is mildly irregular; however, it is totally occluded after a large and small RV branch with evidence of residual filling defect. Left main is free of disease. Circumflex is a moderate-caliber vessel with 2 small PL branches and a third moderate-sized PL branch, which have minor irregularities. The LAD has a large diagonal branch in its proximal portion with a moderate-sized diagonal branch at its midportion and has minor irregularities. There is collateral flow to the distal right from the left system. LV angiography:  Demonstrates a mild degree of inferior basilar hypokinesia. EF is estimated at 60%. There is a slight amount catheter-induced mitral insufficiency. After flow was re-established, the right coronary artery seemed to be a large dominant vessel with minor to moderate irregularities in its mid portion with the posterior descending and posterolateral branch filling well post. There was 0% stenosis of the residual stented segment. IMPRESSION  1.  Recent non-Q-wave myocardial infarction. 2. History of prior descending aortic endograft. Assessment:  1. Coronary artery disease involving native coronary artery of native heart with angina pectoris ([address]    2. Weight loss, abnormal         Plan:  1. . Medication reviewed. No refills warranted today  2. He is on ASA Atorvastatin, Beta blockers  And losartan   4. Will start Megace daily for appetite stimulant   5. Will reach out to Dr. Filomena Kawasaki for his recommendation of scheduling staged PCI of LAD. He has SM camera study 4/14. Gastric emptying study 4/20. 6. Follow up with me after procedure     This note was scribed in the presence of Gurpreet Steele MD by Matt Jara RN         QUALITY MEASURES  1. Tobacco Cessation Counseling: NA   2. Retake of BP if >140/90: NA   3. Documentation to PCP/referring for new patient:  Sent to PCP at close of office visit  4. CAD patient on anti-platelet: YES- Brilinta _asa  5. CAD patient on STATIN therapy: YES- Statin   6. Patient with CHF and aFib on anticoagulation: NA     I, Dr. Martha Jessica, personally performed the services described in this documentation, as scribed by the above signed scribe in my presence. It is both accurate and complete to my knowledge. I agree with the details independently gathered by the clinical support staff, while the remaining scribed note accurately describes my personal service to the patient.         Chano Zhong MD 4/6/2021 11:30 AM

## 2021-04-06 ENCOUNTER — OFFICE VISIT (OUTPATIENT)
Dept: CARDIOLOGY CLINIC | Age: 66
End: 2021-04-06
Payer: MEDICARE

## 2021-04-06 ENCOUNTER — TELEPHONE (OUTPATIENT)
Dept: CARDIOLOGY CLINIC | Age: 66
End: 2021-04-06

## 2021-04-06 VITALS
HEART RATE: 66 BPM | WEIGHT: 132.2 LBS | SYSTOLIC BLOOD PRESSURE: 130 MMHG | BODY MASS INDEX: 19.58 KG/M2 | HEIGHT: 69 IN | DIASTOLIC BLOOD PRESSURE: 72 MMHG | TEMPERATURE: 97.5 F | OXYGEN SATURATION: 100 %

## 2021-04-06 DIAGNOSIS — R63.4 WEIGHT LOSS, ABNORMAL: ICD-10-CM

## 2021-04-06 DIAGNOSIS — Z01.812 PRE-PROCEDURE LAB EXAM: Primary | ICD-10-CM

## 2021-04-06 DIAGNOSIS — I25.119 CORONARY ARTERY DISEASE INVOLVING NATIVE CORONARY ARTERY OF NATIVE HEART WITH ANGINA PECTORIS (HCC): Primary | ICD-10-CM

## 2021-04-06 DIAGNOSIS — I25.119 CORONARY ARTERY DISEASE INVOLVING NATIVE CORONARY ARTERY OF NATIVE HEART WITH ANGINA PECTORIS (HCC): ICD-10-CM

## 2021-04-06 DIAGNOSIS — I20.8 STABLE ANGINA PECTORIS: ICD-10-CM

## 2021-04-06 PROCEDURE — G8427 DOCREV CUR MEDS BY ELIG CLIN: HCPCS | Performed by: INTERNAL MEDICINE

## 2021-04-06 PROCEDURE — 99214 OFFICE O/P EST MOD 30 MIN: CPT | Performed by: INTERNAL MEDICINE

## 2021-04-06 PROCEDURE — 1036F TOBACCO NON-USER: CPT | Performed by: INTERNAL MEDICINE

## 2021-04-06 PROCEDURE — G8420 CALC BMI NORM PARAMETERS: HCPCS | Performed by: INTERNAL MEDICINE

## 2021-04-06 PROCEDURE — 1111F DSCHRG MED/CURRENT MED MERGE: CPT | Performed by: INTERNAL MEDICINE

## 2021-04-06 PROCEDURE — 3017F COLORECTAL CA SCREEN DOC REV: CPT | Performed by: INTERNAL MEDICINE

## 2021-04-06 RX ORDER — MEGESTROL ACETATE 125 MG/ML
625 SUSPENSION ORAL DAILY
Qty: 150 ML | Refills: 3 | Status: SHIPPED | OUTPATIENT
Start: 2021-04-06 | End: 2021-07-02

## 2021-04-06 NOTE — PATIENT INSTRUCTIONS
3. Medication reviewed. No refills warranted today  4. Will start Megace daily for appetite stimulant   5. Will reach out to Dr. Matthew Forde for his recommendation of scheduling staged PCI of LAD. He has SM camera study 4/16    6.  Follow up with me after procedure

## 2021-04-08 ENCOUNTER — CARE COORDINATION (OUTPATIENT)
Dept: CASE MANAGEMENT | Age: 66
End: 2021-04-08

## 2021-04-08 NOTE — CARE COORDINATION
[facility] Transitions Follow Up Call    2021    Patient: Papito Sahu  Patient : 1955   MRN: [id number]  Reason for Admission: weight loss  Discharge Date: 3/23/21 RARS: Readmission Risk Score: 12         Spoke with: Papito Sahu    Patient answered call and verified . Patient pleasant and agreeable to transition call. Patient reviewed last MD appt and upcoming scheduled procedure. Patient scheduled for endo capsule and CTN educated patient on purpose and what to expect. Patient eager to get cardiac stents placed and CTN educated patient on importance of GI clearance due to blood thinners before cardiac stents could be placed. Patient states understanding and appreciative of explanation. Patient taking all medication as directed and denies any acute needs at present time. Agreeable to f/u calls. Educated on the use of urgent care or physicians 24 hr access line if assistance is needed after hours. Care Transitions Subsequent and Final Call    Subsequent and Final Calls  Do you have any ongoing symptoms?: No  Have your medications changed?: No  Do you have any questions related to your medications?: No  Do you currently have any active services?: Yes  Are you currently active with any services?: Home Health  Do you have any needs or concerns that I can assist you with?: No  Identified Barriers: None  Care Transitions Interventions  Other Interventions:            Follow Up  Future Appointments   Date Time Provider Shaila Sanders   2021 10:00 AM [facility] [place] [place]   2021  1:15 PM MD CHRIS Hensley RN

## 2021-04-13 ENCOUNTER — TELEPHONE (OUTPATIENT)
Dept: CARDIOLOGY CLINIC | Age: 66
End: 2021-04-13

## 2021-04-13 NOTE — TELEPHONE ENCOUNTER
Erasto loja sts they have faxed over home care orders that need to be signed by [facility] several times but have not received back. Please advise.

## 2021-04-14 NOTE — TELEPHONE ENCOUNTER
I do not see any orders here @ [facility]. For RKG to sign. I have spoken with [facility]. They are aware that RKG only will sign if he order and is only cardio related. After, reviewing chart it does not appear that Dr. Yesi Glover ordered. Dr. Yesi Glover did not see pt in hosp @ this time.

## 2021-04-16 ENCOUNTER — CARE COORDINATION (OUTPATIENT)
Dept: CASE MANAGEMENT | Age: 66
End: 2021-04-16

## 2021-04-16 NOTE — CARE COORDINATION
[facility] Transitions Follow Up Call    2021    Patient: Sugar Sadnra  Patient : 1955   MRN: [id number]  Reason for Admission: weight loss  Discharge Date: 3/23/21 RARS: Readmission Risk Score: 12         Spoke with: Sugar Sandra    Patient answered call and verified . Patient \"feeling much better\" this week. Patient has better appetite and weight is up to 136lbs. Patient denied any CP or SOB. Patient has follow up appt next week for capsule study. Patient aware of instructions. Denies any acute needs at present time. Agreeable to f/u calls. Educated on the use of urgent care or physicians 24 hr access line if assistance is needed after hours. Care Transitions Subsequent and Final Call    Subsequent and Final Calls  Do you have any ongoing symptoms?: No  Have your medications changed?: No  Do you have any questions related to your medications?: No  Do you currently have any active services?: Yes  Are you currently active with any services?: Home Health  Do you have any needs or concerns that I can assist you with?: No  Identified Barriers: None  Care Transitions Interventions  Other Interventions:            Follow Up  Future Appointments   Date Time Provider Shaila Sanders   2021 10:00 AM [facility]   2021  1:15 PM MD CHRIS Kelly RN

## 2021-04-20 ENCOUNTER — HOSPITAL ENCOUNTER (OUTPATIENT)
Dept: NUCLEAR MEDICINE | Age: 66
Discharge: HOME | End: 2021-04-20
Payer: MEDICARE

## 2021-04-20 DIAGNOSIS — R11.0 NAUSEA: ICD-10-CM

## 2021-04-20 PROCEDURE — A9541 TC99M SULFUR COLLOID: HCPCS | Performed by: INTERNAL MEDICINE

## 2021-04-20 PROCEDURE — 3430000000 HC RX DIAGNOSTIC RADIOPHARMACEUTICAL: Performed by: INTERNAL MEDICINE

## 2021-04-20 PROCEDURE — 78264 GASTRIC EMPTYING IMG STUDY: CPT

## 2021-04-20 RX ADMIN — Medication 1 MILLICURIE: at 10:25

## 2021-05-10 ENCOUNTER — TELEPHONE (OUTPATIENT)
Dept: CARDIOLOGY CLINIC | Age: 66
End: 2021-05-10

## 2021-05-10 NOTE — TELEPHONE ENCOUNTER
Let patient know that it is possible that his coronary artery disease is improved with medical therapy and just with time itself, I would recommend getting a Lexiscan nuclear stress test to further assess residual CAD, I think we may be able to defer PCI pending that. Lets schedule the stress test if patient is agreeable to that plan. Thank you.

## 2021-05-11 NOTE — TELEPHONE ENCOUNTER
Please schedule pt per Brandon Herman with either him or NP in 1-2 months to discuss options. Pt can be reached at [phone].     MY

## 2021-05-13 NOTE — PROGRESS NOTES
[place] [address]   CARDIAC EVALUATION NOTE  [phone]      PCP:  No primary care provider on file. Reason for Consultation/Chief Complaint: follow up for CAD, CP and SOB    Subjective   History of Present Illness:  Demian Hood is a 60 y.o. patient with a history of CAD, CM, HTN and HChol who presents for follow up. He was admitted 02/22-02/24/21 from air evac from [facility] to [facility] on 2/22/21 for STEMI. He underwent LHC and PCI  to RCA SAMM x2, mechanical thrombectomy, cutting balloon, x2. Also disease in LAD and Diagonal both are significant 70% and 80% repectively. ECHO 2/24/21 showed EF 45-50%. He was scheduled for staged PCI of LAD on 3/18/21. Due to not feeling well nausea, vomiting, significant weight loss, and failure to thrive. Cath was canceled and he was admitted. He had EGD 3/19/21 which showed minimal gastritis. He had colonoscopy 3/22/21 which revealed mild diverticulosis otherwise normal.    Today he reports he feels well overall. He is not able to gain weight despite diet changes. He reports he has CP and palpitations. He also reports SOB and fatigue. He denies BLE edema, dizziness and syncope. Past Medical History:   has a past medical history of CAD (coronary artery disease), Cardiomyopathy ([address] Emphysema of lung ([address] Heart attack ([address] High cholesterol, HTN (hypertension), and Hypertension. Surgical History:   has a past surgical history that includes Diagnostic Cardiac Cath Lab Procedure (12/10/2019); Thoracic aortic aneurysm repair (2000); Coronary angioplasty with stent (02/22/2021); Coronary angioplasty with stent (03/28/2013); Upper gastrointestinal endoscopy (N/A, 3/19/2021); and Colonoscopy (N/A, 3/22/2021). Social History:   reports that he quit smoking about a year ago. His smoking use included cigarettes. He has a 100.00 pack-year smoking history. He has never used smokeless tobacco. He reports that he does not drink alcohol.      Family History:  family Pulse: 70   SpO2: 96%    Weight: 134 lb (60.8 kg)         General Appearance:  Alert, cooperative, no distress, appears stated age   Head:  Normocephalic, without obvious abnormality, atraumatic   Eyes:  PERRL, conjunctiva/corneas clear   Nose: Nares normal, no drainage or sinus tenderness   Throat: Lips, mucosa, and tongue normal   Neck: Supple, symmetrical, trachea midline, no adenopathy, thyroid: not enlarged, symmetric, no tenderness/mass/nodules, no carotid bruit or JVD   Lungs:   Clear to auscultation bilaterally, respirations unlabored   Chest Wall:  No deformity or tenderness   Heart:  Regular rate and rhythm, S1, S2 normal, no murmur, rub or gallop   Abdomen:   Soft, non-tender, bowel sounds active all four quadrants,  no masses, no organomegaly   Extremities: Extremities normal, atraumatic, no cyanosis or edema   Pulses: 2+ and symmetric   Skin: Skin color, texture, turgor normal, no rashes or lesions   Pysch: Normal mood and affect   Neurologic: Normal gross motor and sensory exam.         Labs   CBC:   Lab Results   Component Value Date    WBC 9.3 2021    RBC 4.88 2021    HGB 14.6 2021    HCT 43.4 2021    MCV 88.9 2021    RDW 13.1 2021     2021     CMP:  Lab Results   Component Value Date     2021    K 4.6 2021     2021    CO2 22 2021    BUN 16 2021    CREATININE 1.4 2021    GFRAA >60 2021    AGRATIO 1.1 2021    LABGLOM 51 2021    GLUCOSE 88 2021    PROT 7.7 2021    CALCIUM 10.0 2021    BILITOT 0.4 2021    ALKPHOS 99 2021    AST 20 2021    ALT 7 2021     PT/INR:  No results found for: PTINR  HgBA1c:No results found for: LABA1C  No results found for: CKTOTAL, CKMB, CKMBINDEX, TROPONINI      Cardiac Data     Last EK21   SR HR 63 left axis deviation    Echo: 21      Summary   The left ventricular systolic function is mildly reduced with an ejection   fraction of 45 - 50 %. There is hypokinesis of the inferolateral, inferior and basal inferior walls. Left ventricular diastolic filling pressure is normal.   Mild mitral regurgitation. Stress Test:    Cath: 02/22/21  FINDINGS            LVGRAM     LVEDP  30   GRADIENT ACROSS AORTIC VALVE  less than 10 mmHg   LV FUNCTION EF 45%   WALL MOTION  basal inferior hypokinesis   MITRAL REGURGITATION  mild            CORONARY ARTERIES     LM Less than 10% khcyhyxe-spc-vxohee stenosis            LAD Calcified, proximal-mid 70% stenosis, mid vessel bridging is noted, distal 20% stenosis. D1 is a medium to large size vessel with proximal-mid 80% stenosis. LCX  10 to 20% proximal stenosis, mid-distal 30 to 40% stenosis         RCA Dominant, wlivboyr-eel-jaoaax 100% stenosis with thrombus               PERCUTANEOUS INTERVENTION DESCRIPTION      Patient was given heparin and Brilinta in the emergency room, initially a 6 [place] guiding catheter was used to intubate the RCA. A choice floppy wire was initially taken however lesion cannot be crossed with this and was ultimately crossed with an trend.ly wire. Lesion was then treated with mechanical thrombectomy with the penumbra device. Lesion was then treated with angioplasty with 3 mm compliant and noncompliant balloons. Atherectomy was also performed with 3 and 3.5 mm cutting balloons. IVUS was taken but would not advance into the distal vessel and as such the 6 Western Nimo guide catheter was removed and the radial sheath was upsized to a 6/7 Western Nimo sheath and then a 7 Western Nimo [place] 0.75 guiding catheter was used to reintubate the RCA. A workhorse wire was then taken and the lesion was crossed again. With this equipment as well as a guide extension catheter, IVUS was able to be performed which showed minimal luminal diameter distally of 3 to 3.5 mm and proximally 3.5 to 4 mm.   As such the lesions were stented from distal to proximal with Medtronic resolute [place] 3 x 38 mm drug-eluting stent as well as a 3 x 34 mm drug-eluting stent. Stents were postdilated with 3, 3.5 and 4 mm noncompliant balloons. Follow-up IVUS showed good stent apposition/expansion. There was 0% residual stenosis. There was RADHA 0 flow before PCI and RADHA-3 flow after PCI. Integrilin was given during the procedure, this will run for 12 hours. During procedure, there is bradycardia and hypotension, patient was given atropine and supported with phenylephrine, this was able to be weaned at the end of the procedure. CONCLUSIONS:      Successful PCI of RCA with 2 drug-eluting stents     Consider staged PCI of LAD and D1 as outpatient, patient would like to follow-up with Eli Pastrana pending discharge in the next 1 to 2 days. Studies:       I have reviewed labs and imaging/xray/diagnostic testing in this note. Assessment      1. Coronary artery disease involving native coronary artery of native heart with angina pectoris ([address]    2. H/O thoracic aortic aneurysm repair    3. ST elevation myocardial infarction involving right coronary artery ([address]                 Plan   1. Echo for shortness of breath , h/o cad, abnl ekg   2. Georgeanne Martyr for chest pain and shortness of breath, h/o cad, abnl ekg , d/w him that we can possibly defer lad pci if testing is reassuring   3. Follow up with NP in 2 months       Scribe's attestation: This note was scribed in the presence of Dr. Christina Ferraro by Amanda Lee RN      Thank you for allowing us to participate in the care of Obie Bojorquez. Please call me with any questions [phone]. Christina Ferraro MD, [facility]   Interventional Cardiologist  Eli Pastrana  [phone] [facility]  [phone] [facility]  5/14/2021 2:00 PM    I will address the patient's cardiac risk factors and adjusted pharmacologic treatment as needed.  In addition, I have reinforced the need for patient directed

## 2021-05-14 ENCOUNTER — OFFICE VISIT (OUTPATIENT)
Dept: CARDIOLOGY CLINIC | Age: 66
End: 2021-05-14
Payer: MEDICARE

## 2021-05-14 VITALS
HEIGHT: 69 IN | OXYGEN SATURATION: 96 % | BODY MASS INDEX: 19.85 KG/M2 | SYSTOLIC BLOOD PRESSURE: 104 MMHG | WEIGHT: 134 LBS | HEART RATE: 70 BPM | DIASTOLIC BLOOD PRESSURE: 58 MMHG

## 2021-05-14 DIAGNOSIS — Z98.890 H/O THORACIC AORTIC ANEURYSM REPAIR: ICD-10-CM

## 2021-05-14 DIAGNOSIS — R07.2 PRECORDIAL PAIN: ICD-10-CM

## 2021-05-14 DIAGNOSIS — Z86.79 H/O THORACIC AORTIC ANEURYSM REPAIR: ICD-10-CM

## 2021-05-14 DIAGNOSIS — R06.02 SOB (SHORTNESS OF BREATH): ICD-10-CM

## 2021-05-14 DIAGNOSIS — I21.11 ST ELEVATION MYOCARDIAL INFARCTION INVOLVING RIGHT CORONARY ARTERY (HCC): ICD-10-CM

## 2021-05-14 DIAGNOSIS — I25.119 CORONARY ARTERY DISEASE INVOLVING NATIVE CORONARY ARTERY OF NATIVE HEART WITH ANGINA PECTORIS (HCC): Primary | ICD-10-CM

## 2021-05-14 PROCEDURE — G8420 CALC BMI NORM PARAMETERS: HCPCS | Performed by: INTERNAL MEDICINE

## 2021-05-14 PROCEDURE — 3017F COLORECTAL CA SCREEN DOC REV: CPT | Performed by: INTERNAL MEDICINE

## 2021-05-14 PROCEDURE — 1036F TOBACCO NON-USER: CPT | Performed by: INTERNAL MEDICINE

## 2021-05-14 PROCEDURE — G8427 DOCREV CUR MEDS BY ELIG CLIN: HCPCS | Performed by: INTERNAL MEDICINE

## 2021-05-14 PROCEDURE — 99214 OFFICE O/P EST MOD 30 MIN: CPT | Performed by: INTERNAL MEDICINE

## 2021-05-14 NOTE — PATIENT INSTRUCTIONS
1. Echo for shortness of breath   2. Annette Mendoza for chest pain and shortness of breath  3. Follow up with NP in 2 months       Your provider has ordered testing for further evaluation. An order/prescription has been included in your paper work.  To schedule outpatient testing, contact Central Scheduling by calling [address] [place] [address] ([phone]).

## 2021-05-27 ENCOUNTER — TELEPHONE (OUTPATIENT)
Dept: CARDIOLOGY CLINIC | Age: 66
End: 2021-05-27

## 2021-05-27 ENCOUNTER — HOSPITAL ENCOUNTER (OUTPATIENT)
Dept: NON INVASIVE DIAGNOSTICS | Age: 66
Discharge: HOME | End: 2021-05-27
Payer: OTHER GOVERNMENT

## 2021-05-27 DIAGNOSIS — R06.02 SOB (SHORTNESS OF BREATH): ICD-10-CM

## 2021-05-27 DIAGNOSIS — R07.2 PRECORDIAL PAIN: ICD-10-CM

## 2021-05-27 LAB
LV EF: 58 %
LVEF MODALITY: NORMAL

## 2021-05-27 PROCEDURE — 93306 TTE W/DOPPLER COMPLETE: CPT

## 2021-05-27 NOTE — TELEPHONE ENCOUNTER
----- Message from Zayda Ortiz MD sent at 5/27/2021  1:39 PM EDT -----  Let patient know echo test shows normal heart function, no new orders or changes at this time. Thanks.

## 2021-06-08 ENCOUNTER — HOSPITAL ENCOUNTER (OUTPATIENT)
Dept: NUCLEAR MEDICINE | Age: 66
Discharge: HOME | End: 2021-06-08
Payer: MEDICARE

## 2021-06-08 ENCOUNTER — TELEPHONE (OUTPATIENT)
Dept: CARDIOLOGY CLINIC | Age: 66
End: 2021-06-08

## 2021-06-08 ENCOUNTER — HOSPITAL ENCOUNTER (OUTPATIENT)
Dept: NON INVASIVE DIAGNOSTICS | Age: 66
Discharge: HOME | End: 2021-06-08
Payer: MEDICARE

## 2021-06-08 DIAGNOSIS — R07.2 PRECORDIAL PAIN: ICD-10-CM

## 2021-06-08 DIAGNOSIS — R06.02 SOB (SHORTNESS OF BREATH): ICD-10-CM

## 2021-06-08 LAB
LV EF: 55 %
LVEF MODALITY: NORMAL

## 2021-06-08 PROCEDURE — 3430000000 HC RX DIAGNOSTIC RADIOPHARMACEUTICAL: Performed by: INTERNAL MEDICINE

## 2021-06-08 PROCEDURE — 78452 HT MUSCLE IMAGE SPECT MULT: CPT

## 2021-06-08 PROCEDURE — 6360000002 HC RX W HCPCS: Performed by: INTERNAL MEDICINE

## 2021-06-08 PROCEDURE — A9502 TC99M TETROFOSMIN: HCPCS | Performed by: INTERNAL MEDICINE

## 2021-06-08 PROCEDURE — 93017 CV STRESS TEST TRACING ONLY: CPT

## 2021-06-08 RX ADMIN — TETROFOSMIN 10.8 MILLICURIE: 1.38 INJECTION, POWDER, LYOPHILIZED, FOR SOLUTION INTRAVENOUS at 07:31

## 2021-06-08 RX ADMIN — TETROFOSMIN 33 MILLICURIE: 1.38 INJECTION, POWDER, LYOPHILIZED, FOR SOLUTION INTRAVENOUS at 09:37

## 2021-06-08 RX ADMIN — REGADENOSON 0.4 MG: 0.08 INJECTION, SOLUTION INTRAVENOUS at 09:38

## 2021-06-08 NOTE — TELEPHONE ENCOUNTER
----- Message from Hiral Stahl MD sent at 6/8/2021  2:17 PM EDT -----  Let patient know their stress test is abnormal, rec ht cath, lets schedule that if pt agreeable. Thanks.

## 2021-06-08 NOTE — TELEPHONE ENCOUNTER
Is this a regular LHC or PCI placement? Patient states the one in March that we had scheduled was canceled due to him being sick. Asking for clarification for scheduling. Thank you!

## 2021-06-08 NOTE — TELEPHONE ENCOUNTER
Spoke with Christy Burroughs, relayed stress test message per [facility]. Pt v/u and would like Karen to contact him to set up cath.

## 2021-06-09 ENCOUNTER — TELEPHONE (OUTPATIENT)
Dept: CARDIOLOGY CLINIC | Age: 66
End: 2021-06-09

## 2021-06-09 NOTE — TELEPHONE ENCOUNTER
Patient is r/s for 6/24/21 @ 11am, 9:30am arrival to the [facility] main entrance.

## 2021-06-21 RX ORDER — TICAGRELOR 90 MG/1
TABLET ORAL
Qty: 90 TABLET | Refills: 3 | Status: SHIPPED | OUTPATIENT
Start: 2021-06-21 | End: 2022-04-06

## 2021-06-22 ENCOUNTER — TELEPHONE (OUTPATIENT)
Dept: CARDIOLOGY CLINIC | Age: 66
End: 2021-06-22

## 2021-06-22 NOTE — TELEPHONE ENCOUNTER
Spoke with patient regarding procedure on 6/24 needing rescheduled to 6/30 or 7/2 per [facility] due to an urgent inpatient case. Patient was not happy with me and stated I would need to call back tomorrow or Thursday because he will have to find another ride and hung up on me. Will try again Thursday.

## 2021-06-30 ENCOUNTER — HOSPITAL ENCOUNTER (INPATIENT)
Dept: CARDIAC CATH/INVASIVE PROCEDURES | Age: 66
LOS: 1 days | Discharge: HOME | DRG: 247 | End: 2021-07-01
Attending: INTERNAL MEDICINE | Admitting: INTERNAL MEDICINE
Payer: MEDICARE

## 2021-06-30 PROBLEM — I25.10 CAD IN NATIVE ARTERY: Status: ACTIVE | Noted: 2021-06-30

## 2021-06-30 LAB
ANION GAP SERPL CALCULATED.3IONS-SCNC: 9 MMOL/L (ref 3–16)
BUN BLDV-MCNC: 15 MG/DL (ref 7–20)
CALCIUM SERPL-MCNC: 9.3 MG/DL (ref 8.3–10.6)
CHLORIDE BLD-SCNC: 103 MMOL/L (ref 99–110)
CHOLESTEROL, TOTAL: 91 MG/DL (ref 0–199)
CO2: 25 MMOL/L (ref 21–32)
CREAT SERPL-MCNC: 1.3 MG/DL (ref 0.8–1.3)
EKG ATRIAL RATE: 61 BPM
EKG DIAGNOSIS: NORMAL
EKG P AXIS: 38 DEGREES
EKG P-R INTERVAL: 158 MS
EKG Q-T INTERVAL: 412 MS
EKG QRS DURATION: 78 MS
EKG QTC CALCULATION (BAZETT): 414 MS
EKG R AXIS: -27 DEGREES
EKG T AXIS: 47 DEGREES
EKG VENTRICULAR RATE: 61 BPM
GFR AFRICAN AMERICAN: >60
GFR NON-AFRICAN AMERICAN: 55
GLUCOSE BLD-MCNC: 94 MG/DL (ref 70–99)
HCT VFR BLD CALC: 36.7 % (ref 40.5–52.5)
HDLC SERPL-MCNC: 30 MG/DL (ref 40–60)
HEMOGLOBIN: 12.5 G/DL (ref 13.5–17.5)
LDL CHOLESTEROL CALCULATED: 45 MG/DL
LV EF: 60 %
LVEF MODALITY: NORMAL
MCH RBC QN AUTO: 29.8 PG (ref 26–34)
MCHC RBC AUTO-ENTMCNC: 33.9 G/DL (ref 31–36)
MCV RBC AUTO: 87.8 FL (ref 80–100)
PDW BLD-RTO: 15.8 % (ref 12.4–15.4)
PLATELET # BLD: 249 K/UL (ref 135–450)
PMV BLD AUTO: 6.8 FL (ref 5–10.5)
POTASSIUM SERPL-SCNC: 4.3 MMOL/L (ref 3.5–5.1)
RBC # BLD: 4.18 M/UL (ref 4.2–5.9)
SODIUM BLD-SCNC: 137 MMOL/L (ref 136–145)
TRIGL SERPL-MCNC: 82 MG/DL (ref 0–150)
VLDLC SERPL CALC-MCNC: 16 MG/DL
WBC # BLD: 9.9 K/UL (ref 4–11)

## 2021-06-30 PROCEDURE — B240ZZ3 ULTRASONOGRAPHY OF SINGLE CORONARY ARTERY, INTRAVASCULAR: ICD-10-PCS | Performed by: INTERNAL MEDICINE

## 2021-06-30 PROCEDURE — 80048 BASIC METABOLIC PNL TOTAL CA: CPT

## 2021-06-30 PROCEDURE — C1894 INTRO/SHEATH, NON-LASER: HCPCS

## 2021-06-30 PROCEDURE — B2151ZZ FLUOROSCOPY OF LEFT HEART USING LOW OSMOLAR CONTRAST: ICD-10-PCS | Performed by: INTERNAL MEDICINE

## 2021-06-30 PROCEDURE — 80061 LIPID PANEL: CPT

## 2021-06-30 PROCEDURE — 92933 PRQ TRLML C ATHRC ST ANGIOP1: CPT | Performed by: INTERNAL MEDICINE

## 2021-06-30 PROCEDURE — C1769 GUIDE WIRE: HCPCS

## 2021-06-30 PROCEDURE — 02C03ZZ EXTIRPATION OF MATTER FROM CORONARY ARTERY, ONE ARTERY, PERCUTANEOUS APPROACH: ICD-10-PCS | Performed by: INTERNAL MEDICINE

## 2021-06-30 PROCEDURE — C1874 STENT, COATED/COV W/DEL SYS: HCPCS

## 2021-06-30 PROCEDURE — C1753 CATH, INTRAVAS ULTRASOUND: HCPCS

## 2021-06-30 PROCEDURE — 4A023N7 MEASUREMENT OF CARDIAC SAMPLING AND PRESSURE, LEFT HEART, PERCUTANEOUS APPROACH: ICD-10-PCS | Performed by: INTERNAL MEDICINE

## 2021-06-30 PROCEDURE — 2060000000 HC ICU INTERMEDIATE R&B

## 2021-06-30 PROCEDURE — 027034Z DILATION OF CORONARY ARTERY, ONE ARTERY WITH DRUG-ELUTING INTRALUMINAL DEVICE, PERCUTANEOUS APPROACH: ICD-10-PCS | Performed by: INTERNAL MEDICINE

## 2021-06-30 PROCEDURE — 85347 COAGULATION TIME ACTIVATED: CPT

## 2021-06-30 PROCEDURE — 2580000003 HC RX 258: Performed by: INTERNAL MEDICINE

## 2021-06-30 PROCEDURE — C9600 PERC DRUG-EL COR STENT SING: HCPCS

## 2021-06-30 PROCEDURE — 6370000000 HC RX 637 (ALT 250 FOR IP)

## 2021-06-30 PROCEDURE — 93005 ELECTROCARDIOGRAM TRACING: CPT | Performed by: INTERNAL MEDICINE

## 2021-06-30 PROCEDURE — 2709999900 HC NON-CHARGEABLE SUPPLY

## 2021-06-30 PROCEDURE — B2111ZZ FLUOROSCOPY OF MULTIPLE CORONARY ARTERIES USING LOW OSMOLAR CONTRAST: ICD-10-PCS | Performed by: INTERNAL MEDICINE

## 2021-06-30 PROCEDURE — 93010 ELECTROCARDIOGRAM REPORT: CPT | Performed by: INTERNAL MEDICINE

## 2021-06-30 PROCEDURE — 85027 COMPLETE CBC AUTOMATED: CPT

## 2021-06-30 PROCEDURE — 6360000002 HC RX W HCPCS

## 2021-06-30 PROCEDURE — 2500000003 HC RX 250 WO HCPCS

## 2021-06-30 PROCEDURE — 93458 L HRT ARTERY/VENTRICLE ANGIO: CPT | Performed by: INTERNAL MEDICINE

## 2021-06-30 PROCEDURE — 6370000000 HC RX 637 (ALT 250 FOR IP): Performed by: INTERNAL MEDICINE

## 2021-06-30 PROCEDURE — 92978 ENDOLUMINL IVUS OCT C 1ST: CPT | Performed by: INTERNAL MEDICINE

## 2021-06-30 PROCEDURE — C1887 CATHETER, GUIDING: HCPCS

## 2021-06-30 PROCEDURE — 92978 ENDOLUMINL IVUS OCT C 1ST: CPT

## 2021-06-30 PROCEDURE — C1725 CATH, TRANSLUMIN NON-LASER: HCPCS

## 2021-06-30 PROCEDURE — 6360000004 HC RX CONTRAST MEDICATION

## 2021-06-30 PROCEDURE — 93458 L HRT ARTERY/VENTRICLE ANGIO: CPT

## 2021-06-30 RX ORDER — ASPIRIN 81 MG/1
81 TABLET, CHEWABLE ORAL ONCE
Status: COMPLETED | OUTPATIENT
Start: 2021-06-30 | End: 2021-06-30

## 2021-06-30 RX ORDER — OXYCODONE HYDROCHLORIDE AND ACETAMINOPHEN 5; 325 MG/1; MG/1
1 TABLET ORAL EVERY 6 HOURS PRN
Status: DISCONTINUED | OUTPATIENT
Start: 2021-06-30 | End: 2021-07-01 | Stop reason: HOSPADM

## 2021-06-30 RX ORDER — FENTANYL CITRATE 50 UG/ML
INJECTION, SOLUTION INTRAMUSCULAR; INTRAVENOUS
Status: COMPLETED | OUTPATIENT
Start: 2021-06-30 | End: 2021-06-30

## 2021-06-30 RX ORDER — METOCLOPRAMIDE 10 MG/1
5 TABLET ORAL 3 TIMES DAILY
Status: DISCONTINUED | OUTPATIENT
Start: 2021-06-30 | End: 2021-07-01 | Stop reason: HOSPADM

## 2021-06-30 RX ORDER — PANTOPRAZOLE SODIUM 40 MG/1
40 TABLET, DELAYED RELEASE ORAL
Status: DISCONTINUED | OUTPATIENT
Start: 2021-07-01 | End: 2021-07-01 | Stop reason: HOSPADM

## 2021-06-30 RX ORDER — ALBUTEROL SULFATE 2.5 MG/3ML
2.5 SOLUTION RESPIRATORY (INHALATION) EVERY 6 HOURS PRN
Status: DISCONTINUED | OUTPATIENT
Start: 2021-06-30 | End: 2021-07-01 | Stop reason: HOSPADM

## 2021-06-30 RX ORDER — OXYCODONE HYDROCHLORIDE AND ACETAMINOPHEN 5; 325 MG/1; MG/1
1 TABLET ORAL EVERY 6 HOURS PRN
COMMUNITY
End: 2022-09-29

## 2021-06-30 RX ORDER — CARVEDILOL 6.25 MG/1
6.25 TABLET ORAL 2 TIMES DAILY WITH MEALS
Status: DISCONTINUED | OUTPATIENT
Start: 2021-06-30 | End: 2021-07-01 | Stop reason: HOSPADM

## 2021-06-30 RX ORDER — ATORVASTATIN CALCIUM 80 MG/1
80 TABLET, FILM COATED ORAL NIGHTLY
Status: DISCONTINUED | OUTPATIENT
Start: 2021-06-30 | End: 2021-07-01 | Stop reason: HOSPADM

## 2021-06-30 RX ORDER — MIDAZOLAM HYDROCHLORIDE 5 MG/ML
INJECTION INTRAMUSCULAR; INTRAVENOUS
Status: COMPLETED | OUTPATIENT
Start: 2021-06-30 | End: 2021-06-30

## 2021-06-30 RX ORDER — SODIUM CHLORIDE 9 MG/ML
INJECTION, SOLUTION INTRAVENOUS CONTINUOUS
Status: DISCONTINUED | OUTPATIENT
Start: 2021-06-30 | End: 2021-07-01 | Stop reason: HOSPADM

## 2021-06-30 RX ORDER — HEPARIN SODIUM 1000 [USP'U]/ML
INJECTION, SOLUTION INTRAVENOUS; SUBCUTANEOUS
Status: COMPLETED | OUTPATIENT
Start: 2021-06-30 | End: 2021-06-30

## 2021-06-30 RX ORDER — ASPIRIN 81 MG/1
81 TABLET, CHEWABLE ORAL DAILY
Status: DISCONTINUED | OUTPATIENT
Start: 2021-07-01 | End: 2021-07-01 | Stop reason: HOSPADM

## 2021-06-30 RX ORDER — MEGESTROL ACETATE 40 MG/ML
625 SUSPENSION ORAL DAILY
Status: DISCONTINUED | OUTPATIENT
Start: 2021-06-30 | End: 2021-07-01 | Stop reason: HOSPADM

## 2021-06-30 RX ORDER — LOSARTAN POTASSIUM 25 MG/1
25 TABLET ORAL DAILY
Status: DISCONTINUED | OUTPATIENT
Start: 2021-06-30 | End: 2021-07-01 | Stop reason: HOSPADM

## 2021-06-30 RX ADMIN — MIDAZOLAM HYDROCHLORIDE 2 MG: 5 INJECTION INTRAMUSCULAR; INTRAVENOUS at 10:11

## 2021-06-30 RX ADMIN — FENTANYL CITRATE 50 MCG: 50 INJECTION, SOLUTION INTRAMUSCULAR; INTRAVENOUS at 11:38

## 2021-06-30 RX ADMIN — ATORVASTATIN CALCIUM 80 MG: 80 TABLET, FILM COATED ORAL at 21:43

## 2021-06-30 RX ADMIN — MIDAZOLAM HYDROCHLORIDE 2 MG: 5 INJECTION INTRAMUSCULAR; INTRAVENOUS at 09:57

## 2021-06-30 RX ADMIN — LOSARTAN POTASSIUM 25 MG: 25 TABLET, FILM COATED ORAL at 15:49

## 2021-06-30 RX ADMIN — HEPARIN SODIUM 5000 UNITS: 1000 INJECTION, SOLUTION INTRAVENOUS; SUBCUTANEOUS at 11:38

## 2021-06-30 RX ADMIN — FENTANYL CITRATE 100 MCG: 50 INJECTION, SOLUTION INTRAMUSCULAR; INTRAVENOUS at 11:36

## 2021-06-30 RX ADMIN — TICAGRELOR 90 MG: 90 TABLET ORAL at 21:43

## 2021-06-30 RX ADMIN — FENTANYL CITRATE 50 MCG: 50 INJECTION, SOLUTION INTRAMUSCULAR; INTRAVENOUS at 10:11

## 2021-06-30 RX ADMIN — HEPARIN SODIUM 4000 UNITS: 1000 INJECTION, SOLUTION INTRAVENOUS; SUBCUTANEOUS at 11:40

## 2021-06-30 RX ADMIN — MEGESTROL ACETATE 625 MG: 40 SUSPENSION ORAL at 15:50

## 2021-06-30 RX ADMIN — MIDAZOLAM HYDROCHLORIDE 4 MG: 5 INJECTION INTRAMUSCULAR; INTRAVENOUS at 11:36

## 2021-06-30 RX ADMIN — METOCLOPRAMIDE 5 MG: 10 TABLET ORAL at 21:43

## 2021-06-30 RX ADMIN — SODIUM CHLORIDE: 9 INJECTION, SOLUTION INTRAVENOUS at 16:06

## 2021-06-30 RX ADMIN — FENTANYL CITRATE 50 MCG: 50 INJECTION, SOLUTION INTRAMUSCULAR; INTRAVENOUS at 09:57

## 2021-06-30 RX ADMIN — CARVEDILOL 6.25 MG: 6.25 TABLET, FILM COATED ORAL at 18:59

## 2021-06-30 RX ADMIN — METOCLOPRAMIDE 5 MG: 10 TABLET ORAL at 15:49

## 2021-06-30 RX ADMIN — FENTANYL CITRATE 100 MCG: 50 INJECTION, SOLUTION INTRAMUSCULAR; INTRAVENOUS at 11:37

## 2021-06-30 RX ADMIN — ASPIRIN 81 MG: 81 TABLET, CHEWABLE ORAL at 09:25

## 2021-06-30 ASSESSMENT — PAIN SCALES - GENERAL: PAINLEVEL_OUTOF10: 0

## 2021-06-30 NOTE — FLOWSHEET NOTE
06/30/21 1401   Vital Signs   /73   MAP (mmHg) 92   Puncture Site Assessment 1   Location Radial - right   Site Assessment Bleeding   Hemostasis Intervention Radial Compression Device   Milliliters of Air Removed 0 mL  (Nurse attempted to take out air. Witnessed bleeing,air return)   Dressing Applied No   Multiple puncture sites No       Nurse educated patient to keep arm still, flat and stop moving arm because f the risk of bleeding. Pt states ok but keeps moving arm. Nurse notified charge and charge nurse also educated pt on the importance of keeping arm flat and still. Will continue to monitor for any changes.

## 2021-06-30 NOTE — PROGRESS NOTES
06/30/21 1333   RT Protocol   Smoking Status 2   Surgical status 3   Xray 0   Respiratory pattern 0   Mental Status 0   Breath sounds 0   Cough 0   Activity level 0   Oxygen Requirement 0   Indications for Bronchodilator Therapy On home bronchodilators   Bronchodilator Assessment Score 2   Indications for Bronchial Hygiene None   Bronchial Hygiene Assessment Score 2   Indications for Volume Expansion None   Volume Expansion Assessment Score 3

## 2021-06-30 NOTE — PROGRESS NOTES
TR band off. Pt tolerated well. Pt denies any pain at this time. Pt has good cap refill. Transparent dressing applied at this time. Pt site bruised, clean, dry and intact. Will continue to monitor for any changes.

## 2021-06-30 NOTE — H&P
Brief Pre-Op Note/Sedation Assessment      Juliet Javier  1955  Cath Pool Rm/NONE      [id number]  8:54 AM    Planned Procedure: Cardiac Catheterization Procedure    Post Procedure Plan: Return to same level of care    Consent: I have discussed with the patient and/or the patient representative the indication, alternatives, and the possible risks and/or complications of the planned procedure and the anesthesia methods. The patient and/or patient representative appear to understand and agree to proceed. DISCUSSION OF CARDIAC CATHETERIZATION PROCEDURES: The procedures, indications, risks and alternatives have been discussed with the patient and, as appropriate, with the patient's guardian . Risks discussed included, but are not limited to, bleeding, development of blood clots/emboli, damage to blood vessels, renal failure, malignant cardiac arrhythmias, stroke, heart attack, emergent coronary bypass surgery, death, dye allergy. The patient (and guardian as appropriate) expressed understanding of the aforementioned and wished to proceed. Chief Complaint: Chest Pain/Pressure  Dyspnea      Indications for Cath Procedure:   Worsening Angina  Anginal Classification within 2 weeks:  CCS III - Symptoms with everyday living activities, i.e., moderate limitation  NYHA Heart Failure Class within 2 weeks: No symptoms  Is Cath Lab Visit Valve-related?: No  Surgical Risk: N/A  Functional Type: N/A    Anti- Anginal Meds within 2 weeks:   Yes: Beta Blockers, Aspirin and Statin (Any)    Stress or Imaging Studies Performed (within 6 months):  Stress Test with SPECT Result: Positive:  inferior Risk/Extent of Ischemia:  Low      Vital Signs:  Ht 5' 9\" (1.753 m)   Wt 134 lb (60.8 kg)   BMI 19.79 kg/m²     /70  HR 80    Allergies:  No Known Allergies    Past Medical History:  Past Medical History:   Diagnosis Date    CAD (coronary artery disease) 03/28/2013    Cardi[facility] 02/22/2021    EF= 45%    [facility]     Heart attack ([facility] 03/28/2013    3/28/2013 and 2/22/2021    High cholesterol     HTN (hypertension)     Hypertension          Surgical History:  Past Surgical History:   Procedure Laterality Date    COLONOSCOPY N/A 3/22/2021    COLONOSCOPY WITH BIOPSY [address] Lachelle Freed MD [address]  02/22/2021    SAMM- 3.0 x 38 and 3.0 x 34 to pRCA    CORONARY ANGIOPLASTY WITH STENT PLACEMENT  03/28/2013    SAMM- 3.5 x 18 to RCA    DIAGNOSTIC CARDIAC CATH LAB PROCEDURE  12/10/2019    Non Obs CAD    THORACIC AORTIC ANEURYSM REPAIR  2000    UPPER GASTROINTESTINAL ENDOSCOPY N/A 3/19/2021    EGD BIOPSY performed by Lachelle Freed MD at [address]         Medications:  Current Facility-Administered Medications   Medication Dose Route Frequency Provider Last Rate Last Admin    0.9 % sodium chloride infusion   Intravenous Continuous Rajendra Castelan MD               Pre-Sedation:    Pre-Sedation Documentation and Exam:  I have personally completed a history, physical exam & review of systems for this patient (see notes). Prior History of Anesthesia Complications:   none    Modified Mallampati:  III (soft palate, base of uvula visible)    ASA Classification:  Class 3 - A patient with severe systemic disease that limits activity but is not incapacitating      Andres Scale: Activity:  2 - Able to move 4 extremities voluntarily on command  Respiration:  2 - Able to breathe deeply and cough freely  Circulation:  2 - BP+/- 20mmHg of normal  Consciousness:  2 - Fully awake  Oxygen Saturation (color):  2 - Able to maintain oxygen saturation >92% on room air    Sedation/Anesthesia Plan:  Guard the patient's safety and welfare. Minimize physical discomfort and pain. Minimize negative psychological responses to treatment by providing sedation and analgesia and maximize the potential amnesia.   Patient to meet pre-procedure discharge plan.    Medication Planned:  midazolam intravenously and fentanyl intravenously    Patient is an appropriate candidate for plan of sedation: yes      Electronically signed by Arpan Valdez MD on 6/30/2021 at 8:54 AM

## 2021-06-30 NOTE — PROCEDURES
CARDIAC CATHETERIZATION REPORT     Procedure Date:  2021  Patient Name: Pablo Diaz  MRN: [id number] : 1955      INDICATION     Class III angina  Abnormal stress test, low risk  Two-vessel CAD    PROCEDURES PERFORMED     Left heart catheterization  LVgram  Coronary angiogam  Coronary cath  Monitoring of moderate conscious sedation    IVUS of RCA  Atherectomy of RCA  PCI of RCA with single drug-eluting stent      PROCEDURE DESCRIPTION   Risks/benefits/alternatives/outcomes were discussed with patient and/or family and informed consent was obtained. Using the [facility] scale, the patient's right radial artery was found to be a level B. Patient was prepped draped in the usual sterile fashion. Local anaesthetic was applied over puncture site. Using ultrasound guidance and a back wall technique with a small 21-gauge Angiocath, a 4/5 Nepali Terumo sheath was inserted into right radial artery. Verapamil, nitroglycerin, nicardipine were administered through the sheath. There was radial spasm and a small radial artery which was superficial which made access difficult. As such, additional vasodilators were delivered more proximally with a glide catheter. Heparin was administered. Initially 5 [place] catheters were taken for diagnostic angiography however due to radial spasm, this was abandoned as 4 Western Nimo catheter was then taken and these passed easily for diagnostic angiography including pigtail for left trigger angiogram, JL 3.5 for left-sided coronary geography. JL 3.5 was able to be swung over to RCA for RCA angiography. Attention then turned towards PCI as noted below. At the conclusion of the procedure, a TR band was placed over the puncture site and hemostasis was obtained. There were no immediate complications. I supervised sedation with versed 8 mg/fentanyl 300 mcg during the procedure. 230 cc contrast was utilized. <20cc EBL.       FINDINGS           LVGRAM    LVEDP  10   GRADIENT ACROSS AORTIC VALVE  none   LV FUNCTION EF 60%   WALL MOTION  normal   MITRAL REGURGITATION  mild       CORONARY ARTERIES    LM Less than 10% bqophins-qlp-qgukqz stenosis         LAD Proximal 10 to 20% stenosis, mid 70 to 75% stenosis. Mid-distal bridging is noted with 10 to 20% stenosis. D1 is a medium to large size vessel with proximal-mid 60 to 70% stenosis. LCX  10% proximal stenosis, mid 20 to 30% stenosis. RCA Dominant, large vessel, proximal pressure damping and ventricularization is noted with diagnostic catheters and there is proximal 60% to 70% stenosis followed by mid 80% stenosis. Stents are noted throughout the ycdnptyx-xya-apyxnv vessel. Distally there is less than 10% stenosis. PERCUTANEOUS INTERVENTION DESCRIPTION     The initial 4/5 Lao Terumo slender sheath was upsized to a 5/6 Western Nimo Terumo slender sheath. There was radial spasm noted which was treated with vasodilators. Initially a 6 [place] guiding catheter was taken but this would not advance easily into the radial and brachial arteries and as such the guide was removed and then using a 0.014 inch choice wire along with a 2 mm balloon, the guide was able to be advanced using a balloon-assisted tracking technique. The guide was then able to be advanced to the axillary artery and the balloon and a 0.014 inch wire was removed in favor of a Ilya wire. With that, the guide was able to be advanced to the RCA ostium and used to engage it. Heparin was used for anticoagulation. Boluses of Integrilin were given. Patient was already on Brilinta and additional Brilinta was given at the end of the case. A choice floppy wire was used to cross the lesions in the RCA. IVUS was performed which showed extensive in-stent restenosis with smooth muscle proliferation in the proximal and mid segments with 75 to 85% stenosis in the proximal-mid segments.   As such lesions were treated with atherectomy with 3 mm cutting balloon. Lesions were then treated with stenting with Abbott Xience Nancy 3.5 x 38 mm drug-eluting stent is initially a 3 x 38 mm drug stent would not cross properly. The 3.5 mm stent was then implanted just up to the ostium stent was then postdilated with 3.5 and 4 mm noncompliant balloons. 3.5 mm ostial flash balloon was additionally utilized at the ostium. Just at the ostium 4.5 mm noncompliant balloon was also utilized. Follow-up IVUS showed good stent apposition/expansion. There was 0% residual stenosis. There was RADHA 3 flow before and after PCI. CONCLUSIONS:     Successful PCI of RCA with single drug-eluting stent    Consider staged PCI of LAD, may need groin approach and/or anesthesia due to radial spasm. Encouraged smoking cessation, discussed with patient and family that additional PCI including atherectomy versus CABG may be needed if patient continues to have in-stent restenosis.

## 2021-07-01 ENCOUNTER — TELEPHONE (OUTPATIENT)
Dept: CARDIOLOGY CLINIC | Age: 66
End: 2021-07-01

## 2021-07-01 VITALS
TEMPERATURE: 97.9 F | SYSTOLIC BLOOD PRESSURE: 130 MMHG | WEIGHT: 134 LBS | HEIGHT: 69 IN | HEART RATE: 69 BPM | BODY MASS INDEX: 19.85 KG/M2 | DIASTOLIC BLOOD PRESSURE: 86 MMHG | RESPIRATION RATE: 16 BRPM | OXYGEN SATURATION: 97 %

## 2021-07-01 LAB
ANION GAP SERPL CALCULATED.3IONS-SCNC: 12 MMOL/L (ref 3–16)
BUN BLDV-MCNC: 13 MG/DL (ref 7–20)
CALCIUM SERPL-MCNC: 8.8 MG/DL (ref 8.3–10.6)
CHLORIDE BLD-SCNC: 103 MMOL/L (ref 99–110)
CO2: 20 MMOL/L (ref 21–32)
CREAT SERPL-MCNC: 1.2 MG/DL (ref 0.8–1.3)
EKG ATRIAL RATE: 69 BPM
EKG DIAGNOSIS: NORMAL
EKG P AXIS: 75 DEGREES
EKG P-R INTERVAL: 164 MS
EKG Q-T INTERVAL: 386 MS
EKG QRS DURATION: 84 MS
EKG QTC CALCULATION (BAZETT): 413 MS
EKG R AXIS: -22 DEGREES
EKG T AXIS: 40 DEGREES
EKG VENTRICULAR RATE: 69 BPM
GFR AFRICAN AMERICAN: >60
GFR NON-AFRICAN AMERICAN: >60
GLUCOSE BLD-MCNC: 94 MG/DL (ref 70–99)
POC ACT LR: 275 SEC
POC ACT LR: 313 SEC
POC ACT LR: >400 SEC
POTASSIUM SERPL-SCNC: 4 MMOL/L (ref 3.5–5.1)
SODIUM BLD-SCNC: 135 MMOL/L (ref 136–145)

## 2021-07-01 PROCEDURE — 93005 ELECTROCARDIOGRAM TRACING: CPT | Performed by: NURSE PRACTITIONER

## 2021-07-01 PROCEDURE — 36415 COLL VENOUS BLD VENIPUNCTURE: CPT

## 2021-07-01 PROCEDURE — 93010 ELECTROCARDIOGRAM REPORT: CPT | Performed by: INTERNAL MEDICINE

## 2021-07-01 PROCEDURE — 99238 HOSP IP/OBS DSCHRG MGMT 30/<: CPT | Performed by: NURSE PRACTITIONER

## 2021-07-01 PROCEDURE — 80048 BASIC METABOLIC PNL TOTAL CA: CPT

## 2021-07-01 PROCEDURE — 6370000000 HC RX 637 (ALT 250 FOR IP): Performed by: INTERNAL MEDICINE

## 2021-07-01 RX ADMIN — LOSARTAN POTASSIUM 25 MG: 25 TABLET, FILM COATED ORAL at 09:29

## 2021-07-01 RX ADMIN — ASPIRIN 81 MG: 81 TABLET, CHEWABLE ORAL at 09:28

## 2021-07-01 RX ADMIN — TICAGRELOR 90 MG: 90 TABLET ORAL at 09:28

## 2021-07-01 RX ADMIN — METOCLOPRAMIDE 5 MG: 10 TABLET ORAL at 09:28

## 2021-07-01 RX ADMIN — CARVEDILOL 6.25 MG: 6.25 TABLET, FILM COATED ORAL at 09:28

## 2021-07-01 RX ADMIN — PANTOPRAZOLE SODIUM 40 MG: 40 TABLET, DELAYED RELEASE ORAL at 09:28

## 2021-07-01 ASSESSMENT — PAIN SCALES - GENERAL
PAINLEVEL_OUTOF10: 0
PAINLEVEL_OUTOF10: 0

## 2021-07-01 NOTE — TELEPHONE ENCOUNTER
----- Message from Wilberto Esteban MD sent at 6/30/2021  2:01 PM EDT -----  Let patient know their lipid test is normal, continue current meds, no new orders or changes at this time, however, lets emphasize smoking cessation to him as I think that is contributing to CAD progression and would rec: f/u w/ pcp to discuss options for curbing/eliminating smoking. Thanks.

## 2021-07-01 NOTE — FLOWSHEET NOTE
07/01/21 0815   Assessment   Charting Type Shift assessment   Neurological   Neuro (WDL) WDL   Level of Consciousness Alert (0)   Perry Coma Scale   Eye Opening 4   Best Verbal Response 5   Best Motor Response 6   Perry Coma Scale Score 15   HEENT   HEENT (WDL) WDL   Respiratory   Respiratory (WDL) WDL   Cardiac   Cardiac (WDL) WDL   Cardiac Rhythm NSR   Rhythm Interpretation   Pulse 72   Cardiac Monitor   Telemetry Monitor On Yes   Telemetry Audible Yes   Telemetry Alarms Set Yes   Telemetry Box Number 62

## 2021-07-01 NOTE — PLAN OF CARE
Problem: SAFETY  Goal: Free from accidental physical injury  7/1/2021 1152 by Emily Mendes RN  Outcome: Completed  7/1/2021 0333 by Cherylene Imperial, RN  Outcome: Met This Shift     Problem: DAILY CARE  Goal: Daily care needs are met  7/1/2021 1152 by Emily Mendes RN  Outcome: Completed  7/1/2021 0333 by Cherylene Imperial, RN  Outcome: Met This Shift     Problem: PAIN  Goal: Patient's pain/discomfort is manageable  7/1/2021 1152 by Emily Mendes RN  Outcome: Completed  7/1/2021 0333 by Cherylene Imperial, RN  Outcome: Met This Shift     Problem: SKIN INTEGRITY  Goal: Skin integrity is maintained or improved  Outcome: Completed     Problem: DISCHARGE BARRIERS  Goal: Patient's continuum of care needs are met  Outcome: Completed

## 2021-07-01 NOTE — PROGRESS NOTES
Pt discharged home. Pt v/u of discharge instructions, medications and follow up appointments. Pt denies home needs.

## 2021-07-01 NOTE — DISCHARGE SUMMARY
[facility]  Discharge Summary  Patient ID:  Lexy Tolentino  [id number] 31 y.o. 1955    Admit date: 6/30/2021    Discharge date: 7/1/2021    Admitting Physician: Sofiya Rubio MD     Discharge Provider: [facility] Fili Nichole Course: Lexy Tolentino presented 6/30/2021 for coronary angiography following an abnormal stress test. LHC showed significant RCA treated with SAMM. No post procedure complications, rhythm overnight has been sinus. This morning he denies chest pain, shortness of breath, palpitations and dizziness. He has tolerated diet, ambulated, and voided without difficulty. Assessment:  CAD: stable, no angina; s/p SAMM RCA 6/30/2021; s/p thrombectomy and SAMM x2 in the setting of STEMI RCA 2/22/2021; s/p PCI RCA 2012   - consider staged PCI LAD in follow up  Ischemic cardiomyopathy: improved, EF 55% on echo 5/2021 from EF 45-50% on echo 2/2021  HTN: stable  HLD: controlled, LDL 45, continue statin  History of remote TAA repair    Plan:  1. Consider staged PCI LAD in follow up, would possibly need anesthesia support and/or femoral access  2. Check BMP post cath  3. Activity restrictions reviewed  4. Continue aspirin, statin, brilinta, carvedilol, losartan  5.  Follow up 7/20/2021    Admission Diagnoses: Atherosclerotic heart disease of native coronary artery without angina pectoris [I25.10]  CAD in native artery [I25.10]    Discharge Diagnoses:   Patient Active Problem List   Diagnosis    Moderate COPD (chronic obstructive pulmonary disease) (HCC)    Cigarette nicotine dependence without complication    Acute myocardial infarction ([address]    STEMI (ST elevation myocardial infarction) ([address]    Severe malnutrition ([address]    Coronary artery disease involving native coronary artery of native heart with angina pectoris ([address]    H/O thoracic aortic aneurysm repair    Weight loss    CAD in native artery      Discharged Condition: stable  The patient was seen and examined on day of discharge and this discharge summary is in conjunction with any daily progress note from day of discharge. Consults:  None  Physical Exam:  /79   Pulse 72   Temp 97.8 °F (36.6 °C) (Oral)   Resp 16   Ht 5' 9\" (1.753 m)   Wt 134 lb (60.8 kg)   SpO2 97%   BMI 19.79 kg/m²       Intake/Output Summary (Last 24 hours) at 7/1/2021 1124  Last data filed at 6/30/2021 2139  Gross per 24 hour   Intake 97.45 ml   Output 450 ml   Net -352.55 ml     General:  Awake, alert, NAD  Skin:  Warm and dry  Neck:  JVD<8, no bruit  Chest:  Clear to auscultation, no wheezes/rhonchi/rales  Cardiovascular:  RRR S1S2  Abdomen:  Soft, nontender, +bowel sounds  Extremities:  No edema  Right radial site soft, no hematoma, 2+ pulse, mildly tender, mild ecchymosis    Significant Diagnostic Studies:     Coronary angiogram 6/30/2021:  Class III angina  Abnormal stress test, low risk  Two-vessel CAD  PROCEDURES PERFORMED    Left heart catheterization  LVgram  Coronary angiogam  Coronary cath  Monitoring of moderate conscious sedation  IVUS of RCA  Atherectomy of RCA  PCI of RCA with single drug-eluting stent  PROCEDURE DESCRIPTION   Risks/benefits/alternatives/outcomes were discussed with patient and/or family and informed consent was obtained. Using the [facility] scale, the patient's right radial artery was found to be a level B. Patient was prepped draped in the usual sterile fashion. Local anaesthetic was applied over puncture site. Using ultrasound guidance and a back wall technique with a small 21-gauge Angiocath, a 4/5 [place] Terumo sheath was inserted into right radial artery. Verapamil, nitroglycerin, nicardipine were administered through the sheath. There was radial spasm and a small radial artery which was superficial which made access difficult. As such, additional vasodilators were delivered more proximally with a glide catheter. Heparin was administered.   Initially 5 [place] catheters were taken for diagnostic angiography however due to radial spasm, this was abandoned as 4 [place] catheter was then taken and these passed easily for diagnostic angiography including pigtail for left trigger angiogram, JL 3.5 for left-sided coronary geography. JL 3.5 was able to be swung over to RCA for RCA angiography. Attention then turned towards PCI as noted below. At the conclusion of the procedure, a TR band was placed over the puncture site and hemostasis was obtained. There were no immediate complications. I supervised sedation with versed 8 mg/fentanyl 300 mcg during the procedure. 230 cc contrast was utilized. <20cc EBL. FINDINGS     LVEDP  10   GRADIENT ACROSS AORTIC VALVE  none   LV FUNCTION EF 60%   WALL MOTION  normal   MITRAL REGURGITATION  mild     LM Less than 10% auczhlni-gat-hfouux stenosis            LAD Proximal 10 to 20% stenosis, mid 70 to 75% stenosis. Mid-distal bridging is noted with 10 to 20% stenosis.     D1 is a medium to large size vessel with proximal-mid 60 to 70% stenosis.       LCX  10% proximal stenosis, mid 20 to 30% stenosis.       RCA Dominant, large vessel, proximal pressure damping and ventricularization is noted with diagnostic catheters and there is proximal 60% to 70% stenosis followed by mid 80% stenosis. Stents are noted throughout the gzwdowjy-ghf-ubbynb vessel. Distally there is less than 10% stenosis. PERCUTANEOUS INTERVENTION DESCRIPTION    The initial 4/5 [place] citizen Terumo slender sheath was upsized to a 5/6 [place] Terumo slender sheath. There was radial spasm noted which was treated with vasodilators. Initially a 6 [place] guiding catheter was taken but this would not advance easily into the radial and brachial arteries and as such the guide was removed and then using a 0.014 inch choice wire along with a 2 mm balloon, the guide was able to be advanced using a balloon-assisted tracking technique.   The guide was then able to be advanced to the axillary artery and the balloon Mild pulmonic regurgitation present. 2- 45-50% EF with inferolateral, inf and basal inf hypokinesis.  mild   MR    Labs:   Lab Results   Component Value Date    CREATININE 1.3 06/30/2021    BUN 15 06/30/2021     06/30/2021    K 4.3 06/30/2021     06/30/2021    CO2 25 06/30/2021      Lab Results   Component Value Date    WBC 9.9 06/30/2021    HGB 12.5 (L) 06/30/2021    HCT 36.7 (L) 06/30/2021    MCV 87.8 06/30/2021     06/30/2021      Lab Results   Component Value Date    INR 1.14 03/18/2021    PROTIME 13.2 03/18/2021    No results found for: BNP    Disposition: home    Patient Instructions:    [facility] Medication Instructions EJA:[id number]    Printed on:07/01/21 1125   Medication Information                      albuterol (PROVENTIL) (2.5 MG/3ML) 0.083% nebulizer solution  Take 3 mLs by nebulization every 6 hours as needed for Wheezing             albuterol sulfate HFA (VENTOLIN HFA) 108 (90 Base) MCG/ACT inhaler  Inhale 2 puffs into the lungs every 6 hours as needed for Wheezing or Shortness of Breath             aspirin 81 MG chewable tablet  Take 1 tablet by mouth daily             atorvastatin (LIPITOR) 80 MG tablet  Take 1 tablet by mouth nightly             BRILINTA 90 MG TABS tablet  TAKE 1 TABLET BY MOUTH TWO TIMES A DAY             carvedilol (COREG) 6.25 MG tablet  Take 1 tablet by mouth 2 times daily (with meals)             losartan (COZAAR) 25 MG tablet  Take 1 tablet by mouth daily             megestrol (MEGACE ES) 625 MG/5ML suspension  Take 5 mLs by mouth daily             metoclopramide (REGLAN) 5 MG tablet  Take 1 tablet by mouth 3 times daily             NONFORMULARY  Apply 1 patch topically once a week             oxyCODONE-acetaminophen (PERCOCET) 5-325 MG per tablet  Take 1 tablet by mouth every 6 hours as needed for Pain.             pantoprazole (PROTONIX) 40 MG tablet  Take 1 tablet by mouth every morning (before breakfast) umeclidinium-vilanterol (ANORO ELLIPTA) 62.5-25 MCG/INH AEPB inhaler  Inhale 1 puff into the lungs daily               Activity: no heavy lifting for 2 weeks  Diet: cardiac diet  Wound Care: keep wound clean and dry    Follow-up on 7/20/2021    Signed:  Arlene Kehr, APRN - CNP, 7/1/2021, 11:24 AM

## 2021-07-01 NOTE — PLAN OF CARE
Problem: SAFETY  Goal: Free from accidental physical injury  Outcome: Met This Shift     Problem: DAILY CARE  Goal: Daily care needs are met  Outcome: Met This Shift     Problem: PAIN  Goal: Patient's pain/discomfort is manageable  Outcome: Met This Shift

## 2021-07-02 ENCOUNTER — CARE COORDINATION (OUTPATIENT)
Dept: CASE MANAGEMENT | Age: 66
End: 2021-07-02

## 2021-07-02 DIAGNOSIS — I25.10 CAD IN NATIVE ARTERY: Primary | ICD-10-CM

## 2021-07-02 PROCEDURE — 1111F DSCHRG MED/CURRENT MED MERGE: CPT | Performed by: FAMILY MEDICINE

## 2021-07-02 NOTE — CARE COORDINATION
[facility] Initial Follow Up [facility]    Call within 2 business days of discharge: Yes    Patient: Sanjay Lange Patient : 1955   MRN: [id number]  Reason for Admission: heart cath  Discharge Date: 21 RARS: Readmission Risk Score: 11      Last Discharge [facility]       Complaint Diagnosis Description Type Department Provider    21   Admission (Discharged) from [facility]           Spoke with: [address] [place] [address]: [facility]    Non-face-to-face services provided:  Scheduled appointment with Specialist-  Obtained and reviewed discharge summary and/or continuity of care documents     Transitions of Care Initial Call    Was this an external facility discharge? No Discharge Facility: NA    Challenges to be reviewed by the provider   Additional needs identified to be addressed with provider: No  none             Method of communication with provider : phone      Advance Care Planning:   Does patient have an Advance Directive:  decision maker updated. Was this a readmission? No  Patient stated reason for admission: heart cath  Patients top risk factors for readmission: medical condition-heart cath    Care Transition Nurse (CTN) contacted the patient by telephone to perform post hospital discharge assessment. Verified name and  with patient as identifiers. Provided introduction to self, and explanation of the CTN role. CTN reviewed discharge instructions, medical action plan and red flags with patient who verbalized understanding. Patient given an opportunity to ask questions and does not have any further questions or concerns at this time. Were discharge instructions available to patient? Yes. Reviewed appropriate site of care based on symptoms and resources available to patient including: Specialist. The patient agrees to contact the PCP office for questions related to their healthcare.      Medication reconciliation was performed with patient, who verbalizes understanding of administration of home medications. Advised obtaining a 90-day supply of all daily and as-needed medications. Covid Risk Education  Completed Zain Bosch late March per patient     Educated patient about risk for severe COVID-19 due to risk factors according to CDC guidelines. LPN CC reviewed discharge instructions, medical action plan and red flag symptoms with the patient who verbalized understanding. Discussed COVID vaccination status: Yes. Education provided on COVID-19 vaccination as appropriate. Discussed exposure protocols and quarantine with CDC Guidelines. Patient was given an opportunity to verbalize any questions and concerns and agrees to contact LPN CC or health care provider for questions related to their healthcare. Reviewed and educated patient on any new and changed medications related to discharge diagnosis. Was patient discharged with a pulse oximeter? No Discussed and confirmed pulse oximeter discharge instructions and when to notify provider or seek emergency care. Patient verified  and was pleasant and agreeable to transition call. Denies pain. Incision clean, dry, intact. LPN CC educated patient on warning signs and when to call MD: redness, warmth, drainage. Patient verbalized understanding. Instructed patient to let water run over site and pat dry. Patient states he's doing well. Full medication reconciliation and 1111f completed. Patient has f/u with Dr. Sagrario Elizabeth . Denies any acute needs at present time. Agreeable to f/u calls. Educated on the use of urgent care or physicians 24 hr access line if assistance is needed after hours. LPN CC provided contact information. No further follow-up call identified based on severity of symptoms and risk factors.   Jeffrey Nice [address]  [phone]    Care Transitions 24 Hour Call    Do you have all of your prescriptions and are they filled?: Yes  Care Transitions Interventions Follow Up  Future Appointments   Date Time Provider Shaila Foxi   7/20/2021  9:45 AM Sergio Chacon MD [address] [facility] Lili Carrera [address], LPN

## 2021-07-19 NOTE — PROGRESS NOTES
[address] Office  Follow up Note  2021     Subjective:  Mr. Jennifer Vaughn is here today for cardiology  follow up STEMI, CAD   He had PCI with two stents in RCA 21  [place]:    He was air evac from [facility] to [facility] on 21 for STEMI. He underwent LHC and PCI  to RCA, mechanical thrombectomy, cutting balloon, x2. Also disease in LAD and Diagonal both are significant 70% and 80% repectively. ECHO 21 showed EF 45-50%. He was scheduled for staged PCI of LAD on 3/18/21. Due to not feeling well nausea, vomiting, significant weight loss, and failure to thrive. Cath was canceled and he was admitted. He had EGD 3/19/21 which showed minimal gastritis. He had colonoscopy 3/22/21 which revealed mild diverticulosis otherwise normal.   On 21 he had staged PCI of RCA with Dr. Aline Marrero. He reports he is gaining some weight. He is feeling some better post PCI. Today he denies chest pain, shortness of breath, edema, dizziness, palpitations and syncope. Reports he is not smoking. PMH:   CAD, HTN, HLD, thoracic aortic arch  aneurysm repair , COPD, CKD  Previously followed Dr. Roberth Stapleton heart. He then went to [place] and was sent to [address] [place] [address] who told him he has no aneurysm and not worry about it. Stent placed in RCA in . Remote thoracic aortic aneurysm repair in . Admitted [facility] 20 for STEMI. He underwent LHC and PCI  to RCA, mechanical thrombectomy, cutting balloon, x2 SAMM. Review of Systems:         12 point ROS negative in all areas as listed below except as in [place]  Constitutional, EENT,  pulmonary,  , Musculoskeletal, skin, neurological, hematological, endocrine, Psychiatric    Reviewed past medical history, social, and family history.    He quit smoking in 2020  MOM- no cardiac disease   DAD- PAD,  lung cancer, no cardiac disease   Past Medical History:   Diagnosis Date    CAD (coronary artery disease) 2013    Cardiomyopathy ([address] 2021    EF= 45%    Emphysema of lung ([address]  Heart attack ([facility] 03/28/2013    3/28/2013 and 2/22/2021    High cholesterol     HTN (hypertension)     Hypertension      Past Surgical History:   Procedure Laterality Date    COLONOSCOPY N/A 3/22/2021    [facility]ON[facility] WITH [facility]OPSY [facility]  02/22/2021    SAMM- 3.0 x 38 and 3.0 x 34 to pRCA    CORONARY ANGIOPLASTY WITH STENT PLACEMENT  03/28/2013    SAMM- 3.5 x 18 to RCA    DIAGNOSTIC CARDIAC CATH LAB PROCEDURE  12/10/2019    Non Obs CAD    THORACIC AORTIC ANEURYSM REPAIR  2000    UPPER GASTROINTESTINAL ENDOSCOPY N/A 3/19/2021    EGD BIOPSY performed by Basil Oro MD at [address]       Objective:   /72   Pulse 64   Ht 5' 9\" (1.753 m)   Wt 131 lb 3.2 oz (59.5 kg)   SpO2 93%   BMI 19.37 kg/m²     Wt Readings from Last 3 Encounters:   07/20/21 131 lb 3.2 oz (59.5 kg)   06/30/21 134 lb (60.8 kg)   05/14/21 134 lb (60.8 kg)       Physical Exam:  General: No Respiratory distress, appears well developed and well nourished. Eyes:  Sclera nonicteric  Nose/Sinuses:  negative findings: nose shows no deformity, asymmetry, or inflammation, nasal mucosa normal, septum midline with no perforation or bleeding  Back:  no pain to palpation  Joint:  no active joint inflammation  Musculoskeletal:  negative  Skin:  Warm and dry  Neck:  Negative for JVD and Carotid Bruits. Chest:  Mild wheezing, respiration easy  Cardiovascular:  RRR, S1S2 normal, no murmur, no rub or thrill. Extremities:   No edema, clubbing, cyanosis,cath site right wrist healed well. neurovasc intact. Pulses:  Normal radial pulses  Normal pedal pulses   Neuro: intact    Medications:   Outpatient Encounter Medications as of 7/20/2021   Medication Sig Dispense Refill    oxyCODONE-acetaminophen (PERCOCET) 5-325 MG per tablet Take 1 tablet by mouth every 8 hours as needed for Pain.        BRILINTA 90 MG TABS tablet TAKE 1 TABLET BY MOUTH TWO TIMES A DAY 90 tablet 3    pantoprazole (PROTONIX) 40 MG tablet Take 1 tablet by mouth every morning (before breakfast) 30 tablet 3    metoclopramide (REGLAN) 5 MG tablet Take 1 tablet by mouth 3 times daily 120 tablet 3    aspirin 81 MG chewable tablet Take 1 tablet by mouth daily 30 tablet 3    atorvastatin (LIPITOR) 80 MG tablet Take 1 tablet by mouth nightly 30 tablet 3    losartan (COZAAR) 25 MG tablet Take 1 tablet by mouth daily 30 tablet 3    carvedilol (COREG) 6.25 MG tablet Take 1 tablet by mouth 2 times daily (with meals) 60 tablet 3    umeclidinium-vilanterol (ANORO ELLIPTA) 62.5-25 MCG/INH AEPB inhaler Inhale 1 puff into the lungs daily 1 each 3    albuterol sulfate HFA (VENTOLIN HFA) 108 (90 Base) MCG/ACT inhaler Inhale 2 puffs into the lungs every 6 hours as needed for Wheezing or Shortness of Breath 1 Inhaler 5    albuterol (PROVENTIL) (2.5 MG/3ML) 0.083% nebulizer solution Take 3 mLs by nebulization every 6 hours as needed for Wheezing 120 each 5     No facility-administered encounter medications on file as of 7/20/2021. Lab Data:  CBC: No results for input(s): WBC, HGB, HCT, MCV, PLT in the last 72 hours. BMP: No results for input(s): NA, K, CL, CO2, PHOS, BUN, CREATININE in the last 72 hours. Invalid input(s): CA  LIVER PROFILE: No results for input(s): AST, ALT, LIPASE, BILIDIR, BILITOT, ALKPHOS in the last 72 hours. Invalid input(s):   AMYLASE,  ALB  LIPID:   Lab Results   Component Value Date    CHOL 91 06/30/2021    CHOL 82 03/18/2021     Lab Results   Component Value Date    TRIG 82 06/30/2021    TRIG 65 03/18/2021     Lab Results   Component Value Date    HDL 30 (L) 06/30/2021    HDL 28 (L) 03/18/2021     Lab Results   Component Value Date    LDLCALC 45 06/30/2021    LDLCALC 41 03/18/2021     Lab Results   Component Value Date    LABVLDL 16 06/30/2021    LABVLDL 13 03/18/2021     No results found for: CHOLHDLRATIO  PT/INR: No results for input(s): PROTIME, INR in the last 72 Alternatively, multiphase CT is suggested. EKG 3/18/21   Normal sinus rhythmLeft axis deviationInferior infarct (cited on or before 22-FEB-2021)Abnormal ECGWhen compared with ECG of 22-FEB-2021 18:04,ST no longer elevated in Inferior leadsT wave inversion now evident in Inferior leadsQT has shortenedConfirmed by Capri Hodge ([id number]) on 3/19/2021 5:02:58 PM       ECHO 2/24/21  Summary   The left ventricular systolic function is mildly reduced with an ejection   fraction of 45 - 50 %. There is hypokinesis of the inferolateral, inferior and basal inferior   walls. Left ventricular diastolic filling pressure is normal.   Mild mitral regurgitation. EKG 2/22/21   Normal sinus rhythmLeft axis deviationInferior infarct (cited on or before 22-FEB-2021)Abnormal ECGWhen compared with ECG of 22-FEB-2021 18:03,No significant change was foundConfirmed by Ana Maria Rasheed MD, Aline Fraire ([id number]) on 2/23/2021 5:41:24 PM     CXR 2/22/21   Left basilar atelectasis. No evidence of CHF       [facility] 2/22/21   LVGRAM     LVEDP  30   GRADIENT ACROSS AORTIC VALVE  less than 10 mmHg   LV FUNCTION EF 45%   WALL MOTION  basal inferior hypokinesis   MITRAL REGURGITATION  mild            CORONARY ARTERIES     LM Less than 10% [id number] stenosis            LAD Calcified, proximal-mid 70% stenosis, mid vessel bridging is noted, distal 20% stenosis.     D1 is a medium to large size vessel with proximal-mid 80% stenosis.       LCX  10 to 20% proximal stenosis, mid-distal 30 to 40% stenosis         RCA Dominant, [id number] 100% stenosis with thrombus               PERCUTANEOUS INTERVENTION DESCRIPTION      Patient was given heparin and Brilinta in the emergency room, initially a 6 [place] guiding catheter was used to intubate the RCA. A choice floppy wire was initially taken however lesion cannot be crossed with this and was ultimately crossed with an Kluster wire.   Lesion was then treated with mechanical thrombectomy with the penumbra device. Lesion was then treated with angioplasty with 3 mm compliant and noncompliant balloons. Atherectomy was also performed with 3 and 3.5 mm cutting balloons. IVUS was taken but would not advance into the distal vessel and as such the 6 [place] guide catheter was removed and the radial sheath was upsized to a 6/7 [place] sheath and then a 7 [place] AL 0.75 guiding catheter was used to reintubate the RCA. A workhorse wire was then taken and the lesion was crossed again. With this equipment as well as a guide extension catheter, IVUS was able to be performed which showed minimal luminal diameter distally of 3 to 3.5 mm and proximally 3.5 to 4 mm. As such the lesions were stented from distal to proximal with Medtronic resolute Bebeto 3 x 38 mm drug-eluting stent as well as a 3 x 34 mm drug-eluting stent. Stents were postdilated with 3, 3.5 and 4 mm noncompliant balloons. Follow-up IVUS showed good stent apposition/expansion. There was 0% residual stenosis. There was RADHA 0 flow before PCI and RADHA-3 flow after PCI. Integrilin was given during the procedure, this will run for 12 hours. During procedure, there is bradycardia and hypotension, patient was given atropine and supported with phenylephrine, this was able to be weaned at the end of the procedure.              CONCLUSIONS:      Successful PCI of RCA with 2 drug-eluting stents     Consider staged PCI of LAD and D1 as outpatient, patient would like to follow-up with [facility] pending discharge in the next 1 to 2 days. CT chest 6/5/20   IMPRESSION:  1. 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 2. IV contrast could not be administered because of the patient's elevated creatinine of 1.7. ECHO 1/31/20   Study Conclusions    - Left ventricle:  The cavity size is normal. Wall thickness is    normal. Systolic function was normal. The estimated ejection    fraction was in the range of 58% to 63%. Wall motion was normal;    there were no regional wall motion abnormalities. Normal    diastolic function.  - Aortic valve: Thickening, consistent with sclerosis. - Inferior vena cava: The vessel was normal in size; the    respirophasic diameter changes were in the normal range (&gt;= 50%);    findings are consistent with normal central venous pressure. [facility] 3/27/13   The patient was brought in a fasting state to the cardiac cath lab. Right groin was prepped and draped in usual sterile fashion. After local anesthesia was achieved, a 6-Turkish sheath was placed percutaneously into the right femoral artery. Angiography of the right coronary artery was performed with a 3D right catheter. Left was visualized with 6-Turkish L4 catheter. Pigtail catheter was advanced into the left ventricular cavity. LV angiography was performed in the BACA projection. Pullback was obtained. All catheter exchanges were made with the use of a long wire. The patient has a prior history of descending aortic stenting. At this time, it was elected proceed with RCA stenting. This was performed with a 6-Turkish 3D right guide catheter. Lesion was crossed with a [place] wire. Initially, an [place] thrombectomy catheter was used. Significant amount of thrombus was aspirated. At this time, repeat angiograms now showed the right coronary was patent. It was then dilated with a 2.5 x 12 mm balloon. It was then stented with a 3.5 x 18 mm drug-eluting stent, Promus Element, taken to approximately 10 atmospheres. It was then post-dilated with a 12 mm x 3.5 Quantum high-pressure balloon taken to 14 atmospheres. Repeat angiogram was performed. The patient tolerated the procedure well. The patient received intracoronary nitroglycerin as well as IV Angiomax prior to initiation of the procedure.  Postprocedure, femoral arteriography was performed and reviewed, and the arteriotomy was repaired with a Perclose technique. FINDINGS   Hemodynamic data:  Initial central aortic pressure was 119/55. LV pressure is 135/8 with left ventricular end-diastolic pressure of 22. There is no gradient on pullback across the aortic valve. Angiograms: The right coronary artery is mildly irregular; however, it is totally occluded after a large and small RV branch with evidence of residual filling defect. Left main is free of disease. Circumflex is a moderate-caliber vessel with 2 small PL branches and a third moderate-sized PL branch, which have minor irregularities. The LAD has a large diagonal branch in its proximal portion with a moderate-sized diagonal branch at its midportion and has minor irregularities. There is collateral flow to the distal right from the left system. LV angiography:  Demonstrates a mild degree of inferior basilar hypokinesia. EF is estimated at 60%. There is a slight amount catheter-induced mitral insufficiency. After flow was re-established, the right coronary artery seemed to be a large dominant vessel with minor to moderate irregularities in its mid portion with the posterior descending and posterolateral branch filling well post. There was 0% stenosis of the residual stented segment. IMPRESSION  1. Recent non-Q-wave myocardial infarction. 2. History of prior descending aortic endograft. Assessment:  1. Coronary artery disease involving native coronary artery of native heart with angina pectoris ([address]    2. ST elevation myocardial infarction involving right coronary artery ([address]    3. Mild left ventricular systolic dysfunction    4. Hypercholesterolemia     Most recent lipids 6/30/21     Plan:  1. Meds reviewed   2. Will reach out to [address]  to assist with timing/scheduling of staged PCI of LAD  From leg with anesthesia, with Dr. Maddie Resendiz. Schedule sometime in August. Last PCI was 6/30/21   3.  Continue current medications  He is on dual antiplatelets and should remain life long  He is on high dose statin with labs satisfactory control of lipids and no side effects. For LV dysfunction he is on beta blockers and ARB  4. Follow up with me in 3 months       This note was scribed in the presence of Casimiro Scott MD by Sergio Miller RN          QUALITY MEASURES  1. Tobacco Cessation Counseling: reemphasized the importance of not restart smoking. 2. Retake of BP if >140/90: NA   3. Documentation to PCP/referring for new patient:  Sent to PCP at close of office visit  4. CAD patient on anti-platelet: YES- Brilinta _asa  5. CAD patient on STATIN therapy: YES- Statin   6. Patient with CHF and aFib on anticoagulation: NA   I, Dr. [place] Michelle, personally performed the services described in this documentation, as scribed by the above signed scribe in my presence. It is both accurate and complete to my knowledge. I agree with the details independently gathered by the clinical support staff, while the remaining scribed note accurately describes my personal service to the patient.     Klarissa Orellana MD, MD 7/20/2021 10:02 AM

## 2021-07-20 ENCOUNTER — TELEPHONE (OUTPATIENT)
Dept: CARDIOLOGY CLINIC | Age: 66
End: 2021-07-20

## 2021-07-20 ENCOUNTER — OFFICE VISIT (OUTPATIENT)
Dept: CARDIOLOGY CLINIC | Age: 66
End: 2021-07-20
Payer: MEDICARE

## 2021-07-20 VITALS
BODY MASS INDEX: 19.43 KG/M2 | DIASTOLIC BLOOD PRESSURE: 72 MMHG | OXYGEN SATURATION: 93 % | SYSTOLIC BLOOD PRESSURE: 116 MMHG | HEART RATE: 64 BPM | HEIGHT: 69 IN | WEIGHT: 131.2 LBS

## 2021-07-20 DIAGNOSIS — R07.9 CHEST PAIN, UNSPECIFIED TYPE: ICD-10-CM

## 2021-07-20 DIAGNOSIS — I25.119 CORONARY ARTERY DISEASE INVOLVING NATIVE CORONARY ARTERY OF NATIVE HEART WITH ANGINA PECTORIS (HCC): Primary | ICD-10-CM

## 2021-07-20 DIAGNOSIS — I51.89 MILD LEFT VENTRICULAR SYSTOLIC DYSFUNCTION: ICD-10-CM

## 2021-07-20 DIAGNOSIS — I21.11 ST ELEVATION MYOCARDIAL INFARCTION INVOLVING RIGHT CORONARY ARTERY (HCC): ICD-10-CM

## 2021-07-20 DIAGNOSIS — E78.00 HYPERCHOLESTEROLEMIA: ICD-10-CM

## 2021-07-20 PROCEDURE — 99214 OFFICE O/P EST MOD 30 MIN: CPT | Performed by: INTERNAL MEDICINE

## 2021-07-20 PROCEDURE — 1111F DSCHRG MED/CURRENT MED MERGE: CPT | Performed by: INTERNAL MEDICINE

## 2021-07-20 PROCEDURE — 1036F TOBACCO NON-USER: CPT | Performed by: INTERNAL MEDICINE

## 2021-07-20 PROCEDURE — G8420 CALC BMI NORM PARAMETERS: HCPCS | Performed by: INTERNAL MEDICINE

## 2021-07-20 PROCEDURE — 3017F COLORECTAL CA SCREEN DOC REV: CPT | Performed by: INTERNAL MEDICINE

## 2021-07-20 PROCEDURE — G8427 DOCREV CUR MEDS BY ELIG CLIN: HCPCS | Performed by: INTERNAL MEDICINE

## 2021-07-20 NOTE — PATIENT INSTRUCTIONS
Plan:  1. Meds reviewed   2. Will reach out to [place] to assist with timing/scheduling of staged PCI with anesthesia, with Dr. Eriberto King. Schedule sometime in August. Last PCI was 6/30/21   3. Continue current medications   4.  Follow up with me in 3 months

## 2021-07-20 NOTE — TELEPHONE ENCOUNTER
Pt was seen at [facility] ([facility]) office today by Dr. Gladys Jin. He had PCI of RCA on 6/30/21. [place] to return for staged PCI of LAD with anesthesia. Dr. Gladys Jin recommended planning for August. Dr. Aline Marrero please advise? Karen can you reach out to patient to schedule when approved by Dr. Aline Marrero.

## 2021-07-20 NOTE — LETTER
([facility]     Heart attack ([facility] 03/28/2013    3/28/2013 and 2/22/2021    High cholesterol     HTN (hypertension)     Hypertension      Past Surgical History:   Procedure Laterality Date    COLONOSCOPY N/A 3/22/2021    COLONOSCOPY WITH BIOPSY performed by Eric Arnett MD at [address]  02/22/2021    SAMM- 3.0 x 38 and 3.0 x 34 to pRCA    CORONARY ANGIOPLASTY WITH STENT PLACEMENT  03/28/2013    SAMM- 3.5 x 18 to RCA    DIAGNOSTIC CARDIAC CATH LAB PROCEDURE  12/10/2019    Non Obs CAD    THORACIC AORTIC ANEURYSM REPAIR  2000    UPPER GASTROINTESTINAL ENDOSCOPY N/A 3/19/2021    EGD BIOPSY performed by Eric Arnett MD at [address]       Objective:   /72   Pulse 64   Ht 5' 9\" (1.753 m)   Wt 131 lb 3.2 oz (59.5 kg)   SpO2 93%   BMI 19.37 kg/m²     Wt Readings from Last 3 Encounters:   07/20/21 131 lb 3.2 oz (59.5 kg)   06/30/21 134 lb (60.8 kg)   05/14/21 134 lb (60.8 kg)       Physical Exam:  General: No Respiratory distress, appears well developed and well nourished. Eyes:  Sclera nonicteric  Nose/Sinuses:  negative findings: nose shows no deformity, asymmetry, or inflammation, nasal mucosa normal, septum midline with no perforation or bleeding  Back:  no pain to palpation  Joint:  no active joint inflammation  Musculoskeletal:  negative  Skin:  Warm and dry  Neck:  Negative for JVD and Carotid Bruits. Chest:  Mild wheezing, respiration easy  Cardiovascular:  RRR, S1S2 normal, no murmur, no rub or thrill. Extremities:   No edema, clubbing, cyanosis,cath site right wrist healed well. neurovasc intact. Pulses:  Normal radial pulses  Normal pedal pulses   Neuro: intact    Medications:   Outpatient Encounter Medications as of 7/20/2021   Medication Sig Dispense Refill    oxyCODONE-acetaminophen (PERCOCET) 5-325 MG per tablet Take 1 tablet by mouth every 8 hours as needed for Pain.        BRILINTA 90 MG TABS tablet TAKE 1 TABLET BY MOUTH TWO TIMES A DAY 90 tablet 3    pantoprazole (PROTONIX) 40 MG tablet Take 1 tablet by mouth every morning (before breakfast) 30 tablet 3    metoclopramide (REGLAN) 5 MG tablet Take 1 tablet by mouth 3 times daily 120 tablet 3    aspirin 81 MG chewable tablet Take 1 tablet by mouth daily 30 tablet 3    atorvastatin (LIPITOR) 80 MG tablet Take 1 tablet by mouth nightly 30 tablet 3    losartan (COZAAR) 25 MG tablet Take 1 tablet by mouth daily 30 tablet 3    carvedilol (COREG) 6.25 MG tablet Take 1 tablet by mouth 2 times daily (with meals) 60 tablet 3    umeclidinium-vilanterol (ANORO ELLIPTA) 62.5-25 MCG/INH AEPB inhaler Inhale 1 puff into the lungs daily 1 each 3    albuterol sulfate HFA (VENTOLIN HFA) 108 (90 Base) MCG/ACT inhaler Inhale 2 puffs into the lungs every 6 hours as needed for Wheezing or Shortness of Breath 1 Inhaler 5    albuterol (PROVENTIL) (2.5 MG/3ML) 0.083% nebulizer solution Take 3 mLs by nebulization every 6 hours as needed for Wheezing 120 each 5     No facility-administered encounter medications on file as of 7/20/2021. Lab Data:  CBC: No results for input(s): WBC, HGB, HCT, MCV, PLT in the last 72 hours. BMP: No results for input(s): NA, K, CL, CO2, PHOS, BUN, CREATININE in the last 72 hours. Invalid input(s): CA  LIVER PROFILE: No results for input(s): AST, ALT, LIPASE, BILIDIR, BILITOT, ALKPHOS in the last 72 hours. Invalid input(s):   AMYLASE,  ALB  LIPID:   Lab Results   Component Value Date    CHOL 91 06/30/2021    CHOL 82 03/18/2021     Lab Results   Component Value Date    TRIG 82 06/30/2021    TRIG 65 03/18/2021     Lab Results   Component Value Date    HDL 30 (L) 06/30/2021    HDL 28 (L) 03/18/2021     Lab Results   Component Value Date    LDLCALC 45 06/30/2021    LDLCALC 41 03/18/2021     Lab Results   Component Value Date    LABVLDL 16 06/30/2021    LABVLDL 13 03/18/2021     No results found for: CHOLHDLRATIO  PT/INR: No results for input(s): PROTIME, INR in the last 72 hours. A1C: No results found for: LABA1C  BNP:  No results for input(s): BNP in the last 72 hours. IMAGING:   I have reviewed the following tests and documented in this encounter as follows:   Discussed with patient    EKG 7/1/21   Normal sinus rhythmLow voltage in the limb leadsPossible Inferior infarct (cited on or before 22-FEB-2021)Abnormal ECGWhen compared with ECG of 30-JUN-2021 12:37,No significant change was foundConfirmed by Franco Phan MD, Jai Marrero ([id number]) on 7/1/2021 7:28:24 PM    [facility] 6/30/21   LVGRAM     LVEDP  10   GRADIENT ACROSS AORTIC VALVE  none   LV FUNCTION EF 60%   WALL MOTION  normal   MITRAL REGURGITATION  mild         CORONARY ARTERIES     LM Less than 10% proximalmiddistal stenosis            LAD Proximal 10 to 20% stenosis, mid 70 to 75% stenosis. Middistal bridging is noted with 10 to 20% stenosis. D1 is a medium to large size vessel with proximalmid 60 to 70% stenosis. LCX  10% proximal stenosis, mid 20 to 30% stenosis. RCA Dominant, large vessel, proximal pressure damping and ventricularization is noted with diagnostic catheters and there is proximal 60% to 70% stenosis followed by mid 80% stenosis. Stents are noted throughout the proximalmiddistal vessel. Distally there is less than 10% stenosis. Successful PCI of RCA with single drug-eluting stent     Consider staged PCI of LAD, may need groin approach and/or anesthesia due to radial spasm. CT chest 3/22/21   Descending thoracic aortic fusiform aneurysm with maximal diameter 46 mm. Per report 2017, this appears stable. Mild bibasilar atelectasis. Moderately severe emphysema. ---   Abdomen pelvis-   No acute inflammatory abnormality of the abdomen or pelvis is identified. 1.4 cm indeterminate left renal cyst, versus nodule. (This also has similar measurement to 2017, reported as 13 mm). Cystic neoplasm is still considered. When feasible, follow-up MRI with gadolinium is recommended. Alternatively, multiphase CT is suggested. EKG 3/18/21   Normal sinus rhythmLeft axis deviationInferior infarct (cited on or before 22-FEB-2021)Abnormal ECGWhen compared with ECG of 22-FEB-2021 18:04,ST no longer elevated in Inferior leadsT wave inversion now evident in Inferior leadsQT has shortenedConfirmed by Michael Ring ([id number]) on 3/19/2021 5:02:58 PM       ECHO 2/24/21  Summary   The left ventricular systolic function is mildly reduced with an ejection   fraction of 45 - 50 %. There is hypokinesis of the inferolateral, inferior and basal inferior   walls. Left ventricular diastolic filling pressure is normal.   Mild mitral regurgitation. EKG 2/22/21   Normal sinus rhythmLeft axis deviationInferior infarct (cited on or before 22-FEB-2021)Abnormal ECGWhen compared with ECG of 22-FEB-2021 18:03,No significant change was foundConfirmed by Carine Benavides MD, Papo Gordon ([id number]) on 2/23/2021 5:41:24 PM     CXR 2/22/21   Left basilar atelectasis. No evidence of CHF       [facility] 2/22/21   LVGRAM     LVEDP  30   GRADIENT ACROSS AORTIC VALVE  less than 10 mmHg   LV FUNCTION EF 45%   WALL MOTION  basal inferior hypokinesis   MITRAL REGURGITATION  mild            CORONARY ARTERIES     LM Less than 10% proximalmiddistal stenosis            LAD Calcified, proximalmid 70% stenosis, mid vessel bridging is noted, distal 20% stenosis.     D1 is a medium to large size vessel with proximalmid 80% stenosis.       LCX  10 to 20% proximal stenosis, middistal 30 to 40% stenosis         RCA Dominant, proximalmiddistal 100% stenosis with thrombus               PERCUTANEOUS INTERVENTION DESCRIPTION      Patient was given heparin and Brilinta in the emergency room, initially a 6 [place] guiding catheter was used to intubate the RCA. A choice floppy wire was initially taken however lesion cannot be crossed with this and was ultimately crossed with an Tango wire.   Lesion was then treated with mechanical thrombectomy with the penumbra device. Lesion was then treated with angioplasty with 3 mm compliant and noncompliant balloons. Atherectomy was also performed with 3 and 3.5 mm cutting balloons. IVUS was taken but would not advance into the distal vessel and as such the 6 [place] guide catheter was removed and the radial sheath was upsized to a 6/7 [place] sheath and then a 7 [place] AL 0.75 guiding catheter was used to reintubate the RCA. A workhorse wire was then taken and the lesion was crossed again. With this equipment as well as a guide extension catheter, IVUS was able to be performed which showed minimal luminal diameter distally of 3 to 3.5 mm and proximally 3.5 to 4 mm. As such the lesions were stented from distal to proximal with Medtronic resolute Bebeto 3 x 38 mm drug-eluting stent as well as a 3 x 34 mm drug-eluting stent. Stents were postdilated with 3, 3.5 and 4 mm noncompliant balloons. Follow-up IVUS showed good stent apposition/expansion. There was 0% residual stenosis. There was RADHA 0 flow before PCI and RADHA-3 flow after PCI. Integrilin was given during the procedure, this will run for 12 hours. During procedure, there is bradycardia and hypotension, patient was given atropine and supported with phenylephrine, this was able to be weaned at the end of the procedure.              CONCLUSIONS:      Successful PCI of RCA with 2 drug-eluting stents     Consider staged PCI of LAD and D1 as outpatient, patient would like to follow-up with [facility] [place], operated by [facility] pending discharge in the next 1 to 2 days. CT chest 6/5/20   IMPRESSION:  1. 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 2. IV contrast could not be administered because of the patient's elevated creatinine of 1.7. ECHO 1/31/20   Study Conclusions    - Left ventricle:  The cavity size is normal. Wall thickness is    normal. Systolic function was normal. The estimated ejection    fraction was in the range of 58% to 63%. Wall motion was normal;    there were no regional wall motion abnormalities. Normal    diastolic function.  - Aortic valve: Thickening, consistent with sclerosis. - Inferior vena cava: The vessel was normal in size; the    respirophasic diameter changes were in the normal range (&gt;= 50%);    findings are consistent with normal central venous pressure. [facility] 3/27/13   The patient was brought in a fasting state to the cardiac cath lab. Right groin was prepped and draped in usual sterile fashion. After local anesthesia was achieved, a 6-[place] sheath was placed percutaneously into the right femoral artery. Angiography of the right coronary artery was performed with a 3D right catheter. Left was visualized with 6-[place] L4 catheter. Pigtail catheter was advanced into the left ventricular cavity. LV angiography was performed in the BCAA projection. Pullback was obtained. All catheter exchanges were made with the use of a long wire. The patient has a prior history of descending aortic stenting. At this time, it was elected proceed with RCA stenting. This was performed with a 6-[place] 3D right guide catheter. Lesion was crossed with a [place] wire. Initially, an [place] thrombectomy catheter was used. Significant amount of thrombus was aspirated. At this time, repeat angiograms now showed the right coronary was patent. It was then dilated with a 2.5 x 12 mm balloon. It was then stented with a 3.5 x 18 mm drug-eluting stent, Promus Element, taken to approximately 10 atmospheres. It was then post-dilated with a 12 mm x 3.5 Quantum high-pressure balloon taken to 14 atmospheres. Repeat angiogram was performed. The patient tolerated the procedure well. The patient received intracoronary nitroglycerin as well as IV Angiomax prior to initiation of the procedure.  Postprocedure, femoral arteriography was performed and reviewed, and the arteriotomy was repaired with a Perclose technique. FINDINGS   Hemodynamic data:  Initial central aortic pressure was 119/55. LV pressure is 135/8 with left ventricular end-diastolic pressure of 22. There is no gradient on pullback across the aortic valve. Angiograms: The right coronary artery is mildly irregular; however, it is totally occluded after a large and small RV branch with evidence of residual filling defect. Left main is free of disease. Circumflex is a moderate-caliber vessel with 2 small PL branches and a third moderate-sized PL branch, which have minor irregularities. The LAD has a large diagonal branch in its proximal portion with a moderate-sized diagonal branch at its midportion and has minor irregularities. There is collateral flow to the distal right from the left system. LV angiography:  Demonstrates a mild degree of inferior basilar hypokinesia. EF is estimated at 60%. There is a slight amount catheter-induced mitral insufficiency. After flow was re-established, the right coronary artery seemed to be a large dominant vessel with minor to moderate irregularities in its mid portion with the posterior descending and posterolateral branch filling well post. There was 0% stenosis of the residual stented segment. IMPRESSION  1. Recent non-Q-wave myocardial infarction. 2. History of prior descending aortic endograft. Assessment:  1. Coronary artery disease involving native coronary artery of native heart with angina pectoris ([address]    2. ST elevation myocardial infarction involving right coronary artery ([address]    3. Mild left ventricular systolic dysfunction    4. Hypercholesterolemia     Most recent lipids 6/30/21     Plan:  1. Meds reviewed   2. Will reach out to [address]  to assist with timing/scheduling of staged PCI of LAD  From leg with anesthesia, with Dr. Vinod Garcia. Schedule sometime in August. Last PCI was 6/30/21   3.  Continue current medications  He is

## 2021-07-22 ENCOUNTER — APPOINTMENT (RX ONLY)
Dept: URBAN - NONMETROPOLITAN AREA CLINIC 22 | Facility: CLINIC | Age: 66
Setting detail: DERMATOLOGY
End: 2021-07-22

## 2021-07-22 DIAGNOSIS — D18.0 HEMANGIOMA: ICD-10-CM

## 2021-07-22 DIAGNOSIS — Z85.828 PERSONAL HISTORY OF OTHER MALIGNANT NEOPLASM OF SKIN: ICD-10-CM

## 2021-07-22 DIAGNOSIS — D22 MELANOCYTIC NEVI: ICD-10-CM

## 2021-07-22 DIAGNOSIS — Z85.820 PERSONAL HISTORY OF MALIGNANT MELANOMA OF SKIN: ICD-10-CM

## 2021-07-22 DIAGNOSIS — Z80.8 FAMILY HISTORY OF MALIGNANT NEOPLASM OF OTHER ORGANS OR SYSTEMS: ICD-10-CM

## 2021-07-22 DIAGNOSIS — L57.0 ACTINIC KERATOSIS: ICD-10-CM

## 2021-07-22 DIAGNOSIS — L57.8 OTHER SKIN CHANGES DUE TO CHRONIC EXPOSURE TO NONIONIZING RADIATION: ICD-10-CM

## 2021-07-22 DIAGNOSIS — L81.4 OTHER MELANIN HYPERPIGMENTATION: ICD-10-CM

## 2021-07-22 DIAGNOSIS — L82.1 OTHER SEBORRHEIC KERATOSIS: ICD-10-CM

## 2021-07-22 PROBLEM — D22.5 MELANOCYTIC NEVI OF TRUNK: Status: ACTIVE | Noted: 2021-07-22

## 2021-07-22 PROBLEM — D18.01 HEMANGIOMA OF SKIN AND SUBCUTANEOUS TISSUE: Status: ACTIVE | Noted: 2021-07-22

## 2021-07-22 PROCEDURE — ? COUNSELING

## 2021-07-22 PROCEDURE — ? ADDITIONAL NOTES

## 2021-07-22 PROCEDURE — ? FULL BODY SKIN EXAM

## 2021-07-22 PROCEDURE — 99203 OFFICE O/P NEW LOW 30 MIN: CPT

## 2021-07-22 PROCEDURE — ? TREATMENT REGIMEN

## 2021-07-22 ASSESSMENT — LOCATION SIMPLE DESCRIPTION DERM
LOCATION SIMPLE: RIGHT HAND
LOCATION SIMPLE: TRAPEZIAL NECK
LOCATION SIMPLE: SUPERIOR FOREHEAD
LOCATION SIMPLE: RIGHT UPPER BACK
LOCATION SIMPLE: NOSE
LOCATION SIMPLE: RIGHT FOREARM
LOCATION SIMPLE: LEFT CHEEK
LOCATION SIMPLE: LEFT FOREARM
LOCATION SIMPLE: CHEST
LOCATION SIMPLE: LEFT HAND
LOCATION SIMPLE: LEFT ZYGOMA
LOCATION SIMPLE: RIGHT ZYGOMA
LOCATION SIMPLE: RIGHT CHEEK
LOCATION SIMPLE: UPPER BACK

## 2021-07-22 ASSESSMENT — LOCATION DETAILED DESCRIPTION DERM
LOCATION DETAILED: RIGHT SUPERIOR LATERAL UPPER BACK
LOCATION DETAILED: NASAL DORSUM
LOCATION DETAILED: STERNUM
LOCATION DETAILED: RIGHT RADIAL DORSAL HAND
LOCATION DETAILED: SUPERIOR THORACIC SPINE
LOCATION DETAILED: LEFT CENTRAL ZYGOMA
LOCATION DETAILED: STERNAL NOTCH
LOCATION DETAILED: MID TRAPEZIAL NECK
LOCATION DETAILED: LEFT PROXIMAL DORSAL FOREARM
LOCATION DETAILED: RIGHT PROXIMAL DORSAL FOREARM
LOCATION DETAILED: RIGHT CENTRAL MALAR CHEEK
LOCATION DETAILED: SUPERIOR MID FOREHEAD
LOCATION DETAILED: LEFT INFERIOR CENTRAL MALAR CHEEK
LOCATION DETAILED: LEFT RADIAL DORSAL HAND
LOCATION DETAILED: RIGHT CENTRAL ZYGOMA

## 2021-07-22 ASSESSMENT — LOCATION ZONE DERM
LOCATION ZONE: TRUNK
LOCATION ZONE: NOSE
LOCATION ZONE: ARM
LOCATION ZONE: HAND
LOCATION ZONE: FACE
LOCATION ZONE: NECK

## 2021-07-22 NOTE — TELEPHONE ENCOUNTER
Spoke with patient. He is going to contact his ride and review a few dates we discussed. I will call him Monday.

## 2021-07-26 NOTE — TELEPHONE ENCOUNTER
Spoke with patient. Patient is scheduled with Dr. Jaz Priest for PCI LAD with anesthesia on 8/10/21 at [facility], arrival time of 6:30am to the Cath Lab. Please have patient arrive to the main entrance of [facility] and check in with the registration desk. Please call patient regarding medication instructions. Remind patient to be NPO after midnight (8 hours prior). Do not apply lotions/creams on skin the day of procedure. COVID testing - Vaccinated. Patient wants to make sure he is able to drive home the next day. Procedure instructions state no driving for 24 hours. Patient does have a current [facility] auth/referral however that one will NOT cover the PCI LAD w/o filling out additional paperwork. I spoke with Gwyn Frances and she will fax a Request for Services paper to [phone]. That needs to be filled out and sent back along with sending recent notes.

## 2021-07-26 NOTE — TELEPHONE ENCOUNTER
Attempted calling patient no answer left message to return call at their [facility], RN

## 2021-07-26 NOTE — TELEPHONE ENCOUNTER
Per hippa form Patient notified  message given on AM, left phone number to call should there be any questions Sonia Villalba RN  Told OK to take regular meds that morning with few sips of water

## 2021-08-10 ENCOUNTER — ANESTHESIA EVENT (OUTPATIENT)
Dept: CARDIAC CATH/INVASIVE PROCEDURES | Age: 66
End: 2021-08-10
Payer: MEDICARE

## 2021-08-10 ENCOUNTER — HOSPITAL ENCOUNTER (OUTPATIENT)
Dept: CARDIAC CATH/INVASIVE PROCEDURES | Age: 66
Setting detail: OBSERVATION
Discharge: HOME | End: 2021-08-11
Attending: INTERNAL MEDICINE | Admitting: INTERNAL MEDICINE
Payer: MEDICARE

## 2021-08-10 ENCOUNTER — ANESTHESIA (OUTPATIENT)
Dept: CARDIAC CATH/INVASIVE PROCEDURES | Age: 66
End: 2021-08-10
Payer: MEDICARE

## 2021-08-10 VITALS
SYSTOLIC BLOOD PRESSURE: 125 MMHG | OXYGEN SATURATION: 93 % | DIASTOLIC BLOOD PRESSURE: 63 MMHG | RESPIRATION RATE: 1 BRPM

## 2021-08-10 LAB
ANION GAP SERPL CALCULATED.3IONS-SCNC: 9 MMOL/L (ref 3–16)
BUN BLDV-MCNC: 15 MG/DL (ref 7–20)
CALCIUM SERPL-MCNC: 9.4 MG/DL (ref 8.3–10.6)
CHLORIDE BLD-SCNC: 106 MMOL/L (ref 99–110)
CHOLESTEROL, TOTAL: 100 MG/DL (ref 0–199)
CO2: 22 MMOL/L (ref 21–32)
CREAT SERPL-MCNC: 1.2 MG/DL (ref 0.8–1.3)
GFR AFRICAN AMERICAN: >60
GFR NON-AFRICAN AMERICAN: >60
GLUCOSE BLD-MCNC: 97 MG/DL (ref 70–99)
HCT VFR BLD CALC: 37.7 % (ref 40.5–52.5)
HDLC SERPL-MCNC: 32 MG/DL (ref 40–60)
HEMOGLOBIN: 12.9 G/DL (ref 13.5–17.5)
LDL CHOLESTEROL CALCULATED: 53 MG/DL
LV EF: 60 %
LVEF MODALITY: NORMAL
MCH RBC QN AUTO: 30.5 PG (ref 26–34)
MCHC RBC AUTO-ENTMCNC: 34.3 G/DL (ref 31–36)
MCV RBC AUTO: 88.9 FL (ref 80–100)
PDW BLD-RTO: 15.5 % (ref 12.4–15.4)
PLATELET # BLD: 268 K/UL (ref 135–450)
PMV BLD AUTO: 6.3 FL (ref 5–10.5)
POTASSIUM REFLEX MAGNESIUM: 4.5 MMOL/L (ref 3.5–5.1)
RBC # BLD: 4.24 M/UL (ref 4.2–5.9)
SODIUM BLD-SCNC: 137 MMOL/L (ref 136–145)
TRIGL SERPL-MCNC: 73 MG/DL (ref 0–150)
VLDLC SERPL CALC-MCNC: 15 MG/DL
WBC # BLD: 10.2 K/UL (ref 4–11)

## 2021-08-10 PROCEDURE — C1874 STENT, COATED/COV W/DEL SYS: HCPCS

## 2021-08-10 PROCEDURE — 6360000002 HC RX W HCPCS: Performed by: NURSE ANESTHETIST, CERTIFIED REGISTERED

## 2021-08-10 PROCEDURE — 85027 COMPLETE CBC AUTOMATED: CPT

## 2021-08-10 PROCEDURE — C9600 PERC DRUG-EL COR STENT SING: HCPCS

## 2021-08-10 PROCEDURE — 2580000003 HC RX 258

## 2021-08-10 PROCEDURE — 6360000002 HC RX W HCPCS

## 2021-08-10 PROCEDURE — 92934 PR PRQ TRLUML CORONARY STENT/ATH/ANGIO ADDL BRANCH: CPT | Performed by: INTERNAL MEDICINE

## 2021-08-10 PROCEDURE — 6370000000 HC RX 637 (ALT 250 FOR IP): Performed by: INTERNAL MEDICINE

## 2021-08-10 PROCEDURE — C1887 CATHETER, GUIDING: HCPCS

## 2021-08-10 PROCEDURE — C1769 GUIDE WIRE: HCPCS

## 2021-08-10 PROCEDURE — 92978 ENDOLUMINL IVUS OCT C 1ST: CPT | Performed by: INTERNAL MEDICINE

## 2021-08-10 PROCEDURE — 2500000003 HC RX 250 WO HCPCS

## 2021-08-10 PROCEDURE — C1725 CATH, TRANSLUMIN NON-LASER: HCPCS

## 2021-08-10 PROCEDURE — 6360000004 HC RX CONTRAST MEDICATION

## 2021-08-10 PROCEDURE — C1753 CATH, INTRAVAS ULTRASOUND: HCPCS

## 2021-08-10 PROCEDURE — 3700000001 HC ADD 15 MINUTES (ANESTHESIA)

## 2021-08-10 PROCEDURE — 80048 BASIC METABOLIC PNL TOTAL CA: CPT

## 2021-08-10 PROCEDURE — 2709999900 HC NON-CHARGEABLE SUPPLY

## 2021-08-10 PROCEDURE — 92978 ENDOLUMINL IVUS OCT C 1ST: CPT

## 2021-08-10 PROCEDURE — 2580000003 HC RX 258: Performed by: INTERNAL MEDICINE

## 2021-08-10 PROCEDURE — 3700000000 HC ANESTHESIA ATTENDED CARE

## 2021-08-10 PROCEDURE — 2060000000 HC ICU INTERMEDIATE R&B

## 2021-08-10 PROCEDURE — C1894 INTRO/SHEATH, NON-LASER: HCPCS

## 2021-08-10 PROCEDURE — G0378 HOSPITAL OBSERVATION PER HR: HCPCS

## 2021-08-10 PROCEDURE — 93458 L HRT ARTERY/VENTRICLE ANGIO: CPT | Performed by: INTERNAL MEDICINE

## 2021-08-10 PROCEDURE — 80061 LIPID PANEL: CPT

## 2021-08-10 PROCEDURE — 92933 PRQ TRLML C ATHRC ST ANGIOP1: CPT | Performed by: INTERNAL MEDICINE

## 2021-08-10 PROCEDURE — 6370000000 HC RX 637 (ALT 250 FOR IP)

## 2021-08-10 PROCEDURE — 7100000000 HC PACU RECOVERY - FIRST 15 MIN

## 2021-08-10 PROCEDURE — 2580000003 HC RX 258: Performed by: NURSE ANESTHETIST, CERTIFIED REGISTERED

## 2021-08-10 PROCEDURE — 85347 COAGULATION TIME ACTIVATED: CPT

## 2021-08-10 PROCEDURE — 7100000001 HC PACU RECOVERY - ADDTL 15 MIN

## 2021-08-10 PROCEDURE — C9601 PERC DRUG-EL COR STENT BRAN: HCPCS

## 2021-08-10 PROCEDURE — 93005 ELECTROCARDIOGRAM TRACING: CPT | Performed by: INTERNAL MEDICINE

## 2021-08-10 PROCEDURE — 2500000003 HC RX 250 WO HCPCS: Performed by: NURSE ANESTHETIST, CERTIFIED REGISTERED

## 2021-08-10 RX ORDER — ONDANSETRON 2 MG/ML
4 INJECTION INTRAMUSCULAR; INTRAVENOUS
Status: ACTIVE | OUTPATIENT
Start: 2021-08-10 | End: 2021-08-10

## 2021-08-10 RX ORDER — SODIUM CHLORIDE 0.9 % (FLUSH) 0.9 %
5-40 SYRINGE (ML) INJECTION EVERY 12 HOURS SCHEDULED
Status: DISCONTINUED | OUTPATIENT
Start: 2021-08-10 | End: 2021-08-11 | Stop reason: HOSPADM

## 2021-08-10 RX ORDER — OXYCODONE HYDROCHLORIDE AND ACETAMINOPHEN 5; 325 MG/1; MG/1
1 TABLET ORAL EVERY 8 HOURS PRN
Status: DISCONTINUED | OUTPATIENT
Start: 2021-08-10 | End: 2021-08-11 | Stop reason: HOSPADM

## 2021-08-10 RX ORDER — CARVEDILOL 6.25 MG/1
6.25 TABLET ORAL 2 TIMES DAILY WITH MEALS
Status: DISCONTINUED | OUTPATIENT
Start: 2021-08-10 | End: 2021-08-11 | Stop reason: HOSPADM

## 2021-08-10 RX ORDER — ATORVASTATIN CALCIUM 80 MG/1
80 TABLET, FILM COATED ORAL NIGHTLY
Status: DISCONTINUED | OUTPATIENT
Start: 2021-08-10 | End: 2021-08-11 | Stop reason: HOSPADM

## 2021-08-10 RX ORDER — GLYCOPYRROLATE 0.2 MG/ML
INJECTION INTRAMUSCULAR; INTRAVENOUS PRN
Status: DISCONTINUED | OUTPATIENT
Start: 2021-08-10 | End: 2021-08-10 | Stop reason: SDUPTHER

## 2021-08-10 RX ORDER — SODIUM CHLORIDE 0.9 % (FLUSH) 0.9 %
5-40 SYRINGE (ML) INJECTION PRN
Status: DISCONTINUED | OUTPATIENT
Start: 2021-08-10 | End: 2021-08-11 | Stop reason: HOSPADM

## 2021-08-10 RX ORDER — ACETAMINOPHEN 325 MG/1
650 TABLET ORAL EVERY 4 HOURS PRN
Status: DISCONTINUED | OUTPATIENT
Start: 2021-08-10 | End: 2021-08-11 | Stop reason: HOSPADM

## 2021-08-10 RX ORDER — SODIUM CHLORIDE 9 MG/ML
25 INJECTION, SOLUTION INTRAVENOUS PRN
Status: DISCONTINUED | OUTPATIENT
Start: 2021-08-10 | End: 2021-08-11 | Stop reason: HOSPADM

## 2021-08-10 RX ORDER — HEPARIN SODIUM 1000 [USP'U]/ML
INJECTION, SOLUTION INTRAVENOUS; SUBCUTANEOUS DAILY PRN
Status: COMPLETED | OUTPATIENT
Start: 2021-08-10 | End: 2021-08-10

## 2021-08-10 RX ORDER — ALBUTEROL SULFATE 2.5 MG/3ML
2.5 SOLUTION RESPIRATORY (INHALATION) EVERY 6 HOURS PRN
Status: DISCONTINUED | OUTPATIENT
Start: 2021-08-10 | End: 2021-08-11 | Stop reason: HOSPADM

## 2021-08-10 RX ORDER — FENTANYL CITRATE 50 UG/ML
25 INJECTION, SOLUTION INTRAMUSCULAR; INTRAVENOUS EVERY 5 MIN PRN
Status: DISCONTINUED | OUTPATIENT
Start: 2021-08-10 | End: 2021-08-11 | Stop reason: HOSPADM

## 2021-08-10 RX ORDER — ASPIRIN 81 MG/1
81 TABLET, CHEWABLE ORAL DAILY
Status: DISCONTINUED | OUTPATIENT
Start: 2021-08-10 | End: 2021-08-11 | Stop reason: HOSPADM

## 2021-08-10 RX ORDER — MEPERIDINE HYDROCHLORIDE 50 MG/ML
12.5 INJECTION INTRAMUSCULAR; INTRAVENOUS; SUBCUTANEOUS EVERY 5 MIN PRN
Status: DISCONTINUED | OUTPATIENT
Start: 2021-08-10 | End: 2021-08-11 | Stop reason: HOSPADM

## 2021-08-10 RX ORDER — SODIUM CHLORIDE, SODIUM LACTATE, POTASSIUM CHLORIDE, CALCIUM CHLORIDE 600; 310; 30; 20 MG/100ML; MG/100ML; MG/100ML; MG/100ML
INJECTION, SOLUTION INTRAVENOUS CONTINUOUS PRN
Status: DISCONTINUED | OUTPATIENT
Start: 2021-08-10 | End: 2021-08-10 | Stop reason: SDUPTHER

## 2021-08-10 RX ORDER — FENTANYL CITRATE 50 UG/ML
INJECTION, SOLUTION INTRAMUSCULAR; INTRAVENOUS PRN
Status: DISCONTINUED | OUTPATIENT
Start: 2021-08-10 | End: 2021-08-10 | Stop reason: SDUPTHER

## 2021-08-10 RX ORDER — LIDOCAINE HYDROCHLORIDE 10 MG/ML
INJECTION, SOLUTION INFILTRATION; PERINEURAL PRN
Status: DISCONTINUED | OUTPATIENT
Start: 2021-08-10 | End: 2021-08-10 | Stop reason: SDUPTHER

## 2021-08-10 RX ORDER — HYDRALAZINE HYDROCHLORIDE 20 MG/ML
5 INJECTION INTRAMUSCULAR; INTRAVENOUS EVERY 10 MIN PRN
Status: DISCONTINUED | OUTPATIENT
Start: 2021-08-10 | End: 2021-08-11 | Stop reason: HOSPADM

## 2021-08-10 RX ORDER — PROMETHAZINE HYDROCHLORIDE 25 MG/ML
6.25 INJECTION, SOLUTION INTRAMUSCULAR; INTRAVENOUS
Status: ACTIVE | OUTPATIENT
Start: 2021-08-10 | End: 2021-08-10

## 2021-08-10 RX ORDER — ALBUTEROL SULFATE 90 UG/1
2 AEROSOL, METERED RESPIRATORY (INHALATION) EVERY 6 HOURS PRN
Status: DISCONTINUED | OUTPATIENT
Start: 2021-08-10 | End: 2021-08-10

## 2021-08-10 RX ORDER — PANTOPRAZOLE SODIUM 40 MG/1
40 TABLET, DELAYED RELEASE ORAL
Status: DISCONTINUED | OUTPATIENT
Start: 2021-08-11 | End: 2021-08-11 | Stop reason: HOSPADM

## 2021-08-10 RX ORDER — EPHEDRINE SULFATE 50 MG/ML
INJECTION INTRAVENOUS PRN
Status: DISCONTINUED | OUTPATIENT
Start: 2021-08-10 | End: 2021-08-10 | Stop reason: SDUPTHER

## 2021-08-10 RX ORDER — MIDAZOLAM HYDROCHLORIDE 1 MG/ML
INJECTION INTRAMUSCULAR; INTRAVENOUS PRN
Status: DISCONTINUED | OUTPATIENT
Start: 2021-08-10 | End: 2021-08-10 | Stop reason: SDUPTHER

## 2021-08-10 RX ORDER — LOSARTAN POTASSIUM 25 MG/1
25 TABLET ORAL DAILY
Status: DISCONTINUED | OUTPATIENT
Start: 2021-08-10 | End: 2021-08-11 | Stop reason: HOSPADM

## 2021-08-10 RX ORDER — EPTIFIBATIDE 0.75 MG/ML
2 INJECTION, SOLUTION INTRAVENOUS CONTINUOUS
Status: DISCONTINUED | OUTPATIENT
Start: 2021-08-10 | End: 2021-08-10

## 2021-08-10 RX ORDER — PROPOFOL 10 MG/ML
INJECTION, EMULSION INTRAVENOUS PRN
Status: DISCONTINUED | OUTPATIENT
Start: 2021-08-10 | End: 2021-08-10 | Stop reason: SDUPTHER

## 2021-08-10 RX ORDER — ALBUTEROL SULFATE 90 UG/1
2 AEROSOL, METERED RESPIRATORY (INHALATION) EVERY 4 HOURS PRN
Status: DISCONTINUED | OUTPATIENT
Start: 2021-08-10 | End: 2021-08-11 | Stop reason: HOSPADM

## 2021-08-10 RX ORDER — METOCLOPRAMIDE 10 MG/1
5 TABLET ORAL 3 TIMES DAILY
Status: DISCONTINUED | OUTPATIENT
Start: 2021-08-10 | End: 2021-08-10

## 2021-08-10 RX ORDER — OXYCODONE HYDROCHLORIDE AND ACETAMINOPHEN 5; 325 MG/1; MG/1
1 TABLET ORAL PRN
Status: ACTIVE | OUTPATIENT
Start: 2021-08-10 | End: 2021-08-10

## 2021-08-10 RX ORDER — OXYCODONE HYDROCHLORIDE AND ACETAMINOPHEN 5; 325 MG/1; MG/1
2 TABLET ORAL PRN
Status: ACTIVE | OUTPATIENT
Start: 2021-08-10 | End: 2021-08-10

## 2021-08-10 RX ORDER — 0.9 % SODIUM CHLORIDE 0.9 %
500 INTRAVENOUS SOLUTION INTRAVENOUS ONCE
Status: DISCONTINUED | OUTPATIENT
Start: 2021-08-10 | End: 2021-08-11 | Stop reason: HOSPADM

## 2021-08-10 RX ADMIN — FENTANYL CITRATE 12.5 MCG: 50 INJECTION INTRAMUSCULAR; INTRAVENOUS at 09:55

## 2021-08-10 RX ADMIN — FENTANYL CITRATE 25 MCG: 50 INJECTION INTRAMUSCULAR; INTRAVENOUS at 11:05

## 2021-08-10 RX ADMIN — FENTANYL CITRATE 50 MCG: 50 INJECTION INTRAMUSCULAR; INTRAVENOUS at 09:13

## 2021-08-10 RX ADMIN — GLYCOPYRROLATE 0.2 MG: 0.2 INJECTION, SOLUTION INTRAMUSCULAR; INTRAVENOUS at 10:10

## 2021-08-10 RX ADMIN — OXYCODONE HYDROCHLORIDE AND ACETAMINOPHEN 1 TABLET: 5; 325 TABLET ORAL at 23:33

## 2021-08-10 RX ADMIN — ATORVASTATIN CALCIUM 80 MG: 80 TABLET, FILM COATED ORAL at 23:26

## 2021-08-10 RX ADMIN — HEPARIN SODIUM 2000 UNITS: 1000 INJECTION, SOLUTION INTRAVENOUS; SUBCUTANEOUS at 09:48

## 2021-08-10 RX ADMIN — PROPOFOL 20 MG: 10 INJECTION, EMULSION INTRAVENOUS at 10:06

## 2021-08-10 RX ADMIN — PROPOFOL 50 MG: 10 INJECTION, EMULSION INTRAVENOUS at 09:36

## 2021-08-10 RX ADMIN — PROPOFOL 50 MG: 10 INJECTION, EMULSION INTRAVENOUS at 09:13

## 2021-08-10 RX ADMIN — FENTANYL CITRATE 12.5 MCG: 50 INJECTION INTRAMUSCULAR; INTRAVENOUS at 10:11

## 2021-08-10 RX ADMIN — MIDAZOLAM HYDROCHLORIDE 2 MG: 2 INJECTION, SOLUTION INTRAMUSCULAR; INTRAVENOUS at 09:00

## 2021-08-10 RX ADMIN — PROPOFOL 20 MG: 10 INJECTION, EMULSION INTRAVENOUS at 10:34

## 2021-08-10 RX ADMIN — SODIUM CHLORIDE, SODIUM LACTATE, POTASSIUM CHLORIDE, AND CALCIUM CHLORIDE: .6; .31; .03; .02 INJECTION, SOLUTION INTRAVENOUS at 08:57

## 2021-08-10 RX ADMIN — PROPOFOL 20 MG: 10 INJECTION, EMULSION INTRAVENOUS at 11:05

## 2021-08-10 RX ADMIN — FENTANYL CITRATE 25 MCG: 50 INJECTION INTRAMUSCULAR; INTRAVENOUS at 10:44

## 2021-08-10 RX ADMIN — TICAGRELOR 90 MG: 90 TABLET ORAL at 23:26

## 2021-08-10 RX ADMIN — HEPARIN SODIUM 5000 UNITS: 1000 INJECTION, SOLUTION INTRAVENOUS; SUBCUTANEOUS at 09:39

## 2021-08-10 RX ADMIN — PROPOFOL 50 MG: 10 INJECTION, EMULSION INTRAVENOUS at 09:43

## 2021-08-10 RX ADMIN — EPHEDRINE SULFATE 10 MG: 50 INJECTION INTRAVENOUS at 09:34

## 2021-08-10 RX ADMIN — LIDOCAINE HYDROCHLORIDE 60 MG: 10 INJECTION, SOLUTION INFILTRATION; PERINEURAL at 09:13

## 2021-08-10 RX ADMIN — FENTANYL CITRATE 25 MCG: 50 INJECTION INTRAMUSCULAR; INTRAVENOUS at 10:27

## 2021-08-10 RX ADMIN — Medication 10 ML: at 23:26

## 2021-08-10 RX ADMIN — EPTIFIBATIDE 2 MCG/KG/MIN: 0.75 INJECTION, SOLUTION INTRAVENOUS at 11:14

## 2021-08-10 RX ADMIN — PROPOFOL 20 MG: 10 INJECTION, EMULSION INTRAVENOUS at 10:43

## 2021-08-10 SDOH — SOCIAL STABILITY: SOCIAL INSECURITY
WITHIN THE LAST YEAR, HAVE YOU BEEN KICKED, HIT, SLAPPED, OR OTHERWISE PHYSICALLY HURT BY YOUR PARTNER OR EX-PARTNER?: NO

## 2021-08-10 SDOH — HEALTH STABILITY: PHYSICAL HEALTH: ON AVERAGE, HOW MANY DAYS PER WEEK DO YOU ENGAGE IN MODERATE TO STRENUOUS EXERCISE (LIKE A BRISK WALK)?: 7 DAYS

## 2021-08-10 SDOH — SOCIAL STABILITY: SOCIAL NETWORK: ARE YOU MARRIED, WIDOWED, DIVORCED, SEPARATED, NEVER MARRIED, OR LIVING WITH A PARTNER?: DIVORCED

## 2021-08-10 SDOH — HEALTH STABILITY: PHYSICAL HEALTH: ON AVERAGE, HOW MANY MINUTES DO YOU ENGAGE IN EXERCISE AT THIS LEVEL?: 30 MIN

## 2021-08-10 SDOH — HEALTH STABILITY: MENTAL HEALTH
STRESS IS WHEN SOMEONE FEELS TENSE, NERVOUS, ANXIOUS, OR CAN'T SLEEP AT NIGHT BECAUSE THEIR MIND IS TROUBLED. HOW STRESSED ARE YOU?: NOT AT ALL

## 2021-08-10 SDOH — HEALTH STABILITY: MENTAL HEALTH: HOW OFTEN DO YOU HAVE A DRINK CONTAINING ALCOHOL?: NEVER

## 2021-08-10 SDOH — ECONOMIC STABILITY: INCOME INSECURITY: HOW HARD IS IT FOR YOU TO PAY FOR THE VERY BASICS LIKE FOOD, HOUSING, MEDICAL CARE, AND HEATING?: NOT HARD AT ALL

## 2021-08-10 SDOH — SOCIAL STABILITY: SOCIAL NETWORK: HOW OFTEN DO YOU ATTEND CHURCH OR RELIGIOUS SERVICES?: PATIENT DECLINED

## 2021-08-10 SDOH — ECONOMIC STABILITY: HOUSING INSECURITY
IN THE LAST 12 MONTHS, WAS THERE A TIME WHEN YOU DID NOT HAVE A STEADY PLACE TO SLEEP OR SLEPT IN A SHELTER (INCLUDING NOW)?: NO

## 2021-08-10 SDOH — SOCIAL STABILITY: SOCIAL INSECURITY
WITHIN THE LAST YEAR, HAVE TO BEEN RAPED OR FORCED TO HAVE ANY KIND OF SEXUAL ACTIVITY BY YOUR PARTNER OR EX-PARTNER?: NO

## 2021-08-10 SDOH — ECONOMIC STABILITY: INCOME INSECURITY: IN THE LAST 12 MONTHS, WAS THERE A TIME WHEN YOU WERE NOT ABLE TO PAY THE MORTGAGE OR RENT ON TIME?: NO

## 2021-08-10 SDOH — ECONOMIC STABILITY: FOOD INSECURITY: WITHIN THE PAST 12 MONTHS, YOU WORRIED THAT YOUR FOOD WOULD RUN OUT BEFORE YOU GOT MONEY TO BUY MORE.: NEVER TRUE

## 2021-08-10 SDOH — SOCIAL STABILITY: SOCIAL NETWORK
DO YOU BELONG TO ANY CLUBS OR ORGANIZATIONS SUCH AS CHURCH GROUPS UNIONS, FRATERNAL OR ATHLETIC GROUPS, OR SCHOOL GROUPS?: PATIENT DECLINED

## 2021-08-10 SDOH — SOCIAL STABILITY: SOCIAL NETWORK: HOW OFTEN DO YOU GET TOGETHER WITH FRIENDS OR RELATIVES?: PATIENT DECLINED

## 2021-08-10 SDOH — SOCIAL STABILITY: SOCIAL INSECURITY: WITHIN THE LAST YEAR, HAVE YOU BEEN AFRAID OF YOUR PARTNER OR EX-PARTNER?: NO

## 2021-08-10 SDOH — ECONOMIC STABILITY: FOOD INSECURITY: WITHIN THE PAST 12 MONTHS, THE FOOD YOU BOUGHT JUST DIDN'T LAST AND YOU DIDN'T HAVE MONEY TO GET MORE.: NEVER TRUE

## 2021-08-10 SDOH — SOCIAL STABILITY: SOCIAL NETWORK: HOW OFTEN DO YOU ATTENT MEETINGS OF THE CLUB OR ORGANIZATION YOU BELONG TO?: PATIENT DECLINED

## 2021-08-10 SDOH — ECONOMIC STABILITY: TRANSPORTATION INSECURITY
IN THE PAST 12 MONTHS, HAS LACK OF TRANSPORTATION KEPT YOU FROM MEETINGS, WORK, OR FROM GETTING THINGS NEEDED FOR DAILY LIVING?: NO

## 2021-08-10 SDOH — ECONOMIC STABILITY: HOUSING INSECURITY: IN THE LAST 12 MONTHS, HOW MANY PLACES HAVE YOU LIVED?: 1

## 2021-08-10 SDOH — HEALTH STABILITY: MENTAL HEALTH: HOW MANY STANDARD DRINKS CONTAINING ALCOHOL DO YOU HAVE ON A TYPICAL DAY?: PATIENT DECLINED

## 2021-08-10 SDOH — ECONOMIC STABILITY: TRANSPORTATION INSECURITY
IN THE PAST 12 MONTHS, HAS THE LACK OF TRANSPORTATION KEPT YOU FROM MEDICAL APPOINTMENTS OR FROM GETTING MEDICATIONS?: NO

## 2021-08-10 SDOH — SOCIAL STABILITY: SOCIAL INSECURITY: WITHIN THE LAST YEAR, HAVE YOU BEEN HUMILIATED OR EMOTIONALLY ABUSED IN OTHER WAYS BY YOUR PARTNER OR EX-PARTNER?: NO

## 2021-08-10 SDOH — SOCIAL STABILITY: SOCIAL NETWORK: IN A TYPICAL WEEK, HOW MANY TIMES DO YOU TALK ON THE PHONE WITH FAMILY, FRIENDS, OR NEIGHBORS?: PATIENT DECLINED

## 2021-08-10 ASSESSMENT — PULMONARY FUNCTION TESTS
PIF_VALUE: 0
PIF_VALUE: -14
PIF_VALUE: 0
PIF_VALUE: -14
PIF_VALUE: 0
PIF_VALUE: -14
PIF_VALUE: 0
PIF_VALUE: -4
PIF_VALUE: 0
PIF_VALUE: -14
PIF_VALUE: 0
PIF_VALUE: 1
PIF_VALUE: 0
PIF_VALUE: 1
PIF_VALUE: 0

## 2021-08-10 ASSESSMENT — ENCOUNTER SYMPTOMS
SHORTNESS OF BREATH: 1
DYSPNEA ACTIVITY LEVEL: AFTER AMBULATING 2 FLIGHTS OF STAIRS

## 2021-08-10 ASSESSMENT — PAIN DESCRIPTION - PAIN TYPE: TYPE: CHRONIC PAIN

## 2021-08-10 ASSESSMENT — PAIN SCALES - GENERAL
PAINLEVEL_OUTOF10: 6
PAINLEVEL_OUTOF10: 0
PAINLEVEL_OUTOF10: 6
PAINLEVEL_OUTOF10: 6

## 2021-08-10 ASSESSMENT — PAIN DESCRIPTION - LOCATION: LOCATION: SHOULDER

## 2021-08-10 ASSESSMENT — LIFESTYLE VARIABLES: SMOKING_STATUS: 1

## 2021-08-10 NOTE — ANESTHESIA POSTPROCEDURE EVALUATION
Department of Anesthesiology  Postprocedure Note    Patient: Soraya Salmon  MRN: [id number]  YOB: 1955  Date of evaluation: 8/10/2021    Procedure Summary     Date: 08/10/21 Room / Location: [facility] Cardiac Cath Lab    Anesthesia Start: 0900 Anesthesia Stop: 4568    Procedure: PTCA/STENT Diagnosis:       Atherosclerotic heart disease of native coronary artery without angina pectoris      CAD in native artery      Atherosclerotic heart disease of native coronary artery without angina pectoris    Scheduled Providers:  Responsible Provider: Velia Freitas MD    Anesthesia Type: general ASA Status: 3        Anesthesia Type: general    Andres Phase I: Andres Score: 9    Andres Phase II:      Last vitals: Reviewed and per EMR flowsheets.      Anesthesia Post Evaluation   Anesthetic Problems: no   Cardiovascular System Stable: yes  Respiratory Function: Airway Patent yes  ETT no  Ventilator no  Level of consciousness: awake, alert and oriented  Post-op pain: adequate analgesia  Hydration Adequate: yes  Nausea/Vomiting:no  Other Issues:     Marlee Jimenez MD

## 2021-08-10 NOTE — PROGRESS NOTES
Patient to PACU from Cath lab. Report received from Yue Miller and CRNA Aj. Patient stable on 2L of oxygen per NC. Patient with eyes open, alert and oriented, IVF infusing. Patient denies pain when assessed. Visibly shaking-patient reports feeling cold. Warming blanket applied for comfort. VS obtained and filed. Will continue to monitor.

## 2021-08-10 NOTE — ANESTHESIA PRE PROCEDURE
Department of Anesthesiology  Preprocedure Note       Name:  Maurilio Anderson   Age:  60 y.o.  :  1955                                          MRN:  [id number]         Date:  8/10/2021      Surgeon: Kinjal Estimable    Procedure: PCI    HPI:  59 y/o male with recent MI and emergency PCI/Stent. CAD: stable, no angina; s/p SAMM RCA 2021; s/p thrombectomy and SAMM x2 in the setting of STEMI RCA 2021; s/p PCI RCA   Ischemic cardiomyopathy: improved, EF 55% on echo 2021 from EF 45-50% on echo 2021  HTN: stable  HLD: controlled, LDL 45  History of remote TAA repair    Medications prior to admission:    oxyCODONE-acetaminophen (PERCOCET) 5-325 MG per tablet Take 1 tablet by mouth every 8 hours as needed for Pain.     BRILINTA 90 MG TABS tablet TAKE 1 TABLET BY MOUTH TWO TIMES A DAY   pantoprazole (PROTONIX) 40 MG tablet Take 1 tablet by mouth every morning (before breakfast)   metoclopramide (REGLAN) 5 MG tablet Take 1 tablet by mouth 3 times daily   aspirin 81 MG chewable tablet Take 1 tablet by mouth daily   atorvastatin (LIPITOR) 80 MG tablet Take 1 tablet by mouth nightly   losartan (COZAAR) 25 MG tablet Take 1 tablet by mouth daily   carvedilol (COREG) 6.25 MG tablet Take 1 tablet by mouth 2 times daily (with meals)   umeclidinium-vilanterol (ANORO ELLIPTA) 62.5-25 MCG/INH AEPB inhaler Inhale 1 puff into the lungs daily   albuterol sulfate HFA (VENTOLIN HFA) 108 (90 Base) MCG/ACT inhaler Inhale 2 puffs into the lungs every 6 hours as needed for Wheezing or Shortness of Breath   albuterol (PROVENTIL) (2.5 MG/3ML) 0.083% nebulizer solution Take 3 mLs by nebulization every 6 hours as needed for Wheezing     Allergies:  No Known Allergies    Problem List:     Moderate COPD (chronic obstructive pulmonary disease) (HCC)    Cigarette nicotine dependence without complication    Acute myocardial infarction (HCC)    STEMI (ST elevation myocardial infarction) (HCC)    Severe malnutrition (HCC)    Coronary artery disease involving native coronary artery of native heart with angina pectoris ([facility]    H/O thoracic aortic aneurysm repair    Weight loss    CAD in native artery     Past Medical History:     CAD (coronary artery disease) 2013    Cardiomyopathy ([facility] 2021    EF= 45%    [facility]     Heart attack [facility] 2013    3/28/2013 and 2021    High cholesterol     HTN (hypertension)     Hypertension      Past Surgical History:     COLONOSCOPY N/A 3/22/2021    COLONOSCOPY WITH BIOPSY performed by Levar [facility] MD at [facility]  2021    SAMM- 3.0 x 38 and 3.0 x 34 to pRCA    CORONARY ANGIOPLASTY WITH STENT PLACEMENT  2013    SAMM- 3.5 x 18 to RCA    DIAGNOSTIC CARDIAC CATH LAB PROCEDURE  12/10/2019    Non Obs CAD    THORACIC AORTIC ANEURYSM REPAIR  2000    UPPER GASTROINTESTINAL ENDOSCOPY N/A 3/19/2021    EGD BIOPSY performed by Levar Rosa MD at [address] [place] [address] History:     Smoking status: Former Smoker     Packs/day: 2.00     Years: 50.00     Pack years: 100.00     Types: Cigarettes     Quit date: 2021     Years since quittin.1    Smokeless tobacco: Never Used   Substance Use Topics    Alcohol use: Never                                Counseling given: Not Answered    Vital Signs (Current): Pulse 62  R 14  SpO2 95%  Height: 5' 9\" (1.753 m)  (08/10/21) Weight: 128 lb (58.1 kg)  (08/10/21)   BMI: 18.9           BP Readings from Last 3 Encounters:   21 116/72   21 130/86   21 (!) 104/58     NPO Status: >8 hrs                      BMI:   Wt Readings from Last 3 Encounters:   08/10/21 128 lb (58.1 kg)   21 131 lb 3.2 oz (59.5 kg)   21 134 lb (60.8 kg)     Body mass index is 18.9 kg/m².     CBC:    WBC 10.2 08/10/2021    HGB 12.9 08/10/2021    HCT 37.7 08/10/2021     08/10/2021     CMP:     2021    K 4.0 2021     2021    CO2 20 07/01/2021    BUN 13 07/01/2021    CREATININE 1.2 07/01/2021    GLUCOSE 94 07/01/2021    PROT 7.7 03/22/2021    CALCIUM 8.8 07/01/2021    BILITOT 0.4 03/22/2021    ALKPHOS 99 03/22/2021    AST 20 03/22/2021    ALT 7 03/22/2021     Coags:    PROTIME 13.2 03/18/2021    INR 1.14 03/18/2021     COVID-19 Screening (If Applicable): COVID19 Not Detected 03/15/2021     Anesthesia Evaluation  Patient summary reviewed and Nursing notes reviewed no history of anesthetic complications:   Airway: Mallampati: II  TM distance: >3 FB   Neck ROM: full  Comment: Short cropped beard  Mouth opening: > = 3 FB Dental:          Pulmonary: breath sounds clear to auscultation  (+) COPD:  shortness of breath: chronic and no interval change,  current smoker    (-) sleep apnea and wheezes                           Cardiovascular:  Exercise tolerance: good (>4 METS),   (+) hypertension:, past MI: 1-6 months, CAD: obstructive, VALLES: after ambulating 2 flights of stairs and no interval change, hyperlipidemia    (-) murmur    ECG reviewed  Rhythm: regular  Rate: normal  Echocardiogram reviewed                  Neuro/Psych:      (-) seizures, TIA and CVA           GI/Hepatic/Renal:   (+) renal disease: CRI,      (-) GERD, hepatitis and liver disease       Endo/Other:        (-) diabetes mellitus, hypothyroidism               Abdominal:             Vascular:     - DVT and PE. Other Findings:           Anesthesia Plan      general     ASA 3       Induction: intravenous. MIPS: Prophylactic antiemetics administered. Anesthetic plan and risks discussed with patient. Plan discussed with CRNA.             Jim Funez MD

## 2021-08-10 NOTE — PROGRESS NOTES
Removed 2CC of air from TR band. Due to bleeding at site, [facility] HSPTL of air was placed back into band. Will monitor.

## 2021-08-10 NOTE — H&P
Brief Pre-Op Note/Sedation Assessment      Madison Win  1955  Cath Pool Rm/NONE      [id number]  9:13 AM    Planned Procedure: Cardiac Catheterization Procedure    Post Procedure Plan: Return to same level of care    Consent: I have discussed with the patient and/or the patient representative the indication, alternatives, and the possible risks and/or complications of the planned procedure and the anesthesia methods. The patient and/or patient representative appear to understand and agree to proceed. DISCUSSION OF CARDIAC CATHETERIZATION PROCEDURES: The procedures, indications, risks and alternatives have been discussed with the patient and, as appropriate, with the patient's guardian . Risks discussed included, but are not limited to, bleeding, development of blood clots/emboli, damage to blood vessels, renal failure, malignant cardiac arrhythmias, stroke, heart attack, emergent coronary bypass surgery, death, dye allergy. The patient (and guardian as appropriate) expressed understanding of the aforementioned and wished to proceed. Chief Complaint: Dyspnea  Fatigue      Indications for Cath Procedure: Worsening Angina  Anginal Classification within 2 weeks:  CCS III - Symptoms with everyday living activities, i.e., moderate limitation  NYHA Heart Failure Class within 2 weeks: Class III - Symptoms of HF on less-than-ordinary exertion, Newly Diagnosed?  No, Heart Failure Type: Diastolic  Is Cath Lab Visit Valve-related?: No  Surgical Risk: N/A  Functional Type: N/A    Anti- Anginal Meds within 2 weeks:   Yes: Beta Blockers, Aspirin and Statin (Any)    Stress or Imaging Studies Performed (within 6 months):  Stress Test with SPECT Result: Positive:  inferior Risk/Extent of Ischemia:  Low      Vital Signs:  Ht 5' 9\" (1.753 m)   Wt 128 lb (58.1 kg)   BMI 18.90 kg/m²     156/70  55    Allergies:  No Known Allergies    Past Medical History:  Past Medical History:   Diagnosis Date    CAD (coronary artery disease) 03/28/2013    Cardiomyopathy ([facility] [address] 02/22/2021    EF= 45%    Emphysema of lung ([facility] [address]     Heart attack ([facility] [address] 03/28/2013    3/28/2013 and 2/22/2021    High cholesterol     HTN (hypertension)     Hypertension          Surgical History:  Past Surgical History:   Procedure Laterality Date    COLONOSCOPY N/A 3/22/2021    COLONOSCOPY WITH BIOPSY performed by Madeleine Shrestha MD at [address] [facility] [address]  02/22/2021    SAMM- 3.0 x 38 and 3.0 x 34 to pRCA    CORONARY ANGIOPLASTY WITH STENT PLACEMENT  03/28/2013    SAMM- 3.5 x 18 to RCA    DIAGNOSTIC CARDIAC CATH LAB PROCEDURE  12/10/2019    Non Obs CAD    THORACIC AORTIC ANEURYSM REPAIR  2000    UPPER GASTROINTESTINAL ENDOSCOPY N/A 3/19/2021    EGD BIOPSY performed by Madeleine Shrestha MD at [address] [facility]         Medications:  Current Facility-Administered Medications   Medication Dose Route Frequency Provider Last Rate Last Admin    meperidine (DEMEROL) injection 12.5 mg  12.5 mg Intravenous Q5 Min PRN Zak Orosco MD        fentaNYL (SUBLIMAZE) injection 25 mcg  25 mcg Intravenous Q5 Min PRN Zak Orosco MD        HYDROmorphone (DILAUDID) injection 0.5 mg  0.5 mg Intravenous Q5 Min PRN Zak Orosco MD        HYDROmorphone (DILAUDID) injection 0.25 mg  0.25 mg Intravenous Q5 Min PRN Zak Orosco MD        HYDROmorphone (DILAUDID) injection 0.5 mg  0.5 mg Intravenous Q5 Min PRN Zak Orosco MD        oxyCODONE-acetaminophen (PERCOCET) 5-325 MG per tablet 1 tablet  1 tablet Oral PRN Zak Orosco MD        Or    oxyCODONE-acetaminophen (PERCOCET) 5-325 MG per tablet 2 tablet  2 tablet Oral PRN Zak Orosco MD        [facility]dansetron [facility]) injection 4 mg  4 mg Intravenous Once PRN Zak Orosco MD        promethazine [facility]) injection 6.25 mg  6.25 mg Intramuscular Once PRN Zak Orosco MD        hydrALAZINE (APRESOLINE) injection 5 mg  5 mg Intravenous Q10 Min PRN Bernadette Esteban MD               Pre-Sedation:    Pre-Sedation Documentation and Exam:  I have assessed the patient and agree with the H&P present on the chart. Prior History of Anesthesia Complications:   none    Modified Mallampati:  III (soft palate, base of uvula visible)    ASA Classification:  Class 3 - A patient with severe systemic disease that limits activity but is not incapacitating      Andres Scale: Activity:  2 - Able to move 4 extremities voluntarily on command  Respiration:  2 - Able to breathe deeply and cough freely  Circulation:  2 - BP+/- 20mmHg of normal  Consciousness:  2 - Fully awake  Oxygen Saturation (color):  2 - Able to maintain oxygen saturation >92% on room air    Sedation/Anesthesia Plan:  Guard the patient's safety and welfare. Minimize physical discomfort and pain. Minimize negative psychological responses to treatment by providing sedation and analgesia and maximize the potential amnesia. Patient to meet pre-procedure discharge plan. Medication Planned:    As per anesthesiology service.       Electronically signed by Jeffrey Awad MD on 8/10/2021 at 9:13 AM

## 2021-08-10 NOTE — PROCEDURES
CARDIAC CATHETERIZATION REPORT     Procedure Date:  8/10/2021  Patient Name: Juliet Dash  MRN: [id number] : 1955      INDICATION     Class III angina  Ischemic cardiomyopathy      PROCEDURES PERFORMED     Left heart catheterization  LVgram  Coronary angiogam  Coronary cath  Monitoring of moderate conscious sedation    Atherectomy of LAD  Atherectomy of D2  PCI of LAD with single drug-eluting stent  PCI of D2 with single drug-eluting stent      PROCEDURE DESCRIPTION   Risks/benefits/alternatives/outcomes were discussed with patient and/or family and informed consent was obtained. Using the [facility] scale, the patient's right radial artery was found to be a level B. Patient was prepped draped in the usual sterile fashion. Local anaesthetic was applied over puncture site. Using a back wall technique, a 6 Chinese Terumo sheath was inserted into right radial artery. Verapamil, nitroglycerin, nicardipine were administered through the sheath. There was radial spasm noted which responded to intra-arterial vasodilators. Heparin was administered. Diagnostic 5 Persian pigtail, ultra catheters were used for diagnostic angiograms. Attention then turned towards PCI as noted below. At the conclusion of the procedure, a TR band was placed over the puncture site and hemostasis was obtained. There were no immediate complications. Sedation was provided by the anesthesiology service. 333 cc of contrast was utilized. <20cc EBL. FINDINGS       LVGRAM    LVEDP , 9   GRADIENT ACROSS AORTIC VALVE  none   LV FUNCTION EF 60%   WALL MOTION  normal   MITRAL REGURGITATION  mild         CORONARY ARTERIES    LM Less than 10% [id number] stenosis         LAD Proximal 10 to 20% stenosis, mid 75 to 80% stenosis. Mid to distal vessel bridging with 40 to 50% stenosis noted. D1 is a small to medium size vessel with proximal-mid 50 to 60% stenosis.   D2 is the largest diagonal branch which is nearly the same size as compared to the LAD. There is proximal-mid 75% stenosis. LCX Less than 10% proximal stenosis, mid 20 to 30% stenosis. RCA Large vessel, dominant, this vessels been stented in the [id number] segments, there is less than 10% stenosis noted proximally, there is mid vessel 20 to 30% stenosis, distal less than 10% stenosis. PERCUTANEOUS INTERVENTION DESCRIPTION     Heparin was used for anticoagulation, patient was already on Brilinta, was given additional 90 mg of Brilinta. During the procedure, Integrilin was given as patient had branch vessel disease and there was stenting just into the LAD. A 6 Cypriot XB 3.5 guiding catheter was used to intubate the left main. There was radial spasm noted and this guide was able to be slowly advanced through the radial artery and was able to be utilized to engage the left main. A choice floppy wire was used to cross the lesion in the LAD. IVUS was performed of the LAD which showed a severe stenosis in the mid vessel with mild to moderate calcification with minimal luminal diameter of 3 to 3.25 mm. .  Lesion was then treated with atherectomy 2.5 mm and 3 mm cutting balloons. Lesion was then stented with an Abbott Xience Nancy 3 x 12 mm drug-eluting stent. Stent was postdilated with 3 mm and 3.25 mm noncompliant balloons. Follow-up IVUS showed good stent apposition/expansion. Attention then turned towards D2, using same equipment, the lesion and D2 was treated with a 2.5 mm cutting balloon for atherectomy. Lesion was then stented after IVUS was performed which showed minimal luminal diameter of 3 mm. Stenting was performed with Peña Xience Nancy 3 x 50 mm drug-eluting stent. Stent was postdilated with 3 mm noncompliant balloon. Follow-up IVUS showed good stent apposition/expansion.   There was a very small area of protrusion of the stent into the LAD however this appeared to be less than 1 mm and as such, further intervention was not felt to be needed. There was no obstruction of flow into the LAD and angiographically the result appeared to be good. CONCLUSIONS:     Successful LAD PCI with single drug-eluting stent  Successful D2 PCI with single drug-eluting stent    Encouraged smoking cessation, emphasized medication compliance to patient and family  Patient/family understand that further PCI (including bifurcation PCI) may be warranted without smoking cessation, that ultimately CABG may be needed if he has persistent CAD with restenosis in the future.

## 2021-08-10 NOTE — PROGRESS NOTES
2CC of air removed from R Radial TR Band. Patient without complaints. Stable on room air. Patient denies additional needs at this time. Site to Right Radial dry, clean and intact.

## 2021-08-11 VITALS
SYSTOLIC BLOOD PRESSURE: 152 MMHG | WEIGHT: 128.8 LBS | HEIGHT: 69 IN | OXYGEN SATURATION: 97 % | DIASTOLIC BLOOD PRESSURE: 82 MMHG | BODY MASS INDEX: 19.08 KG/M2 | HEART RATE: 75 BPM | TEMPERATURE: 98.4 F | RESPIRATION RATE: 15 BRPM

## 2021-08-11 LAB
ANION GAP SERPL CALCULATED.3IONS-SCNC: 10 MMOL/L (ref 3–16)
BUN BLDV-MCNC: 11 MG/DL (ref 7–20)
CALCIUM SERPL-MCNC: 8.7 MG/DL (ref 8.3–10.6)
CHLORIDE BLD-SCNC: 109 MMOL/L (ref 99–110)
CO2: 19 MMOL/L (ref 21–32)
CREAT SERPL-MCNC: 1.1 MG/DL (ref 0.8–1.3)
EKG ATRIAL RATE: 59 BPM
EKG ATRIAL RATE: 61 BPM
EKG ATRIAL RATE: 65 BPM
EKG DIAGNOSIS: NORMAL
EKG P AXIS: 66 DEGREES
EKG P AXIS: 74 DEGREES
EKG P-R INTERVAL: 148 MS
EKG P-R INTERVAL: 164 MS
EKG P-R INTERVAL: 168 MS
EKG Q-T INTERVAL: 394 MS
EKG Q-T INTERVAL: 404 MS
EKG Q-T INTERVAL: 414 MS
EKG QRS DURATION: 76 MS
EKG QRS DURATION: 88 MS
EKG QRS DURATION: 88 MS
EKG QTC CALCULATION (BAZETT): 396 MS
EKG QTC CALCULATION (BAZETT): 409 MS
EKG QTC CALCULATION (BAZETT): 420 MS
EKG R AXIS: -27 DEGREES
EKG R AXIS: -32 DEGREES
EKG R AXIS: 8 DEGREES
EKG T AXIS: 51 DEGREES
EKG T AXIS: 52 DEGREES
EKG T AXIS: 63 DEGREES
EKG VENTRICULAR RATE: 59 BPM
EKG VENTRICULAR RATE: 61 BPM
EKG VENTRICULAR RATE: 65 BPM
GFR AFRICAN AMERICAN: >60
GFR NON-AFRICAN AMERICAN: >60
GLUCOSE BLD-MCNC: 81 MG/DL (ref 70–99)
HCT VFR BLD CALC: 31.8 % (ref 40.5–52.5)
HEMOGLOBIN: 10.9 G/DL (ref 13.5–17.5)
MCH RBC QN AUTO: 30.6 PG (ref 26–34)
MCHC RBC AUTO-ENTMCNC: 34.3 G/DL (ref 31–36)
MCV RBC AUTO: 89.1 FL (ref 80–100)
PDW BLD-RTO: 14.9 % (ref 12.4–15.4)
PLATELET # BLD: 206 K/UL (ref 135–450)
PMV BLD AUTO: 6.6 FL (ref 5–10.5)
POC ACT LR: 293 SEC
POC ACT LR: 327 SEC
POC ACT LR: 342 SEC
POTASSIUM SERPL-SCNC: 4.2 MMOL/L (ref 3.5–5.1)
RBC # BLD: 3.57 M/UL (ref 4.2–5.9)
SODIUM BLD-SCNC: 138 MMOL/L (ref 136–145)
WBC # BLD: 7.6 K/UL (ref 4–11)

## 2021-08-11 PROCEDURE — 80048 BASIC METABOLIC PNL TOTAL CA: CPT

## 2021-08-11 PROCEDURE — 85027 COMPLETE CBC AUTOMATED: CPT

## 2021-08-11 PROCEDURE — 99238 HOSP IP/OBS DSCHRG MGMT 30/<: CPT | Performed by: NURSE PRACTITIONER

## 2021-08-11 PROCEDURE — G0378 HOSPITAL OBSERVATION PER HR: HCPCS

## 2021-08-11 PROCEDURE — 93010 ELECTROCARDIOGRAM REPORT: CPT | Performed by: INTERNAL MEDICINE

## 2021-08-11 PROCEDURE — 6370000000 HC RX 637 (ALT 250 FOR IP): Performed by: INTERNAL MEDICINE

## 2021-08-11 PROCEDURE — 36415 COLL VENOUS BLD VENIPUNCTURE: CPT

## 2021-08-11 PROCEDURE — 93005 ELECTROCARDIOGRAM TRACING: CPT | Performed by: INTERNAL MEDICINE

## 2021-08-11 RX ORDER — ATORVASTATIN CALCIUM 80 MG/1
80 TABLET, FILM COATED ORAL NIGHTLY
Qty: 30 TABLET | Refills: 11 | Status: SHIPPED | OUTPATIENT
Start: 2021-08-11 | End: 2022-08-31

## 2021-08-11 RX ADMIN — CARVEDILOL 6.25 MG: 6.25 TABLET, FILM COATED ORAL at 08:39

## 2021-08-11 RX ADMIN — OXYCODONE HYDROCHLORIDE AND ACETAMINOPHEN 1 TABLET: 5; 325 TABLET ORAL at 08:45

## 2021-08-11 RX ADMIN — PANTOPRAZOLE SODIUM 40 MG: 40 TABLET, DELAYED RELEASE ORAL at 07:10

## 2021-08-11 RX ADMIN — TICAGRELOR 90 MG: 90 TABLET ORAL at 08:38

## 2021-08-11 RX ADMIN — LOSARTAN POTASSIUM 25 MG: 25 TABLET, FILM COATED ORAL at 08:39

## 2021-08-11 RX ADMIN — ASPIRIN 81 MG: 81 TABLET, CHEWABLE ORAL at 08:39

## 2021-08-11 ASSESSMENT — PAIN SCALES - GENERAL
PAINLEVEL_OUTOF10: 7
PAINLEVEL_OUTOF10: 5

## 2021-08-11 NOTE — PLAN OF CARE
Problem: Pain:  Goal: Pain level will decrease  Description: Pain level will decrease  Outcome: Ongoing  Note: Pt given prn pain medication. Goal: Control of acute pain  Description: Control of acute pain  Outcome: Ongoing  Goal: Control of chronic pain  Description: Control of chronic pain  Outcome: Ongoing  Note: Pt given prn pain medication. Problem: Falls - Risk of:  Goal: Will remain free from falls  Description: Will remain free from falls  Outcome: Ongoing  Note: Pt told to use call light to get up. Goal: Absence of physical injury  Description: Absence of physical injury  Outcome: Ongoing  Note: Bed alarm on. Call light given. Belongings given to pt. Problem: Cardiac:  Goal: Ability to maintain vital signs within normal range will improve  Description: Ability to maintain vital signs within normal range will improve  Outcome: Ongoing  Note: VSS  Goal: Cardiovascular alteration will improve  Description: Cardiovascular alteration will improve  Outcome: Ongoing  Note: Pt denies chest pain.

## 2021-08-11 NOTE — DISCHARGE SUMMARY
[address]  Discharge Summary  Patient ID:  Mirna Schwab  [id number] 24 y.o. 1955    Admit date: 8/10/2021    Discharge date: 8/11/2021    Admitting Physician: Dino Hurst MD     Discharge Provider: Cassi Hyatt, [facility] Nichole Course: Mirna Schwab presented 8/10/2021 for staged PCI LAD. He had SAMM LAD and SAMM Diag 2. No post procedure complications, rhythm overnight has been sinus. This morning he denies chest pain, shortness of breath, palpitations and dizziness. He has tolerated diet, ambulated, and voided without difficulty. Assessment:  CAD: stable, no angina; s/p SAMM LAD and SAMM Diag 2 8/10/2021; s/p SAMM RCA 6/30/2021; s/p thrombectomy and SAMM x2 in the setting of STEMI RCA 2/22/2021; s/p PCI RCA 2012  Ischemic cardiomyopathy: improved, EF 55% on echo 5/2021 from EF 45-50% on echo 2/2021  HTN: stable  HLD: controlled, LDL 53, continue statin  History of remote TAA repair    Plan:  1. Activity restrictions reviewed  2. Smoking cessation  3. Continue aspirin, statin, brilinta, carvedilol, losartan  4. Check BP at home and call the office if consistently out of goal range  5.  Follow up 8/27/2021    Admission Diagnoses: Atherosclerotic heart disease of native coronary artery without angina pectoris [I25.10]  CAD in native artery [I25.10]    Discharge Diagnoses:   Patient Active Problem List   Diagnosis    Moderate COPD (chronic obstructive pulmonary disease) (HCC)    Cigarette nicotine dependence without complication    Acute myocardial infarction ([address]    STEMI (ST elevation myocardial infarction) ([address]    Severe malnutrition ([address]    Coronary artery disease involving native coronary artery of native heart with angina pectoris ([address]    H/O thoracic aortic aneurysm repair    Weight loss    CAD in native artery      Discharged Condition: stable  The patient was seen and examined on day of discharge and this discharge summary is in conjunction with any daily progress note from day of discharge. Consults:  IP CONSULT TO CARDIAC REHAB  Physical Exam:  /78   Pulse 66   Temp 98.1 °F (36.7 °C) (Oral)   Resp 16   Ht 5' 9\" (1.753 m)   Wt 128 lb 12.8 oz (58.4 kg)   SpO2 97%   BMI 19.02 kg/m²       Intake/Output Summary (Last 24 hours) at 8/11/2021 0840  Last data filed at 8/11/2021 0445  Gross per 24 hour   Intake 1000 ml   Output 500 ml   Net 500 ml     General:  Awake, alert, NAD  Skin:  Warm and dry  Neck:  JVD<8, no bruit  Chest:  Clear to auscultation, no wheezes/rhonchi/rales  Cardiovascular:  RRR S1S2  Abdomen:  Soft, nontender, +bowel sounds  Extremities:  No edema  Right radial site soft, no hematoma, 2+ pulse    Significant Diagnostic Studies:     Coronary angiogram 8/10/2021:  Class III angina  Ischemic cardiomyopathy  PROCEDURES PERFORMED    Left heart catheterization  LVgram  Coronary angiogam  Coronary cath  Monitoring of moderate conscious sedation   Atherectomy of LAD  Atherectomy of D2  PCI of LAD with single drug-eluting stent  PCI of D2 with single drug-eluting stent  PROCEDURE DESCRIPTION   Risks/benefits/alternatives/outcomes were discussed with patient and/or family and informed consent was obtained. Using the [facility] scale, the patient's right radial artery was found to be a level B. Patient was prepped draped in the usual sterile fashion. Local anaesthetic was applied over puncture site. Using a back wall technique, a 6 Swedish Terumo sheath was inserted into right radial artery. Verapamil, nitroglycerin, nicardipine were administered through the sheath. There was radial spasm noted which responded to intra-arterial vasodilators. Heparin was administered. Diagnostic 5 Polish pigtail, ultra catheters were used for diagnostic angiograms. Attention then turned towards PCI as noted below. At the conclusion of the procedure, a TR band was placed over the puncture site and hemostasis was obtained. There were no immediate complications.   Sedation was provided by the anesthesiology service. 333 cc of contrast was utilized. <20cc EBL. FINDINGS     LVEDP , 9   GRADIENT ACROSS AORTIC VALVE  none   LV FUNCTION EF 60%   WALL MOTION  normal   MITRAL REGURGITATION  mild     LM Less than 10% dgxnriuf-rnr-ygjixa stenosis            LAD Proximal 10 to 20% stenosis, mid 75 to 80% stenosis. Mid to distal vessel bridging with 40 to 50% stenosis noted.     D1 is a small to medium size vessel with proximal-mid 50 to 60% stenosis. D2 is the largest diagonal branch which is nearly the same size as compared to the LAD. There is proximal-mid 75% stenosis.       LCX Less than 10% proximal stenosis, mid 20 to 30% stenosis.       RCA Large vessel, dominant, this vessels been stented in the pxsbsajx-aeq-wboxyy segments, there is less than 10% stenosis noted proximally, there is mid vessel 20 to 30% stenosis, distal less than 10% stenosis. PERCUTANEOUS INTERVENTION DESCRIPTION    Heparin was used for anticoagulation, patient was already on Brilinta, was given additional 90 mg of Brilinta. During the procedure, Integrilin was given as patient had branch vessel disease and there was stenting just into the LAD.   A 6 Western Nimo XB 3.5 guiding catheter was used to intubate the left main. There was radial spasm noted and this guide was able to be slowly advanced through the radial artery and was able to be utilized to engage the left main. A choice floppy wire was used to cross the lesion in the LAD. IVUS was performed of the LAD which showed a severe stenosis in the mid vessel with mild to moderate calcification with minimal luminal diameter of 3 to 3.25 mm. .  Lesion was then treated with atherectomy 2.5 mm and 3 mm cutting balloons. Lesion was then stented with an Abbott Xience Nancy 3 x 12 mm drug-eluting stent. Stent was postdilated with 3 mm and 3.25 mm noncompliant balloons.   Follow-up IVUS showed good stent apposition/expansion.   Attention then turned towards D2, using same equipment, the lesion and D2 was treated with a 2.5 mm cutting balloon for atherectomy. Lesion was then stented after IVUS was performed which showed minimal luminal diameter of 3 mm. Stenting was performed with Peña Xience Nancy 3 x 50 mm drug-eluting stent. Stent was postdilated with 3 mm noncompliant balloon. Follow-up IVUS showed good stent apposition/expansion. There was a very small area of protrusion of the stent into the LAD however this appeared to be less than 1 mm and as such, further intervention was not felt to be needed. There was no obstruction of flow into the LAD and angiographically the result appeared to be good. CONCLUSIONS:    Successful LAD PCI with single drug-eluting stent  Successful D2 PCI with single drug-eluting stent   Encouraged smoking cessation, emphasized medication compliance to patient and family  Patient/family understand that further PCI (including bifurcation PCI) may be warranted without smoking cessation, that ultimately CABG may be needed if he has persistent CAD with restenosis in the future. Coronary angiogram 6/30/2021:  Class III angina  Abnormal stress test, low risk  Two-vessel CAD  PROCEDURES PERFORMED    Left heart catheterization  LVgram  Coronary angiogam  Coronary cath  Monitoring of moderate conscious sedation  IVUS of RCA  Atherectomy of RCA  PCI of RCA with single drug-eluting stent  PROCEDURE DESCRIPTION   Risks/benefits/alternatives/outcomes were discussed with patient and/or family and informed consent was obtained.  Using the [place] scale, the patient's right radial artery was found to be a level B.  Patient was prepped draped in the usual sterile fashion.  Local anaesthetic was applied over puncture site.  Using ultrasound guidance and a back wall technique with a small 21-gauge Angiocath, a 4/5 Danish Terumo sheath was inserted into right radial artery.  Verapamil, nitroglycerin, nicardipine were administered through the sheath.  There was radial spasm and a small radial artery which was superficial which made access difficult.  As such, additional vasodilators were delivered more proximally with a glide catheter.  Heparin was administered.  Initially 5 [place]ar[place]uan catheters were taken for diagnostic angiography however due to radial spasm, this was abandoned as 4 Western Nimo catheter was then taken and these passed easily for diagnostic angiography including pigtail for left trigger angiogram, JL 3.5 for left-sided coronary geography.  JL 3.5 was able to be swung over to RCA for RCA angiography.  Attention then turned towards PCI as noted below.  At the conclusion of the procedure, a TR band was placed over the puncture site and hemostasis was obtained. Hermilo Leung were no immediate complications. I supervised sedation with versed 8 mg/fentanyl 300 mcg during the procedure.  230 cc contrast was utilized. <20cc EBL. FINDINGS      LVEDP  10   GRADIENT ACROSS AORTIC VALVE  none   LV FUNCTION EF 60%   WALL MOTION  normal   MITRAL REGURGITATION  mild      LM Less than 10% evjhmxnl-xbt-jacsnk stenosis            LAD Proximal 10 to 20% stenosis, mid 70 to 75% stenosis.  Mid-distal bridging is noted with 10 to 20% stenosis.     D1 is a medium to large size vessel with proximal-mid 60 to 70% stenosis.       LCX  10% proximal stenosis, mid 20 to 30% stenosis.          RCA Dominant, large vessel, proximal pressure damping and ventricularization is noted with diagnostic catheters and there is proximal 60% to 70% stenosis followed by mid 80% stenosis.  Stents are noted throughout the zcbjgwng-yta-cizvjc vessel.  Distally there is less than 10% stenosis.      PERCUTANEOUS INTERVENTION DESCRIPTION    The initial 4/5 [place]araguan Terumo slender sheath was upsized to a 5/6 Western Nimo Terumo slender sheath. Hermilo Leung was radial spasm noted which was treated with vasodilators.  Initially a 6 [place] guiding catheter was taken but this would not advance easily into the radial and brachial arteries and as such the guide was removed and then using a 0.014 inch choice wire along with a 2 mm balloon, the guide was able to be advanced using a balloon-assisted tracking technique.  The guide was then able to be advanced to the axillary artery and the balloon and a 0.014 inch wire was removed in favor of a Ilya wire.  With that, the guide was able to be advanced to the RCA ostium and used to engage it. Nirav Helms was used for anticoagulation.  Boluses of Integrilin were given.  Patient was already on Brilinta and additional Brilinta was given at the end of the case. choice floppy wire was used to cross the lesions in the RCA.  IVUS was performed which showed extensive in-stent restenosis with smooth muscle proliferation in the proximal and mid segments with 75 to 85% stenosis in the proximal-mid segments.  As such lesions were treated with atherectomy with 3 mm cutting balloon.  Lesions were then treated with stenting with Abbott Xience Nancy 3.5 x 38 mm drug-eluting stent is initially a 3 x 38 mm drug stent would not cross properly.  The 3.5 mm stent was then implanted just up to the ostium stent was then postdilated with 3.5 and 4 mm noncompliant balloons.  3.5 mm ostial flash balloon was additionally utilized at the ostium.  Just at the ostium 4.5 mm noncompliant balloon was also utilized.  Follow-up IVUS showed good stent apposition/expansion. Chio Tanna was 0% residual stenosis.  There was RADHA 3 flow before and after PCI.     CONCLUSIONS:    Successful PCI of RCA with single drug-eluting stent  Consider staged PCI of LAD, may need groin approach and/or anesthesia due to radial spasm.   Encouraged smoking cessation, discussed with patient and family that additional PCI including atherectomy versus CABG may be needed if patient continues to have in-stent restenosis.     Echo 5/2021:  Left ventricular systolic function is normal with ejection fraction   estimated at 55-60 %.   No regional wall motion abnormalities are noted.   Left ventricular size is decreased.   There is mild concentric left ventricular hypertrophy.   Normal left ventricular diastolic filling pressure.   Normal systolic pulmonary artery pressure (SPAP) estimated at 32 mmHg (RA   pressure 3 mmHg).  Mild pulmonic regurgitation present.   2- 45-50% EF with inferolateral, inf and basal inf hypokinesis.  mild   MR    Labs:   Lab Results   Component Value Date    CREATININE 1.1 08/11/2021    BUN 11 08/11/2021     08/11/2021    K 4.2 08/11/2021     08/11/2021    CO2 19 (L) 08/11/2021      Lab Results   Component Value Date    WBC 7.6 08/11/2021    HGB 10.9 (L) 08/11/2021    HCT 31.8 (L) 08/11/2021    MCV 89.1 08/11/2021     08/11/2021      Lab Results   Component Value Date    INR 1.14 03/18/2021    PROTIME 13.2 03/18/2021    No results found for: BNP    Disposition: home    Patient Instructions:    Catarina Horta   Home Medication Instructions UAR:[id number]    Printed on:08/11/21 0840   Medication Information                      albuterol (PROVENTIL) (2.5 MG/3ML) 0.083% nebulizer solution  Take 3 mLs by nebulization every 6 hours as needed for Wheezing             albuterol sulfate HFA (VENTOLIN HFA) 108 (90 Base) MCG/ACT inhaler  Inhale 2 puffs into the lungs every 6 hours as needed for Wheezing or Shortness of Breath             aspirin 81 MG chewable tablet  Take 1 tablet by mouth daily             atorvastatin (LIPITOR) 80 MG tablet  Take 1 tablet by mouth nightly             BRILINTA 90 MG TABS tablet  TAKE 1 TABLET BY MOUTH TWO TIMES A DAY             carvedilol (COREG) 6.25 MG tablet  Take 1 tablet by mouth 2 times daily (with meals)             losartan (COZAAR) 25 MG tablet  Take 1 tablet by mouth daily             metoclopramide (REGLAN) 5 MG tablet  Take 1 tablet by mouth 3 times daily             oxyCODONE-acetaminophen (PERCOCET) 5-325 MG per tablet  Take 1 tablet by mouth every 8 hours as needed for

## 2021-08-11 NOTE — PROGRESS NOTES
Patient discharge completed. Discharge information included information on diagnosis including signs and symptoms, complications and when to seek medical attention. Patient verbalized understanding of all discharge information. Patient escorted out by staff with all documented belongings. Home with family. Post cath instructions reviewed and follow up appointment with cardiology is scheduled.

## 2021-08-12 ENCOUNTER — CARE COORDINATION (OUTPATIENT)
Dept: CASE MANAGEMENT | Age: 66
End: 2021-08-12

## 2021-08-12 LAB
POC ACT LR: >400 SEC

## 2021-08-12 NOTE — CARE COORDINATION
[address] Transitions Initial Follow Up Call    Call within 2 business days of discharge: Yes    Patient: Kojo Alvarez Patient : 1955   MRN: [id number]  Reason for Admission: Cardiac Cath   Discharge Date: 21 RARS: Readmission Risk Score: 13      Last Discharge [facility]       Complaint Diagnosis Description Type Department Provider    8/10/21   Admission (Discharged) from [facility] [address] [place] [address], MD           Attempted to reach patient via phone for initial post hospital transition call. NIKOLE left stating purpose of call along with my contact information requesting a return call.           Care Transitions 24 Hour Call    Do you have all of your prescriptions and are they filled?: Yes  Post Acute Services:  [facility] Transitions Interventions         Follow Up  Future Appointments   Date Time Provider Shaila Sanders   2021  9:15 AM MD CHRIS Go   10/26/2021  9:30 AM MD CHRIS GoN, RN, [facility]  Care Transition Nurse  [phone] mobile

## 2021-08-13 ENCOUNTER — CARE COORDINATION (OUTPATIENT)
Dept: CASE MANAGEMENT | Age: 66
End: 2021-08-13

## 2021-08-13 RX ORDER — CARVEDILOL 6.25 MG/1
TABLET ORAL
Qty: 180 TABLET | Refills: 3 | Status: SHIPPED | OUTPATIENT
Start: 2021-08-13 | End: 2022-09-22

## 2021-08-13 RX ORDER — LOSARTAN POTASSIUM 25 MG/1
TABLET ORAL
Qty: 90 TABLET | Refills: 3 | Status: SHIPPED | OUTPATIENT
Start: 2021-08-13 | End: 2022-08-31

## 2021-08-13 NOTE — CARE COORDINATION
[address] Transitions Initial Follow Up Call    Call within 2 business days of discharge: Yes    Patient: Jacques Vizcaino Patient : 1955   MRN: [id number]  Reason for Admission: Cardiac cath   Discharge Date: 21 RARS: Readmission Risk Score: 13      Last Discharge [facility]       Complaint Diagnosis Description Type Department Provider    8/10/21   Admission (Discharged) from [facility] [address] [place], [place] [address], MD           Final attempt made to reach patient for post hospital follow up call. Left a voice message for patient with my contact information and informed of final outreach attempt.            Care Transitions 24 Hour Call    Do you have all of your prescriptions and are they filled?: Yes  Post Acute Services: [address] [facility] Transitions Interventions         Follow Up  Future Appointments   Date Time Provider Shaila Sanders   2021  9:15 AM MD CHRIS Stanford   10/26/2021  9:30 AM MD CHRIS Stanford RN

## 2021-08-26 PROBLEM — I25.10 CAD IN NATIVE ARTERY: Status: RESOLVED | Noted: 2021-06-30 | Resolved: 2021-08-26

## 2022-01-27 ENCOUNTER — APPOINTMENT (RX ONLY)
Dept: URBAN - NONMETROPOLITAN AREA CLINIC 22 | Facility: CLINIC | Age: 67
Setting detail: DERMATOLOGY
End: 2022-01-27

## 2022-01-27 DIAGNOSIS — L82.1 OTHER SEBORRHEIC KERATOSIS: ICD-10-CM

## 2022-01-27 DIAGNOSIS — Z85.828 PERSONAL HISTORY OF OTHER MALIGNANT NEOPLASM OF SKIN: ICD-10-CM

## 2022-01-27 DIAGNOSIS — D18.0 HEMANGIOMA: ICD-10-CM

## 2022-01-27 DIAGNOSIS — L57.0 ACTINIC KERATOSIS: ICD-10-CM | Status: INADEQUATELY CONTROLLED

## 2022-01-27 DIAGNOSIS — L81.4 OTHER MELANIN HYPERPIGMENTATION: ICD-10-CM

## 2022-01-27 DIAGNOSIS — D22 MELANOCYTIC NEVI: ICD-10-CM

## 2022-01-27 DIAGNOSIS — L57.8 OTHER SKIN CHANGES DUE TO CHRONIC EXPOSURE TO NONIONIZING RADIATION: ICD-10-CM

## 2022-01-27 PROBLEM — D18.01 HEMANGIOMA OF SKIN AND SUBCUTANEOUS TISSUE: Status: ACTIVE | Noted: 2022-01-27

## 2022-01-27 PROBLEM — D22.5 MELANOCYTIC NEVI OF TRUNK: Status: ACTIVE | Noted: 2022-01-27

## 2022-01-27 PROCEDURE — ? FULL BODY SKIN EXAM

## 2022-01-27 PROCEDURE — ? ADDITIONAL NOTES

## 2022-01-27 PROCEDURE — 99213 OFFICE O/P EST LOW 20 MIN: CPT

## 2022-01-27 PROCEDURE — ? TREATMENT REGIMEN

## 2022-01-27 PROCEDURE — ? COUNSELING

## 2022-01-27 ASSESSMENT — LOCATION SIMPLE DESCRIPTION DERM
LOCATION SIMPLE: RIGHT TEMPLE
LOCATION SIMPLE: LEFT EAR
LOCATION SIMPLE: LEFT CHEEK
LOCATION SIMPLE: POSTERIOR SCALP
LOCATION SIMPLE: CHEST
LOCATION SIMPLE: UPPER BACK
LOCATION SIMPLE: RIGHT EAR
LOCATION SIMPLE: LEFT TEMPLE
LOCATION SIMPLE: TRAPEZIAL NECK
LOCATION SIMPLE: SCALP
LOCATION SIMPLE: RIGHT CHEEK
LOCATION SIMPLE: RIGHT FOREHEAD
LOCATION SIMPLE: NOSE

## 2022-01-27 ASSESSMENT — LOCATION DETAILED DESCRIPTION DERM
LOCATION DETAILED: LEFT CENTRAL TEMPLE
LOCATION DETAILED: SUPERIOR THORACIC SPINE
LOCATION DETAILED: RIGHT SCAPHA
LOCATION DETAILED: LEFT SCAPHA
LOCATION DETAILED: MID-OCCIPITAL SCALP
LOCATION DETAILED: NASAL DORSUM
LOCATION DETAILED: RIGHT CENTRAL MALAR CHEEK
LOCATION DETAILED: LEFT INFERIOR CENTRAL MALAR CHEEK
LOCATION DETAILED: RIGHT MEDIAL FOREHEAD
LOCATION DETAILED: MID TRAPEZIAL NECK
LOCATION DETAILED: LEFT SUPERIOR POSTAURICULAR SKIN
LOCATION DETAILED: STERNAL NOTCH
LOCATION DETAILED: RIGHT SUPERIOR POSTAURICULAR SKIN
LOCATION DETAILED: STERNUM
LOCATION DETAILED: RIGHT CENTRAL TEMPLE

## 2022-01-27 ASSESSMENT — LOCATION ZONE DERM
LOCATION ZONE: TRUNK
LOCATION ZONE: NECK
LOCATION ZONE: EAR
LOCATION ZONE: SCALP
LOCATION ZONE: FACE
LOCATION ZONE: NOSE

## 2022-04-06 RX ORDER — TICAGRELOR 90 MG/1
TABLET ORAL
Qty: 90 TABLET | Refills: 1 | Status: SHIPPED | OUTPATIENT
Start: 2022-04-06 | End: 2022-07-27

## 2022-07-18 ENCOUNTER — TELEPHONE (OUTPATIENT)
Dept: CARDIOLOGY CLINIC | Age: 67
End: 2022-07-18

## 2022-07-27 ENCOUNTER — APPOINTMENT (RX ONLY)
Dept: URBAN - NONMETROPOLITAN AREA CLINIC 22 | Facility: CLINIC | Age: 67
Setting detail: DERMATOLOGY
End: 2022-07-27

## 2022-07-27 DIAGNOSIS — I25.119 CORONARY ARTERY DISEASE INVOLVING NATIVE CORONARY ARTERY OF NATIVE HEART WITH ANGINA PECTORIS (HCC): Primary | ICD-10-CM

## 2022-07-27 DIAGNOSIS — D18.0 HEMANGIOMA: ICD-10-CM

## 2022-07-27 DIAGNOSIS — B07.8 OTHER VIRAL WARTS: ICD-10-CM

## 2022-07-27 DIAGNOSIS — L57.8 OTHER SKIN CHANGES DUE TO CHRONIC EXPOSURE TO NONIONIZING RADIATION: ICD-10-CM

## 2022-07-27 DIAGNOSIS — L82.1 OTHER SEBORRHEIC KERATOSIS: ICD-10-CM

## 2022-07-27 DIAGNOSIS — D22 MELANOCYTIC NEVI: ICD-10-CM

## 2022-07-27 DIAGNOSIS — L57.0 ACTINIC KERATOSIS: ICD-10-CM

## 2022-07-27 DIAGNOSIS — Z85.820 PERSONAL HISTORY OF MALIGNANT MELANOMA OF SKIN: ICD-10-CM

## 2022-07-27 PROBLEM — D18.01 HEMANGIOMA OF SKIN AND SUBCUTANEOUS TISSUE: Status: ACTIVE | Noted: 2022-07-27

## 2022-07-27 PROBLEM — D22.5 MELANOCYTIC NEVI OF TRUNK: Status: ACTIVE | Noted: 2022-07-27

## 2022-07-27 PROCEDURE — 17110 DESTRUCTION B9 LES UP TO 14: CPT | Mod: 59

## 2022-07-27 PROCEDURE — ? ADDITIONAL NOTES

## 2022-07-27 PROCEDURE — 99213 OFFICE O/P EST LOW 20 MIN: CPT | Mod: 25

## 2022-07-27 PROCEDURE — ? FULL BODY SKIN EXAM

## 2022-07-27 PROCEDURE — ? LIQUID NITROGEN

## 2022-07-27 PROCEDURE — ? TREATMENT REGIMEN

## 2022-07-27 PROCEDURE — 17004 DESTROY PREMAL LESIONS 15/>: CPT

## 2022-07-27 PROCEDURE — ? COUNSELING

## 2022-07-27 RX ORDER — TICAGRELOR 90 MG/1
TABLET ORAL
Qty: 60 TABLET | Refills: 1 | Status: SHIPPED | OUTPATIENT
Start: 2022-07-27 | End: 2022-08-10

## 2022-07-27 ASSESSMENT — LOCATION DETAILED DESCRIPTION DERM
LOCATION DETAILED: RIGHT SUPERIOR LATERAL MALAR CHEEK
LOCATION DETAILED: LEFT VENTRAL PROXIMAL FOREARM
LOCATION DETAILED: LEFT INFERIOR CENTRAL MALAR CHEEK
LOCATION DETAILED: RIGHT INFERIOR ANTERIOR NECK
LOCATION DETAILED: NASAL DORSUM
LOCATION DETAILED: LEFT MID PREAURICULAR CHEEK
LOCATION DETAILED: LEFT CENTRAL BUCCAL CHEEK
LOCATION DETAILED: LEFT PROXIMAL RADIAL DORSAL FOREARM
LOCATION DETAILED: RIGHT CENTRAL MALAR CHEEK
LOCATION DETAILED: LEFT SUPERIOR LATERAL BUCCAL CHEEK
LOCATION DETAILED: RIGHT SUPERIOR CENTRAL MALAR CHEEK
LOCATION DETAILED: LEFT DISTAL RADIAL DORSAL FOREARM
LOCATION DETAILED: LEFT PROXIMAL DORSAL FOREARM
LOCATION DETAILED: LEFT MEDIAL ZYGOMA
LOCATION DETAILED: RIGHT DISTAL DORSAL FOREARM
LOCATION DETAILED: RIGHT LATERAL MALAR CHEEK
LOCATION DETAILED: PERIUMBILICAL SKIN
LOCATION DETAILED: LEFT MEDIAL EYEBROW
LOCATION DETAILED: RIGHT INFERIOR PREAURICULAR CHEEK
LOCATION DETAILED: RIGHT MEDIAL UPPER BACK
LOCATION DETAILED: LEFT PROXIMAL DORSAL MIDDLE FINGER
LOCATION DETAILED: LEFT INFERIOR PREAURICULAR CHEEK
LOCATION DETAILED: INFERIOR THORACIC SPINE
LOCATION DETAILED: RIGHT PROXIMAL DORSAL FOREARM
LOCATION DETAILED: LEFT SUPERIOR LATERAL MALAR CHEEK

## 2022-07-27 ASSESSMENT — LOCATION ZONE DERM
LOCATION ZONE: FINGER
LOCATION ZONE: TRUNK
LOCATION ZONE: NECK
LOCATION ZONE: ARM
LOCATION ZONE: NOSE
LOCATION ZONE: FACE

## 2022-07-27 ASSESSMENT — LOCATION SIMPLE DESCRIPTION DERM
LOCATION SIMPLE: RIGHT CHEEK
LOCATION SIMPLE: LEFT MIDDLE FINGER
LOCATION SIMPLE: LEFT ZYGOMA
LOCATION SIMPLE: NOSE
LOCATION SIMPLE: UPPER BACK
LOCATION SIMPLE: LEFT FOREARM
LOCATION SIMPLE: RIGHT FOREARM
LOCATION SIMPLE: RIGHT ANTERIOR NECK
LOCATION SIMPLE: LEFT CHEEK
LOCATION SIMPLE: LEFT EYEBROW
LOCATION SIMPLE: ABDOMEN
LOCATION SIMPLE: RIGHT UPPER BACK

## 2022-07-27 NOTE — PROCEDURE: MIPS QUALITY
Quality 130: Documentation Of Current Medications In The Medical Record: Current Medications Documented
Quality 226: Preventive Care And Screening: Tobacco Use: Screening And Cessation Intervention: Patient screened for tobacco use and is an ex/non-smoker
Quality 137: Melanoma: Continuity Of Care - Recall System: Patient information entered into a recall system that includes: target date for the next exam specified AND a process to follow up with patients regarding missed or unscheduled appointments
Quality 431: Preventive Care And Screening: Unhealthy Alcohol Use - Screening: Patient screened for unhealthy alcohol use using a single question and scores less than 2 times per year
Quality 47: Advance Care Plan: Advance Care Planning discussed and documented; advance care plan or surrogate decision maker documented in the medical record.
Detail Level: Detailed
Quality 431: Preventive Care And Screening: Unhealthy Alcohol Use - Screening: Patient not identified as an unhealthy alcohol user when screened for unhealthy alcohol use using a systematic screening method

## 2022-07-27 NOTE — PROCEDURE: TREATMENT REGIMEN
Otc Regimen: Recommended SPF 30 or greater.
Detail Level: Simple
Plan: Pt has 5FU at home and treat temples,  cheeks and hands sometime this fall

## 2022-07-27 NOTE — PROCEDURE: LIQUID NITROGEN
Render Note In Bullet Format When Appropriate: No
Duration Of Freeze Thaw-Cycle (Seconds): 0
Post-Care Instructions: I reviewed with the patient in detail post-care instructions. Patient is to wear sunprotection, and avoid picking at any of the treated lesions. Pt may apply Vaseline to crusted or scabbing areas.
Consent: The patient's consent was obtained including but not limited to risks of crusting, scabbing, blistering, scarring, darker or lighter pigmentary change, recurrence, incomplete removal and infection.
Detail Level: Detailed
Show Aperture Variable?: Yes
Spray Paint Text: The liquid nitrogen was applied to the skin utilizing a spray paint frosting technique.
Medical Necessity Clause: This procedure was medically necessary because the lesions that were treated were:
Medical Necessity Information: It is in your best interest to select a reason for this procedure from the list below. All of these items fulfill various CMS LCD requirements except the new and changing color options.
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
## 2022-08-10 ENCOUNTER — OFFICE VISIT (OUTPATIENT)
Dept: CARDIOLOGY CLINIC | Age: 67
End: 2022-08-10
Payer: MEDICARE

## 2022-08-10 VITALS
HEIGHT: 69 IN | HEART RATE: 57 BPM | DIASTOLIC BLOOD PRESSURE: 84 MMHG | SYSTOLIC BLOOD PRESSURE: 143 MMHG | WEIGHT: 132.2 LBS | BODY MASS INDEX: 19.58 KG/M2 | OXYGEN SATURATION: 98 %

## 2022-08-10 DIAGNOSIS — I25.110 CORONARY ARTERY DISEASE INVOLVING NATIVE CORONARY ARTERY OF NATIVE HEART WITH UNSTABLE ANGINA PECTORIS (HCC): Primary | ICD-10-CM

## 2022-08-10 DIAGNOSIS — I73.9 PAD (PERIPHERAL ARTERY DISEASE) (HCC): ICD-10-CM

## 2022-08-10 DIAGNOSIS — E78.00 HIGH CHOLESTEROL: ICD-10-CM

## 2022-08-10 LAB
BUN BLDV-MCNC: 12 MG/DL
CALCIUM SERPL-MCNC: 9.2 MG/DL
CHLORIDE BLD-SCNC: 107 MMOL/L
CHOLESTEROL, TOTAL: 96 MG/DL
CHOLESTEROL/HDL RATIO: NORMAL
CO2: 24 MMOL/L
CREAT SERPL-MCNC: 1.22 MG/DL
GFR CALCULATED: 65
GLUCOSE BLD-MCNC: 94 MG/DL
HCT VFR BLD CALC: 37.4 % (ref 41–53)
HDLC SERPL-MCNC: 35 MG/DL (ref 35–70)
HEMOGLOBIN: 13.1 G/DL (ref 13.5–17.5)
LDL CHOLESTEROL CALCULATED: 55 MG/DL (ref 0–160)
NONHDLC SERPL-MCNC: NORMAL MG/DL
PLATELET # BLD: 326 K/ΜL
POTASSIUM SERPL-SCNC: 4.1 MMOL/L
SODIUM BLD-SCNC: 139 MMOL/L
TRIGL SERPL-MCNC: 94 MG/DL
VLDLC SERPL CALC-MCNC: NORMAL MG/DL
WBC # BLD: 9.3 10^3/ML

## 2022-08-10 PROCEDURE — 1036F TOBACCO NON-USER: CPT | Performed by: INTERNAL MEDICINE

## 2022-08-10 PROCEDURE — 3017F COLORECTAL CA SCREEN DOC REV: CPT | Performed by: INTERNAL MEDICINE

## 2022-08-10 PROCEDURE — G8427 DOCREV CUR MEDS BY ELIG CLIN: HCPCS | Performed by: INTERNAL MEDICINE

## 2022-08-10 PROCEDURE — G8420 CALC BMI NORM PARAMETERS: HCPCS | Performed by: INTERNAL MEDICINE

## 2022-08-10 PROCEDURE — 1123F ACP DISCUSS/DSCN MKR DOCD: CPT | Performed by: INTERNAL MEDICINE

## 2022-08-10 PROCEDURE — 99214 OFFICE O/P EST MOD 30 MIN: CPT | Performed by: INTERNAL MEDICINE

## 2022-08-17 ENCOUNTER — HOSPITAL ENCOUNTER (OUTPATIENT)
Dept: VASCULAR LAB | Age: 67
Discharge: HOME | End: 2022-08-17
Payer: OTHER GOVERNMENT

## 2022-08-17 DIAGNOSIS — I73.9 PAD (PERIPHERAL ARTERY DISEASE) (HCC): ICD-10-CM

## 2022-08-17 PROCEDURE — 93923 UPR/LXTR ART STDY 3+ LVLS: CPT

## 2022-08-19 ENCOUNTER — TELEPHONE (OUTPATIENT)
Dept: CARDIOLOGY CLINIC | Age: 67
End: 2022-08-19

## 2022-08-19 ENCOUNTER — HOSPITAL ENCOUNTER (OUTPATIENT)
Dept: NUCLEAR MEDICINE | Age: 67
Discharge: HOME | End: 2022-08-19
Payer: OTHER GOVERNMENT

## 2022-08-19 ENCOUNTER — HOSPITAL ENCOUNTER (OUTPATIENT)
Dept: NON INVASIVE DIAGNOSTICS | Age: 67
Discharge: HOME | End: 2022-08-19
Payer: OTHER GOVERNMENT

## 2022-08-19 DIAGNOSIS — E78.00 HIGH CHOLESTEROL: ICD-10-CM

## 2022-08-19 DIAGNOSIS — I25.119 CORONARY ARTERY DISEASE INVOLVING NATIVE CORONARY ARTERY OF NATIVE HEART WITH ANGINA PECTORIS (HCC): Primary | ICD-10-CM

## 2022-08-19 DIAGNOSIS — F17.210 CIGARETTE NICOTINE DEPENDENCE WITHOUT COMPLICATION: ICD-10-CM

## 2022-08-19 DIAGNOSIS — I73.9 ARTERIAL INSUFFICIENCY OF LOWER EXTREMITY (HCC): Primary | ICD-10-CM

## 2022-08-19 DIAGNOSIS — I25.110 CORONARY ARTERY DISEASE INVOLVING NATIVE CORONARY ARTERY OF NATIVE HEART WITH UNSTABLE ANGINA PECTORIS (HCC): ICD-10-CM

## 2022-08-19 LAB
LV EF: 70 %
LVEF MODALITY: NORMAL

## 2022-08-19 PROCEDURE — A9502 TC99M TETROFOSMIN: HCPCS | Performed by: INTERNAL MEDICINE

## 2022-08-19 PROCEDURE — 93017 CV STRESS TEST TRACING ONLY: CPT

## 2022-08-19 PROCEDURE — 3430000000 HC RX DIAGNOSTIC RADIOPHARMACEUTICAL: Performed by: INTERNAL MEDICINE

## 2022-08-19 PROCEDURE — 78452 HT MUSCLE IMAGE SPECT MULT: CPT

## 2022-08-19 PROCEDURE — 6360000002 HC RX W HCPCS: Performed by: INTERNAL MEDICINE

## 2022-08-19 RX ADMIN — TETROFOSMIN 10.4 MILLICURIE: 1.38 INJECTION, POWDER, LYOPHILIZED, FOR SOLUTION INTRAVENOUS at 09:38

## 2022-08-19 RX ADMIN — TETROFOSMIN 32.2 MILLICURIE: 1.38 INJECTION, POWDER, LYOPHILIZED, FOR SOLUTION INTRAVENOUS at 11:57

## 2022-08-19 RX ADMIN — REGADENOSON 0.4 MG: 0.08 INJECTION, SOLUTION INTRAVENOUS at 11:57

## 2022-08-19 NOTE — TELEPHONE ENCOUNTER
----- Message from Jorge Luis Villalba MD sent at 8/19/2022  6:52 AM EDT -----  Vascular study shows blockage in right leg    seems significant enough to cause right leg pain  will need to see Dr Joseph Stroud please refer him he has office at [facility] [address] shares with cardiology.

## 2022-08-19 NOTE — TELEPHONE ENCOUNTER
----- Message from Celia Tirado MD sent at 8/19/2022  6:52 AM EDT -----  Vascular study shows blockage in right leg    seems significant enough to cause right leg pain  will need to see Dr Alberto Khanna please refer him he has office at [facility] on [address] shares with cardiology.

## 2022-08-19 NOTE — TELEPHONE ENCOUNTER
Called and spoke with patient. Informed him of Dr Ray Lazcano message. Patient verbally understood. Referral placed to Dr Sonjia Cooks office.

## 2022-08-22 ENCOUNTER — TELEPHONE (OUTPATIENT)
Dept: CARDIOLOGY CLINIC | Age: 67
End: 2022-08-22

## 2022-08-22 NOTE — TELEPHONE ENCOUNTER
Patient notes that he has been having constant chest tightness that is worsening, sob at rest and he cannot exert himself. Feels fatigued. He notes that he does not feel right. This has been worsening over past two weeks. Any further testing or ED?

## 2022-08-22 NOTE — TELEPHONE ENCOUNTER
----- Message from Max Serra MD sent at 8/22/2022 12:27 PM EDT -----  Please notify patient that their stress test is equivocal but not high risk.    May need additional testing but will be best if Dr Sanjuana Crane reviews upon return before giving any clearance for surgery

## 2022-08-24 ENCOUNTER — APPOINTMENT (OUTPATIENT)
Dept: GENERAL RADIOLOGY | Age: 67
End: 2022-08-24
Payer: OTHER GOVERNMENT

## 2022-08-24 ENCOUNTER — HOSPITAL ENCOUNTER (OUTPATIENT)
Age: 67
Setting detail: OBSERVATION
Discharge: HOME | End: 2022-08-25
Attending: EMERGENCY MEDICINE | Admitting: INTERNAL MEDICINE
Payer: OTHER GOVERNMENT

## 2022-08-24 DIAGNOSIS — R07.9 CHEST PAIN, UNSPECIFIED TYPE: Primary | ICD-10-CM

## 2022-08-24 PROBLEM — I20.0 UNSTABLE ANGINA (HCC): Status: ACTIVE | Noted: 2022-08-24

## 2022-08-24 LAB
A/G RATIO: 1.4 (ref 1.1–2.2)
ALBUMIN SERPL-MCNC: 4.2 G/DL (ref 3.4–5)
ALP BLD-CCNC: 113 U/L (ref 40–129)
ALT SERPL-CCNC: 11 U/L (ref 10–40)
ANION GAP SERPL CALCULATED.3IONS-SCNC: 10 MMOL/L (ref 3–16)
ANION GAP SERPL CALCULATED.3IONS-SCNC: 8 MMOL/L (ref 3–16)
AST SERPL-CCNC: 22 U/L (ref 15–37)
BASOPHILS ABSOLUTE: 0.1 K/UL (ref 0–0.2)
BASOPHILS RELATIVE PERCENT: 0.7 %
BILIRUB SERPL-MCNC: 1 MG/DL (ref 0–1)
BUN BLDV-MCNC: 11 MG/DL (ref 7–20)
BUN BLDV-MCNC: 13 MG/DL (ref 7–20)
CALCIUM SERPL-MCNC: 10.6 MG/DL (ref 8.3–10.6)
CALCIUM SERPL-MCNC: 8.4 MG/DL (ref 8.3–10.6)
CHLORIDE BLD-SCNC: 102 MMOL/L (ref 99–110)
CHLORIDE BLD-SCNC: 106 MMOL/L (ref 99–110)
CO2: 22 MMOL/L (ref 21–32)
CO2: 24 MMOL/L (ref 21–32)
CREAT SERPL-MCNC: 1.3 MG/DL (ref 0.8–1.3)
CREAT SERPL-MCNC: 1.4 MG/DL (ref 0.8–1.3)
EKG ATRIAL RATE: 74 BPM
EKG DIAGNOSIS: NORMAL
EKG P AXIS: 77 DEGREES
EKG P-R INTERVAL: 154 MS
EKG Q-T INTERVAL: 372 MS
EKG QRS DURATION: 90 MS
EKG QTC CALCULATION (BAZETT): 412 MS
EKG R AXIS: -34 DEGREES
EKG T AXIS: 59 DEGREES
EKG VENTRICULAR RATE: 74 BPM
EOSINOPHILS ABSOLUTE: 0.1 K/UL (ref 0–0.6)
EOSINOPHILS RELATIVE PERCENT: 1.6 %
GFR AFRICAN AMERICAN: >60
GFR AFRICAN AMERICAN: >60
GFR NON-AFRICAN AMERICAN: 51
GFR NON-AFRICAN AMERICAN: 55
GLUCOSE BLD-MCNC: 131 MG/DL (ref 70–99)
GLUCOSE BLD-MCNC: 87 MG/DL (ref 70–99)
HCT VFR BLD CALC: 34 % (ref 40.5–52.5)
HCT VFR BLD CALC: 41.2 % (ref 40.5–52.5)
HEMOGLOBIN: 11.5 G/DL (ref 13.5–17.5)
HEMOGLOBIN: 13.9 G/DL (ref 13.5–17.5)
LV EF: 40 %
LVEF MODALITY: NORMAL
LYMPHOCYTES ABSOLUTE: 1.8 K/UL (ref 1–5.1)
LYMPHOCYTES RELATIVE PERCENT: 19 %
MCH RBC QN AUTO: 31.4 PG (ref 26–34)
MCH RBC QN AUTO: 31.5 PG (ref 26–34)
MCHC RBC AUTO-ENTMCNC: 33.7 G/DL (ref 31–36)
MCHC RBC AUTO-ENTMCNC: 33.8 G/DL (ref 31–36)
MCV RBC AUTO: 93 FL (ref 80–100)
MCV RBC AUTO: 93.3 FL (ref 80–100)
MONOCYTES ABSOLUTE: 0.6 K/UL (ref 0–1.3)
MONOCYTES RELATIVE PERCENT: 6.6 %
NEUTROPHILS ABSOLUTE: 6.8 K/UL (ref 1.7–7.7)
NEUTROPHILS RELATIVE PERCENT: 72.1 %
PDW BLD-RTO: 15.5 % (ref 12.4–15.4)
PDW BLD-RTO: 15.7 % (ref 12.4–15.4)
PLATELET # BLD: 218 K/UL (ref 135–450)
PLATELET # BLD: 282 K/UL (ref 135–450)
PMV BLD AUTO: 6.9 FL (ref 5–10.5)
PMV BLD AUTO: 6.9 FL (ref 5–10.5)
POTASSIUM SERPL-SCNC: 3.9 MMOL/L (ref 3.5–5.1)
POTASSIUM SERPL-SCNC: 4.2 MMOL/L (ref 3.5–5.1)
RBC # BLD: 3.64 M/UL (ref 4.2–5.9)
RBC # BLD: 4.43 M/UL (ref 4.2–5.9)
SODIUM BLD-SCNC: 136 MMOL/L (ref 136–145)
SODIUM BLD-SCNC: 136 MMOL/L (ref 136–145)
TOTAL PROTEIN: 7.2 G/DL (ref 6.4–8.2)
TROPONIN: <0.01 NG/ML
WBC # BLD: 7.6 K/UL (ref 4–11)
WBC # BLD: 9.4 K/UL (ref 4–11)

## 2022-08-24 PROCEDURE — 2500000003 HC RX 250 WO HCPCS: Performed by: INTERNAL MEDICINE

## 2022-08-24 PROCEDURE — C1894 INTRO/SHEATH, NON-LASER: HCPCS

## 2022-08-24 PROCEDURE — G0378 HOSPITAL OBSERVATION PER HR: HCPCS

## 2022-08-24 PROCEDURE — 92979 ENDOLUMINL IVUS OCT C EA: CPT | Performed by: INTERNAL MEDICINE

## 2022-08-24 PROCEDURE — 84484 ASSAY OF TROPONIN QUANT: CPT

## 2022-08-24 PROCEDURE — 6370000000 HC RX 637 (ALT 250 FOR IP)

## 2022-08-24 PROCEDURE — 2709999900 HC NON-CHARGEABLE SUPPLY

## 2022-08-24 PROCEDURE — 96374 THER/PROPH/DIAG INJ IV PUSH: CPT

## 2022-08-24 PROCEDURE — C1753 CATH, INTRAVAS ULTRASOUND: HCPCS

## 2022-08-24 PROCEDURE — 93010 ELECTROCARDIOGRAM REPORT: CPT | Performed by: INTERNAL MEDICINE

## 2022-08-24 PROCEDURE — 6360000002 HC RX W HCPCS: Performed by: PHYSICIAN ASSISTANT

## 2022-08-24 PROCEDURE — 6360000002 HC RX W HCPCS

## 2022-08-24 PROCEDURE — 2580000003 HC RX 258

## 2022-08-24 PROCEDURE — 99245 OFF/OP CONSLTJ NEW/EST HI 55: CPT | Performed by: INTERNAL MEDICINE

## 2022-08-24 PROCEDURE — 2500000003 HC RX 250 WO HCPCS

## 2022-08-24 PROCEDURE — 85025 COMPLETE CBC W/AUTO DIFF WBC: CPT

## 2022-08-24 PROCEDURE — C1769 GUIDE WIRE: HCPCS

## 2022-08-24 PROCEDURE — 93458 L HRT ARTERY/VENTRICLE ANGIO: CPT | Performed by: INTERNAL MEDICINE

## 2022-08-24 PROCEDURE — C1760 CLOSURE DEV, VASC: HCPCS

## 2022-08-24 PROCEDURE — 71045 X-RAY EXAM CHEST 1 VIEW: CPT

## 2022-08-24 PROCEDURE — 6370000000 HC RX 637 (ALT 250 FOR IP): Performed by: PHYSICIAN ASSISTANT

## 2022-08-24 PROCEDURE — 36415 COLL VENOUS BLD VENIPUNCTURE: CPT

## 2022-08-24 PROCEDURE — C1725 CATH, TRANSLUMIN NON-LASER: HCPCS

## 2022-08-24 PROCEDURE — 93458 L HRT ARTERY/VENTRICLE ANGIO: CPT

## 2022-08-24 PROCEDURE — 85347 COAGULATION TIME ACTIVATED: CPT

## 2022-08-24 PROCEDURE — 99152 MOD SED SAME PHYS/QHP 5/>YRS: CPT

## 2022-08-24 PROCEDURE — 99285 EMERGENCY DEPT VISIT HI MDM: CPT

## 2022-08-24 PROCEDURE — 2580000003 HC RX 258: Performed by: INTERNAL MEDICINE

## 2022-08-24 PROCEDURE — 6360000002 HC RX W HCPCS: Performed by: INTERNAL MEDICINE

## 2022-08-24 PROCEDURE — 92928 PRQ TCAT PLMT NTRAC ST 1 LES: CPT | Performed by: INTERNAL MEDICINE

## 2022-08-24 PROCEDURE — C1887 CATHETER, GUIDING: HCPCS

## 2022-08-24 PROCEDURE — 93005 ELECTROCARDIOGRAM TRACING: CPT | Performed by: INTERNAL MEDICINE

## 2022-08-24 PROCEDURE — 92978 ENDOLUMINL IVUS OCT C 1ST: CPT

## 2022-08-24 PROCEDURE — 99153 MOD SED SAME PHYS/QHP EA: CPT

## 2022-08-24 PROCEDURE — C1874 STENT, COATED/COV W/DEL SYS: HCPCS

## 2022-08-24 PROCEDURE — 92978 ENDOLUMINL IVUS OCT C 1ST: CPT | Performed by: INTERNAL MEDICINE

## 2022-08-24 PROCEDURE — 2000000000 HC ICU R&B

## 2022-08-24 PROCEDURE — 93005 ELECTROCARDIOGRAM TRACING: CPT | Performed by: EMERGENCY MEDICINE

## 2022-08-24 PROCEDURE — 85027 COMPLETE CBC AUTOMATED: CPT

## 2022-08-24 PROCEDURE — 6360000004 HC RX CONTRAST MEDICATION

## 2022-08-24 PROCEDURE — C9600 PERC DRUG-EL COR STENT SING: HCPCS

## 2022-08-24 PROCEDURE — 80053 COMPREHEN METABOLIC PANEL: CPT

## 2022-08-24 RX ORDER — SODIUM CHLORIDE 0.9 % (FLUSH) 0.9 %
5-40 SYRINGE (ML) INJECTION EVERY 12 HOURS SCHEDULED
Status: DISCONTINUED | OUTPATIENT
Start: 2022-08-24 | End: 2022-08-25 | Stop reason: HOSPADM

## 2022-08-24 RX ORDER — SODIUM CHLORIDE 0.9 % (FLUSH) 0.9 %
5-40 SYRINGE (ML) INJECTION PRN
Status: DISCONTINUED | OUTPATIENT
Start: 2022-08-24 | End: 2022-08-25 | Stop reason: HOSPADM

## 2022-08-24 RX ORDER — POLYETHYLENE GLYCOL 3350 17 G/17G
17 POWDER, FOR SOLUTION ORAL DAILY PRN
Status: DISCONTINUED | OUTPATIENT
Start: 2022-08-24 | End: 2022-08-25 | Stop reason: HOSPADM

## 2022-08-24 RX ORDER — FENTANYL CITRATE 50 UG/ML
INJECTION, SOLUTION INTRAMUSCULAR; INTRAVENOUS
Status: COMPLETED | OUTPATIENT
Start: 2022-08-24 | End: 2022-08-24

## 2022-08-24 RX ORDER — MIDAZOLAM HYDROCHLORIDE 1 MG/ML
4 INJECTION INTRAMUSCULAR; INTRAVENOUS ONCE
Status: DISCONTINUED | OUTPATIENT
Start: 2022-08-24 | End: 2022-08-25 | Stop reason: HOSPADM

## 2022-08-24 RX ORDER — ONDANSETRON 2 MG/ML
4 INJECTION INTRAMUSCULAR; INTRAVENOUS EVERY 6 HOURS PRN
Status: DISCONTINUED | OUTPATIENT
Start: 2022-08-24 | End: 2022-08-25 | Stop reason: HOSPADM

## 2022-08-24 RX ORDER — LOSARTAN POTASSIUM 25 MG/1
25 TABLET ORAL DAILY
Status: DISCONTINUED | OUTPATIENT
Start: 2022-08-25 | End: 2022-08-25 | Stop reason: HOSPADM

## 2022-08-24 RX ORDER — ONDANSETRON 4 MG/1
4 TABLET, ORALLY DISINTEGRATING ORAL EVERY 8 HOURS PRN
Status: DISCONTINUED | OUTPATIENT
Start: 2022-08-24 | End: 2022-08-25 | Stop reason: HOSPADM

## 2022-08-24 RX ORDER — DEXMEDETOMIDINE HYDROCHLORIDE 4 UG/ML
.1-1.5 INJECTION, SOLUTION INTRAVENOUS CONTINUOUS
Status: DISCONTINUED | OUTPATIENT
Start: 2022-08-24 | End: 2022-08-25

## 2022-08-24 RX ORDER — ACETAMINOPHEN 650 MG/1
650 SUPPOSITORY RECTAL EVERY 6 HOURS PRN
Status: DISCONTINUED | OUTPATIENT
Start: 2022-08-24 | End: 2022-08-25 | Stop reason: HOSPADM

## 2022-08-24 RX ORDER — ATORVASTATIN CALCIUM 40 MG/1
40 TABLET, FILM COATED ORAL NIGHTLY
Status: DISCONTINUED | OUTPATIENT
Start: 2022-08-24 | End: 2022-08-24 | Stop reason: ALTCHOICE

## 2022-08-24 RX ORDER — FLUTICASONE PROPIONATE AND SALMETEROL 500; 50 UG/1; UG/1
1 POWDER RESPIRATORY (INHALATION) 2 TIMES DAILY
COMMUNITY
Start: 2022-07-26 | End: 2022-08-30

## 2022-08-24 RX ORDER — MIDAZOLAM HYDROCHLORIDE 1 MG/ML
INJECTION INTRAMUSCULAR; INTRAVENOUS
Status: COMPLETED | OUTPATIENT
Start: 2022-08-24 | End: 2022-08-24

## 2022-08-24 RX ORDER — ENOXAPARIN SODIUM 100 MG/ML
40 INJECTION SUBCUTANEOUS DAILY
Status: DISCONTINUED | OUTPATIENT
Start: 2022-08-24 | End: 2022-08-25 | Stop reason: HOSPADM

## 2022-08-24 RX ORDER — OXYCODONE HYDROCHLORIDE AND ACETAMINOPHEN 5; 325 MG/1; MG/1
1 TABLET ORAL EVERY 6 HOURS PRN
Status: DISCONTINUED | OUTPATIENT
Start: 2022-08-24 | End: 2022-08-25

## 2022-08-24 RX ORDER — ASPIRIN 81 MG/1
324 TABLET, CHEWABLE ORAL ONCE
Status: COMPLETED | OUTPATIENT
Start: 2022-08-24 | End: 2022-08-24

## 2022-08-24 RX ORDER — MORPHINE SULFATE 4 MG/ML
4 INJECTION, SOLUTION INTRAMUSCULAR; INTRAVENOUS ONCE
Status: DISCONTINUED | OUTPATIENT
Start: 2022-08-24 | End: 2022-08-25 | Stop reason: HOSPADM

## 2022-08-24 RX ORDER — PANTOPRAZOLE SODIUM 40 MG/1
40 TABLET, DELAYED RELEASE ORAL
Status: DISCONTINUED | OUTPATIENT
Start: 2022-08-25 | End: 2022-08-24

## 2022-08-24 RX ORDER — ACETAMINOPHEN 325 MG/1
650 TABLET ORAL EVERY 4 HOURS PRN
Status: DISCONTINUED | OUTPATIENT
Start: 2022-08-24 | End: 2022-08-25 | Stop reason: HOSPADM

## 2022-08-24 RX ORDER — HALOPERIDOL 5 MG/ML
5 INJECTION INTRAMUSCULAR ONCE
Status: DISCONTINUED | OUTPATIENT
Start: 2022-08-24 | End: 2022-08-25 | Stop reason: HOSPADM

## 2022-08-24 RX ORDER — SODIUM CHLORIDE 9 MG/ML
INJECTION, SOLUTION INTRAVENOUS PRN
Status: DISCONTINUED | OUTPATIENT
Start: 2022-08-24 | End: 2022-08-25 | Stop reason: HOSPADM

## 2022-08-24 RX ORDER — ASPIRIN 81 MG/1
81 TABLET, CHEWABLE ORAL DAILY
Status: DISCONTINUED | OUTPATIENT
Start: 2022-08-25 | End: 2022-08-24 | Stop reason: SDUPTHER

## 2022-08-24 RX ORDER — NITROGLYCERIN 0.4 MG/1
0.4 TABLET SUBLINGUAL EVERY 5 MIN PRN
Status: DISCONTINUED | OUTPATIENT
Start: 2022-08-24 | End: 2022-08-25 | Stop reason: HOSPADM

## 2022-08-24 RX ORDER — SODIUM CHLORIDE 9 MG/ML
INJECTION, SOLUTION INTRAVENOUS CONTINUOUS
Status: DISCONTINUED | OUTPATIENT
Start: 2022-08-24 | End: 2022-08-25

## 2022-08-24 RX ORDER — MORPHINE SULFATE 4 MG/ML
4 INJECTION, SOLUTION INTRAMUSCULAR; INTRAVENOUS ONCE
Status: COMPLETED | OUTPATIENT
Start: 2022-08-24 | End: 2022-08-24

## 2022-08-24 RX ORDER — METOCLOPRAMIDE 10 MG/1
5 TABLET ORAL 3 TIMES DAILY
Status: DISCONTINUED | OUTPATIENT
Start: 2022-08-24 | End: 2022-08-24

## 2022-08-24 RX ORDER — LORAZEPAM 2 MG/ML
2 INJECTION INTRAMUSCULAR ONCE
Status: DISCONTINUED | OUTPATIENT
Start: 2022-08-24 | End: 2022-08-25 | Stop reason: HOSPADM

## 2022-08-24 RX ORDER — ASPIRIN 81 MG/1
81 TABLET, CHEWABLE ORAL DAILY
Status: DISCONTINUED | OUTPATIENT
Start: 2022-08-25 | End: 2022-08-25 | Stop reason: HOSPADM

## 2022-08-24 RX ORDER — CARVEDILOL 6.25 MG/1
6.25 TABLET ORAL 2 TIMES DAILY WITH MEALS
Status: DISCONTINUED | OUTPATIENT
Start: 2022-08-24 | End: 2022-08-25 | Stop reason: HOSPADM

## 2022-08-24 RX ORDER — ACETAMINOPHEN 325 MG/1
650 TABLET ORAL EVERY 6 HOURS PRN
Status: DISCONTINUED | OUTPATIENT
Start: 2022-08-24 | End: 2022-08-25 | Stop reason: HOSPADM

## 2022-08-24 RX ORDER — ATORVASTATIN CALCIUM 80 MG/1
80 TABLET, FILM COATED ORAL NIGHTLY
Status: DISCONTINUED | OUTPATIENT
Start: 2022-08-24 | End: 2022-08-25 | Stop reason: HOSPADM

## 2022-08-24 RX ADMIN — MIDAZOLAM 2 MG: 1 INJECTION INTRAMUSCULAR; INTRAVENOUS at 15:21

## 2022-08-24 RX ADMIN — FENTANYL CITRATE 50 MCG: 50 INJECTION INTRAMUSCULAR; INTRAVENOUS at 15:37

## 2022-08-24 RX ADMIN — ASPIRIN 81 MG 324 MG: 81 TABLET ORAL at 11:40

## 2022-08-24 RX ADMIN — Medication 0.1 MCG/KG/HR: at 22:58

## 2022-08-24 RX ADMIN — NITROGLYCERIN 0.5 INCH: 20 OINTMENT TOPICAL at 11:40

## 2022-08-24 RX ADMIN — BIVALIRUDIN 1.75 MG/KG/HR: 250 INJECTION, POWDER, LYOPHILIZED, FOR SOLUTION INTRAVENOUS at 15:41

## 2022-08-24 RX ADMIN — MIDAZOLAM 1 MG: 1 INJECTION INTRAMUSCULAR; INTRAVENOUS at 15:37

## 2022-08-24 RX ADMIN — MORPHINE SULFATE 4 MG: 4 INJECTION, SOLUTION INTRAMUSCULAR; INTRAVENOUS at 11:40

## 2022-08-24 RX ADMIN — FENTANYL CITRATE 50 MCG: 50 INJECTION INTRAMUSCULAR; INTRAVENOUS at 15:21

## 2022-08-24 RX ADMIN — MIDAZOLAM HYDROCHLORIDE 1 MG: 1 INJECTION INTRAMUSCULAR; INTRAVENOUS at 15:53

## 2022-08-24 RX ADMIN — FENTANYL CITRATE 50 MCG: 50 INJECTION INTRAMUSCULAR; INTRAVENOUS at 15:22

## 2022-08-24 RX ADMIN — FENTANYL CITRATE 50 MCG: 50 INJECTION, SOLUTION INTRAMUSCULAR; INTRAVENOUS at 15:53

## 2022-08-24 ASSESSMENT — PAIN SCALES - GENERAL
PAINLEVEL_OUTOF10: 5
PAINLEVEL_OUTOF10: 6
PAINLEVEL_OUTOF10: 5

## 2022-08-24 NOTE — PROGRESS NOTES
Brief Pre-Op Note/Sedation Assessment      Vianey Cartwright  1955  [id number]  2:42 PM    Planned Procedure: Cardiac Catheterization Procedure  Post Procedure Plan: Return to same level of care  Consent: I have discussed with the patient and/or the patient representative the indication, alternatives, and the possible risks and/or complications of the planned procedure and the anesthesia methods. The patient and/or patient representative appear to understand and agree to proceed. DISCUSSION OF CARDIAC CATHETERIZATION PROCEDURES: The procedures, indications, risks and alternatives have been discussed with the patient and, as appropriate, with the patient's guardian . Risks discussed included, but are not limited to, bleeding, development of blood clots/emboli, damage to blood vessels, renal failure, malignant cardiac arrhythmias, stroke, heart attack, emergent coronary bypass surgery, death, dye allergy. The patient (and guardian as appropriate) expressed understanding of the aforementioned and wished to proceed. Chief Complaint:   Chest Pain/Pressure      Indications for Cath Procedure:  Presentation:  ACS > 24 hrs  2. Anginal Classification within 2 weeks:  CCS IV - Inability to perform any activity without angina or angina at rest, i.e., severe limitation  3. Angina Symptoms Assessment:  Typical Chest Pain  4. Heart Failure Class within last 2 weeks:  No symptoms  5. Cardiovascular Instability:  No    Prior Ischemic Workup/Eval:  Pre-Procedural Medications: Yes: Aspirin and STATIN  2. Stress Test Completed? Yes:  Stress or Imaging Studies Performed (within ANY time period):   Type:  Stress Nuclear  Results:  Positive:  Myocardial Perfusion Defects (Nuclear) Extent of Ischemia:  Intermediate    Does Patient need surgery?   Cath Valve Surgery:  No    Pre-Procedure Medical History:  Vital Signs:  /88   Pulse 66   Temp 97.7 °F (36.5 °C) (Oral)   Resp 20   Ht 5' 9\" (1.753 m)   Wt 125 lb 11.2 oz (57 kg)   SpO2 100%   BMI 18.56 kg/m²     Allergies:  No Known Allergies  Medications:    Current Facility-Administered Medications   Medication Dose Route Frequency Provider Last Rate Last Admin    [START ON 8/25/2022] aspirin chewable tablet 81 mg  81 mg Oral Daily Teodoro Rae MD        atorvastatin (LIPITOR) tablet 80 mg  80 mg Oral Nightly Teodoro Rae MD        carvedilol (COREG) tablet 6.25 mg  6.25 mg Oral BID WC Teodoro Rae MD        [START ON 8/25/2022] losartan (COZAAR) tablet 25 mg  25 mg Oral Daily Teodoro Rae MD        metoclopramide (REGLAN) tablet 5 mg  5 mg Oral TID Teodoro Rae MD        oxyCODONE-acetaminophen (PERCOCET) 5-325 MG per tablet 1 tablet  1 tablet Oral Q6H PRN Teodoro Rae MD        [START ON 8/25/2022] pantoprazole (PROTONIX) tablet 40 mg  40 mg Oral QAM AC Teodoro Rae MD        ticagrelor (BRILINTA) tablet 60 mg  60 mg Oral BID Teodoro Rae MD        tiotropium-olodaterol (STIOLTO) 2.5-2.5 MCG/ACT inhaler 2 puff  2 puff Inhalation Daily Teodoro Rae MD        sodium chloride flush 0.9 % injection 5-40 mL  5-40 mL IntraVENous 2 times per day Duane Lei, MD        sodium chloride flush 0.9 % injection 5-40 mL  5-40 mL IntraVENous PRN Teodoro Rae MD        0.9 % sodium chloride infusion   IntraVENous PRN Teodoro Rae MD        ondansetron (ZOFRAN-ODT) disintegrating tablet 4 mg  4 mg Oral Q8H PRN Teodoro Rae MD        Or    ondansetron (ZOFRAN) injection 4 mg  4 mg IntraVENous Q6H PRN Teodoro Rae MD        acetaminophen (TYLENOL) tablet 650 mg  650 mg Oral Q6H PRN Teodoro Rae MD        Or    acetaminophen (TYLENOL) suppository 650 mg  650 mg Rectal Q6H PRN Teodoro Rae MD        polyethylene glycol (GLYCOLAX) packet 17 g  17 g Oral Daily PRN Teodoro Rae MD        atorvastatin (LIPITOR) tablet 40 mg  40 mg Oral Nightly Teodoro Rae MD        enoxaparin (LOVENOX) injection 40 mg  40 mg SubCUTAneous Daily Teodoro Rae MD        nitroGLYCERIN (NITROSTAT) SL tablet 0.4 mg 0.4 mg SubLINGual Q5 Min PRN Teodoro Rae MD        perflutren lipid microspheres (DEFINITY) injection 1.65 mg  1.5 mL IntraVENous ONCE PRN Rickie Sanderson MD           Past Medical History:    Past Medical History:   Diagnosis Date    CAD (coronary artery disease) 03/28/2013    Cardiomyopathy ([facility] [address] 02/22/2021    EF= 45%    Emphysema of lung ([facility] [address]     Heart attack ([facility] [address] 03/28/2013    3/28/2013 and 2/22/2021    High cholesterol     HTN (hypertension)     Hypertension        Surgical History:    Past Surgical History:   Procedure Laterality Date    COLONOSCOPY N/A 3/22/2021    COLONOSCOPY WITH BIOPSY performed by Saima Burton MD at [facility]  02/22/2021    SAMM- 3.0 x 38 and 3.0 x 34 to [address]  03/28/2013    SAMM- 3.5 x 18 to RCA    DIAGNOSTIC CARDIAC CATH LAB PROCEDURE  12/10/2019    Non Obs CAD    THORACIC AORTIC ANEURYSM REPAIR  2000    UPPER GASTROINTESTINAL ENDOSCOPY N/A 3/19/2021    EGD BIOPSY performed by Saima Burton MD at [facility]:  Pre-Sedation Documentation and Exam:  I have personally completed a history, physical exam & review of systems for this patient (see notes). Prior History of Anesthesia Complications:   none    Modified Mallampati:  III (soft palate, base of uvula visible)    ASA Classification:  Class 4 - A patient with an incapacitating systemic disease that is a constant threat to life    Andres Scale: Activity:  2 - Able to move 4 extremities voluntarily on command  Respiration:  2 - Able to breathe deeply and cough freely  Circulation:  2 - BP+/- 20mmHg of normal  Consciousness:  2 - Fully awake  Oxygen Saturation (color):  2 - Able to maintain oxygen saturation >92% on room air    Sedation/Anesthesia Plan:  Guard the patient's safety and welfare. Minimize physical discomfort and pain.   Minimize negative psychological responses to treatment by providing sedation and analgesia and maximize the potential amnesia. Patient to meet pre-procedure discharge plan.     Medication Planned:  midazolam intravenously and fentanyl intravenously    Patient is an appropriate candidate for plan of sedation:   yes      Electronically signed by Queta Shepard MD on 8/24/2022 at 2:42 PM

## 2022-08-24 NOTE — PROGRESS NOTES
Patient admitted to room 216 from ED. Patient oriented to room, call light, bed rails, phone, lights and bathroom. Patient instructed about the schedule of the day including: vital sign frequency, lab draws, possible tests, frequency of MD and staff rounds, including RN/MD rounding together at bedside, daily weights, and I &O's. Patient instructed about prescribed diet, how to use MENU, and television. Telemetry box  in place, patient aware of placement and reason. Bed locked, in lowest position, side rails up 2/4, call light within reach. Will continue to monitor.         Vitals:    08/24/22 1403   BP: 111/88   Pulse: 66   Resp: 20   Temp: 97.7 °F (36.5 °C)   SpO2: 100%

## 2022-08-24 NOTE — CONSULTS
[facility]  [phone]      Attending Physician: Dwain Nichols MD  Reason for Consultation/Chief Complaint: Chest pain    Subjective   History of Present Illness:  Flaca Kern is a 78 y.o. patient who presented to the hospital with complaints of chest pain and not feeling well over the last 2 weeks. He says symptoms are similar to prior symptoms of angina however they are less severe. He has been compliant with medications although he says that Brilinta has been cut down to 60 mg but he had decided to take 90 mg. Due to these recent symptoms, he did see Dr. Shani Walker who he sees in our office and had a stress test which was suboptimal and nondiagnostic for ischemia. Due to ongoing symptoms he was referred to the emergency room and admitted due to concerns for escalating angina. He also an HUMERA ordered for claudication which showed moderate insufficiency with peripheral vascular disease. Patient is a cardiac history which dates back to February 2021, he was transferred from [facility] to [facility]) for acute inferior STEMI and underwent PCI of the RCA. He had staged PCI of the LAD and diagonal later that year. He has been doing well since then and chronically has had hypertension, hypercholesterolemia and has had a prior history of tobacco use although none currently. He does have h/o TAA repair. Past Medical History:   has a past medical history of CAD (coronary artery disease), Cardiomyopathy ([address] Emphysema of lung ([address] Heart attack ([address] High cholesterol, HTN (hypertension), and Hypertension. Surgical History:   has a past surgical history that includes Diagnostic Cardiac Cath Lab Procedure (12/10/2019); Thoracic aortic aneurysm repair (2000); Coronary angioplasty with stent (02/22/2021); Coronary angioplasty with stent (03/28/2013); Upper gastrointestinal endoscopy (N/A, 3/19/2021); and Colonoscopy (N/A, 3/22/2021).      Social History:   reports that he quit smoking about 14 months ago. His smoking use included cigarettes. He has a 100.00 pack-year smoking history. He has never used smokeless tobacco. He reports that he does not drink alcohol and does not use drugs. Home Medications:  Were reviewed and are listed in nursing record and/or below  Prior to Admission medications    Medication Sig Start Date End Date Taking? Authorizing Provider   fluticasone-salmeterol (ADVAIR) 500-50 MCG/ACT AEPB diskus inhaler Inhale 1 puff into the lungs in the morning and at bedtime 7/26/22  Yes Historical Provider, MD   ticagrelor (BRILINTA) 60 MG TABS tablet Take 1 tablet by mouth in the morning and 1 tablet before bedtime. 8/10/22   Odessa Owusu MD   losartan (COZAAR) 25 MG tablet TAKE ONE TABLET BY MOUTH EVERY DAY 8/13/21   Odessa Owusu MD   carvedilol (COREG) 6.25 MG tablet TAKE ONE TABLET BY MOUTH TWO TIMES A DAY WITH MEALS 8/13/21   Odessa Owusu MD   atorvastatin (LIPITOR) 80 MG tablet Take 1 tablet by mouth nightly 8/11/21   SKYLAR Araya - CNP   oxyCODONE-acetaminophen (PERCOCET) 5-325 MG per tablet Take 1 tablet by mouth every 6 hours as needed for Pain.     Historical Provider, MD   aspirin 81 MG chewable tablet Take 1 tablet by mouth daily 2/25/21   Alli Resendiz MD   umeclidinium-vilanterol (ANORO ELLIPTA) 62.5-25 MCG/INH AEPB inhaler Inhale 1 puff into the lungs daily 6/11/20   Williams Delgado MD   albuterol sulfate HFA (VENTOLIN HFA) 108 (90 Base) MCG/ACT inhaler Inhale 2 puffs into the lungs every 6 hours as needed for Wheezing or Shortness of Breath 3/3/20   Williams Delgado MD   albuterol (PROVENTIL) (2.5 MG/3ML) 0.083% nebulizer solution Take 3 mLs by nebulization every 6 hours as needed for Wheezing 3/3/20   Williams Delgado MD        CURRENT Medications:  aspirin chewable tablet 81 mg, Daily  atorvastatin (LIPITOR) tablet 80 mg, Nightly  carvedilol (COREG) tablet 6.25 mg, BID WC  losartan (COZAAR) tablet 25 mg, Daily  metoclopramide (REGLAN) tablet 5 mg, TID  oxyCODONE-acetaminophen (PERCOCET) 5-325 MG per tablet 1 tablet, Q6H PRN  [START ON 8/25/2022] pantoprazole (PROTONIX) tablet 40 mg, QAM AC  ticagrelor (BRILINTA) tablet 60 mg, BID  tiotropium-olodaterol (STIOLTO) 2.5-2.5 MCG/ACT inhaler 2 puff, Daily  sodium chloride flush 0.9 % injection 5-40 mL, 2 times per day  sodium chloride flush 0.9 % injection 5-40 mL, PRN  0.9 % sodium chloride infusion, PRN  ondansetron (ZOFRAN-ODT) disintegrating tablet 4 mg, Q8H PRN   Or  ondansetron (ZOFRAN) injection 4 mg, Q6H PRN  acetaminophen (TYLENOL) tablet 650 mg, Q6H PRN   Or  acetaminophen (TYLENOL) suppository 650 mg, Q6H PRN  polyethylene glycol (GLYCOLAX) packet 17 g, Daily PRN  [START ON 8/25/2022] aspirin chewable tablet 81 mg, Daily  atorvastatin (LIPITOR) tablet 40 mg, Nightly  enoxaparin (LOVENOX) injection 40 mg, Daily  nitroGLYCERIN (NITROSTAT) SL tablet 0.4 mg, Q5 Min PRN        Allergies:  Patient has no known allergies. Review of Systems:   A 14 point review of symptoms completed. Pertinent positives identified in the HPI, all other review of symptoms negative as below.       Objective   PHYSICAL EXAM:    Vitals:    08/24/22 1403   BP: 111/88   Pulse: 66   Resp: 20   Temp: 97.7 °F (36.5 °C)   SpO2: 100%    Weight: 125 lb 11.2 oz (57 kg)         General Appearance:  Alert, cooperative, no distress, appears stated age   Head:  Normocephalic, without obvious abnormality, atraumatic   Eyes:  PERRL, conjunctiva/corneas clear   Nose: Nares normal, no drainage or sinus tenderness   Throat: Lips, mucosa, and tongue normal   Neck: Supple, symmetrical, trachea midline, no adenopathy, thyroid: not enlarged, symmetric, no tenderness/mass/nodules, no JVD   Lungs:   Clear to auscultation bilaterally, respirations unlabored   Chest Wall:  No deformity or tenderness   Heart:  Regular rate and rhythm, S1, S2 normal, 2 out of 6 systolic murmur   Abdomen:   Soft, non-tender, bowel sounds active all four quadrants,  no masses, no organomegaly   Extremities: Extremities normal, atraumatic, no cyanosis or edema   Pulses: 2+ and symmetric   Skin: Skin color, texture, turgor normal, no rashes or lesions   Pysch: Normal mood and affect   Neurologic: Normal gross motor and sensory exam.         Labs   CBC:   Lab Results   Component Value Date/Time    WBC 9.4 08/24/2022 11:08 AM    RBC 4.43 08/24/2022 11:08 AM    HGB 13.9 08/24/2022 11:08 AM    HCT 41.2 08/24/2022 11:08 AM    MCV 93.0 08/24/2022 11:08 AM    RDW 15.7 08/24/2022 11:08 AM     08/24/2022 11:08 AM     CMP:  Lab Results   Component Value Date/Time     08/24/2022 11:08 AM    K 4.2 08/24/2022 11:08 AM    K 4.5 08/10/2021 07:10 AM     08/24/2022 11:08 AM    CO2 24 08/24/2022 11:08 AM    BUN 13 08/24/2022 11:08 AM    CREATININE 1.4 08/24/2022 11:08 AM    GFRAA >60 08/24/2022 11:08 AM    AGRATIO 1.4 08/24/2022 11:08 AM    LABGLOM 51 08/24/2022 11:08 AM    GLUCOSE 131 08/24/2022 11:08 AM    PROT 7.2 08/24/2022 11:08 AM    CALCIUM 10.6 08/24/2022 11:08 AM    BILITOT 1.0 08/24/2022 11:08 AM    ALKPHOS 113 08/24/2022 11:08 AM    AST 22 08/24/2022 11:08 AM    ALT 11 08/24/2022 11:08 AM     PT/INR:  No results found for: PTINR  HgBA1c:No results found for: LABA1C  Lab Results   Component Value Date    TROPONINI <0.01 08/24/2022         Cardiac Data     Last EKG: Normal sinus rhythm, lt axis possible lvh    Echo:    Stress Test:    8/2022    Summary    Significant sub-diaphragmatic/gut tracer uptake despite re-scanning the    patient which limits interpretation. FIxed basal to apical inferior wall defect with normal wall motion but    decreased myocardial thickening. There is partial correction with AC images. Basal inferior wall fails to correct on stress images -Suspect this is due    to attenuation artifact but cannot rule out inferior wall ischemia. Post-stress LVEF is normal at >70%. Sub-optimal study. Equivocal study for inducible ischemia. Cath:                         CARDIAC CATHETERIZATION REPORT      Procedure Date:  8/10/2021  Patient Name: Fadumo Jacob  MRN: [id number]      : 1955        INDICATION      Class III angina  Ischemic cardiomyopathy        PROCEDURES PERFORMED      Left heart catheterization  LVgram  Coronary angiogam  Coronary cath  Monitoring of moderate conscious sedation     Atherectomy of LAD  Atherectomy of D2  PCI of LAD with single drug-eluting stent  PCI of D2 with single drug-eluting stent        PROCEDURE DESCRIPTION   Risks/benefits/alternatives/outcomes were discussed with patient and/or family and informed consent was obtained. Using the [facility] scale, the patient's right radial artery was found to be a level B. Patient was prepped draped in the usual sterile fashion. Local anaesthetic was applied over puncture site. Using a back wall technique, a 6 Singaporean Terumo sheath was inserted into right radial artery. Verapamil, nitroglycerin, nicardipine were administered through the sheath. There was radial spasm noted which responded to intra-arterial vasodilators. Heparin was administered. Diagnostic 5 Japanese pigtail, ultra catheters were used for diagnostic angiograms. Attention then turned towards PCI as noted below. At the conclusion of the procedure, a TR band was placed over the puncture site and hemostasis was obtained. There were no immediate complications. Sedation was provided by the anesthesiology service. 333 cc of contrast was utilized. <20cc EBL. FINDINGS         LVGRAM     LVEDP , 9   GRADIENT ACROSS AORTIC VALVE  none   LV FUNCTION EF 60%   WALL MOTION  normal   MITRAL REGURGITATION  mild            CORONARY ARTERIES     LM Less than 10% [id number]-vbk-[id number] stenosis            LAD Proximal 10 to 20% stenosis, mid 75 to 80% stenosis. Mid to distal vessel bridging with 40 to 50% stenosis noted.      D1 is a small to medium size vessel with proximal-mid 50 to 60% stenosis. D2 is the largest diagonal branch which is nearly the same size as compared to the LAD. There is proximal-mid 75% stenosis. LCX Less than 10% proximal stenosis, mid 20 to 30% stenosis. RCA Large vessel, dominant, this vessels been stented in the [id number]-wxh-[id number] segments, there is less than 10% stenosis noted proximally, there is mid vessel 20 to 30% stenosis, distal less than 10% stenosis. PERCUTANEOUS INTERVENTION DESCRIPTION      Heparin was used for anticoagulation, patient was already on Brilinta, was given additional 90 mg of Brilinta. During the procedure, Integrilin was given as patient had branch vessel disease and there was stenting just into the LAD. A 6 Paraguayan XB 3.5 guiding catheter was used to intubate the left main. There was radial spasm noted and this guide was able to be slowly advanced through the radial artery and was able to be utilized to engage the left main. A choice floppy wire was used to cross the lesion in the LAD. IVUS was performed of the LAD which showed a severe stenosis in the mid vessel with mild to moderate calcification with minimal luminal diameter of 3 to 3.25 mm. .  Lesion was then treated with atherectomy 2.5 mm and 3 mm cutting balloons. Lesion was then stented with an Abbott Xience Nancy 3 x 12 mm drug-eluting stent. Stent was postdilated with 3 mm and 3.25 mm noncompliant balloons. Follow-up IVUS showed good stent apposition/expansion. Attention then turned towards D2, using same equipment, the lesion and D2 was treated with a 2.5 mm cutting balloon for atherectomy. Lesion was then stented after IVUS was performed which showed minimal luminal diameter of 3 mm. Stenting was performed with Peña Xience Nancy 3 x 50 mm drug-eluting stent. Stent was postdilated with 3 mm noncompliant balloon. Follow-up IVUS showed good stent apposition/expansion.   There was a very small area of protrusion of the stent into the LAD however this appeared to be less than 1 mm and as such, further intervention was not felt to be needed. There was no obstruction of flow into the LAD and angiographically the result appeared to be good. CONCLUSIONS:      Successful LAD PCI with single drug-eluting stent  Successful D2 PCI with single drug-eluting stent     Encouraged smoking cessation, emphasized medication compliance to patient and family  Patient/family understand that further PCI (including bifurcation PCI) may be warranted without smoking cessation, that ultimately CABG may be needed if he has persistent CAD with restenosis in the future. Studies:     Cxr       Impression   No radiographic evidence of acute pulmonary disease. I have reviewed labs and imaging/xray/diagnostic testing in this note. Assessment and Plan          Patient Active Problem List   Diagnosis    Moderate COPD (chronic obstructive pulmonary disease) (HCC)    Cigarette nicotine dependence without complication    Acute myocardial infarction ([address]    STEMI (ST elevation myocardial infarction) ([address]    Severe malnutrition ([address]    Coronary artery disease involving native coronary artery of native heart with angina pectoris ([address]    H/O thoracic aortic aneurysm repair    Weight loss    High cholesterol    Unstable angina (HCC)       Chest pain, recent stress test which was borderline abnormal, will plan on heart catheterization due to escalating angina, risk/benefit/alternatives/outcome discussed patient, he understands/agrees and wishes to proceed with this today. Cad/ashd, dapt    ICM, echo    Hchol, statin    DISCUSSION OF CARDIAC CATHETERIZATION PROCEDURES: The procedures, indications, risks and alternatives have been discussed with the patient and, as appropriate, with the patient's guardian .  Risks discussed included, but are not limited to, bleeding, development of blood clots/emboli, damage to blood vessels, renal failure, malignant cardiac arrhythmias, stroke, heart attack, emergent coronary bypass surgery, death, dye allergy. The patient (and guardian as appropriate) expressed understanding of the aforementioned and wished to proceed. Thank you for allowing us to participate in the care of Gladis Chambers. Please call me with any questions [phone].     Lashon Patel MD, [facility]   Interventional Cardiologist  [facility]  [phone] [facility]  [phone] [facility]  8/24/2022 2:25 PM

## 2022-08-24 NOTE — ED NOTES
3233 - called Rhianna Burnett at [facility] cardio  Re: cp/abnl stress  18 - Dr Angelina Mckeon called back to speak with CLINT Gray  08/24/22 8222

## 2022-08-24 NOTE — PROGRESS NOTES
Cardiology:    Notified of pt becoming agitated. Code violet was called as pt refused to be directed by nurses and was getting out of bed. Came to bedside, pt confused. Groin site no bleeding/hematoma. Vitals stable. Gross neuro exam normal w/ exception of mentation/confusion. Gave morphine/benzos/haldol and pt was ultimately able to be sedated to facilitate getting him back in bed. Suspect pt may have had reaction to meds, will place possible allergy in chart, he has been on pain meds at home. He has rec'd benzos in past. I spoke to pt's brother and cousin and cousin indicated he has a h/o of agitation and has fought with nurses in past procedure. His cousin, Brando Snow, is coming to hospital to facilitate redirection. Due to severity of confusion and potential for complications/bleeding, felt needed to escalate to precedex and d/w family this itself may result in complications such as resp failure/ventilation/death but is necessarily, at least in short term. Will place consult to pulm/cc for assistance as well.

## 2022-08-24 NOTE — PROCEDURES
CARDIAC CATHETERIZATION REPORT     Procedure Date:  2022  Patient Name: Amadou Bowen  MRN: [id number] : 1955      INDICATION     Unstable angina    PROCEDURES PERFORMED     Left heart catheterization  LVgram  Coronary angiogam  Coronary cath  Monitoring of moderate conscious sedation    IVUS of LM  IVUS of LAD  IVUS of D2    PCI of D2 with single drug-eluting stent      PROCEDURE DESCRIPTION   Risks/benefits/alternatives/outcomes were discussed with patient and/or family and informed consent was obtained. Patient had known right radial artery occlusion as such attention turned towards the groin using fluoroscopic ultrasound guidance, 5 Thai sheath was inserted into the right common femoral artery. Diagnostic 5fr pigtail, JL 4, JR4 catheters were used for diagnostic angiograms. Attention then turned towards PCI as noted below. At the conclusion of the procedure, a Vascade device was deployed and hemostasis was obtained. There were no immediate complications. I supervised sedation from 3:15 PM to 4:19 PM with versed 4 mg/fentanyl 200 mcg during the procedure. An independent trained observer pushed meds at my direction. We monitored the patient's level of consciousness and vital signs/physiologic status throughout the procedure duration (see times listed previously). 230 cc contrast was utilized. <20cc EBL. FINDINGS       LVGRAM    LVEDP 4   GRADIENT ACROSS AORTIC VALVE None   LV FUNCTION EF 40%   WALL MOTION Basal inferior hypokinesis   MITRAL REGURGITATION Mild         CORONARY ARTERIES    LM Less than 10% [id number]-tlq-rgmcwm stenosis         LAD Vessel wraps around the apex, there is proximal 23% stenosis, there is a mid vessel stent which is patent with less than 10% restenosis. There is mid vessel bridging versus mid-distal 50% stenosis.     D1 is a very small vessel with ostial/proximal/mid 60% stenosis  D2 is a medium size vessel with proximal-mid 75 to 80% stenosis, there is a stent in place in this vessel. LCX Medium to large size vessel, proximal 10 to 20% stenosis, mid-distal 40% stenosis. RCA Dominant, large vessel, stented in the [id number]-dro-bdlvbv segments, proximal segment has 40% stenosis, the mid-distal segments have less than 10% stenosis. PERCUTANEOUS INTERVENTION DESCRIPTION     Bivalirudin was used for anticoagulation, patient was given additional dose of Brilinta at the end of the procedure along with single bolus of Integrilin. The initial 5 Kinyarwanda sheath was upsized to a 6 Venora Salmons sheath and a 6 Kinyarwanda VL 3.5 guiding cath was used to intubate the left main. Choice floppy wire was initially taken across the lesion of the LAD into D2 however this would not pass into the distal segment of the diagonal artery and as such a Brenda blue wire was taken and this was able to be passed distally. IVUS was performed of the vessel which showed slight underexpansion as well as in-stent restenosis of prior stent which had a MLD of 3 mm. As such lesion was treated with 3 mm cutting balloon followed by stenting with Medtronic resolute Bebeto 3 x 22 mm drug-eluting stent. Follow-up IVUS showed good stent apposition/expansion. Stent had been postdilated with 3 mm noncompliant balloon. There is slight stent protrusion into the LAD however there appeared to be no impact on the LAD and further PCI was not felt to be warranted at this time. CONCLUSIONS:     Successful PCI of D2 with single drug and stent  Slight protrusion of stent into LAD as well as notation of distal D2 disease, will need to continue to monitor us, patient will likely need follow-up stress testing and may need further PCI pending outpatient clinical follow-up.   Patient would like to have assessment of claudication due to recent abnormal HUMERA, will order CTA abdomen with lower extremity runoff and consider endovascular intervention    Addendum:    Post procedure, pt had confusion/agitation,

## 2022-08-24 NOTE — ACP (ADVANCE CARE PLANNING)
Advance Care Planning     General Advance Care Planning (ACP) Conversation    Date of Conversation: 8/24/2022  Conducted with: Patient with Decision Making Capacity    Healthcare Decision Maker:    Primary Decision Maker: [facility] - Brother/Sister - [phone]    Secondary Decision Maker: Eli Dior - Brother/Sister - [phone]  Click here to complete Healthcare Decision Makers including selection of the Healthcare Decision Maker Relationship (ie \"Primary\"). Today we documented Decision Maker(s) consistent with Legal Next of Kin hierarchy.     Content/Action Overview:  Has NO ACP documents/care preferences - information provided, considering goals and options        Length of Voluntary ACP Conversation in minutes:  <16 minutes (Non-Billable)    ANIBAL Jama

## 2022-08-24 NOTE — ED PROVIDER NOTES
[facility] Emergency Department    CHIEF COMPLAINT  Chest Pain (Chest pain sob,, abnormal stress friday) and Shortness of Breath      SHARED SERVICE VISIT  I have seen and evaluated this patient with my supervising physician, Dr. Ernie Murdock. HISTORY OF PRESENT ILLNESS  Cait Stein is a 78 y.o. male who presents to the ED complaining of ongoing chest pain starting to worsen Friday. Patient with cardiac history. 5 stents in place. Reports that he saw his cardiologist on Wednesday and had stress test and got results that it appeared abnormal on Thursday. Worsening chest pain developed Friday. He rates it at a 7 at 10. Fairly constant. Does endorse this being anginal equivalent. He has had no nausea or vomiting. No headaches, lightheadedness, dizziness or confusion. Denies neck, arm, jaw pain. No back pain. 100-pack-year history. Recently quit. Nonproductive cough. No hemoptysis. Recently diagnosed with PAD. Denies unilateral leg pain or swelling. No pain with deep inspiration. No other complaints, modifying factors or associated symptoms. Nursing notes reviewed.    Past Medical History:   Diagnosis Date    CAD (coronary artery disease) 03/28/2013    Cardiomyopathy ([address] 02/22/2021    EF= 45%    Emphysema of lung ([address]     Heart attack ([address] 03/28/2013    3/28/2013 and 2/22/2021    High cholesterol     HTN (hypertension)     Hypertension      Past Surgical History:   Procedure Laterality Date    COLONOSCOPY N/A 3/22/2021    COLONOSCOPY WITH BIOPSY performed by Esteban Israel MD at [facility]  02/22/2021    SAMM- 3.0 x 38 and 3.0 x 34 to [address]  03/28/2013    SAMM- 3.5 x 18 to RCA    DIAGNOSTIC CARDIAC CATH LAB PROCEDURE  12/10/2019    Non Obs CAD    THORACIC AORTIC ANEURYSM REPAIR  2000    UPPER GASTROINTESTINAL ENDOSCOPY N/A 3/19/2021    EGD BIOPSY performed by Esteban Israel MD at [address]     No family history on file. Social History     Socioeconomic History    Marital status:      Spouse name: Not on file    Number of children: Not on file    Years of education: Not on file    Highest education level: Not on file   Occupational History    Not on file   Tobacco Use    Smoking status: Former     Packs/day: 2.00     Years: 50.00     Pack years: 100.00     Types: Cigarettes     Quit date: 2021     Years since quittin.2    Smokeless tobacco: Never   Vaping Use    Vaping Use: Not on file   Substance and Sexual Activity    Alcohol use: Never    Drug use: Never    Sexual activity: Not on file   Other Topics Concern    Not on file   Social History Narrative    Not on file     Social Determinants of Health     Financial Resource Strain: Not on file   Food Insecurity: Not on file   Transportation Needs: Not on file   Physical Activity: Not on file   Stress: Not on file   Social Connections: Not on file   Intimate Partner Violence: Not on file   Housing Stability: Not on file     Current Facility-Administered Medications   Medication Dose Route Frequency Provider Last Rate Last Admin    aspirin chewable tablet 324 mg  324 mg Oral Once Erika Patterson        nitroglycerin (NITRO-BID) 2 % ointment 0.5 inch  0.5 inch Topical Once CLINT Patterson        morphine sulfate (PF) injection 4 mg  4 mg IntraVENous Once Erika Patterson         Current Outpatient Medications   Medication Sig Dispense Refill    ticagrelor (BRILINTA) 60 MG TABS tablet Take 1 tablet by mouth in the morning and 1 tablet before bedtime.  60 tablet 5    losartan (COZAAR) 25 MG tablet TAKE ONE TABLET BY MOUTH EVERY DAY 90 tablet 3    carvedilol (COREG) 6.25 MG tablet TAKE ONE TABLET BY MOUTH TWO TIMES A DAY WITH MEALS 180 tablet 3    atorvastatin (LIPITOR) 80 MG tablet Take 1 tablet by mouth nightly 30 tablet 11    oxyCODONE-acetaminophen (PERCOCET) 5-325 MG per tablet Take 1 tablet by mouth every 6 Demographics   Patient Name       [facility]   Date of Study      08/17/2022         Gender              Male   Patient Number     [id number]         Date of Birth       1955   Visit Number       [id number]          Age                 73 year(s)   Accession Number   [id number]         Room Number            Corporate ID       [id number]           Sonographer         Gisela Garcia RVT   Ordering Physician Zak [facility]                     MD, [facility] [place]           Physician           Readers                                                            Yara Santana MD  Procedure Type of Study:   Extremities Arteries:Lower Arterial Plethysmography, VASC LOWER EXTREMITY  ART SEGMENTAL PRESSURES W PPG. Vascular Sonographer Report  Indications for Study:Claudication. Additional Indications:Patient complains of claudication (X3 blocks) for approximately one year. Physiologic Arterial Study: including Doppler waveform analysis, Pulse Volume Recording, and Segmental Pressures Impressions Right Impression 1. The right ankle/brachial index is 0.48. 2. Pulse Volume Recordings at the ankle and calf level reveal moderately abnormal waveforms. 3. Pulse Volume Recordings at the low thigh and high thigh reveal mildly abnormal waveforms. 4. Continuous PW Doppler reveals abnormal monophasic signals in the popliteal, posterior tibial and dorsalis pedis arteries. 5. Continuous PW Doppler reveals normal multiphasic signals in the common femoral artery. Left Impression 1. The left ankle/brachial index is 0.74. 2. Pulse Volume Recordings at the ankle, calf, low thigh and high thigh reveal mildly abnormal waveforms. 3. Continuous PW Doppler reveals abnormal monophasic signals in the posterior tibial and dorsalis pedis arteries.  4. Continuous PW Doppler reveals normal multiphasic signals in the popliteal and common femoral arteries. 5. There is no previous exam for comparison. Conclusions   Summary   Moderate bilateral arterial insufficiency worse in the right lower  extremity. The right superficial femoral artery is likely occluded and there is  evidence of aortoiliac inflow disease. There is mild left aorto iliac inflow disease and infrapopliteal disease. Signature   ------------------------------------------------------------------  Electronically signed by Royden Gowers, MD (Interpreting  physician) on 08/17/2022 at 06:56 PM  ------------------------------------------------------------------  Patient Status:Routine. Study [address] - Vascular Lab. Technical Quality:Adequate visualization. Risk Factors   - The patient's risk factor(s) include: dyslipidemia and arterial     hypertension.   - The patient has a current/recent (within 1 year) tobacco history. Velocities are measured in cm/s ; Diameters are measured in mm Pressures +-----------++--------+-----+----+--------+-----+ ! ! !Right   ! ! Left!        !     ! +-----------++--------+-----+----+--------+-----+ ! Location   ! !Pressure! Ratio! !Pressure! Ratio! +-----------++--------+-----+----+--------+-----+ ! Upper DFYRS!!679     !0.75 !    !117     !0.79 ! +-----------++--------+-----+----+--------+-----+ ! Lower Thigh!!80      !0.54 !    !110     !0.74 ! +-----------++--------+-----+----+--------+-----+ ! Calf       !!80      !0.54 !    !111     !0.74 ! +-----------++--------+-----+----+--------+-----+ ! DP         !!71      !0.48 !    !111     !0.74 ! +-----------++--------+-----+----+--------+-----+ ! Ankle PT   !!63      !0.42 !    !109     ! 0.73 ! +-----------++--------+-----+----+--------+-----+   - Brachial Pressure:Right: 136. Left:149.   - HUMERA:Right: 0.48. Left: 0.74. Right Plethysmographic Results   - Right Brachial Pressure:136.   - Right HUMERA:0.48.  Left Plethysmographic Results   - Left Brachial Pressure:149.   - Left Sub-optimal study. Equivocal study for inducible ischemia. Stress Protocols   Resting ECG  Sinus bradycardia, normal axis, normal intervals, normal ECG. Resting HR:57 bpm             Resting BP:110/63 mmHg   Pre-stress physical exam: Pt denies CP. Stress Protocol:Pharmacologic - Lexiscan's  Peak HR:73 bpm        HR/BP product:54927  Peak BP:150/68 mmHg  Predicted HR: 155 bpm  % of predicted HR: 47  Test duration: 1 min   ECG Findings  Sinus with no significant ST-T wave  abnormalities. Non-diagnostic chemical  stress ECG (Failure to reach goal HR). Arrhythmias  Rare PVC's/fusions beats with lexiscan. Symptoms  Pt denies CP. C/o SOB that resolved  without intervention during recovery. Complications  Procedure complication was none. Stress Interpretation  Significant sub-diaphragmatic/gut tracer  uptake despite re-scanning the patient  which limits interpretation. FIxed basal to apical inferior wall  defect with normal wall motion but  decreased myocardial thickening. There is  partial correction with AC images. Basal  inferior wall fails to correct on stress  images -Suspect this is due to  attenuation artifact but cannot rule out  inferior wall ischemia. Post-stress LVEF  is normal at >70%. Imaging Protocols   - One Day   Rest                          Stress   Isotope:Myoview/Tetrofosmin   Isotope: Myoview/Tetrofosmin  Isotope dose:10.4 mCi         Isotope dose:32.2 mCi  Administration Route:I.V. Administration Route:I.V.  Date:08/19/2022 09:40         Date:08/19/2022 11:55                                 Technique:      Gated  Imaging Results   Applied corrections   - Attenuation correction  applied     Stress ejection    Ejection fraction:77 %    EDV :62 ml    ESV :14 ml    Stroke volume :48 ml    LV mass :102 gr  Medical History   Additional Medical History   Pt denies CP.    Signatures   ------------------------------------------------------------------  Electronically signed by Connie Edmond MD (Interpreting  physician) on 08/19/2022 at 18:18  ------------------------------------------------------------------      ED COURSE  Patient received aspirin, nitro and morphine for pain, with good relief. Triage vitals stable. CBC without leukocytosis or anemia. CMP chronic kidney disease which appears grossly unchanged. Troponin was less than 0.01. Stable portable chest x-ray. EKG was a normal sinus rhythm. No evidence for active ischemia. Patient on reevaluation is feeling better. Discussed case with cardiology as well as hospital medicine regarding admission for further cardiac evaluation. Admission orders placed. A discussion was had with Mr. Quincy Erickson regarding chest pain, ED findings, and recommendations for admission. Risk management discussed and shared decision making had with patient and/or surrogate. All questions were answered. Patient is in agreement.     MDM  Results for orders placed or performed during the hospital encounter of 08/24/22   CBC with Auto Differential   Result Value Ref Range    WBC 9.4 4.0 - 11.0 K/uL    RBC 4.43 4.20 - 5.90 M/uL    Hemoglobin 13.9 13.5 - 17.5 g/dL    Hematocrit 41.2 40.5 - 52.5 %    MCV 93.0 80.0 - 100.0 fL    MCH 31.4 26.0 - 34.0 pg    MCHC 33.8 31.0 - 36.0 g/dL    RDW 15.7 (H) 12.4 - 15.4 %    Platelets 556 375 - 069 K/uL    MPV 6.9 5.0 - 10.5 fL    Neutrophils % 72.1 %    Lymphocytes % 19.0 %    Monocytes % 6.6 %    Eosinophils % 1.6 %    Basophils % 0.7 %    Neutrophils Absolute 6.8 1.7 - 7.7 K/uL    Lymphocytes Absolute 1.8 1.0 - 5.1 K/uL    Monocytes Absolute 0.6 0.0 - 1.3 K/uL    Eosinophils Absolute 0.1 0.0 - 0.6 K/uL    Basophils Absolute 0.1 0.0 - 0.2 K/uL   Comprehensive Metabolic Panel   Result Value Ref Range    Sodium 136 136 - 145 mmol/L    Potassium 4.2 3.5 - 5.1 mmol/L    Chloride 102 99 - 110 mmol/L    CO2 24 21 - 32 mmol/L    Anion Gap 10 3 - 16    Glucose 131 (H) 70 - 99 mg/dL    BUN 13 7 - 20 mg/dL    Creatinine 1.4 (H) 0.8 - 1.3 mg/dL GFR Non- 51 (A) >60    GFR African American >60 >60    Calcium 10.6 8.3 - 10.6 mg/dL    Total Protein 7.2 6.4 - 8.2 g/dL    Albumin 4.2 3.4 - 5.0 g/dL    Albumin/Globulin Ratio 1.4 1.1 - 2.2    Total Bilirubin 1.0 0.0 - 1.0 mg/dL    Alkaline Phosphatase 113 40 - 129 U/L    ALT 11 10 - 40 U/L    AST 22 15 - 37 U/L   Troponin   Result Value Ref Range    Troponin <0.01 <0.01 ng/mL   EKG 12 Lead   Result Value Ref Range    Ventricular Rate 74 BPM    Atrial Rate 74 BPM    P-R Interval 154 ms    QRS Duration 90 ms    Q-T Interval 372 ms    QTc Calculation (Bazett) 412 ms    P Axis 77 degrees    R Axis -34 degrees    T Axis 59 degrees    Diagnosis       Normal sinus rhythmLeft axis deviationAbnormal ECGWhen compared with ECG of 11-AUG-2021 05:39,QRS axis Shifted left     I spoke with Kush Elliott, and Shahab. We thoroughly discussed the history, physical exam, laboratory and imaging studies, as well as, emergency department course. Based upon that discussion, we've decided to admit Saul White to the hospital for further observation, evaluation, and treatment. Final Impression  1. Chest pain, unspecified type      Blood pressure 114/70, pulse 62, temperature 98.7 °F (37.1 °C), resp. rate 16, height 5' 9\" (1.753 m), weight 132 lb (59.9 kg), SpO2 98 %. DISPOSITION  Patient was mated to the hospital in stable condition.          Magdalena Clarosma  08/24/22 [id number] De Guzman, PA  08/24/22 [id number]

## 2022-08-24 NOTE — CARE COORDINATION
CASE MANAGEMENT INITIAL ASSESSMENT      Reviewed chart and completed assessment with patient:yes  Family present: NA  Explained Case Management role/services. yes    Primary contact information:brother, [place]I [place] [facility] Decision Maker :   Primary Decision Maker: Galloina Dominik - Brother/Sister - [phone]    Secondary Decision Maker: Tai Laguna - Brother/Sister - [phone]          Can this person be reached and be able to respond quickly, such as within a few minutes or hours? Yes      Admit date/status:8/24/22  Diagnosis:chest pain  Is this a Readmission?:  No      Insurance:[place]   Precert required for SNF: Yes       3 night stay required: No    Living arrangements, Adls, care needs, prior to admission:home alone, independent in ADLs    Durable Medical Equipment at home:  Walker__Cane__RTS__ BSC__Shower Chair__  02__ HHN__ CPAP__  BiPap__  Hospital Bed__ W/C___ Other_____    Services in the home and/or outpatient, prior to admission:none    Current PCP:Dr. Vanessa Mcgowan                                Medications:yes Prescription coverage? Yes Will pt require financial assistance with medications No     Transportation needs: car     Dialysis Facility (if applicable)   Name:  Address:  Dialysis Schedule:  Phone:  Fax:    PT/OT recs:    Hospital Exemption Notification (HEN):    Barriers to discharge:medical complications    Plan/comments:Referred to patient for d/c planning. Spoke to patient. Patient is a 78year old male admitted for chest pain. Patient usually lives at home alone. Patient reports he is independent in ADLs. Patient currently denies d/c needs.  Electronically signed by Karma Leyden, MSW LISW-S on 8/24/2022 at 12:38 PM      ECOC on chart for MD signature

## 2022-08-24 NOTE — ED PROVIDER NOTES
I independently performed a history and physical on Matthew Eden. All diagnostic, treatment, and disposition decisions were made by myself in conjunction with the advanced practice provider. I personally saw the patient and performed a substantive portion of the visit including all aspects of the medical decision making. For further details of Norma Morales [facility] emergency department encounter, please see CLINT Alcantar's documentation. Patient is a 60-year-old male who has had intermittent chest pain over the last few weeks and had a stress test last week which was read as abnormal and ultimately was having more chest pain today similar to prior history where he needed a coronary stent and therefore he came to the ED. He is currently states his pain is 4 out of 10 and is mild at this point and improved compared to earlier today. He did have some shortness of breath this morning. He denies any cough or fever or COVID concerns. He denies any abdominal pain. Due to concern for worsening chest pain, he came back to the ED for further evaluation. He denies any smoking over the last year. Physical:   Gen: No acute distress. AOx3. Psych: Normal mood and affect  HEENT: NCAT  Neck: supple  Cardiac: RRR, pulses 2+ in upper extremities  Lungs: C2AB, no R/R/W  Abdomen: soft and nontender with no R/D/G  Neuro: Moving all extremities equally, GCS equals 15    The Ekg interpreted by me shows  normal sinus rhythm with a rate of 74  Axis is   Left axis deviation  QTc is  normal  Intervals and Durations are unremarkable. ST Segments: no acute change and nonspecific changes  No significant change from prior EKG dated - 8/11/21  No STEMI     MDM: Patient was evaluated due to having chest discomfort with shortness of breath similar to history where he needed coronary stents and being anginal equivalent. His pain is improved at this time although still present. No concern for STEMI on EKG.   Due to concern for recent abnormal stress test, he will need further evaluation in the hospital with cardiology consultation. He is stable for the floor with telemetry and agrees with this plan. He denies any pain with breathing and story not suggestive of pulmonary embolism.       Devante Long MD  08/24/22 [id number]

## 2022-08-24 NOTE — H&P
Hospital Medicine History & Physical      PCP: Radha Wall DO    Date of Admission: 8/24/2022    Chief Complaint:  Chest pain      History Of Present Illness:     78 y.o. male who presents to the ED complaining of ongoing chest pain starting to worsen Friday. Patient with cardiac history. 5 stents in place. Reports that he saw his cardiologist on Wednesday and had stress test and got results that it appeared abnormal on Thursday. Worsening chest pain developed Friday. He rates it at a 7 at 10. Fairly constant. Does endorse this being anginal equivalent. He has had no nausea or vomiting. No headaches, lightheadedness, dizziness or confusion. Denies neck, arm, jaw pain. No back pain. 100-pack-year history. Recently quit. Nonproductive cough. No hemoptysis. Recently diagnosed with PAD. Denies unilateral leg pain or swelling. No pain with deep inspiration. Past Medical History:          Diagnosis Date    CAD (coronary artery disease) 03/28/2013    Cardiomyopathy ([facility] 02/22/2021    EF= 45%    Emphysema of lung ([facility] [address]     Heart attack ([facility] [address] 03/28/2013    3/28/2013 and 2/22/2021    High cholesterol     HTN (hypertension)     Hypertension        Past Surgical History:          Procedure Laterality Date    COLONOSCOPY N/A 3/22/2021    COLONOSCOPY WITH BIOPSY performed by Bobo Montesinos MD at [facility]  02/22/2021    SAMM- 3.0 x 38 and 3.0 x 34 to [address]  03/28/2013    SAMM- 3.5 x 18 to RCA    DIAGNOSTIC CARDIAC CATH LAB PROCEDURE  12/10/2019    Non Obs CAD    THORACIC AORTIC ANEURYSM REPAIR  2000    UPPER GASTROINTESTINAL ENDOSCOPY N/A 3/19/2021    EGD BIOPSY performed by Bobo Montesinos MD at [address]       Medications Prior to Admission:      Prior to Admission medications    Medication Sig Start Date End Date Taking?  Authorizing Provider   ticagrelor (BRILINTA) 60 MG TABS tablet Take 1 tablet by mouth in the morning and 1 tablet before bedtime. 8/10/22   Mustapha Hollis MD   losartan (COZAAR) 25 MG tablet TAKE ONE TABLET BY MOUTH EVERY DAY 8/13/21   Mustapha Hollis MD   carvedilol (COREG) 6.25 MG tablet TAKE ONE TABLET BY MOUTH TWO TIMES A DAY WITH MEALS 8/13/21   Mustapha Hollis MD   atorvastatin (LIPITOR) 80 MG tablet Take 1 tablet by mouth nightly 8/11/21   SKYLAR Piedra CNP   oxyCODONE-acetaminophen (PERCOCET) 5-325 MG per tablet Take 1 tablet by mouth every 6 hours as needed for Pain. Historical Provider, MD   pantoprazole (PROTONIX) 40 MG tablet Take 1 tablet by mouth every morning (before breakfast) 3/24/21   SKYLAR Malin CNP   metoclopramide (REGLAN) 5 MG tablet Take 1 tablet by mouth 3 times daily  Patient not taking: Reported on 8/10/2022 3/23/21   SKYLAR Pedroza CNP   aspirin 81 MG chewable tablet Take 1 tablet by mouth daily 2/25/21   Marina Patel MD   umeclidinium-vilanterol (ANORO ELLIPTA) 62.5-25 MCG/INH AEPB inhaler Inhale 1 puff into the lungs daily 6/11/20   Jaycob Mckinley MD   albuterol sulfate HFA (VENTOLIN HFA) 108 (90 Base) MCG/ACT inhaler Inhale 2 puffs into the lungs every 6 hours as needed for Wheezing or Shortness of Breath 3/3/20   Jaycob Mckinley MD   albuterol (PROVENTIL) (2.5 MG/3ML) 0.083% nebulizer solution Take 3 mLs by nebulization every 6 hours as needed for Wheezing 3/3/20   Jaycob Mckinley MD       Allergies:  Patient has no known allergies. Social History:        TOBACCO:   reports that he quit smoking about 14 months ago. His smoking use included cigarettes. He has a 100.00 pack-year smoking history. He has never used smokeless tobacco.  ETOH:   reports no history of alcohol use.   E-cigarette/Vaping       Questions Responses    E-cigarette/Vaping Use Never Assessed    Start Date     Passive Exposure     Quit Date     Counseling Given     Comments               Family History:       Reviewed and negative in regards to presenting illness/complaint. History reviewed. No pertinent family history. REVIEW OF SYSTEMS COMPLETED:   Pertinent positives as noted in the HPI. All other systems reviewed and negative. PHYSICAL EXAM PERFORMED:    /70   Pulse 62   Temp 98.7 °F (37.1 °C)   Resp 16   Ht 5' 9\" (1.753 m)   Wt 132 lb (59.9 kg)   SpO2 98%   BMI 19.49 kg/m²     General appearance:  No apparent distress, appears stated age and cooperative. HEENT:  Normal cephalic, atraumatic without obvious deformity. Pupils equal, round, and reactive to light. Extra ocular muscles intact. Conjunctivae/corneas clear. Neck: Supple, with full range of motion. No jugular venous distention. Trachea midline. Respiratory:  Normal respiratory effort. Clear to auscultation, bilaterally without Rales/Wheezes/Rhonchi. Cardiovascular:  Regular rate and rhythm with normal S1/S2 without murmurs, rubs or gallops. Abdomen: Soft, non-tender, non-distended with normal bowel sounds. Musculoskeletal:  No clubbing, cyanosis or edema bilaterally. Full range of motion without deformity. Skin: Skin color, texture, turgor normal.  No rashes or lesions. Neurologic:  Neurovascularly intact without any focal sensory/motor deficits. Cranial nerves: II-XII intact, grossly non-focal.  Psychiatric:  Alert and oriented, thought content appropriate, normal insight  Capillary Refill: Brisk,3 seconds, normal  Peripheral Pulses: +2 palpable, equal bilaterally       Labs:     Recent Labs     08/24/22  1108   WBC 9.4   HGB 13.9   HCT 41.2        Recent Labs     08/24/22  1108      K 4.2      CO2 24   BUN 13   CREATININE 1.4*   CALCIUM 10.6     Recent Labs     08/24/22  1108   AST 22   ALT 11   BILITOT 1.0   ALKPHOS 113     No results for input(s): INR in the last 72 hours.   Recent Labs     08/24/22  1108   TROPONINI <0.01       Urinalysis:    No results found for: [address] Fern[address]es, [address], BACTERIA, RBCUA, BLOODU, [address], [address]    Radiology: CXR: I have reviewed the CXR   EKG:  I have reviewed the EKG   XR CHEST PORTABLE   Final Result   No radiographic evidence of acute pulmonary disease. Consults:    IP CONSULT TO CARDIOLOGY    ASSESSMENT:    Active Hospital Problems    Diagnosis Date Noted    Unstable angina ([facility] [address] [I20.0] 08/24/2022     Priority: Medium     -Unstable angina: Patient has underlying coronary artery disease. Patient has a history of stent placement x5. Patient is experiencing pressure-like chest pain. Recent abnormal stress test approximately 1 week ago. - COPD without exacerbation: Patient is saturating well on room air  - Essential hypertension  - Dyslipidemia  - CKD stage III    PLAN:    Admit patient to telemetry for cardiac monitoring  Aspirin 81 mg daily  Continue home ticagrelor  Continue statin  P.o. carvedilol 6.25 mg twice daily  Obtain cardiology consult for possible cardiac catheterization  Monitor respiration, Albuterol Pratropium as needed    DVT Prophylaxis: Lovenox  Diet: No diet orders on file  Code Status: Prior    PT/OT Eval Status: Purvi Roberts MD    Thank you Bird Li DO for the opportunity to be involved in this patient's care. If you have any questions or concerns please feel free to contact me at [phone].

## 2022-08-25 VITALS
RESPIRATION RATE: 18 BRPM | HEART RATE: 70 BPM | BODY MASS INDEX: 19 KG/M2 | HEIGHT: 69 IN | WEIGHT: 128.31 LBS | OXYGEN SATURATION: 97 % | SYSTOLIC BLOOD PRESSURE: 176 MMHG | DIASTOLIC BLOOD PRESSURE: 72 MMHG | TEMPERATURE: 97.4 F

## 2022-08-25 LAB
ANION GAP SERPL CALCULATED.3IONS-SCNC: 11 MMOL/L (ref 3–16)
BUN BLDV-MCNC: 11 MG/DL (ref 7–20)
CALCIUM SERPL-MCNC: 8.6 MG/DL (ref 8.3–10.6)
CHLORIDE BLD-SCNC: 109 MMOL/L (ref 99–110)
CO2: 20 MMOL/L (ref 21–32)
CREAT SERPL-MCNC: 1 MG/DL (ref 0.8–1.3)
EKG ATRIAL RATE: 56 BPM
EKG ATRIAL RATE: 60 BPM
EKG DIAGNOSIS: NORMAL
EKG DIAGNOSIS: NORMAL
EKG P AXIS: 70 DEGREES
EKG P AXIS: 82 DEGREES
EKG P-R INTERVAL: 164 MS
EKG P-R INTERVAL: 170 MS
EKG Q-T INTERVAL: 400 MS
EKG Q-T INTERVAL: 426 MS
EKG QRS DURATION: 84 MS
EKG QRS DURATION: 90 MS
EKG QTC CALCULATION (BAZETT): 400 MS
EKG QTC CALCULATION (BAZETT): 411 MS
EKG R AXIS: -14 DEGREES
EKG R AXIS: 8 DEGREES
EKG T AXIS: 51 DEGREES
EKG T AXIS: 67 DEGREES
EKG VENTRICULAR RATE: 56 BPM
EKG VENTRICULAR RATE: 60 BPM
GFR AFRICAN AMERICAN: >60
GFR NON-AFRICAN AMERICAN: >60
GLUCOSE BLD-MCNC: 80 MG/DL (ref 70–99)
HCT VFR BLD CALC: 36.7 % (ref 40.5–52.5)
HEMOGLOBIN: 12.4 G/DL (ref 13.5–17.5)
LV EF: 50 %
LVEF MODALITY: NORMAL
MCH RBC QN AUTO: 31.6 PG (ref 26–34)
MCHC RBC AUTO-ENTMCNC: 33.7 G/DL (ref 31–36)
MCV RBC AUTO: 93.9 FL (ref 80–100)
PDW BLD-RTO: 15.4 % (ref 12.4–15.4)
PLATELET # BLD: 212 K/UL (ref 135–450)
PMV BLD AUTO: 6.9 FL (ref 5–10.5)
POTASSIUM SERPL-SCNC: 3.7 MMOL/L (ref 3.5–5.1)
RBC # BLD: 3.91 M/UL (ref 4.2–5.9)
SODIUM BLD-SCNC: 140 MMOL/L (ref 136–145)
WBC # BLD: 8.2 K/UL (ref 4–11)

## 2022-08-25 PROCEDURE — 99214 OFFICE O/P EST MOD 30 MIN: CPT | Performed by: INTERNAL MEDICINE

## 2022-08-25 PROCEDURE — 96372 THER/PROPH/DIAG INJ SC/IM: CPT

## 2022-08-25 PROCEDURE — 93306 TTE W/DOPPLER COMPLETE: CPT

## 2022-08-25 PROCEDURE — G0378 HOSPITAL OBSERVATION PER HR: HCPCS

## 2022-08-25 PROCEDURE — 94640 AIRWAY INHALATION TREATMENT: CPT

## 2022-08-25 PROCEDURE — 85027 COMPLETE CBC AUTOMATED: CPT

## 2022-08-25 PROCEDURE — 36415 COLL VENOUS BLD VENIPUNCTURE: CPT

## 2022-08-25 PROCEDURE — 80048 BASIC METABOLIC PNL TOTAL CA: CPT

## 2022-08-25 PROCEDURE — 93010 ELECTROCARDIOGRAM REPORT: CPT | Performed by: INTERNAL MEDICINE

## 2022-08-25 PROCEDURE — 2580000003 HC RX 258: Performed by: INTERNAL MEDICINE

## 2022-08-25 PROCEDURE — 6370000000 HC RX 637 (ALT 250 FOR IP): Performed by: INTERNAL MEDICINE

## 2022-08-25 PROCEDURE — 93005 ELECTROCARDIOGRAM TRACING: CPT | Performed by: INTERNAL MEDICINE

## 2022-08-25 PROCEDURE — 6360000002 HC RX W HCPCS: Performed by: INTERNAL MEDICINE

## 2022-08-25 RX ORDER — OXYCODONE HYDROCHLORIDE AND ACETAMINOPHEN 5; 325 MG/1; MG/1
1 TABLET ORAL EVERY 8 HOURS PRN
Status: DISCONTINUED | OUTPATIENT
Start: 2022-08-25 | End: 2022-08-25 | Stop reason: HOSPADM

## 2022-08-25 RX ORDER — OXYCODONE AND ACETAMINOPHEN 10; 325 MG/1; MG/1
1 TABLET ORAL EVERY 8 HOURS PRN
Status: DISCONTINUED | OUTPATIENT
Start: 2022-08-25 | End: 2022-08-25

## 2022-08-25 RX ADMIN — Medication 10 ML: at 08:22

## 2022-08-25 RX ADMIN — LOSARTAN POTASSIUM 25 MG: 25 TABLET, FILM COATED ORAL at 08:12

## 2022-08-25 RX ADMIN — Medication 10 ML: at 08:24

## 2022-08-25 RX ADMIN — OXYCODONE AND ACETAMINOPHEN 1 TABLET: 5; 325 TABLET ORAL at 08:58

## 2022-08-25 RX ADMIN — ASPIRIN 81 MG 81 MG: 81 TABLET ORAL at 08:12

## 2022-08-25 RX ADMIN — ENOXAPARIN SODIUM 40 MG: 100 INJECTION SUBCUTANEOUS at 08:12

## 2022-08-25 RX ADMIN — SODIUM CHLORIDE: 9 INJECTION, SOLUTION INTRAVENOUS at 05:10

## 2022-08-25 RX ADMIN — MUPIROCIN: 20 OINTMENT TOPICAL at 08:12

## 2022-08-25 RX ADMIN — CARVEDILOL 6.25 MG: 6.25 TABLET, FILM COATED ORAL at 08:12

## 2022-08-25 RX ADMIN — TICAGRELOR 90 MG: 90 TABLET ORAL at 08:12

## 2022-08-25 RX ADMIN — TIOTROPIUM BROMIDE AND OLODATEROL 2 PUFF: 3.124; 2.736 SPRAY, METERED RESPIRATORY (INHALATION) at 09:07

## 2022-08-25 ASSESSMENT — PAIN DESCRIPTION - FREQUENCY: FREQUENCY: CONTINUOUS

## 2022-08-25 ASSESSMENT — PAIN - FUNCTIONAL ASSESSMENT: PAIN_FUNCTIONAL_ASSESSMENT: PREVENTS OR INTERFERES SOME ACTIVE ACTIVITIES AND ADLS

## 2022-08-25 ASSESSMENT — PAIN SCALES - GENERAL
PAINLEVEL_OUTOF10: 0
PAINLEVEL_OUTOF10: 6
PAINLEVEL_OUTOF10: 0
PAINLEVEL_OUTOF10: 6

## 2022-08-25 ASSESSMENT — PAIN DESCRIPTION - PAIN TYPE: TYPE: CHRONIC PAIN

## 2022-08-25 ASSESSMENT — PAIN DESCRIPTION - DESCRIPTORS: DESCRIPTORS: ACHING;DISCOMFORT

## 2022-08-25 ASSESSMENT — PAIN DESCRIPTION - LOCATION: LOCATION: SHOULDER

## 2022-08-25 ASSESSMENT — PAIN DESCRIPTION - ONSET: ONSET: ON-GOING

## 2022-08-25 ASSESSMENT — PAIN DESCRIPTION - ORIENTATION: ORIENTATION: RIGHT;LEFT

## 2022-08-25 NOTE — DISCHARGE INSTRUCTIONS
FOLLOW-UP APPOINTMENTS    [facility] - Keep your appointment on September 29th at 1pm with Dr Ruchi Nascimento, [facility]. You and your one visitor will need to have your mouth and nose covered. This can be with a mask. No children please. [facility]) office location. [address] [place] [address], / [address] Office #: [phone]. Located behind Madhav Hyatt. If you are unable to make this appointment, please call to reschedule.

## 2022-08-25 NOTE — PROGRESS NOTES
Spoke with pt's cousin Dionne Carter and updated on POC. Pt A/Ox4 and told Dionne Carter he would call him later this morning after breakfast and he has a nap. Assisted to [facility]- a little unsteady on his feet but independent. C/o pain- PRN percocet given.

## 2022-08-25 NOTE — PROGRESS NOTES
Patient to room from cath lab with Rns from cath lab. Bedside report was given. Patient attached to ICU monitor. Patient was confused, combative, and verbally abusive towards staff. Patient arrived with precedex drip at 0.01. See EMAR for titrations. Chlorhexidine bath given. Patient placed in bilateral soft wrist restraints and right lower extremity restraints. Right groin site ecchymotic with no hematoma. Doppler/+1 pulses in right lower extremity. Waqar Boogie, the patient's cousin, was updated.

## 2022-08-25 NOTE — PROGRESS NOTES
Discharge paperwork discussed. Pt was taking brilinta at home BID prior to admission and pt does not want to fill here. Waiting for ride- coming from several hours away. PIV x2 removed.

## 2022-08-25 NOTE — PROGRESS NOTES
Physician Progress Note      PATIENT:               Park Lara  CSN #:                  [id number]  :                       1955  ADMIT DATE:       2022 10:57 AM  DISCH DATE:  RESPONDING  PROVIDER #:        Aurelia Morris MD          QUERY TEXT:    Pt admitted with CAD and has encephalopathy documented. If possible, please   document in progress notes and discharge summary further specificity regarding   the type of encephalopathy:      The medical record reflects the following:  Risk Factors: Agitation, confusion, delirium  Clinical Indicators:  note- Agitation/confusion/delirium, suspect related   to benzo, dc iv sedatives, can cancel pulm/cc consult. Mentation has   dramatically improved. Treatment:  dc iv sedatives, monitor labs/image    Thank You Maci Munoz RN, [email or web address]  Options provided:  -- Metabolic encephalopathy  -- Toxic encephalopathy  -- Other - I will add my own diagnosis  -- Disagree - Not applicable / Not valid  -- Disagree - Clinically unable to determine / Unknown  -- Refer to Clinical Documentation Reviewer    PROVIDER RESPONSE TEXT:    Provider is clinically unable to determine a response to this query.     Query created by: Latoya Stone on 2022 9:46 AM      Electronically signed by:  Aurelia Morris MD 2022 12:45 PM

## 2022-08-25 NOTE — PLAN OF CARE
Problem: Discharge Planning  Goal: Discharge to home or other facility with appropriate resources  Outcome: Progressing  Flowsheets (Taken 8/25/2022 0010)  Discharge to home or other facility with appropriate resources:   Identify discharge learning needs (meds, wound care, etc)   Refer to discharge planning if patient needs post-hospital services based on physician order or complex needs related to functional status, cognitive ability or social support system   Identify barriers to discharge with patient and caregiver   Arrange for needed discharge resources and transportation as appropriate     Problem: Safety - Medical Restraint  Goal: Remains free of injury from restraints (Restraint for Interference with Medical Device)  Description: INTERVENTIONS:  1. Determine that other, less restrictive measures have been tried or would not be effective before applying the restraint  2. Evaluate the patient's condition at the time of restraint application  3. Inform patient/family regarding the reason for restraint  4. Q2H: Monitor safety, psychosocial status, comfort, nutrition and hydration  Outcome: Progressing  Flowsheets (Taken 8/24/2022 2000)  Remains free of injury from restraints (restraint for interference with medical device):   Determine that other, less restrictive measures have been tried or would not be effective before applying the restraint   Evaluate the patient's condition at the time of restraint application   Inform patient/family regarding the reason for restraint   Every 2 hours: Monitor safety, psychosocial status, comfort, nutrition and hydration     Problem: Skin/Tissue Integrity  Goal: Absence of new skin breakdown  Description: 1. Monitor for areas of redness and/or skin breakdown  2. Assess vascular access sites hourly  3. Every 4-6 hours minimum:  Change oxygen saturation probe site  4.   Every 4-6 hours:  If on nasal continuous positive airway pressure, respiratory therapy assess nares and determine need for appliance change or resting period.   Outcome: Progressing

## 2022-08-25 NOTE — CARE COORDINATION
8/25/22 DC order noted, no DCP needs ordered upon dc. Spoke to RN no needs anticipated, pt will dc home with family and no needs, CM signed off.

## 2022-08-25 NOTE — PROGRESS NOTES
[facility]   Daily Cardiovascular Progress Note    Admit Date: 8/24/2022    Chief complaint:   chest pain  HPI:     Pt denies CP, SOB, dizziness or syncope.         Medications/Labs all Reviewed:  Patient Active Problem List   Diagnosis    Moderate COPD (chronic obstructive pulmonary disease) ([facility])    Cigarette nicotine dependence without complication    Acute myocardial infarction (HCC)    STEMI (ST elevation myocardial infarction) ([facility] [address]    Severe malnutrition ([facility])    Coronary artery disease involving native coronary artery of native heart with angina pectoris ([facility] [address]    H/O thoracic aortic aneurysm repair    Weight loss    High cholesterol    Unstable angina ([facility])       Medications:  mupirocin (BACTROBAN) 2 % ointment, BID  aspirin chewable tablet 81 mg, Daily  atorvastatin (LIPITOR) tablet 80 mg, Nightly  carvedilol (COREG) tablet 6.25 mg, BID WC  losartan (COZAAR) tablet 25 mg, Daily  tiotropium-olodaterol (STIOLTO) 2.5-2.5 MCG/ACT inhaler 2 puff, Daily  sodium chloride flush 0.9 % injection 5-40 mL, 2 times per day  sodium chloride flush 0.9 % injection 5-40 mL, PRN  0.9 % sodium chloride infusion, PRN  ondansetron (ZOFRAN-ODT) disintegrating tablet 4 mg, Q8H PRN   Or  ondansetron (ZOFRAN) injection 4 mg, Q6H PRN  acetaminophen (TYLENOL) tablet 650 mg, Q6H PRN   Or  acetaminophen (TYLENOL) suppository 650 mg, Q6H PRN  polyethylene glycol (GLYCOLAX) packet 17 g, Daily PRN  enoxaparin (LOVENOX) injection 40 mg, Daily  nitroGLYCERIN (NITROSTAT) SL tablet 0.4 mg, Q5 Min PRN  perflutren lipid microspheres (DEFINITY) injection 1.65 mg, ONCE PRN  sodium chloride flush 0.9 % injection 5-40 mL, 2 times per day  sodium chloride flush 0.9 % injection 5-40 mL, PRN  0.9 % sodium chloride infusion, PRN  sodium chloride flush 0.9 % injection 5-40 mL, 2 times per day  sodium chloride flush 0.9 % injection 5-40 mL, PRN  0.9 % sodium chloride infusion, PRN  acetaminophen (TYLENOL) tablet 650 mg, Q4H PRN  ticagrelor (BRILINTA) tablet 90 mg, BID  LORazepam (ATIVAN) injection 2 mg, Once  haloperidol lactate (HALDOL) injection 5 mg, Once  LORazepam (ATIVAN) injection 2 mg, Once  morphine sulfate (PF) injection 4 mg, Once  midazolam (VERSED) injection 4 mg, Once           PHYSICAL EXAM   BP (!) 176/72   Pulse 71   Temp 97.4 °F (36.3 °C) (Temporal)   Resp 19   Ht 5' 9\" (1.753 m)   Wt 128 lb 4.9 oz (58.2 kg)   SpO2 97%   BMI 18.95 kg/m²    Vitals:    08/25/22 0600 08/25/22 0700 08/25/22 0800 08/25/22 0812   BP: (!) 124/56 (!) 176/72 106/62 (!) 176/72   Pulse: 51 59 75 71   Resp: 14 (!) 7 19    Temp: 97.6 °F (36.4 °C)  97.4 °F (36.3 °C)    TempSrc: Oral  Temporal    SpO2: 100% 100% 97%    Weight: 128 lb 4.9 oz (58.2 kg)      Height:             Intake/Output Summary (Last 24 hours) at 8/25/2022 0849  Last data filed at 8/25/2022 0829  Gross per 24 hour   Intake 1566.98 ml   Output 350 ml   Net 1216.98 ml     Wt Readings from Last 3 Encounters:   08/25/22 128 lb 4.9 oz (58.2 kg)   08/10/22 132 lb 3.2 oz (60 kg)   08/11/21 128 lb 12.8 oz (58.4 kg)         Gen: Patient in NAD, resting comfortably  Neck: No JVD or bruits  Respiratory: CTAB no WRR  Chest: normal without deformity  Cardiovascular:RRR, S1S2, no mrg, normal PMI  Abdomen: Soft, NTND, Normal BS, bruising in groin, no hematoma/bruit/thrill, distal pulses dopplered in DP  Extremities: No clubbing, cyanosis, or edema  Neurological/Psychiatric: AxO x4, No gross motors/sensory deficits  Skin:  Warm and dry      Labs:  CBC:   Recent Labs     08/24/22  1108 08/24/22  2037 08/25/22  0416   WBC 9.4 7.6 8.2   HGB 13.9 11.5* 12.4*   HCT 41.2 34.0* 36.7*   MCV 93.0 93.3 93.9    218 212     BMP:   Recent Labs     08/24/22  1108 08/24/22 2037 08/25/22  0416    136 140   K 4.2 3.9 3.7    106 109   CO2 24 22 20*   BUN 13 11 11   CREATININE 1.4* 1.3 1.0     MG:  No results for input(s): MG in the last 72 hours.    PT/INR: No results for input(s): is a mid vessel stent which is patent with less than 10% restenosis. There is mid vessel bridging versus mid-distal 50% stenosis. D1 is a very small vessel with ostial/proximal/mid 60% stenosis  D2 is a medium size vessel with proximal-mid 75 to 80% stenosis, there is a stent in place in this vessel. LCX Medium to large size vessel, proximal 10 to 20% stenosis, mid-distal 40% stenosis. RCA Dominant, large vessel, stented in the [id number] segments, proximal segment has 40% stenosis, the mid-distal segments have less than 10% stenosis. PERCUTANEOUS INTERVENTION DESCRIPTION      Bivalirudin was used for anticoagulation, patient was given additional dose of Brilinta at the end of the procedure along with single bolus of Integrilin. The initial 5 Gibraltarian sheath was upsized to a 6 Western Nimo sheath and a 6 Gibraltarian VL 3.5 guiding cath was used to intubate the left main. Choice floppy wire was initially taken across the lesion of the LAD into D2 however this would not pass into the distal segment of the diagonal artery and as such a Brenda blue wire was taken and this was able to be passed distally. IVUS was performed of the vessel which showed slight underexpansion as well as in-stent restenosis of prior stent which had a MLD of 3 mm. As such lesion was treated with 3 mm cutting balloon followed by stenting with Medtronic resolute [place] 3 x 22 mm drug-eluting stent. Follow-up IVUS showed good stent apposition/expansion. Stent had been postdilated with 3 mm noncompliant balloon. There is slight stent protrusion into the LAD however there appeared to be no impact on the LAD and further PCI was not felt to be warranted at this time.            CONCLUSIONS:      Successful PCI of D2 with single drug and stent  Slight protrusion of stent into LAD as well as notation of distal D2 disease, will need to continue to monitor us, patient will likely need follow-up stress testing and may need further PCI pending outpatient clinical follow-up. Patient would like to have assessment of claudication due to recent abnormal HUMERA, will order CTA abdomen with lower extremity runoff and consider endovascular intervention     Addendum:     Post procedure, pt had confusion/agitation, required escalation of meds and placement in ICU       I have reviewed labs and imaging/xray/diagnostic testing in this note. Assessment and Plan       Patient Active Problem List   Diagnosis    Moderate COPD (chronic obstructive pulmonary disease) (HCC)    Cigarette nicotine dependence without complication    Acute myocardial infarction (HCC)    STEMI (ST elevation myocardial infarction) ([facility] [address]    Severe malnutrition (HCC)    Coronary artery disease involving native coronary artery of native heart with angina pectoris ([facility] [address]    H/O thoracic aortic aneurysm repair    Weight loss    High cholesterol    Unstable angina (HCC)     Chest pain, s/p diag pci, now cp free. Cad/ashd, dapt     ICM, echo, bblocker/arb     Hchol, statin     Agitation/confusion/delirium, suspect related to benzo, dc iv sedatives, can cancel pulm/cc consult. Mentation has dramatically improved. Ok for tx out of icu from cardiac perspective, no further cv w/u or tx is planned. Defer CTA abd/bilat le runoff to outpt setting. Will sign off,please call with ?s    Thank you for allowing me to participate in the care of your patient. Please call me with any questions [phone].       Magdaleno Steward MD, [facility]   Interventional Cardiologist  [address]  [phone] [facility]  [phone] [address] [place] [address]  8/25/2022 8:49 AM

## 2022-08-26 ENCOUNTER — CARE COORDINATION (OUTPATIENT)
Dept: CASE MANAGEMENT | Age: 67
End: 2022-08-26

## 2022-08-26 NOTE — CARE COORDINATION
[facility] Transitions Initial Follow Up Call    Call within 2 business days of discharge: Yes    Patient: Tommie Alexandra Patient : 1955   MRN: [id number]  Reason for Admission: CP, s/p [facility]  Discharge Date: 22 RARS: Readmission Risk Score: 11.6      Last Discharge [facility]       Date Complaint Diagnosis Description Type Department Provider    22 Chest Pain; Shortness of Breath Chest pain, unspecified type ED to Hosp-Admission (Discharged) (ADMITTED) Alexandra Abdul MD; Fred Santiago. .. Spoke with: [address]: [facility]      Non-face-to-face services provided:  Obtained and reviewed discharge summary and/or continuity of care documents  Education of patient/family/caregiver/guardian to support self-management-puncture care, f/u  Assessment and support for treatment adherence and medication management-full medication reconciliation completd    Care Transitions 24 Hour Call    Do you have all of your prescriptions and are they filled?: Yes  Post Acute Services:  [facility] Transitions Interventions         Transitions of Care Initial Call    Was this an external facility discharge? No Discharge Facility: NA    Challenges to be reviewed by the provider   Additional needs identified to be addressed with provider: No  none             Method of communication with provider : none    Advance Care Planning:   Does patient have an Advance Directive: not on file. LPN Care Coordinator contacted the patient by telephone to perform post hospital discharge assessment. Verified name and  with patient as identifiers. Provided introduction to self, and explanation of the LPN CC role. LPN CC reviewed discharge instructions, medical action plan and red flags with patient who verbalized understanding. Patient given an opportunity to ask questions and does not have any further questions or concerns at this time.  Were discharge instructions available to patient? Yes. Reviewed appropriate site of care based on symptoms and resources available to patient including: PCP  Specialist. The patient agrees to contact the PCP office for questions related to their healthcare. Was patient discharged with a pulse oximeter? no    Patient verified  and was pleasant and agreeable to transition calls. States he is doing fine. Puncture site CDI. Inquires if he can bathe. LPN CC advised patient to wait some time before bathing, but did advise showering would be safe. Advised that if the dressing is removed to just ensure there is no drainage. Counseled to apply pressure if area bleeds & if he is unable to control it to call 911. Patient verbalized understanding. Denies CP, worsening SOB, palpitations. Appetite good. Denies problems with bowels or bladder. Full medication reconciliation completed. Reviewed appts. States he is unsure if he needs to see Dr. Oc Cook sooner or not. LPN CC to route note for clarification. LPN CC provided contact information. No further follow-up call indicated based on severity of symptoms and risk factors. Follow Up  Future Appointments   Date Time Provider Shaila Sanders   2022 11:15 AM Hood Longoria MD AND [facility]   2022  1:00 PM Marycruz Medina MD [facility],  [address] Transitions Initial Follow Up Call    Call within 2 business days of discharge: Yes    Patient: Kyle García Patient : 1955   MRN: [id number]  Reason for Admission: CP, s/p [facility]  Discharge Date: 22 RARS: Readmission Risk Score: 11.6      Last Discharge [facility]       Date Complaint Diagnosis Description Type Department Provider    22 Chest Pain; Shortness of Breath Chest pain, unspecified type ED to Hosp-Admission (Discharged) (ADMITTED) Milvia Tolentino MD; Ewelina Painting,. ..              Spoke with: RUSS    Facility: [facility]    Attempted to reach patient via phone for initial post hospital transition call.  left stating purpose of call along with my contact information requesting a return call.    Rebecca Faria LPN [address] [place] [address]  [phone]    Care Transitions 24 Hour Call    Do you have all of your prescriptions and are they filled?: Yes  Post Acute Services: [facility] Transitions Interventions         Follow Up  Future Appointments   Date Time Provider Shaila Sanders   9/2/2022 11:15 AM Bunny Roa MD AND [facility]   9/28/2022  1:00 PM Angelina Goddard MD [address] [facility]       Rebecca Faria LPN

## 2022-08-28 NOTE — TELEPHONE ENCOUNTER
He can see Dr Orestes Arango on 9.2.22 and keep his appointment with me as he has. If he has any issues I can see him sooner just double book him.

## 2022-08-29 ENCOUNTER — TELEPHONE (OUTPATIENT)
Dept: CARDIOLOGY CLINIC | Age: 67
End: 2022-08-29

## 2022-08-29 LAB — POC ACT LR: >400 SEC

## 2022-08-29 NOTE — TELEPHONE ENCOUNTER
8/24/22 Dr Angelina Mckeon  Left heart catheterization  LVgram  Coronary angiogam  Coronary cath  Monitoring of moderate conscious sedation     IVUS of LM  IVUS of LAD  IVUS of D2    PCI of D2 with single drug-eluting stent

## 2022-08-29 NOTE — TELEPHONE ENCOUNTER
Pt called mhi and stated he just had a stent put in last week and stated incision isnt red but there is a lot of swelling, puffiness and sore to touch, pt wondering if he needs an appt. please advise.

## 2022-08-30 ENCOUNTER — OFFICE VISIT (OUTPATIENT)
Dept: CARDIOLOGY CLINIC | Age: 67
End: 2022-08-30
Payer: OTHER GOVERNMENT

## 2022-08-30 VITALS
BODY MASS INDEX: 19.11 KG/M2 | OXYGEN SATURATION: 98 % | SYSTOLIC BLOOD PRESSURE: 140 MMHG | HEIGHT: 69 IN | WEIGHT: 129 LBS | HEART RATE: 74 BPM | DIASTOLIC BLOOD PRESSURE: 66 MMHG

## 2022-08-30 DIAGNOSIS — I72.4 PSEUDOANEURYSM OF RIGHT FEMORAL ARTERY (HCC): Primary | ICD-10-CM

## 2022-08-30 DIAGNOSIS — I25.119 CORONARY ARTERY DISEASE INVOLVING NATIVE CORONARY ARTERY OF NATIVE HEART WITH ANGINA PECTORIS (HCC): ICD-10-CM

## 2022-08-30 DIAGNOSIS — E78.00 HIGH CHOLESTEROL: ICD-10-CM

## 2022-08-30 DIAGNOSIS — I73.9 ARTERIAL INSUFFICIENCY OF LOWER EXTREMITY (HCC): ICD-10-CM

## 2022-08-30 DIAGNOSIS — I73.9 CLAUDICATION OF RIGHT LOWER EXTREMITY (HCC): ICD-10-CM

## 2022-08-30 DIAGNOSIS — S30.1XXA HEMATOMA OF GROIN, INITIAL ENCOUNTER: Primary | ICD-10-CM

## 2022-08-30 PROCEDURE — 1123F ACP DISCUSS/DSCN MKR DOCD: CPT | Performed by: INTERNAL MEDICINE

## 2022-08-30 PROCEDURE — 99214 OFFICE O/P EST MOD 30 MIN: CPT | Performed by: INTERNAL MEDICINE

## 2022-08-30 NOTE — LETTER
[address] [address]  Phone: [phone]  Fax: [phone]    Janet Laws MD    September 2, 2022     Dominik Stapleton DO  [address] [place] [address]    Patient: Vianey Cartwright   MR Number: [id number]   YOB: 1955   Date of Visit: 8/30/2022       Dear Dominik Stapleton: Thank you for referring Sommer Thompson to me for evaluation/treatment. Below are the relevant portions of my assessment and plan of care. If you have questions, please do not hesitate to call me. I look forward to following Nuria Burks along with you.     Sincerely,      Janet Laws MD
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
## 2022-08-30 NOTE — PATIENT INSTRUCTIONS
Plan:  Current labs reviewed with patient from 8/25/22  2. Ultrasound of right groin today  3. I will personally review your tests and then I will have my staff call you with the results.       Follow up with me in 3 months

## 2022-08-31 ENCOUNTER — HOSPITAL ENCOUNTER (OUTPATIENT)
Dept: VASCULAR LAB | Age: 67
Discharge: HOME | End: 2022-08-31
Payer: MEDICARE

## 2022-08-31 DIAGNOSIS — I72.4 PSEUDOANEURYSM OF RIGHT FEMORAL ARTERY (HCC): ICD-10-CM

## 2022-08-31 PROCEDURE — 93926 LOWER EXTREMITY STUDY: CPT

## 2022-08-31 RX ORDER — ATORVASTATIN CALCIUM 80 MG/1
80 TABLET, FILM COATED ORAL NIGHTLY
Qty: 30 TABLET | Refills: 2 | Status: SHIPPED | OUTPATIENT
Start: 2022-08-31

## 2022-08-31 RX ORDER — LOSARTAN POTASSIUM 25 MG/1
TABLET ORAL
Qty: 90 TABLET | Refills: 3 | Status: SHIPPED | OUTPATIENT
Start: 2022-08-31

## 2022-09-02 ENCOUNTER — OFFICE VISIT (OUTPATIENT)
Dept: VASCULAR SURGERY | Age: 67
End: 2022-09-02
Payer: OTHER GOVERNMENT

## 2022-09-02 VITALS
WEIGHT: 128.5 LBS | DIASTOLIC BLOOD PRESSURE: 60 MMHG | BODY MASS INDEX: 19.03 KG/M2 | HEIGHT: 69 IN | SYSTOLIC BLOOD PRESSURE: 110 MMHG

## 2022-09-02 DIAGNOSIS — I73.9 SEVERE CLAUDICATION (HCC): Primary | ICD-10-CM

## 2022-09-02 PROCEDURE — 99243 OFF/OP CNSLTJ NEW/EST LOW 30: CPT | Performed by: SURGERY

## 2022-09-02 NOTE — PROGRESS NOTES
Outpatient Consultation / H&P    Date of Consultation:  9/2/2022    PCP:  Nancy Marroquin DO     Referring Provider:  Dr. Annette Kurtz     Chief Complaint:   Chief Complaint   Patient presents with    Other     Patient is ref by Dr Annette Kurtz for right leg pain/arterial stenosis. pamlr        History of Present Illness: We are asked to see this patient in consultation by Dr. Annette Kurtz regarding PVD. Macarena Rizvi is a 78 y.o. male who reports severe right lower extremity claudication with onset symptoms after <100ft. He does develop similar LLE symptoms if he persists. Recently he has developed some paraesthesias at rest on the dorsum of right foot. He underwent non invasive studies showing moderate to severe disease bilaterally with the right being much worse. He underwent Cardiac Cath and PCI last week via right femoral access and subsequently developed a hematoma. Duplex was performed post procedure to r/o pseudoaneurysm. There was no pseudo and multiphasic common femoral flow noted. He currently denies CP or SOB.          Past Medical History:  Past Medical History:   Diagnosis Date    CAD (coronary artery disease) 03/28/2013    Cardiomyopathy ([facility] [address] 02/22/2021    EF= 45%    Emphysema of lung ([facility] [address]     Heart attack ([facility] [address] 03/28/2013    3/28/2013 and 2/22/2021    High cholesterol     HTN (hypertension)     Hypertension        Past Surgical History:  Past Surgical History:   Procedure Laterality Date    COLONOSCOPY N/A 3/22/2021    COLONOSCOPY WITH BIOPSY performed by Fredy Ulloa MD at [facility]  02/22/2021    SAMM- 3.0 x 38 and 3.0 x 34 to [address]  03/28/2013    SAMM- 3.5 x 18 to RCA    DIAGNOSTIC CARDIAC CATH LAB PROCEDURE  12/10/2019    Non Obs CAD    THORACIC AORTIC ANEURYSM REPAIR  2000    UPPER GASTROINTESTINAL ENDOSCOPY N/A 3/19/2021    EGD BIOPSY performed by Fredy Ulloa MD at [address] mildly abnormal waveforms. 3. Continuous PW Doppler reveals abnormal monophasic signals in the posterior   tibial and dorsalis pedis arteries. 4. Continuous PW Doppler reveals normal multiphasic signals in the popliteal   and common femoral arteries. 5. There is no previous exam for comparison. Assessment:      Diagnosis Orders   1. Severe claudication ([address]              Recommendations/Plan:    BLE angiogram with possible RLE intervention. I have discussed all risks (including but not limited to bleeding, thrombosis, contrast allergy, renal failure, and early and late failure of intervention), benefits and alternatives of catheter-based angiography. Patient freely consents and is eager to proceed. All questions and expectations addressed.        Addy Lazo MD, FACS

## 2022-09-12 ENCOUNTER — TELEPHONE (OUTPATIENT)
Dept: VASCULAR SURGERY | Age: 67
End: 2022-09-12

## 2022-09-12 NOTE — TELEPHONE ENCOUNTER
Called patient as reminder of procedure tomorrow. Arrive at 6:00am for 7:30am procedure. Npo after midnight.  Radha

## 2022-09-13 ENCOUNTER — HOSPITAL ENCOUNTER (OUTPATIENT)
Dept: CARDIAC CATH/INVASIVE PROCEDURES | Age: 67
Discharge: HOME | End: 2022-09-13
Attending: SURGERY | Admitting: SURGERY
Payer: OTHER GOVERNMENT

## 2022-09-13 VITALS — WEIGHT: 127.4 LBS | HEIGHT: 69 IN | BODY MASS INDEX: 18.87 KG/M2

## 2022-09-13 PROBLEM — I73.9 CLAUDICATION IN PERIPHERAL VASCULAR DISEASE (HCC): Status: ACTIVE | Noted: 2022-09-13

## 2022-09-13 LAB
ANION GAP SERPL CALCULATED.3IONS-SCNC: 11 MMOL/L (ref 3–16)
BUN BLDV-MCNC: 17 MG/DL (ref 7–20)
CALCIUM SERPL-MCNC: 10.4 MG/DL (ref 8.3–10.6)
CHLORIDE BLD-SCNC: 104 MMOL/L (ref 99–110)
CO2: 22 MMOL/L (ref 21–32)
CREAT SERPL-MCNC: 1 MG/DL (ref 0.8–1.3)
GFR AFRICAN AMERICAN: >60
GFR NON-AFRICAN AMERICAN: >60
GLUCOSE BLD-MCNC: 98 MG/DL (ref 70–99)
HCT VFR BLD CALC: 38.4 % (ref 40.5–52.5)
HEMOGLOBIN: 12.7 G/DL (ref 13.5–17.5)
MCH RBC QN AUTO: 31.6 PG (ref 26–34)
MCHC RBC AUTO-ENTMCNC: 33.2 G/DL (ref 31–36)
MCV RBC AUTO: 95.3 FL (ref 80–100)
PDW BLD-RTO: 16.3 % (ref 12.4–15.4)
PLATELET # BLD: 274 K/UL (ref 135–450)
PMV BLD AUTO: 6.7 FL (ref 5–10.5)
POTASSIUM REFLEX MAGNESIUM: 4.2 MMOL/L (ref 3.5–5.1)
RBC # BLD: 4.03 M/UL (ref 4.2–5.9)
SODIUM BLD-SCNC: 137 MMOL/L (ref 136–145)
WBC # BLD: 9 K/UL (ref 4–11)

## 2022-09-13 PROCEDURE — 99152 MOD SED SAME PHYS/QHP 5/>YRS: CPT | Performed by: SURGERY

## 2022-09-13 PROCEDURE — 85027 COMPLETE CBC AUTOMATED: CPT

## 2022-09-13 PROCEDURE — C1769 GUIDE WIRE: HCPCS

## 2022-09-13 PROCEDURE — G0269 OCCLUSIVE DEVICE IN VEIN ART: HCPCS

## 2022-09-13 PROCEDURE — 75716 ARTERY X-RAYS ARMS/LEGS: CPT | Performed by: SURGERY

## 2022-09-13 PROCEDURE — 80048 BASIC METABOLIC PNL TOTAL CA: CPT

## 2022-09-13 PROCEDURE — 75625 CONTRAST EXAM ABDOMINL AORTA: CPT | Performed by: SURGERY

## 2022-09-13 PROCEDURE — 75716 ARTERY X-RAYS ARMS/LEGS: CPT

## 2022-09-13 PROCEDURE — 2709999900 HC NON-CHARGEABLE SUPPLY

## 2022-09-13 PROCEDURE — 36200 PLACE CATHETER IN AORTA: CPT | Performed by: SURGERY

## 2022-09-13 PROCEDURE — 36200 PLACE CATHETER IN AORTA: CPT

## 2022-09-13 PROCEDURE — 76937 US GUIDE VASCULAR ACCESS: CPT | Performed by: SURGERY

## 2022-09-13 PROCEDURE — 75625 CONTRAST EXAM ABDOMINL AORTA: CPT

## 2022-09-13 PROCEDURE — 6360000004 HC RX CONTRAST MEDICATION

## 2022-09-13 PROCEDURE — 6360000002 HC RX W HCPCS

## 2022-09-13 PROCEDURE — 6370000000 HC RX 637 (ALT 250 FOR IP)

## 2022-09-13 PROCEDURE — C1760 CLOSURE DEV, VASC: HCPCS

## 2022-09-13 RX ORDER — MIDAZOLAM HYDROCHLORIDE 1 MG/ML
INJECTION INTRAMUSCULAR; INTRAVENOUS
Status: COMPLETED | OUTPATIENT
Start: 2022-09-13 | End: 2022-09-13

## 2022-09-13 RX ORDER — FENTANYL CITRATE 50 UG/ML
INJECTION, SOLUTION INTRAMUSCULAR; INTRAVENOUS
Status: COMPLETED | OUTPATIENT
Start: 2022-09-13 | End: 2022-09-13

## 2022-09-13 RX ORDER — SODIUM CHLORIDE 9 MG/ML
1000 INJECTION, SOLUTION INTRAVENOUS CONTINUOUS
Status: DISCONTINUED | OUTPATIENT
Start: 2022-09-13 | End: 2022-09-13 | Stop reason: HOSPADM

## 2022-09-13 RX ORDER — ASPIRIN 81 MG/1
81 TABLET, CHEWABLE ORAL ONCE
Status: COMPLETED | OUTPATIENT
Start: 2022-09-13 | End: 2022-09-13

## 2022-09-13 RX ORDER — DIPHENHYDRAMINE HYDROCHLORIDE 50 MG/ML
50 INJECTION INTRAMUSCULAR; INTRAVENOUS ONCE
Status: COMPLETED | OUTPATIENT
Start: 2022-09-13 | End: 2022-09-13

## 2022-09-13 RX ADMIN — DIPHENHYDRAMINE HYDROCHLORIDE 50 MG: 50 INJECTION INTRAMUSCULAR; INTRAVENOUS at 07:22

## 2022-09-13 RX ADMIN — ASPIRIN 81 MG: 81 TABLET, CHEWABLE ORAL at 07:21

## 2022-09-13 RX ADMIN — FENTANYL CITRATE 25 MCG: 50 INJECTION, SOLUTION INTRAMUSCULAR; INTRAVENOUS at 07:57

## 2022-09-13 RX ADMIN — MIDAZOLAM HYDROCHLORIDE 1 MG: 1 INJECTION INTRAMUSCULAR; INTRAVENOUS at 07:57

## 2022-09-13 RX ADMIN — FENTANYL CITRATE 25 MCG: 50 INJECTION, SOLUTION INTRAMUSCULAR; INTRAVENOUS at 08:13

## 2022-09-13 NOTE — DISCHARGE INSTRUCTIONS
Cath Lab at  [facility]    Discharge Instructions        9/13/2022  Osbaldo Lassiter   Date of Birth 1955     Allergies  @ALG@       Activity:  No driving for 24 hours. No Tub baths, swimming or hot tubs for 5 days. In 24 hours you may remove dressing and shower. Wash site with soap and water and leave open to air. No lotions, powders or ointments near site for 5 days. No lifting over 5 pounds for 5 days. Return to work/school in 5 days unless instructed otherwise by your doctor. Diet:  Resume previous diet unless instructed otherwise by your doctor, if so general guidelines for a cardiac diet will be provided to you. Drink extra non-alcoholic/decaffienated fluids for first 24 hours after your procedure. Groin Management:  If you have stairs to walk up, pretend you have a cast on the affected leg walk up them without bending affected leg. When you go home go to the bed or sofa, do not get up except to go to the bathroom. Avoid strenuous activity for 5 days. For example, lifting heavy objects greater than 10 lbs, bicycling, jogging, climbing stairs, or walking long distances. If bleeding occurs from the site or a hematoma (lump), begins to increase in size, apply pressure directly over the site and call 911 to return to the hospital.     Special Instructions:  Report any coolness or numbness in the leg where the cath was done to the MD or call 911  Report any chills, fever, itching, red bumps or rash. Report any of the following to the MD: drainage from the cath site, redness and/or swelling at the site, increased tenderness at the site. If you are currently taking Metformin or Metformin combination medications for Diabetes, hold your dose for 48 hours after your procedure. Sedation Discharge Instructions: For the next 24 hours do not drive a car, operate machinery, power tools or kitchen appliances. Do not drink alcohol; including beer or wine.   Do not make any important decisions or sign any important papers. For the next 24 hours you can expect drowsiness, light-headed or dizziness, nausea/ vomiting, inability to concentrate, fatigue and desire to sleep. We strongly suggest that a responsible adult be with for the next 24 hours, for your protection and safety. You are not allowed to drive yourself home, or take any type of public transportation. Follow Up Appointment:  Follow up with Dr Misit Valdez as directed. Call [phone] if you have questions or to make an appointment.

## 2022-09-13 NOTE — OP NOTE
[address] [facility]                 Brianne Stone                                OPERATIVE REPORT    PATIENT NAME: Elridge Crigler                      :        1955  MED REC NO:   [id number]                          ROOM:  ACCOUNT NO:   [de-identified]                           ADMIT DATE: 2022  PROVIDER:     Donivan Kehr, MD    DATE OF PROCEDURE:  2022    PREOPERATIVE DIAGNOSIS:  Severe right lower extremity claudication. POSTOPERATIVE DIAGNOSIS:  Severe right lower extremity claudication. PROCEDURES PERFORMED:  1. Ultrasound-guided left femoral artery access. 2.  Abdominal insertion of catheter in the aorta. 3.  Abdominal aortogram.  4.  Bilateral lower extremity angiograms. SURGEON:  Donivan Kehr, MD    ANESTHESIA:  Local with moderate monitored sedation. INDICATIONS:  The patient is a 67-year-old male with severe disabling  right lower extremity claudication to a lesser degree. He has some  symptoms in the left leg. He is brought to the angio suite at this time  to undergo angiography with possible intervention. PROCEDURE:  The patient was brought to the angio suite, placed in the  supine position. Under my direct supervision Versed and fentanyl were  administered for moderate sedation. The patient was monitored by an  independent trained nurse observer using continuous blood pressure, EKG  and pulse oximetry. I spent approximately 22 minutes face-to-face with  the patient providing and directing sedation. The groins were prepped  and draped in sterile fashion. Ultrasound was used to identify the left  common femoral artery. This was patent and pulsatile. Under direct  visualization this was accessed and a 5-Iraqi introducer sheath was  placed. Digital copy of the ultrasound image was saved and placed in  the patient's record. Bentson wire was advanced into the aorta.    Modified hook catheter was advanced over the wire. Abdominal aortogram  was then obtained. The catheter was then pulled on to just above the  aortic bifurcation using a bolus christine technique, bilateral lower  extremity angiography was performed. The catheter was then removed over  a Bentson wire. Retrograde left femoral angiogram was performed and a  5-[place] M[employer] closure device was then placed and deployed in standard  fashion achieving excellent hemostasis. The patient was transferred to  the recovery area in stable condition following this procedure. Estimated blood loss was minimal.    ANGIOGRAPHIC FINDINGS:  Abdominal aortogram demonstrates patent celiac  superior mesenteric and bilateral renal arteries without stenosis. The  aorta is slightly diseased and tapers distally. Iliac arteries are  patent, but small without significant stenosis. The right common  femoral and deep femoral artery are patent. The superficial femoral  artery is occluded at its origin with reconstitution below the adductor  canal via profunda collaterals. Popliteal vessel was patent and there  is three-vessel runoff, but the tibial vessels are very small. On the  left side, the common femoral, deep femoral and superficial femoral  arteries are patent without significant stenosis. There is again  three-vessel runoff, but very atretic distal vessels above the ankle. No significant stenosis proximally is identified.         Sarah Beth Alonzo MD    D: 09/13/2022 8:25:29       T: 09/13/2022 8:28:48     [email or web address]  Job#: [id number]     Doc#: [id number]    CC:

## 2022-09-13 NOTE — H&P
History and Physical / Pre-Sedation Assessment    Patient:  Sunnie Goltz  :   1955    Intended Procedure: Aortogram with BLE angiogram possible intervention      HPI: Claudication- see prior note   Nurses notes reviewed and agreed. Medications reviewed  Allergies:   Ativan [lorazepam] and Fentanyl and related        Physical Exam:   CURRENT VITALS:  Ht 5' 9\" (1.753 m)   Wt 127 lb 6.4 oz (57.8 kg)   BMI 18.81 kg/m²   Airway:Normal  Cardiac:Normal  Pulmonary:Normal  Abdomen:Normal        Pre-Procedure Assessment/Plan:  ASA 3 - Patient with moderate systemic disease with functional limitations    Mallampati Airway Assessment:  Mallampati Class II - (soft palate, fauces & uvula are visible)    Level of Sedation Plan: Moderate sedation    Post Procedure plan: Return to same level of care    I assessed the patient and find that the patient is in satisfactory condition to proceed with the planned procedure and sedation plan. I have explained the risk, benefits, and alternatives to the procedure. The patient understands and agrees to proceed.   Yes    Elisabeth Davis

## 2022-09-22 RX ORDER — CARVEDILOL 6.25 MG/1
TABLET ORAL
Qty: 180 TABLET | Refills: 3 | Status: SHIPPED | OUTPATIENT
Start: 2022-09-22

## 2022-09-23 DIAGNOSIS — I73.9 PERIPHERAL VASCULAR DISEASE, UNSPECIFIED (HCC): Primary | ICD-10-CM

## 2022-09-27 ENCOUNTER — PREP FOR PROCEDURE (OUTPATIENT)
Dept: VASCULAR SURGERY | Age: 67
End: 2022-09-27

## 2022-09-27 ENCOUNTER — TELEPHONE (OUTPATIENT)
Dept: VASCULAR SURGERY | Age: 67
End: 2022-09-27

## 2022-09-27 DIAGNOSIS — Z01.818 PREOP TESTING: Primary | ICD-10-CM

## 2022-09-27 RX ORDER — SODIUM CHLORIDE 0.9 % (FLUSH) 0.9 %
5-40 SYRINGE (ML) INJECTION EVERY 12 HOURS SCHEDULED
Status: CANCELLED | OUTPATIENT
Start: 2022-09-27

## 2022-09-27 RX ORDER — SODIUM CHLORIDE 0.9 % (FLUSH) 0.9 %
5-40 SYRINGE (ML) INJECTION PRN
Status: CANCELLED | OUTPATIENT
Start: 2022-09-27

## 2022-09-27 RX ORDER — SODIUM CHLORIDE 9 MG/ML
INJECTION, SOLUTION INTRAVENOUS PRN
Status: CANCELLED | OUTPATIENT
Start: 2022-09-27

## 2022-09-27 NOTE — TELEPHONE ENCOUNTER
Called patient as reminder of vm for Friday 9/30/22  at [place] as well as his surgery on Monday 10/3/2022. yeison

## 2022-09-27 NOTE — PROGRESS NOTES
Jerre Go    Age 78 y.o.    male    1955    MRN [id number]    10/3/2022  Arrival Time_____________  OR Time____________[address]     Procedure(s):  RIGHT FEMORAL POPLITEAL INSITU BYPASS GRAFT                      General   Surgeon(s):  Viet Stephen, MD      DAY ADMIT ___  SDS/OP ___  OUTPT IN BED ___        Phone [phone] (home)     PCP _____________________ Phone_________________ Epic ( ) Epic CE ( ) Appt ________    ADDITIONAL INFO __________________________________ Cardio/Consult _____________    NOTES _____________________________________________________________________    ____________________________________________________________________________    PAT APPT DATE:________ TIME: ________  FAXED QAD: _______  (__) H&P w/ Hospitalist  __________________________________________________________________________  Preop Nurse phone screen complete: _____________  (__) CBC     (__) W/ DIFF ___________     (__) Hgb A1C    ___________  (__) CHEST X RAY   __________  (__) LIPID PROFILE  ___________  (__) EKG   __________  (__) PT/PTT   ___________  (__) PFT's   __________  (__) BMP   ___________  (__) CAROTIDS  __________  (__) CMP   ___________  (__) VEIN MAPPING  __________  (__) U/A   ___________  (__) HISTORY & PHYSICAL __________  (__) URINE C & S  ___________  (__) CARDIAC CLEARANCE __________  (__) U/A W/ FLEX  ___________  (__) PULM.  CLEARANCE __________  (__) SERUM PREGNANCY ___________  (__) Check Epic DOS orders __________  (__) TYPE & SCREEN __________repeat ( ) (__)  __________________ __________  (__) ALBUMIN Loyal Izzy ___________  (__)  __________________ __________  (__) TRANSFERRIN  ___________  (__)  __________________ __________  (__) LIVER PROFILE  ___________  (__)  __________________ __________  (__) MRSA NASAL SWAB ___________  (__) URINE PREG DOS __________  (__) SED RATE  ___________  (__) BLOOD SUGAR DOS __________  (__) C-REACTIVE PROTEIN ___________    (__) VITAMIN D HYDROXY ___________  (__) BLOOD THINNERS __________    (__) ACE/ ARBS: _____________________     (__) BETABLOCKERS __________________

## 2022-09-29 ENCOUNTER — TELEPHONE (OUTPATIENT)
Dept: VASCULAR SURGERY | Age: 67
End: 2022-09-29

## 2022-09-29 RX ORDER — OXYCODONE AND ACETAMINOPHEN 10; 325 MG/1; MG/1
1 TABLET ORAL EVERY 4 HOURS PRN
Status: ON HOLD | COMMUNITY
End: 2022-10-04 | Stop reason: SDUPTHER

## 2022-09-29 NOTE — PROGRESS NOTES
1. Do not eat or drink anything after 12 midnight prior to surgery. This includes no water, chewing gum mints, or ice chips. You may brush your teeth and gargle the day of surgery but DO NOT SWALLOW THE WATER. 2. Please see your family doctor/pediatrician for a history and physical and/or concerning medications. Bring any test results/reports from your physician's office. If you are under the care of a heart doctor or specialist please be aware that you may be asked to see him or her for clearance. 3. You may be asked to stop blood thinners such as Coumadin, Plavix, Fragmin, and Lovenox or Anti-inflammatories such as Aspirin, Ibuprofen, Advil, and Naproxen prior to your surgery. Please check with your doctor before stopping these or any other medications. 4. Do not smoke, and do not drink any alcoholic beverages 24 hours prior to surgery. 5. You MUST make arrangements for a responsible adult to take you home after your surgery. For your safety, you will not be allowed to leave alone or drive yourself home. Your surgery will be cancelled if you do not have a ride home. Also for your safety, it is strongly suggested someone stay with you the first 24 hrs after your surgery. 6. A parent/legal guardian must accompany a child scheduled for surgery and plan to stay at the hospital until the child is discharged. Please do not bring other children with you. 7. For your comfort,please wear simple, loose fitting clothing to the hospital.  Please do not bring valuables (money, credit cards, checkbooks, etc.) Do not wear any makeup (including no eye makeup) or nail polish on your fingers or toes. 8. For your safety, please DO NOT wear any jewelry or piercings on day of surgery. All body piercing jewelry must be removed. 9. If you have dentures, they will be removed before going to the OR; for your convenience we will provide you with a container.   If you wear contact lenses or glasses, they will be removed, they will be removed, please bring a case for them. 10. If appicable,Please see your family doctor/pediatrician for a history & physical and/or concerning medications. Bring any test results/reports from your physician's office. 11. Remember to bring Blood Bank bracelet to the hospital on the day of surgery. 12. If you have a Living Will and Durable Power of  for Healthcare, please bring in a copy. 15. Notify your Surgeon if you develop any illness between now and surgery  time, cough, cold, fever, sore throat, nausea, vomiting, etc.  Please notify your surgeon if you experience dizziness, shortness of breath or blurred vision between now & the time of your surgery   14. DO NOT shave your operative site 96 hours prior to surgery. For face & neck surgery, men may use an electric razor 48 hours prior to surgery. 15. Shower the night before surgery with _X__Antibacterial soap ___Hibiclens   16. To provide excellent care visitors will be limited to one in the room at any given time. 17.  Please bring picture ID and insurance card. 18.  Visit our web site for additional information:  [facility]. ONTRAPORT/[email or web address].         TAKE CARVEDILOL DOS W/SIPS

## 2022-09-29 NOTE — TELEPHONE ENCOUNTER
Called patient regarding stopping Janes Borden as he took his morning dose today, so no more today. He takes it twice daily. He is to stop today and start Plavix 75 mg once daily all the way up to surgery on Monday. [address] and gave him Rx for Plavix 75 mg 1 po qd #30, per Dr Misti Valdez.  Pamlr

## 2022-09-30 ENCOUNTER — ANESTHESIA EVENT (OUTPATIENT)
Dept: OPERATING ROOM | Age: 67
DRG: 253 | End: 2022-09-30
Payer: OTHER GOVERNMENT

## 2022-09-30 ENCOUNTER — HOSPITAL ENCOUNTER (OUTPATIENT)
Dept: VASCULAR LAB | Age: 67
Discharge: HOME | End: 2022-09-30
Payer: OTHER GOVERNMENT

## 2022-09-30 DIAGNOSIS — I73.9 PERIPHERAL VASCULAR DISEASE, UNSPECIFIED (HCC): ICD-10-CM

## 2022-09-30 PROCEDURE — 93971 EXTREMITY STUDY: CPT

## 2022-10-03 ENCOUNTER — ANESTHESIA (OUTPATIENT)
Dept: OPERATING ROOM | Age: 67
DRG: 253 | End: 2022-10-03
Payer: OTHER GOVERNMENT

## 2022-10-03 ENCOUNTER — HOSPITAL ENCOUNTER (INPATIENT)
Age: 67
LOS: 1 days | Discharge: HOME | DRG: 253 | End: 2022-10-04
Attending: SURGERY | Admitting: SURGERY
Payer: OTHER GOVERNMENT

## 2022-10-03 DIAGNOSIS — G89.18 POST-OP PAIN: Primary | ICD-10-CM

## 2022-10-03 DIAGNOSIS — Z01.818 PREOP TESTING: ICD-10-CM

## 2022-10-03 PROBLEM — I73.9 CLAUDICATION IN PERIPHERAL VASCULAR DISEASE (HCC): Status: ACTIVE | Noted: 2022-10-03

## 2022-10-03 LAB — ANTIBODY SCREEN: NORMAL

## 2022-10-03 PROCEDURE — 6360000002 HC RX W HCPCS: Performed by: ANESTHESIOLOGY

## 2022-10-03 PROCEDURE — 6370000000 HC RX 637 (ALT 250 FOR IP): Performed by: ANESTHESIOLOGY

## 2022-10-03 PROCEDURE — 35583 VEIN BYP GRFT FEM-POPLITEAL: CPT | Performed by: SURGERY

## 2022-10-03 PROCEDURE — 6360000002 HC RX W HCPCS: Performed by: SURGERY

## 2022-10-03 PROCEDURE — A4217 STERILE WATER/SALINE, 500 ML: HCPCS | Performed by: SURGERY

## 2022-10-03 PROCEDURE — 6360000004 HC RX CONTRAST MEDICATION: Performed by: SURGERY

## 2022-10-03 PROCEDURE — 3600000007 HC SURGERY HYBRID BASE: Performed by: SURGERY

## 2022-10-03 PROCEDURE — 7100000001 HC PACU RECOVERY - ADDTL 15 MIN: Performed by: SURGERY

## 2022-10-03 PROCEDURE — 041K09L BYPASS RIGHT FEMORAL ARTERY TO POPLITEAL ARTERY WITH AUTOLOGOUS VENOUS TISSUE, OPEN APPROACH: ICD-10-PCS | Performed by: SURGERY

## 2022-10-03 PROCEDURE — 3700000000 HC ANESTHESIA ATTENDED CARE: Performed by: SURGERY

## 2022-10-03 PROCEDURE — 7100000000 HC PACU RECOVERY - FIRST 15 MIN: Performed by: SURGERY

## 2022-10-03 PROCEDURE — 2580000003 HC RX 258: Performed by: SURGERY

## 2022-10-03 PROCEDURE — 2580000003 HC RX 258: Performed by: NURSE ANESTHETIST, CERTIFIED REGISTERED

## 2022-10-03 PROCEDURE — 86850 RBC ANTIBODY SCREEN: CPT

## 2022-10-03 PROCEDURE — 2720000010 HC SURG SUPPLY STERILE: Performed by: SURGERY

## 2022-10-03 PROCEDURE — 1200000000 HC SEMI PRIVATE

## 2022-10-03 PROCEDURE — 6360000002 HC RX W HCPCS: Performed by: NURSE ANESTHETIST, CERTIFIED REGISTERED

## 2022-10-03 PROCEDURE — 6370000000 HC RX 637 (ALT 250 FOR IP): Performed by: SURGERY

## 2022-10-03 PROCEDURE — 2709999900 HC NON-CHARGEABLE SUPPLY: Performed by: SURGERY

## 2022-10-03 PROCEDURE — 3700000001 HC ADD 15 MINUTES (ANESTHESIA): Performed by: SURGERY

## 2022-10-03 PROCEDURE — 3600000017 HC SURGERY HYBRID ADDL 15MIN: Performed by: SURGERY

## 2022-10-03 PROCEDURE — 2500000003 HC RX 250 WO HCPCS: Performed by: NURSE ANESTHETIST, CERTIFIED REGISTERED

## 2022-10-03 RX ORDER — SODIUM CHLORIDE 9 MG/ML
INJECTION, SOLUTION INTRAVENOUS CONTINUOUS
Status: DISCONTINUED | OUTPATIENT
Start: 2022-10-03 | End: 2022-10-04

## 2022-10-03 RX ORDER — SODIUM CHLORIDE 0.9 % (FLUSH) 0.9 %
5-40 SYRINGE (ML) INJECTION PRN
Status: DISCONTINUED | OUTPATIENT
Start: 2022-10-03 | End: 2022-10-03 | Stop reason: HOSPADM

## 2022-10-03 RX ORDER — OXYCODONE HYDROCHLORIDE 5 MG/1
5 TABLET ORAL PRN
Status: COMPLETED | OUTPATIENT
Start: 2022-10-03 | End: 2022-10-03

## 2022-10-03 RX ORDER — PROPOFOL 10 MG/ML
INJECTION, EMULSION INTRAVENOUS PRN
Status: DISCONTINUED | OUTPATIENT
Start: 2022-10-03 | End: 2022-10-03 | Stop reason: SDUPTHER

## 2022-10-03 RX ORDER — MORPHINE SULFATE 4 MG/ML
4 INJECTION, SOLUTION INTRAMUSCULAR; INTRAVENOUS
Status: DISCONTINUED | OUTPATIENT
Start: 2022-10-03 | End: 2022-10-04 | Stop reason: HOSPADM

## 2022-10-03 RX ORDER — HEPARIN SODIUM 1000 [USP'U]/ML
INJECTION, SOLUTION INTRAVENOUS; SUBCUTANEOUS PRN
Status: DISCONTINUED | OUTPATIENT
Start: 2022-10-03 | End: 2022-10-03 | Stop reason: SDUPTHER

## 2022-10-03 RX ORDER — DIPHENHYDRAMINE HYDROCHLORIDE 50 MG/ML
12.5 INJECTION INTRAMUSCULAR; INTRAVENOUS
Status: DISCONTINUED | OUTPATIENT
Start: 2022-10-03 | End: 2022-10-03 | Stop reason: HOSPADM

## 2022-10-03 RX ORDER — CLOPIDOGREL BISULFATE 75 MG/1
75 TABLET ORAL DAILY
Status: ON HOLD | COMMUNITY
End: 2022-10-04 | Stop reason: HOSPADM

## 2022-10-03 RX ORDER — POTASSIUM CHLORIDE 7.45 MG/ML
10 INJECTION INTRAVENOUS PRN
Status: DISCONTINUED | OUTPATIENT
Start: 2022-10-03 | End: 2022-10-04 | Stop reason: HOSPADM

## 2022-10-03 RX ORDER — HYDRALAZINE HYDROCHLORIDE 20 MG/ML
10 INJECTION INTRAMUSCULAR; INTRAVENOUS
Status: DISCONTINUED | OUTPATIENT
Start: 2022-10-03 | End: 2022-10-04 | Stop reason: HOSPADM

## 2022-10-03 RX ORDER — ONDANSETRON 2 MG/ML
INJECTION INTRAMUSCULAR; INTRAVENOUS PRN
Status: DISCONTINUED | OUTPATIENT
Start: 2022-10-03 | End: 2022-10-03 | Stop reason: SDUPTHER

## 2022-10-03 RX ORDER — OXYCODONE HYDROCHLORIDE 5 MG/1
10 TABLET ORAL EVERY 4 HOURS PRN
Status: DISCONTINUED | OUTPATIENT
Start: 2022-10-03 | End: 2022-10-04 | Stop reason: HOSPADM

## 2022-10-03 RX ORDER — PROMETHAZINE HYDROCHLORIDE 25 MG/1
12.5 TABLET ORAL EVERY 6 HOURS PRN
Status: DISCONTINUED | OUTPATIENT
Start: 2022-10-03 | End: 2022-10-04 | Stop reason: HOSPADM

## 2022-10-03 RX ORDER — SODIUM CHLORIDE 0.9 % (FLUSH) 0.9 %
5-40 SYRINGE (ML) INJECTION EVERY 12 HOURS SCHEDULED
Status: DISCONTINUED | OUTPATIENT
Start: 2022-10-03 | End: 2022-10-04 | Stop reason: HOSPADM

## 2022-10-03 RX ORDER — SODIUM CHLORIDE 9 MG/ML
INJECTION, SOLUTION INTRAVENOUS PRN
Status: DISCONTINUED | OUTPATIENT
Start: 2022-10-03 | End: 2022-10-04 | Stop reason: HOSPADM

## 2022-10-03 RX ORDER — LIDOCAINE HYDROCHLORIDE 10 MG/ML
INJECTION, SOLUTION INFILTRATION; PERINEURAL PRN
Status: DISCONTINUED | OUTPATIENT
Start: 2022-10-03 | End: 2022-10-03 | Stop reason: SDUPTHER

## 2022-10-03 RX ORDER — SODIUM CHLORIDE 9 MG/ML
INJECTION, SOLUTION INTRAVENOUS PRN
Status: DISCONTINUED | OUTPATIENT
Start: 2022-10-03 | End: 2022-10-03 | Stop reason: HOSPADM

## 2022-10-03 RX ORDER — CLONIDINE HYDROCHLORIDE 0.1 MG/1
0.1 TABLET ORAL
Status: DISCONTINUED | OUTPATIENT
Start: 2022-10-03 | End: 2022-10-04 | Stop reason: HOSPADM

## 2022-10-03 RX ORDER — SODIUM CHLORIDE, SODIUM LACTATE, POTASSIUM CHLORIDE, CALCIUM CHLORIDE 600; 310; 30; 20 MG/100ML; MG/100ML; MG/100ML; MG/100ML
INJECTION, SOLUTION INTRAVENOUS CONTINUOUS PRN
Status: DISCONTINUED | OUTPATIENT
Start: 2022-10-03 | End: 2022-10-03 | Stop reason: SDUPTHER

## 2022-10-03 RX ORDER — SODIUM CHLORIDE 0.9 % (FLUSH) 0.9 %
5-40 SYRINGE (ML) INJECTION EVERY 12 HOURS SCHEDULED
Status: DISCONTINUED | OUTPATIENT
Start: 2022-10-03 | End: 2022-10-03 | Stop reason: HOSPADM

## 2022-10-03 RX ORDER — OXYCODONE HYDROCHLORIDE 5 MG/1
10 TABLET ORAL PRN
Status: COMPLETED | OUTPATIENT
Start: 2022-10-03 | End: 2022-10-03

## 2022-10-03 RX ORDER — LABETALOL HYDROCHLORIDE 5 MG/ML
5 INJECTION, SOLUTION INTRAVENOUS EVERY 10 MIN PRN
Status: DISCONTINUED | OUTPATIENT
Start: 2022-10-03 | End: 2022-10-03 | Stop reason: HOSPADM

## 2022-10-03 RX ORDER — MORPHINE SULFATE 2 MG/ML
2 INJECTION, SOLUTION INTRAMUSCULAR; INTRAVENOUS
Status: DISCONTINUED | OUTPATIENT
Start: 2022-10-03 | End: 2022-10-04 | Stop reason: HOSPADM

## 2022-10-03 RX ORDER — ATORVASTATIN CALCIUM 80 MG/1
80 TABLET, FILM COATED ORAL NIGHTLY
Status: DISCONTINUED | OUTPATIENT
Start: 2022-10-03 | End: 2022-10-04 | Stop reason: HOSPADM

## 2022-10-03 RX ORDER — ASPIRIN 81 MG/1
81 TABLET, CHEWABLE ORAL DAILY
Status: DISCONTINUED | OUTPATIENT
Start: 2022-10-03 | End: 2022-10-04 | Stop reason: HOSPADM

## 2022-10-03 RX ORDER — CARVEDILOL 6.25 MG/1
6.25 TABLET ORAL 2 TIMES DAILY WITH MEALS
Status: DISCONTINUED | OUTPATIENT
Start: 2022-10-03 | End: 2022-10-04 | Stop reason: HOSPADM

## 2022-10-03 RX ORDER — OXYCODONE HYDROCHLORIDE 5 MG/1
5 TABLET ORAL EVERY 4 HOURS PRN
Status: DISCONTINUED | OUTPATIENT
Start: 2022-10-03 | End: 2022-10-04 | Stop reason: HOSPADM

## 2022-10-03 RX ORDER — ONDANSETRON 2 MG/ML
4 INJECTION INTRAMUSCULAR; INTRAVENOUS EVERY 6 HOURS PRN
Status: DISCONTINUED | OUTPATIENT
Start: 2022-10-03 | End: 2022-10-04 | Stop reason: HOSPADM

## 2022-10-03 RX ORDER — ROCURONIUM BROMIDE 10 MG/ML
INJECTION, SOLUTION INTRAVENOUS PRN
Status: DISCONTINUED | OUTPATIENT
Start: 2022-10-03 | End: 2022-10-03 | Stop reason: SDUPTHER

## 2022-10-03 RX ORDER — ALBUTEROL SULFATE 2.5 MG/3ML
2.5 SOLUTION RESPIRATORY (INHALATION) EVERY 6 HOURS PRN
Status: DISCONTINUED | OUTPATIENT
Start: 2022-10-03 | End: 2022-10-04 | Stop reason: HOSPADM

## 2022-10-03 RX ORDER — HYDROMORPHONE HCL 110MG/55ML
PATIENT CONTROLLED ANALGESIA SYRINGE INTRAVENOUS PRN
Status: DISCONTINUED | OUTPATIENT
Start: 2022-10-03 | End: 2022-10-03 | Stop reason: SDUPTHER

## 2022-10-03 RX ORDER — LOSARTAN POTASSIUM 25 MG/1
25 TABLET ORAL DAILY
Status: DISCONTINUED | OUTPATIENT
Start: 2022-10-03 | End: 2022-10-04 | Stop reason: HOSPADM

## 2022-10-03 RX ORDER — DEXAMETHASONE SODIUM PHOSPHATE 4 MG/ML
INJECTION, SOLUTION INTRA-ARTICULAR; INTRALESIONAL; INTRAMUSCULAR; INTRAVENOUS; SOFT TISSUE PRN
Status: DISCONTINUED | OUTPATIENT
Start: 2022-10-03 | End: 2022-10-03 | Stop reason: SDUPTHER

## 2022-10-03 RX ORDER — SODIUM CHLORIDE 0.9 % (FLUSH) 0.9 %
5-40 SYRINGE (ML) INJECTION PRN
Status: DISCONTINUED | OUTPATIENT
Start: 2022-10-03 | End: 2022-10-04 | Stop reason: HOSPADM

## 2022-10-03 RX ORDER — ONDANSETRON 2 MG/ML
4 INJECTION INTRAMUSCULAR; INTRAVENOUS
Status: DISCONTINUED | OUTPATIENT
Start: 2022-10-03 | End: 2022-10-03 | Stop reason: HOSPADM

## 2022-10-03 RX ADMIN — PHENYLEPHRINE HYDROCHLORIDE 40 MCG/MIN: 10 INJECTION INTRAVENOUS at 07:39

## 2022-10-03 RX ADMIN — ROCURONIUM BROMIDE 10 MG: 10 SOLUTION INTRAVENOUS at 09:12

## 2022-10-03 RX ADMIN — HYDROMORPHONE HYDROCHLORIDE 0.25 MG: 1 INJECTION, SOLUTION INTRAMUSCULAR; INTRAVENOUS; SUBCUTANEOUS at 16:36

## 2022-10-03 RX ADMIN — ROCURONIUM BROMIDE 10 MG: 10 SOLUTION INTRAVENOUS at 07:45

## 2022-10-03 RX ADMIN — SUGAMMADEX 200 MG: 100 INJECTION, SOLUTION INTRAVENOUS at 09:55

## 2022-10-03 RX ADMIN — HYDROMORPHONE HYDROCHLORIDE 0.5 MG: 1 INJECTION, SOLUTION INTRAMUSCULAR; INTRAVENOUS; SUBCUTANEOUS at 10:59

## 2022-10-03 RX ADMIN — OXYCODONE 10 MG: 5 TABLET ORAL at 23:03

## 2022-10-03 RX ADMIN — LOSARTAN POTASSIUM 25 MG: 25 TABLET, FILM COATED ORAL at 23:08

## 2022-10-03 RX ADMIN — ASPIRIN 81 MG 81 MG: 81 TABLET ORAL at 23:08

## 2022-10-03 RX ADMIN — ATORVASTATIN CALCIUM 80 MG: 80 TABLET, FILM COATED ORAL at 23:03

## 2022-10-03 RX ADMIN — ROCURONIUM BROMIDE 50 MG: 10 SOLUTION INTRAVENOUS at 07:36

## 2022-10-03 RX ADMIN — PROPOFOL 200 MG: 10 INJECTION, EMULSION INTRAVENOUS at 07:36

## 2022-10-03 RX ADMIN — SODIUM CHLORIDE: 9 INJECTION, SOLUTION INTRAVENOUS at 23:18

## 2022-10-03 RX ADMIN — CARVEDILOL 6.25 MG: 6.25 TABLET, FILM COATED ORAL at 23:08

## 2022-10-03 RX ADMIN — HYDROMORPHONE HYDROCHLORIDE 0.5 MG: 2 INJECTION INTRAMUSCULAR; INTRAVENOUS; SUBCUTANEOUS at 09:56

## 2022-10-03 RX ADMIN — HYDROMORPHONE HYDROCHLORIDE 0.5 MG: 2 INJECTION INTRAMUSCULAR; INTRAVENOUS; SUBCUTANEOUS at 10:03

## 2022-10-03 RX ADMIN — ONDANSETRON 4 MG: 2 INJECTION INTRAMUSCULAR; INTRAVENOUS at 09:55

## 2022-10-03 RX ADMIN — ONDANSETRON 4 MG: 2 INJECTION INTRAMUSCULAR; INTRAVENOUS at 07:41

## 2022-10-03 RX ADMIN — HYDROMORPHONE HYDROCHLORIDE 0.5 MG: 1 INJECTION, SOLUTION INTRAMUSCULAR; INTRAVENOUS; SUBCUTANEOUS at 11:47

## 2022-10-03 RX ADMIN — HYDROMORPHONE HYDROCHLORIDE 0.5 MG: 1 INJECTION, SOLUTION INTRAMUSCULAR; INTRAVENOUS; SUBCUTANEOUS at 11:06

## 2022-10-03 RX ADMIN — HEPARIN SODIUM 5000 UNITS: 1000 INJECTION, SOLUTION INTRAVENOUS; SUBCUTANEOUS at 08:24

## 2022-10-03 RX ADMIN — LIDOCAINE HYDROCHLORIDE 50 MG: 10 INJECTION, SOLUTION INFILTRATION; PERINEURAL at 07:36

## 2022-10-03 RX ADMIN — SODIUM CHLORIDE, SODIUM LACTATE, POTASSIUM CHLORIDE, AND CALCIUM CHLORIDE: .6; .31; .03; .02 INJECTION, SOLUTION INTRAVENOUS at 08:27

## 2022-10-03 RX ADMIN — HYDROMORPHONE HYDROCHLORIDE 0.5 MG: 1 INJECTION, SOLUTION INTRAMUSCULAR; INTRAVENOUS; SUBCUTANEOUS at 10:47

## 2022-10-03 RX ADMIN — OXYCODONE 10 MG: 5 TABLET ORAL at 13:38

## 2022-10-03 RX ADMIN — MORPHINE SULFATE 4 MG: 4 INJECTION, SOLUTION INTRAMUSCULAR; INTRAVENOUS at 18:23

## 2022-10-03 RX ADMIN — Medication 10 ML: at 23:08

## 2022-10-03 RX ADMIN — CEFAZOLIN 2000 MG: 10 INJECTION, POWDER, FOR SOLUTION INTRAVENOUS at 07:45

## 2022-10-03 RX ADMIN — DEXAMETHASONE SODIUM PHOSPHATE 10 MG: 4 INJECTION, SOLUTION INTRAMUSCULAR; INTRAVENOUS at 07:41

## 2022-10-03 RX ADMIN — ROCURONIUM BROMIDE 20 MG: 10 SOLUTION INTRAVENOUS at 08:29

## 2022-10-03 RX ADMIN — SODIUM CHLORIDE, SODIUM LACTATE, POTASSIUM CHLORIDE, AND CALCIUM CHLORIDE: .6; .31; .03; .02 INJECTION, SOLUTION INTRAVENOUS at 07:11

## 2022-10-03 ASSESSMENT — PAIN DESCRIPTION - PAIN TYPE
TYPE: ACUTE PAIN;SURGICAL PAIN
TYPE: ACUTE PAIN

## 2022-10-03 ASSESSMENT — PAIN DESCRIPTION - ORIENTATION
ORIENTATION: RIGHT

## 2022-10-03 ASSESSMENT — PAIN DESCRIPTION - LOCATION
LOCATION: GROIN
LOCATION: LEG
LOCATION: GROIN
LOCATION: LEG

## 2022-10-03 ASSESSMENT — PAIN - FUNCTIONAL ASSESSMENT
PAIN_FUNCTIONAL_ASSESSMENT: ACTIVITIES ARE NOT PREVENTED

## 2022-10-03 ASSESSMENT — PAIN DESCRIPTION - DESCRIPTORS
DESCRIPTORS: SHARP
DESCRIPTORS: BURNING
DESCRIPTORS: SHARP
DESCRIPTORS: ACHING;BURNING
DESCRIPTORS: ACHING;SORE
DESCRIPTORS: ACHING;BURNING;SORE
DESCRIPTORS: ACHING
DESCRIPTORS: BURNING

## 2022-10-03 ASSESSMENT — PAIN SCALES - GENERAL
PAINLEVEL_OUTOF10: 9
PAINLEVEL_OUTOF10: 9
PAINLEVEL_OUTOF10: 7
PAINLEVEL_OUTOF10: 8
PAINLEVEL_OUTOF10: 9
PAINLEVEL_OUTOF10: 7
PAINLEVEL_OUTOF10: 0

## 2022-10-03 ASSESSMENT — PAIN DESCRIPTION - FREQUENCY
FREQUENCY: INTERMITTENT

## 2022-10-03 ASSESSMENT — PAIN DESCRIPTION - ONSET
ONSET: AWAKENED FROM SLEEP

## 2022-10-03 NOTE — PROGRESS NOTES
A: Bedside report given to [facility], all current drips, treatments, skin issues, and plan of care were reviewed by both RN's, patient transferred in stable condition.

## 2022-10-03 NOTE — ANESTHESIA POSTPROCEDURE EVALUATION
Department of Anesthesiology  Postprocedure Note    Patient: Kan Daily  MRN: [id number]  YOB: 1955  Date of evaluation: 10/3/2022      Procedure Summary     Date: 10/03/22 Room / Location: [facility]  (HYBRID) / [facility]    Anesthesia Start: 0730 Anesthesia Stop: 4283    Procedure: RIGHT FEMORAL POPLITEAL INSITU BYPASS GRAFT (Right: Leg Upper) Diagnosis:       Peripheral vascular disease, unspecified ([address]      (PERIPHERAL VASCULAR DISEASE)    Surgeons: Daquan Alves MD Responsible Provider: Azael Olguin MD    Anesthesia Type: general ASA Status: 4          Anesthesia Type: No value filed.     Andres Phase I: Andres Score: 9    Andres Phase II:        Anesthesia Post Evaluation    Comments: Postoperative Anesthesia Note    Name:    Kan Daily  MRN:      [id number]    Patient Vitals in the past 12 hrs:  10/03/22 1700, BP:(!) 154/72, Pulse:72, Resp:20, SpO2:95 %  10/03/22 1650, BP:(!) 158/70, Pulse:69, Resp:20, SpO2:95 %  10/03/22 1640, BP:(!) 157/89, Pulse:71, Resp:20, SpO2:95 %  10/03/22 1636, Resp:18  10/03/22 1635, BP:(!) 163/73, Pulse:71, Resp:18, SpO2:96 %  10/03/22 1630, BP:(!) 149/82, Pulse:78, Resp:18, SpO2:94 %  10/03/22 1625, BP:(!) 152/71, Pulse:70, Resp:18, SpO2:95 %  10/03/22 1620, BP:(!) 154/75, Pulse:68, Resp:18, SpO2:95 %  10/03/22 1615, BP:(!) 169/73, Pulse:70, Resp:18, SpO2:96 %  10/03/22 1610, BP:(!) 160/69, Pulse:68, Resp:18, SpO2:95 %  10/03/22 1605, BP:(!) 157/72, Pulse:68, Resp:18, SpO2:95 %  10/03/22 1600, BP:(!) 155/71, Pulse:69, Resp:18, SpO2:95 %  10/03/22 1555, BP:(!) 159/72, Pulse:69, Resp:18, SpO2:95 %  10/03/22 1550, BP:(!) 148/71, Pulse:71, Resp:20, SpO2:96 %  10/03/22 1545, BP:(!) 154/69, Pulse:68, Resp:18, SpO2:95 %  10/03/22 1540, BP:(!) 150/73, Pulse:69, Resp:18, SpO2:96 %  10/03/22 1535, BP:(!) 149/75, Pulse:69, Resp:18, SpO2:95 %  10/03/22 1530, BP:(!) 159/73, Pulse:68, Resp:16, SpO2:95 %  10/03/22 1525, BP:(!) 158/69, Pulse:68, Resp:16, SpO2:95 %  10/03/22 1520, BP:(!) 143/73, Pulse:68, Resp:12, SpO2:95 %  10/03/22 1515, BP:(!) 151/70, Pulse:67, Resp:20, SpO2:95 %  10/03/22 1510, BP:(!) 150/66, Pulse:69, Resp:22, SpO2:95 %  10/03/22 1505, BP:(!) 147/70, Pulse:68, Resp:20, SpO2:94 %  10/03/22 1500, Pulse:68, SpO2:94 %  10/03/22 1455, BP:(!) 148/68, Pulse:67, Resp:20, SpO2:95 %  10/03/22 1450, BP:(!) 146/69, Pulse:67, Resp:20, SpO2:96 %  10/03/22 1445, BP:(!) 141/68, Pulse:67, Resp:22, SpO2:96 %  10/03/22 1440, BP:(!) 151/69, Pulse:67, Resp:20, SpO2:96 %  10/03/22 1435, Pulse:66, SpO2:96 %  10/03/22 1430, Pulse:67, SpO2:95 %  10/03/22 1425, BP:(!) 149/68, Pulse:66, Resp:20, SpO2:95 %  10/03/22 1420, BP:(!) 151/71, Pulse:66, Resp:20, SpO2:96 %  10/03/22 1415, BP:(!) 146/68, Pulse:66, Resp:20, SpO2:96 %  10/03/22 1410, BP:(!) 151/65, Pulse:66, Resp:20, SpO2:95 %  10/03/22 1405, BP:(!) 154/70, Pulse:65, Resp:20, SpO2:96 %  10/03/22 1400, BP:(!) 148/72, Pulse:71, Resp:20, SpO2:96 %  10/03/22 1355, BP:(!) 165/132, Pulse:65, Resp:20, SpO2:96 %  10/03/22 1350, BP:(!) 150/74, Pulse:72, Resp:20, SpO2:96 %  10/03/22 1345, BP:(!) 152/88, Pulse:68, Resp:20, SpO2:95 %  10/03/22 1330, BP:(!) 149/73, Pulse:69, Resp:20, SpO2:95 %  10/03/22 1325, BP:136/67, Pulse:64, Resp:20, SpO2:95 %  10/03/22 1320, BP:(!) 141/68, Pulse:66, Resp:20, SpO2:95 %  10/03/22 1315, BP:132/69, Pulse:67, Resp:18, SpO2:97 %  10/03/22 1310, BP:(!) 141/64, Pulse:67, Resp:20, SpO2:97 %  10/03/22 1305, BP:135/68, Pulse:65, Resp:20, SpO2:97 %  10/03/22 1300, BP:136/69, Pulse:68, Resp:18, SpO2:97 %  10/03/22 1255, BP:(!) 140/75, Pulse:65, Resp:20, SpO2:95 %  10/03/22 1250, Pulse:63, SpO2:96 %  10/03/22 1245, BP:131/69, Pulse:65, Resp:18, SpO2:97 %  10/03/22 1240, BP:130/65, Pulse:64, Resp:18, SpO2:97 %  10/03/22 1235, BP:132/69, Pulse:66, Resp:18, SpO2:96 %  10/03/22 1230, Pulse:65, SpO2:97 %  10/03/22 1225, BP:138/68, Pulse:65, Resp:18, SpO2:96 %  10/03/22 1220, BP:(!) 140/70, Pulse:63, Resp:18, SpO2:97 %  10/03/22 1215, BP:124/69, Pulse:64, Resp:18, SpO2:97 %  10/03/22 1210, BP:129/68, Pulse:64, Resp:18, SpO2:96 %  10/03/22 1205, Pulse:65, SpO2:96 %  10/03/22 1200, BP:137/69, Pulse:63, Resp:18, SpO2:97 %  10/03/22 1155, BP:(!) 141/66, Pulse:63, Resp:18, SpO2:95 %  10/03/22 1150, BP:(!) 127/103, Pulse:67, Resp:18, SpO2:95 %  10/03/22 1145, Pulse:63, SpO2:96 %  10/03/22 1140, Pulse:64, SpO2:96 %  10/03/22 1135, BP:138/70, Pulse:63, Resp:18, SpO2:96 %  10/03/22 1130, BP:139/68, Pulse:64, Resp:18, SpO2:96 %  10/03/22 1125, Pulse:64, SpO2:95 %  10/03/22 1120, BP:(!) 144/67, Pulse:64, Resp:18, SpO2:95 %  10/03/22 1115, BP:(!) 115/99, Pulse:65, Resp:20, SpO2:93 %  10/03/22 1110, BP:(!) 139/102, Pulse:65, Resp:20, SpO2:93 %  10/03/22 1105, BP:(!) 144/67, Pulse:64, Resp:18, SpO2:96 %  10/03/22 1100, BP:(!) 143/69, Pulse:63, Resp:22, SpO2:95 %  10/03/22 1055, BP:(!) 138/108, Pulse:64, Resp:18, SpO2:93 %  10/03/22 1050, BP:126/74, Pulse:64, Resp:18, SpO2:90 %  10/03/22 1035, BP:(!) 142/70, Pulse:62, Resp:18, SpO2:94 %  10/03/22 1030, BP:139/71, Pulse:63, Resp:16, SpO2:94 %  10/03/22 1025, BP:(!) 145/81, Pulse:62, Resp:16, SpO2:94 %  10/03/22 1020, BP:(!) 144/71, Pulse:62, Resp:16, SpO2:94 %  10/03/22 1015, BP:(!) 145/76, Pulse:62, Resp:16, SpO2:94 %  10/03/22 1010, BP:(!) 149/69, Temp:98 °F (36.7 °C), Temp src:Temporal, Pulse:62, Resp:16, SpO2:95 %  10/03/22 1005, Temp:98 °F (36.7 °C), Temp src:Temporal     LABS:    CBC  Lab Results       Component                Value               Date/Time                  WBC                      9.0                 09/13/2022 06:50 AM        HGB                      12.7 (L)            09/13/2022 06:50 AM        HCT                      38.4 (L)            09/13/2022 06:50 AM        PLT                      274                 09/13/2022 06:50 AM   RENAL  Lab Results       Component                Value               Date/Time                  NA                       137 09/13/2022 06:50 AM        K                        4.2                 09/13/2022 06:50 AM        CL                       104                 09/13/2022 06:50 AM        CO2                      22                  09/13/2022 06:50 AM        BUN                      17                  09/13/2022 06:50 AM        CREATININE               1.0                 09/13/2022 06:50 AM        GLUCOSE                  98                  09/13/2022 06:50 AM   COAGS  Lab Results       Component                Value               Date/Time                  PROTIME                  13.2                03/18/2021 07:05 AM        INR                      1.14                03/18/2021 07:05 AM     Intake & Output:  @66KICY@    Nausea & Vomiting:  No    Level of Consciousness:  Awake    Pain Assessment:  Adequate analgesia    Anesthesia Complications:  No apparent anesthetic complications    SUMMARY      Vital signs stable  OK to discharge from Stage I post anesthesia care.   Care transferred from Anesthesiology department on discharge from perioperative area

## 2022-10-03 NOTE — ANESTHESIA PRE PROCEDURE
Department of Anesthesiology  Preprocedure Note       Name:  Kelsey Hemphill   Age:  78 y.o.  :  1955                                          MRN:  [id number]         Date:  10/3/2022      Surgeon: Abad Antunez):  Martin Baptiste MD    Procedure: Procedure(s):  RIGHT FEMORAL POPLITEAL INSITU BYPASS GRAFT    Medications prior to admission:   Prior to Admission medications    Medication Sig Start Date End Date Taking? Authorizing Provider   oxyCODONE-acetaminophen (PERCOCET)  MG per tablet Take 1 tablet by mouth every 4 hours as needed for Pain.    Yes Historical Provider, MD   carvedilol (COREG) 6.25 MG tablet TAKE ONE TABLET BY MOUTH TWO TIMES A DAY WITH MEALS 22   Garcia Phelps MD   losartan (COZAAR) 25 MG tablet TAKE ONE TABLET BY MOUTH EVERY DAY 22   Garcia Phelps MD   atorvastatin (LIPITOR) 80 MG tablet TAKE 1 TABLET BY MOUTH NIGHTLY  Patient taking differently: Take 80 mg by mouth daily 22   Garcia Phelps MD   tiotropium-olodaterol (STIOLTO) 2.5-2.5 MCG/ACT AERS Inhale 2 puffs into the lungs daily    Historical Provider, MD   ticagrelor (BRILINTA) 90 MG TABS tablet Take 1 tablet by mouth 2 times daily 22   Surinder Valencia DO   aspirin 81 MG chewable tablet Take 1 tablet by mouth daily 21   Duncan Bruce MD   albuterol sulfate HFA (VENTOLIN HFA) 108 (90 Base) MCG/ACT inhaler Inhale 2 puffs into the lungs every 6 hours as needed for Wheezing or Shortness of Breath  Patient not taking: Reported on 2022 3/3/20   Palomo Hester MD   albuterol (PROVENTIL) (2.5 MG/3ML) 0.083% nebulizer solution Take 3 mLs by nebulization every 6 hours as needed for Wheezing 3/3/20   Palomo Hester MD       Current medications:    Current Facility-Administered Medications   Medication Dose Route Frequency Provider Last Rate Last Admin    sodium chloride flush 0.9 % injection 5-40 mL  5-40 mL IntraVENous 2 times per day Martin Baptiste MD        sodium chloride flush 0.9 % injection 5-40 mL Obs CAD    THORACIC AORTIC ANEURYSM REPAIR      UPPER GASTROINTESTINAL ENDOSCOPY N/A 2021    EGD BIOPSY performed by Jayshree Gorman MD at [address] [place] [address] History:    Social History     Tobacco Use    Smoking status: Some Days     Packs/day: 2.00     Years: 50.00     Pack years: 100.00     Types: Cigarettes     Last attempt to quit: 2021     Years since quittin.3    Smokeless tobacco: Never   Substance Use Topics    Alcohol use: Never                                Ready to quit: Not Answered  Counseling given: Not Answered      Vital Signs (Current):   Vitals:    22 1558 10/03/22 0600   BP:  (!) 144/70   Pulse:  69   Resp:  16   Temp:  97.4 °F (36.3 °C)   TempSrc:  Temporal   SpO2:  98%   Weight: 127 lb (57.6 kg) 128 lb 1.6 oz (58.1 kg)   Height:  5' 9\" (1.753 m)                                              BP Readings from Last 3 Encounters:   10/03/22 (!) 144/70   22 110/60   22 (!) 140/66       NPO Status: Time of last liquid consumption:                         Time of last solid consumption:                         Date of last liquid consumption: 10/02/22                        Date of last solid food consumption: 10/02/22    BMI:   Wt Readings from Last 3 Encounters:   10/03/22 128 lb 1.6 oz (58.1 kg)   22 127 lb 6.4 oz (57.8 kg)   22 128 lb 8 oz (58.3 kg)     Body mass index is 18.92 kg/m².     CBC:   Lab Results   Component Value Date/Time    WBC 9.0 2022 06:50 AM    RBC 4.03 2022 06:50 AM    HGB 12.7 2022 06:50 AM    HCT 38.4 2022 06:50 AM    MCV 95.3 2022 06:50 AM    RDW 16.3 2022 06:50 AM     2022 06:50 AM       CMP:   Lab Results   Component Value Date/Time     2022 06:50 AM    K 4.2 2022 06:50 AM     2022 06:50 AM    CO2 22 2022 06:50 AM    BUN 17 2022 06:50 AM    CREATININE 1.0 2022 06:50 AM    GFRAA >60 2022 06:50 AM AGRATIO 1.4 08/24/2022 11:08 AM    LABGLOM >60 09/13/2022 06:50 AM    GLUCOSE 98 09/13/2022 06:50 AM    PROT 7.2 08/24/2022 11:08 AM    CALCIUM 10.4 09/13/2022 06:50 AM    BILITOT 1.0 08/24/2022 11:08 AM    ALKPHOS 113 08/24/2022 11:08 AM    AST 22 08/24/2022 11:08 AM    ALT 11 08/24/2022 11:08 AM       POC Tests: No results for input(s): POCGLU, POCNA, POCK, POCCL, POCBUN, POCHEMO, POCHCT in the last 72 hours. Coags:   Lab Results   Component Value Date/Time    PROTIME 13.2 03/18/2021 07:05 AM    INR 1.14 03/18/2021 07:05 AM       HCG (If Applicable): No results found for: PREGTESTUR, PREGSERUM, HCG, HCGQUANT     ABGs: No results found for: PHART, PO2ART, HTG0JFW, KQR8VGL, BEART, W4DJIAQV     Type & Screen (If Applicable):  No results found for: LABABO, LABRH    Drug/Infectious Status (If Applicable):  No results found for: HIV, HEPCAB    COVID-19 Screening (If Applicable):   Lab Results   Component Value Date/Time    COVID19 Not Detected 03/15/2021 11:34 AM           Anesthesia Evaluation  Patient summary reviewed no history of anesthetic complications:   Airway: Mallampati: II  TM distance: >3 FB   Neck ROM: full  Mouth opening: > = 3 FB   Dental:    (+) upper dentures and lower dentures      Pulmonary:normal exam  breath sounds clear to auscultation  (+) COPD:      (-) asthma and sleep apnea                           Cardiovascular:  Exercise tolerance: good (>4 METS),   (+) hypertension:, valvular problems/murmurs: MR, past MI:, CAD: obstructive, CABG/stent: no interval change, CHF: systolic,     (-)  angina and  VALLES      Rhythm: regular  Rate: normal                    Neuro/Psych:   (+) neuromuscular disease:,    (-) seizures and TIA           GI/Hepatic/Renal:   (+) renal disease: CRI,      (-) GERD and liver disease       Endo/Other:        (-) diabetes mellitus               Abdominal:             Vascular:   + PVD, aortic or cerebral, . ROS comment: Thoracic aortic aneurysm s/p repair.  Other Findings:           Anesthesia Plan      general     ASA 4     (I discussed with the patient the risks and benefits of PIV, anesthesia, IV Narcotics, PACU. All questions were answered the patient agrees with the plan and wishes to proceed)  Induction: intravenous.                             Sergey Terrazas MD   10/3/2022

## 2022-10-03 NOTE — H&P
Outpatient Follow Up Note    Sondra Olsen is 78 y.o. male who presents today for  right lower extremity bypass. He has severe right lower extremity claudication with onset symptoms after <100ft. He does develop similar LLE symptoms if he persists. Recently he has developed some paraesthesias at rest on the dorsum of right foot. He underwent non invasive studies showing moderate to severe disease bilaterally with the right being much worse. Angiogram has been performed showing R SFA occlusion.      Past Medical History:   Diagnosis Date    CAD (coronary artery disease) 2013    Cardiomyopathy ([facility] 2021    EF= 45%    Emphysema of lung ([facility]     Heart attack ([facility] 2013    3/28/2013 and 2021    High cholesterol     HTN (hypertension)     Prolonged emergence from general anesthesia     AWAKES VERY QUICKLY, ONCE AWOKE VERY CONFUSED AFTER ATIVAN AND FENTANYL    Thoracic aortic aneurysm 2000    ARCH       Past Surgical History:   Procedure Laterality Date    COLONOSCOPY N/A 2021    COLONOSCOPY WITH BIOPSY performed by Paddy Napier MD at [address] [place] [address], [place] [address]  2021    SAMM- 3.0 x 38 and 3.0 x 34 to pRCA  (TOTAL 7 STENTS)    CORONARY ANGIOPLASTY WITH STENT PLACEMENT  2013    SAMM- 3.5 x 18 to RCA    DIAGNOSTIC CARDIAC CATH LAB PROCEDURE  12/10/2019    Non Obs CAD    THORACIC AORTIC ANEURYSM REPAIR      UPPER GASTROINTESTINAL ENDOSCOPY N/A 2021    EGD BIOPSY performed by Paddy Napier MD at [address], [facility] History:    Social History     Tobacco Use   Smoking Status Some Days    Packs/day: 2.00    Years: 50.00    Pack years: 100.00    Types: Cigarettes    Last attempt to quit: 2021    Years since quittin.3   Smokeless Tobacco Never     Current Medications:  Current Facility-Administered Medications   Medication Dose Route Frequency Provider Last Rate Last Admin    sodium chloride flush 0.9 % injection 5-40 mL  5-40 mL IntraVENous 2 times per day Gabino Dorsey MD        sodium chloride flush 0.9 % injection 5-40 mL  5-40 mL IntraVENous PRN Gabino Dorsey MD        0.9 % sodium chloride infusion   IntraVENous PRN Gabino Dorsey MD        ceFAZolin (ANCEF) 2000 mg in dextrose 5 % 100 mL IVPB  2,000 mg IntraVENous On Call to Kriss Conklin MD         Allergies:  Ativan [lorazepam] and Fentanyl and related    REVIEW OF SYSTEMS:   A 14 point review of systems was completed. Pertinent positives identified in the HPI, all other review of systems negative. Objective:   PHYSICAL EXAM:        VITALS:  BP (!) 144/70   Pulse 69   Temp 97.4 °F (36.3 °C) (Temporal)   Resp 16   Ht 5' 9\" (1.753 m)   Wt 128 lb 1.6 oz (58.1 kg)   SpO2 98%   BMI 18.92 kg/m²   CONSTITUTIONAL: Cooperative, no apparent distress, and appears well nourished / developed  NEUROLOGIC:  Awake and oriented to person, place and time. PSYCH: Calm affect. SKIN: Warm and dry. HEENT: Sclera non-icteric, normocephalic, neck supple, normal carotid pulses with no bruits and thyroid normal size. LUNGS:  No increased work of breathing and clear to auscultation, no crackles or wheezing  CARDIOVASCULAR:  Regular rate and rhythm with no murmurs, gallops, rubs, or abnormal heart sounds, normal PMI. Pulses:    femoral   RIGHT 2   LEFT 2     ABDOMEN:  Normal bowel sounds, non-distended and non-tender to palpation  EXT: No edema, no calf tenderness. Assessment:   Severe claudication with right SFA occlusion. Plan:   Right femoral popliteal bypass. Discussed all risks ( including but not limited to death, MI, infection, hemorrhage wound healing problems, graft failure and nerve injury), benefits and alternatives to distal bypass with patient. They understand, freely consent and are eager to proceed. All questions and expectations addressed.          Gabino Dorsey MD, FACS

## 2022-10-03 NOTE — PROGRESS NOTES
D: Pt brought to PACU. Report obtained from [facility] and CRNA Pt placed on monitor and O2, VSS, not responding to commands at this time, surgical incisions intact, no drainage noted. RLE pulses palpable and strong.

## 2022-10-03 NOTE — PROGRESS NOTES
Patient arrived from PACU to C4 rm 438. He is A&Ox4. Surgical site is clean dry and intact with CHRISTINE dressing. Pedal pulse +2 , PT dopplered. Call light and belonginsg within reach. Safety precautions in place.  Will continue to monitor

## 2022-10-04 VITALS
SYSTOLIC BLOOD PRESSURE: 136 MMHG | RESPIRATION RATE: 18 BRPM | OXYGEN SATURATION: 96 % | DIASTOLIC BLOOD PRESSURE: 67 MMHG | TEMPERATURE: 98.3 F | BODY MASS INDEX: 19.11 KG/M2 | HEART RATE: 67 BPM | WEIGHT: 129 LBS | HEIGHT: 69 IN

## 2022-10-04 LAB
ANION GAP SERPL CALCULATED.3IONS-SCNC: 10 MMOL/L (ref 3–16)
BUN BLDV-MCNC: 20 MG/DL (ref 7–20)
CALCIUM SERPL-MCNC: 8.7 MG/DL (ref 8.3–10.6)
CHLORIDE BLD-SCNC: 99 MMOL/L (ref 99–110)
CO2: 23 MMOL/L (ref 21–32)
CREAT SERPL-MCNC: 1.3 MG/DL (ref 0.8–1.3)
GFR AFRICAN AMERICAN: >60
GFR NON-AFRICAN AMERICAN: 55
GLUCOSE BLD-MCNC: 129 MG/DL (ref 70–99)
HCT VFR BLD CALC: 34.2 % (ref 40.5–52.5)
HEMOGLOBIN: 11.3 G/DL (ref 13.5–17.5)
MCH RBC QN AUTO: 31.6 PG (ref 26–34)
MCHC RBC AUTO-ENTMCNC: 33.2 G/DL (ref 31–36)
MCV RBC AUTO: 95.2 FL (ref 80–100)
PDW BLD-RTO: 16 % (ref 12.4–15.4)
PLATELET # BLD: 250 K/UL (ref 135–450)
PMV BLD AUTO: 6.6 FL (ref 5–10.5)
POTASSIUM REFLEX MAGNESIUM: 4.6 MMOL/L (ref 3.5–5.1)
RBC # BLD: 3.59 M/UL (ref 4.2–5.9)
SODIUM BLD-SCNC: 132 MMOL/L (ref 136–145)
WBC # BLD: 13.7 K/UL (ref 4–11)

## 2022-10-04 PROCEDURE — 6370000000 HC RX 637 (ALT 250 FOR IP): Performed by: SURGERY

## 2022-10-04 PROCEDURE — 94640 AIRWAY INHALATION TREATMENT: CPT

## 2022-10-04 PROCEDURE — 80048 BASIC METABOLIC PNL TOTAL CA: CPT

## 2022-10-04 PROCEDURE — 85027 COMPLETE CBC AUTOMATED: CPT

## 2022-10-04 PROCEDURE — 2580000003 HC RX 258: Performed by: SURGERY

## 2022-10-04 PROCEDURE — 36415 COLL VENOUS BLD VENIPUNCTURE: CPT

## 2022-10-04 PROCEDURE — 6360000002 HC RX W HCPCS: Performed by: SURGERY

## 2022-10-04 RX ORDER — OXYCODONE AND ACETAMINOPHEN 10; 325 MG/1; MG/1
1 TABLET ORAL EVERY 6 HOURS PRN
Qty: 28 TABLET | Refills: 0 | Status: SHIPPED | OUTPATIENT
Start: 2022-10-04 | End: 2022-10-11

## 2022-10-04 RX ADMIN — OXYCODONE 10 MG: 5 TABLET ORAL at 10:20

## 2022-10-04 RX ADMIN — MORPHINE SULFATE 4 MG: 4 INJECTION, SOLUTION INTRAMUSCULAR; INTRAVENOUS at 06:32

## 2022-10-04 RX ADMIN — TICAGRELOR 90 MG: 90 TABLET ORAL at 07:51

## 2022-10-04 RX ADMIN — Medication 10 ML: at 07:51

## 2022-10-04 RX ADMIN — OXYCODONE 10 MG: 5 TABLET ORAL at 06:00

## 2022-10-04 RX ADMIN — CARVEDILOL 6.25 MG: 6.25 TABLET, FILM COATED ORAL at 07:51

## 2022-10-04 RX ADMIN — TIOTROPIUM BROMIDE AND OLODATEROL 2 PUFF: 3.124; 2.736 SPRAY, METERED RESPIRATORY (INHALATION) at 07:57

## 2022-10-04 RX ADMIN — LOSARTAN POTASSIUM 25 MG: 25 TABLET, FILM COATED ORAL at 07:51

## 2022-10-04 RX ADMIN — ASPIRIN 81 MG 81 MG: 81 TABLET ORAL at 07:51

## 2022-10-04 ASSESSMENT — PAIN - FUNCTIONAL ASSESSMENT: PAIN_FUNCTIONAL_ASSESSMENT: ACTIVITIES ARE NOT PREVENTED

## 2022-10-04 ASSESSMENT — PAIN DESCRIPTION - ORIENTATION: ORIENTATION: RIGHT

## 2022-10-04 ASSESSMENT — PAIN SCALES - WONG BAKER: WONGBAKER_NUMERICALRESPONSE: 8

## 2022-10-04 ASSESSMENT — PAIN DESCRIPTION - FREQUENCY: FREQUENCY: CONTINUOUS

## 2022-10-04 ASSESSMENT — PAIN DESCRIPTION - DESCRIPTORS: DESCRIPTORS: ACHING

## 2022-10-04 ASSESSMENT — PAIN SCALES - GENERAL
PAINLEVEL_OUTOF10: 4
PAINLEVEL_OUTOF10: 8
PAINLEVEL_OUTOF10: 7

## 2022-10-04 ASSESSMENT — PAIN DESCRIPTION - LOCATION: LOCATION: LEG

## 2022-10-04 ASSESSMENT — PAIN DESCRIPTION - ONSET: ONSET: AWAKENED FROM SLEEP

## 2022-10-04 NOTE — PROGRESS NOTES
Vascular Surgery Progress Note      SUBJECTIVE:  No c/o this am    OBJECTIVE    Physical  CURRENT VITALS:  BP (!) 149/74   Pulse 65   Temp 98.4 °F (36.9 °C) (Oral)   Resp 18   Ht 5' 9\" (1.753 m)   Wt 128 lb 1.6 oz (58.1 kg)   SpO2 92%   BMI 18.92 kg/m²   24 HR INTAKE/OUTPUT:    Intake/Output Summary (Last 24 hours) at 10/4/2022 0736  Last data filed at 10/3/2022 0954  Gross per 24 hour   Intake 1000 ml   Output 500 ml   Net 500 ml     Incisions intact  Palp PT pulse    Data  CBC:   Lab Results   Component Value Date/Time    WBC 9.0 09/13/2022 06:50 AM    RBC 4.03 09/13/2022 06:50 AM    HGB 12.7 09/13/2022 06:50 AM    HCT 38.4 09/13/2022 06:50 AM    MCV 95.3 09/13/2022 06:50 AM    MCH 31.6 09/13/2022 06:50 AM    MCHC 33.2 09/13/2022 06:50 AM    RDW 16.3 09/13/2022 06:50 AM     09/13/2022 06:50 AM    MPV 6.7 09/13/2022 06:50 AM       ASSESSMENT AND PLAN    POD #1 R Fem-pop   Excellent perfusion   Ambulate this am   Home this afternoon.

## 2022-10-04 NOTE — PROGRESS NOTES
Patient discharged on this date to home. Patient educated regarding new medications and follow up apt and stated understanding. New medications collected from outpatient pharmacy. Writer escorted patient to vehicle.

## 2022-10-04 NOTE — OP NOTE
[facility], [place] [address]                                OPERATIVE REPORT    PATIENT NAME: Norma Atkins                      :        1955  MED REC NO:   [id number]                          ROOM:       6825  ACCOUNT NO:   [de-identified]                           ADMIT DATE: 10/03/2022  PROVIDER:     Tu Stacy MD    DATE OF PROCEDURE:  10/03/2022    PREOPERATIVE DIAGNOSIS:  Severe right lower extremity claudication. POSTOPERATIVE DIAGNOSIS:  Severe right lower extremity claudication. PROCEDURE PERFORMED:  Right femoral to popliteal in situ bypass. ANESTHESIA:  General endotracheal.    SURGEON:  Tu Stacy MD    INDICATIONS:  The patient is a 50-year-old male with _____ severe right  lower extremity claudication. Angiograms had been performed showing  occlusion of the superficial femoral artery. The tibial vessels were  extremely small. The patient is brought to the operating room at this  time to undergo femoral popliteal bypass. PROCEDURE:  The patient was brought to the operating room, placed in the  supine position. General endotracheal anesthesia induced. After  adequate anesthesia, the right lower extremity was prepped and draped in  sterile fashion. Oblique incision was made in the right groin. The  saphenous vein was identified, side branches ligated and divided using  3-0 silk ties and hemoclips. The saphenofemoral junction was clamped  and divided. The proximal stump oversewn using 5-0 Prolene running  suture. The common femoral, deep femoral and superficial femoral artery  were exposed and encircled with vessel loops. The incision was then  made in the distal thigh medially over the saphenous vein. The  saphenous vein here was mobilized, ligating and dividing side branches  again with hemoclips and ties.   The popliteal vessel just below the  adductor canal was identified and encircled with vessel loops proximally  and distally. The patient was given 5000 units of intravenous heparin. After approximately 3 minutes, the common femoral artery was clamped and  the vessel loops on the deep femoral artery were constricted. A  longitudinal arteriotomy was made in the distal common femoral artery,  widened using a 5 mm aortic punch. The saphenous vein was then sewn in  an end-to-side fashion to the common femoral artery using running 6-0  Prolene suture. After completing the anastomosis, the vessel loops were  released establishing flow into the deep femoral artery and into the  vein graft. The saphenous vein was then ligated in the distal thigh  using a 2-0 silk tie and divided. A LeMaitre self-expanding valvulotome  was then passed retrograde into the saphenous vein up to the proximal  anastomosis. The valvulotome was then deployed and pulled through the  vein stripping the valves establishing excellent arterial outflow. The  distal vein was then clamped. The popliteal artery was occluded  constricting the vessel loops that had been placed around it. An  arteriotomy was made on its medial aspect, widened using a 5 mm aortic  punch. The saphenous vein was cut to the appropriate length, spatulated  posteriorly and the distal anastomosis between the vein in the popliteal  vessel performed using running 6-0 Prolene suture. After completing the  anastomosis, clamps and vessel loops were released establishing flow  into the vein graft. The common femoral artery was then punctured and a  5-Malaysian introducer sheath was placed retrograde in the femoral artery. Right lower extremity angiogram was then performed that showed one side  branch in the midportion of the thigh. This was identified and  controlled using hemoclips.   Repeat angiograms demonstrated brisk flow  through the vein graft widely patent proximal and distal anastomosis and  three-vessel runoff below the knee although dominant flow via the  posterior tibial artery. Tibial vessels again were noted to be quite  small. The introducer sheath was removed from the common femoral artery  and the puncture site closed using a 6-0 Prolene figure-of-eight suture. The incisions were irrigated with antibiotic saline solution and closed  using several layers of running 2-0 Vicryl suture, 3-0 Vicryl suture and  then running 4-0 Monocryl subcuticular sutures were used to  reapproximate the skin edges. The groin incision was dressed with  Steri-Strips and then a CHRISTINE negative pressure wound dressing was  applied. The Prineo and Dermabond dressing was applied in the distal  thigh incision. There were no complications to this procedure. Estimated blood loss less than 50 mL. At the conclusion of the  procedure, all sponge, needle and instrument counts were correct. The  patient was extubated in the operating room and transferred to the  recovery area in a stable condition.         Nati Morrow MD    D: 10/03/2022 11:03:35       T: 10/03/2022 11:06:07     [id number]  Job#: [id number]     Doc#: [id number]    CC:

## 2022-10-04 NOTE — PROGRESS NOTES
10/03/22 2006   RT Protocol   History Pulmonary Disease 1   Respiratory pattern 0   Breath sounds 0   Cough 0   Indications for Bronchodilator Therapy On home bronchodilators   Bronchodilator Assessment Score 1

## 2022-10-05 ENCOUNTER — CARE COORDINATION (OUTPATIENT)
Dept: CASE MANAGEMENT | Age: 67
End: 2022-10-05

## 2022-10-05 NOTE — CARE COORDINATION
[facility] Care Transitions Initial Follow Up Call    Call within 2 business days of discharge: Yes    Care Transition Nurse contacted the patient by telephone to perform post hospital discharge assessment. Verified name and  with patient as identifiers. Provided introduction to self, and explanation of the Care Transition Nurse role. Patient: Rigo Abebe Patient : 1955   MRN: [id number]  Reason for Admission: claudication in peripheral vascular disease  Discharge Date: 10/4/22 RARS: Readmission Risk Score: 11.6      Last Discharge  Street       Date Complaint Diagnosis Description Type Department Provider    10/3/22  Post-op pain . .. Admission (Discharged) Kimberli Freitas MD            Was this an external facility discharge? No Discharge Facility: na    Challenges to be reviewed by the provider   Additional needs identified to be addressed with provider: Yes  Post procedure follow up appt scheduled with Dr Nadeem Smith of communication with provider: phone. Patient answered call and verified . Patient pleasant and agreeable to transition call. Patient having some post op pain this morning and rated 7/10 on pain scale. Patient confirmed that he has prescription and taking as directed, but hasn't \"kicked in yet\". Patient taking all medications as directed. Post d/c summary reviewed and Dr Carlos Pagan information not noted on paperwork. CTN provided contact number and assisted with scheduling follow up visit. Scheduled 10/14 at 11am for post op visit. Spoke to nurse at Dr Val Sorto office in regards to CHRISTINE dressing. Patient stated he has dressing with tube in right groin site. Nurse explained to patient to remove CHRISTINE dressing on Monday 10/10/22, but leave steri-strips in place. Patient stated understanding. Denied any acute needs at present time. Educated on the use of urgent care or physicians 24 hr access line if assistance is needed after hours.       Care Transition Nurse reviewed discharge instructions with patient who verbalized understanding. The patient was given an opportunity to ask questions and does not have any further questions or concerns at this time. Were discharge instructions available to patient? Yes. Reviewed appropriate site of care based on symptoms and resources available to patient including: PCP  Specialist  When to call 911. The patient agrees to contact the PCP office for questions related to their healthcare. Advance Care Planning:   Does patient have an Advance Directive: not on file. Medication reconciliation was performed with patient, who verbalizes understanding of administration of home medications. Medications reviewed, 1111F entered: N/A    Was patient discharged with a pulse oximeter? no    Non-face-to-face services provided:  Obtained and reviewed discharge summary and/or continuity of care documents    Offered patient enrollment in the Remote Patient Monitoring (RPM) program for in-home monitoring: Patient is not eligible for RPM program.    Care Transitions 24 Hour Call    Do you have a copy of your discharge instructions?: Yes  Do you have all of your prescriptions and are they filled?: Yes  Have you been contacted by a [facility] Pharmacist?: No  Have you scheduled your follow up appointment?: Yes  How are you going to get to your appointment?: Car - family or friend to transport  [address]: [address] you feel like you have everything you need to keep you well at home?: Yes  Care Transitions Interventions         Follow Up  Future Appointments   Date Time Provider Shaila Sanders   11/30/2022 12:45 PM Evens Matt MD P CLER CAR [facility]       Care Transition Nurse provided contact information. No further follow-up call indicated based on severity of symptoms and risk factors.   Plan for next call: follow-up appointment-scheduled     Zoran Dean RN

## 2022-10-05 NOTE — DISCHARGE SUMMARY
Disp-1 Inhaler, R-5Normal      albuterol (PROVENTIL) (2.5 MG/3ML) 0.083% nebulizer solution Take 3 mLs by nebulization every 6 hours as needed for Wheezing, Disp-120 each, R-5Normal           STOP taking these medications       clopidogrel (PLAVIX) 75 MG tablet Comments:   Reason for Stopping:             Activity: activity as tolerated  Diet: regular diet  Wound Care: keep wound clean and dry    Follow-up with Dr Darron Johns in 2 weeks. Signed:   Yair Enriquez MD  10/5/2022  7:18 AM

## 2022-10-07 NOTE — PROGRESS NOTES
Physician Progress Note      PATIENT:               Silvano Hargrove  CSN #:                  [id number]  :                       1955  ADMIT DATE:       10/3/2022 5:30 AM  Jimenez Colbert DATE:        10/4/2022 11:34 AM  RESPONDING  PROVIDER #:        Yvonne Miller MD          QUERY TEXT:    Patient admitted with PVD, noted to have BMI 18.9. If possible, please   document in progress notes and discharge summary if you are evaluating and/or   treating any of the following: The medical record reflects the following:  Risk Factors: chronic conditions, poor PO intake  Clinical Indicators: BMI 18.9  Treatment: daily weights, IVF, I&O's, supportive care    Thank you,  Amy Pozo RN, CDS  [email or web address]  Options provided:  -- Underweight, BMI 18.9  -- Other - I will add my own diagnosis  -- Disagree - Not applicable / Not valid  -- Disagree - Clinically unable to determine / Unknown  -- Refer to Clinical Documentation Reviewer    PROVIDER RESPONSE TEXT:    This patient is underweight, BMI 18.9. Query created by: Ana Luisa Tsai on 10/7/2022 11:11 AM      Electronically signed by:   Yvonne Miller MD 10/7/2022 11:13 AM

## 2022-10-14 ENCOUNTER — OFFICE VISIT (OUTPATIENT)
Dept: VASCULAR SURGERY | Age: 67
End: 2022-10-14

## 2022-10-14 VITALS
SYSTOLIC BLOOD PRESSURE: 118 MMHG | BODY MASS INDEX: 19.11 KG/M2 | HEIGHT: 69 IN | WEIGHT: 129 LBS | DIASTOLIC BLOOD PRESSURE: 60 MMHG

## 2022-10-14 DIAGNOSIS — I73.9 PVD (PERIPHERAL VASCULAR DISEASE) (HCC): ICD-10-CM

## 2022-10-14 DIAGNOSIS — Z95.828 H/O EXTREMITY BYPASS GRAFT: Primary | ICD-10-CM

## 2022-10-14 DIAGNOSIS — Z09 POSTOPERATIVE EXAMINATION: ICD-10-CM

## 2022-10-14 PROCEDURE — 99024 POSTOP FOLLOW-UP VISIT: CPT | Performed by: SURGERY

## 2022-10-14 NOTE — PROGRESS NOTES
Postop Progress Note    Subjective    Merary Catherine presents to the office for postop follow up. Claudication/Rest pain symptoms resolved. Objective    Vitals:    10/14/22 1059   BP: 118/60     General: alert, cooperative and no distress  Incisions: healing well  Palpable R DP pulse    Assessment  Doing well postoperatively. Plan  1. Continue any current medications  Graft duplex 6 weeks.      Electronically signed by John Short MD on 10/14/2022 at 11:18 AM

## 2022-10-15 DIAGNOSIS — I21.11 ST ELEVATION MYOCARDIAL INFARCTION INVOLVING RIGHT CORONARY ARTERY (HCC): ICD-10-CM

## 2022-10-15 DIAGNOSIS — I25.119 CORONARY ARTERY DISEASE INVOLVING NATIVE CORONARY ARTERY OF NATIVE HEART WITH ANGINA PECTORIS (HCC): Primary | ICD-10-CM

## 2022-10-17 RX ORDER — TICAGRELOR 90 MG/1
TABLET ORAL
Qty: 180 TABLET | Refills: 0 | Status: SHIPPED | OUTPATIENT
Start: 2022-10-17

## 2022-11-08 RX ORDER — GABAPENTIN 100 MG/1
100 CAPSULE ORAL 3 TIMES DAILY
Qty: 90 CAPSULE | Refills: 2 | Status: SHIPPED | OUTPATIENT
Start: 2022-11-08 | End: 2023-02-06

## 2022-11-09 ENCOUNTER — TELEPHONE (OUTPATIENT)
Dept: VASCULAR SURGERY | Age: 67
End: 2022-11-09

## 2022-11-09 NOTE — TELEPHONE ENCOUNTER
Called patient with date and time of arterial duplex scan at [address] for November 25, 2022 at 9:00am with arrival of 8:30am. Radha

## 2022-11-09 NOTE — TELEPHONE ENCOUNTER
----- Message from Barbara Singh MD sent at 11/8/2022  6:00 PM EST -----  Regarding: RE: Nerve Pain  Let him know I sent a prescription for Neurotin to [address] [place] [address] for nerve pain. Regarding knot- likely normal healing but can come back to see me if desires. ----- Message -----  From: Jethro Ball MA  Sent: 11/8/2022  12:47 PM EST  To: Barbara Singh MD  Subject: Nerve Pain                                       Patient is calling asking if there is anything you can prescribe for nerve pain? He is having nerve pain mainly in knee and above knee. He also said there's a knot in the incision. He explained the pain as a stinging, burning pain. He is currently taking Percocet for a shoulder procedure he had, and said that doesn't help it at all.  thanks

## 2022-12-06 RX ORDER — ATORVASTATIN CALCIUM 80 MG/1
80 TABLET, FILM COATED ORAL NIGHTLY
Qty: 30 TABLET | Refills: 2 | Status: SHIPPED | OUTPATIENT
Start: 2022-12-06

## 2023-01-25 ENCOUNTER — OFFICE VISIT (OUTPATIENT)
Dept: CARDIOLOGY CLINIC | Age: 68
End: 2023-01-25

## 2023-01-25 VITALS
DIASTOLIC BLOOD PRESSURE: 60 MMHG | OXYGEN SATURATION: 95 % | HEART RATE: 77 BPM | TEMPERATURE: 98.6 F | HEIGHT: 69 IN | SYSTOLIC BLOOD PRESSURE: 110 MMHG | BODY MASS INDEX: 19.55 KG/M2 | WEIGHT: 132 LBS

## 2023-01-25 DIAGNOSIS — Z98.890 H/O THORACIC AORTIC ANEURYSM REPAIR: ICD-10-CM

## 2023-01-25 DIAGNOSIS — I49.9 IRREGULAR HEART BEAT: ICD-10-CM

## 2023-01-25 DIAGNOSIS — Z86.79 H/O THORACIC AORTIC ANEURYSM REPAIR: ICD-10-CM

## 2023-01-25 DIAGNOSIS — Z79.899 MEDICATION MANAGEMENT: ICD-10-CM

## 2023-01-25 DIAGNOSIS — I73.9 PVD (PERIPHERAL VASCULAR DISEASE) (HCC): ICD-10-CM

## 2023-01-25 DIAGNOSIS — E78.00 HIGH CHOLESTEROL: ICD-10-CM

## 2023-01-25 DIAGNOSIS — I25.10 CORONARY ARTERY DISEASE INVOLVING NATIVE CORONARY ARTERY OF NATIVE HEART WITHOUT ANGINA PECTORIS: Primary | ICD-10-CM

## 2023-01-25 RX ORDER — ATORVASTATIN CALCIUM 80 MG/1
80 TABLET, FILM COATED ORAL NIGHTLY
Qty: 90 TABLET | Refills: 3 | Status: SHIPPED | OUTPATIENT
Start: 2023-01-25

## 2023-01-25 NOTE — PATIENT INSTRUCTIONS
Plan:  Current [facility] labs from 10/4/22 reviewed with patient  Current medications reviewed. Refills given as warranted. At you next appointment we will discuss cutting your Brilinta to 60 mg daily. 4.   EKG today  5. Repeat blood work in July   -CBC, CMP, TSH, and fasting lipids (8 hours) ordered. Due July 2023.      Follow up with me in 6 months
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
## 2023-03-21 ENCOUNTER — APPOINTMENT (RX ONLY)
Dept: URBAN - NONMETROPOLITAN AREA CLINIC 22 | Facility: CLINIC | Age: 68
Setting detail: DERMATOLOGY
End: 2023-03-21

## 2023-03-21 VITALS — WEIGHT: 205 LBS | HEIGHT: 76 IN

## 2023-03-21 DIAGNOSIS — L82.1 OTHER SEBORRHEIC KERATOSIS: ICD-10-CM

## 2023-03-21 DIAGNOSIS — L57.0 ACTINIC KERATOSIS: ICD-10-CM | Status: INADEQUATELY CONTROLLED

## 2023-03-21 DIAGNOSIS — Z85.820 PERSONAL HISTORY OF MALIGNANT MELANOMA OF SKIN: ICD-10-CM

## 2023-03-21 DIAGNOSIS — D22 MELANOCYTIC NEVI: ICD-10-CM

## 2023-03-21 DIAGNOSIS — L57.8 OTHER SKIN CHANGES DUE TO CHRONIC EXPOSURE TO NONIONIZING RADIATION: ICD-10-CM

## 2023-03-21 PROBLEM — D22.5 MELANOCYTIC NEVI OF TRUNK: Status: ACTIVE | Noted: 2023-03-21

## 2023-03-21 PROCEDURE — ? ADDITIONAL NOTES

## 2023-03-21 PROCEDURE — ? TREATMENT REGIMEN

## 2023-03-21 PROCEDURE — 99213 OFFICE O/P EST LOW 20 MIN: CPT

## 2023-03-21 PROCEDURE — ? COUNSELING

## 2023-03-21 PROCEDURE — ? FULL BODY SKIN EXAM

## 2023-03-21 PROCEDURE — ? PRESCRIPTION MEDICATION MANAGEMENT

## 2023-03-21 ASSESSMENT — LOCATION SIMPLE DESCRIPTION DERM
LOCATION SIMPLE: RIGHT THIGH
LOCATION SIMPLE: RIGHT UPPER BACK
LOCATION SIMPLE: RIGHT ZYGOMA
LOCATION SIMPLE: LEFT ZYGOMA
LOCATION SIMPLE: SUPERIOR FOREHEAD
LOCATION SIMPLE: LEFT UPPER ARM
LOCATION SIMPLE: LEFT PRETIBIAL REGION
LOCATION SIMPLE: RIGHT SHOULDER
LOCATION SIMPLE: ABDOMEN

## 2023-03-21 ASSESSMENT — LOCATION DETAILED DESCRIPTION DERM
LOCATION DETAILED: LEFT CENTRAL ZYGOMA
LOCATION DETAILED: RIGHT ANTERIOR DISTAL THIGH
LOCATION DETAILED: RIGHT ANTERIOR SHOULDER
LOCATION DETAILED: SUPERIOR MID FOREHEAD
LOCATION DETAILED: RIGHT SUPERIOR MEDIAL UPPER BACK
LOCATION DETAILED: RIGHT CENTRAL ZYGOMA
LOCATION DETAILED: EPIGASTRIC SKIN
LOCATION DETAILED: LEFT PROXIMAL PRETIBIAL REGION
LOCATION DETAILED: LEFT ANTERIOR PROXIMAL UPPER ARM

## 2023-03-21 ASSESSMENT — LOCATION ZONE DERM
LOCATION ZONE: LEG
LOCATION ZONE: TRUNK
LOCATION ZONE: ARM
LOCATION ZONE: FACE

## 2023-03-21 NOTE — PROCEDURE: ADDITIONAL NOTES
Render Risk Assessment In Note?: no
Detail Level: Generalized
Additional Notes: Axillary and Supraclavicular lymph nodes palpated, no lymphadenopathy present.

## 2023-03-21 NOTE — PROCEDURE: MIPS QUALITY
Quality 226: Preventive Care And Screening: Tobacco Use: Screening And Cessation Intervention: Patient screened for tobacco use and is an ex/non-smoker
Detail Level: Detailed
Quality 47: Advance Care Plan: Advance Care Planning discussed and documented; advance care plan or surrogate decision maker documented in the medical record.
Quality 137: Melanoma: Continuity Of Care - Recall System: Patient information entered into a recall system that includes: target date for the next exam specified AND a process to follow up with patients regarding missed or unscheduled appointments
Quality 431: Preventive Care And Screening: Unhealthy Alcohol Use - Screening: Patient not identified as an unhealthy alcohol user when screened for unhealthy alcohol use using a systematic screening method
Quality 111:Pneumonia Vaccination Status For Older Adults: Pneumococcal vaccine (PPSV23) administered on or after patient’s 60th birthday and before the end of the measurement period
Quality 130: Documentation Of Current Medications In The Medical Record: Current Medications Documented

## 2023-05-16 ENCOUNTER — TELEPHONE (OUTPATIENT)
Dept: CARDIOLOGY CLINIC | Age: 68
End: 2023-05-16

## 2023-05-16 DIAGNOSIS — I25.10 CORONARY ARTERY DISEASE INVOLVING NATIVE CORONARY ARTERY OF NATIVE HEART WITHOUT ANGINA PECTORIS: ICD-10-CM

## 2023-05-16 RX ORDER — CARVEDILOL 6.25 MG/1
6.25 TABLET ORAL 2 TIMES DAILY WITH MEALS
Qty: 180 TABLET | Refills: 0 | Status: SHIPPED | OUTPATIENT
Start: 2023-05-16

## 2023-05-16 RX ORDER — ATORVASTATIN CALCIUM 80 MG/1
80 TABLET, FILM COATED ORAL NIGHTLY
Qty: 90 TABLET | Refills: 0 | Status: SHIPPED | OUTPATIENT
Start: 2023-05-16

## 2023-05-16 RX ORDER — LOSARTAN POTASSIUM 25 MG/1
25 TABLET ORAL DAILY
Qty: 90 TABLET | Refills: 0 | Status: SHIPPED | OUTPATIENT
Start: 2023-05-16

## 2023-05-16 NOTE — TELEPHONE ENCOUNTER
Pt needs atorvastatin (LIPITOR) 80 MG tablet, ticagrelor (BRILINTA) 90 MG TABS tablet, carvedilol (COREG) 6.25 MG tablet, losartan (COZAAR) 25 MG tablet scripts sent to [facility]. Pt gave to numbers he could read his writing, either [phone] or [phone]. Zygomaticofacial Flap Text: Given the location of the defect, shape of the defect and the proximity to free margins a zygomaticofacial flap was deemed most appropriate for repair.  Using a sterile surgical marker, the appropriate flap was drawn incorporating the defect and placing the expected incisions within the relaxed skin tension lines where possible. The area thus outlined was incised deep to adipose tissue with a #15 scalpel blade with preservation of a vascular pedicle.  The skin margins were undermined to an appropriate distance in all directions utilizing iris scissors.  The flap was then placed into the defect and anchored with interrupted buried subcutaneous sutures.

## 2023-07-12 ENCOUNTER — TELEPHONE (OUTPATIENT)
Dept: CARDIOLOGY CLINIC | Age: 68
End: 2023-07-12

## 2023-07-12 ENCOUNTER — OFFICE VISIT (OUTPATIENT)
Dept: CARDIOLOGY CLINIC | Age: 68
End: 2023-07-12
Payer: MEDICARE

## 2023-07-12 VITALS
SYSTOLIC BLOOD PRESSURE: 110 MMHG | HEIGHT: 69 IN | HEART RATE: 52 BPM | BODY MASS INDEX: 19.64 KG/M2 | OXYGEN SATURATION: 98 % | WEIGHT: 132.6 LBS | DIASTOLIC BLOOD PRESSURE: 70 MMHG

## 2023-07-12 DIAGNOSIS — Z98.890 H/O THORACIC AORTIC ANEURYSM REPAIR: ICD-10-CM

## 2023-07-12 DIAGNOSIS — I73.9 CLAUDICATION IN PERIPHERAL VASCULAR DISEASE (HCC): ICD-10-CM

## 2023-07-12 DIAGNOSIS — G57.11 NEUROPATHIC PAIN INVOLVING RIGHT LATERAL FEMORAL CUTANEOUS NERVE: ICD-10-CM

## 2023-07-12 DIAGNOSIS — Z86.79 H/O THORACIC AORTIC ANEURYSM REPAIR: ICD-10-CM

## 2023-07-12 DIAGNOSIS — Z79.899 MEDICATION MANAGEMENT: ICD-10-CM

## 2023-07-12 DIAGNOSIS — F17.210 CIGARETTE NICOTINE DEPENDENCE WITHOUT COMPLICATION: ICD-10-CM

## 2023-07-12 DIAGNOSIS — I25.10 CORONARY ARTERY DISEASE INVOLVING NATIVE CORONARY ARTERY OF NATIVE HEART WITHOUT ANGINA PECTORIS: Primary | ICD-10-CM

## 2023-07-12 DIAGNOSIS — J44.9 MODERATE COPD (CHRONIC OBSTRUCTIVE PULMONARY DISEASE) (HCC): ICD-10-CM

## 2023-07-12 PROCEDURE — G8427 DOCREV CUR MEDS BY ELIG CLIN: HCPCS | Performed by: INTERNAL MEDICINE

## 2023-07-12 PROCEDURE — 99214 OFFICE O/P EST MOD 30 MIN: CPT | Performed by: INTERNAL MEDICINE

## 2023-07-12 PROCEDURE — 3017F COLORECTAL CA SCREEN DOC REV: CPT | Performed by: INTERNAL MEDICINE

## 2023-07-12 PROCEDURE — 1123F ACP DISCUSS/DSCN MKR DOCD: CPT | Performed by: INTERNAL MEDICINE

## 2023-07-12 PROCEDURE — G8420 CALC BMI NORM PARAMETERS: HCPCS | Performed by: INTERNAL MEDICINE

## 2023-07-12 PROCEDURE — 4004F PT TOBACCO SCREEN RCVD TLK: CPT | Performed by: INTERNAL MEDICINE

## 2023-07-12 PROCEDURE — 3023F SPIROM DOC REV: CPT | Performed by: INTERNAL MEDICINE

## 2023-07-12 RX ORDER — LOSARTAN POTASSIUM 25 MG/1
25 TABLET ORAL DAILY
Qty: 90 TABLET | Refills: 3 | Status: SHIPPED | OUTPATIENT
Start: 2023-07-12

## 2023-07-12 RX ORDER — ATORVASTATIN CALCIUM 80 MG/1
80 TABLET, FILM COATED ORAL NIGHTLY
Qty: 90 TABLET | Refills: 3 | Status: SHIPPED | OUTPATIENT
Start: 2023-07-12

## 2023-07-12 RX ORDER — CARVEDILOL 6.25 MG/1
6.25 TABLET ORAL 2 TIMES DAILY WITH MEALS
Qty: 180 TABLET | Refills: 3 | Status: SHIPPED | OUTPATIENT
Start: 2023-07-12

## 2023-07-12 NOTE — PROGRESS NOTES
[facility] Office  Follow up Note  7/12/2023     Subjective:  Mr. Chava Dela Cruz is here today for cardiology  follow up STEMI, CAD   He had PCI with two stents in RCA 6.30.21 PCI 8.10.21 PCI LAD,   He had PCI with one stent in D2 8/24/22; may need further PCI pending outpt follow up  C/O ***    Te-Moak:             Patient is vaccinated against Covid. Tc Barba 4/4    PMH:   CAD s/p stents, HTN, HLD, thoracic aortic arch  aneurysm repair 2000, COPD, CKD, 10/3/2022 right femoral popliteal bypass graft  Previously followed Dr. Luis Alberto Shea heart. He then went to [place] and was sent to [facility] who told him he has no aneurysm and not worry about it. Stent placed in RCA in 2013. Remote thoracic aortic aneurysm repair in 2000. Admitted [facility] 2/22/20 for STEMI. He underwent LHC and PCI  to RCA, mechanical thrombectomy, cutting balloon, x2 SAMM. He was air evac from [facility] to [facility] on 2/22/21 for STEMI. He underwent LHC and PCI  to RCA, mechanical thrombectomy, cutting balloon, x2. Also disease in LAD and Diagonal both are significant 70% and 80% repectively. ECHO 2/24/21 showed EF 45-50%. He was scheduled for staged PCI of LAD on 3/18/21. Due to not feeling well nausea, vomiting, significant weight loss, and failure to thrive. Cath was canceled and he was admitted. He had EGD 3/19/21 which showed minimal gastritis. He had colonoscopy 3/22/21 which revealed mild diverticulosis otherwise normal.   On 6/30/21 he had staged PCI of RCA with Dr. Arnold Ro. He reports he is gaining some weight. He is feeling some better post PCI. 7/20/21 he denied  chest pain, shortness of breath, edema, dizziness, palpitations and syncope. Reported he is not smoking. 8/10/21 he had staged PCI LAD with Dr. Arnold Ro. He had SAMM LAD and SAMM Diag 2. Admitted 8/24-8/25/22 for CP. He underwent LHC 8/24/22 with Dr. Arnold Ro. PCI to D2.   Slight protrusion of stent into LAD as well as notation of distal D2 disease, will likely need follow-up stress testing and may need further
anticoagulation: RUSS Meier MD  [facility], [address]  [address] office  [phone] [place]i [place] office

## 2023-07-12 NOTE — PATIENT INSTRUCTIONS
Plan:  Labs reviewed in epic and discussed with patient. Current medications reviewed. Refills given as warranted. It is ok to continue taking brilinta 90 mg daily  Repeat blood work in October. Please have [place] fax labs to [phone]  -CBC, CMP, TSH, Lipids   -you will need to be fasting for blood work  -it is ok to take your medicine with sips of water or black coffee  No cardiac testing at this time.

## 2023-07-28 LAB
CHOLESTEROL, TOTAL: 213 MG/DL
CHOLESTEROL/HDL RATIO: ABNORMAL
HDLC SERPL-MCNC: 38 MG/DL (ref 35–70)
LDL CHOLESTEROL CALCULATED: 185 MG/DL (ref 0–160)
NONHDLC SERPL-MCNC: ABNORMAL MG/DL
TRIGL SERPL-MCNC: 181 MG/DL
VLDLC SERPL CALC-MCNC: ABNORMAL MG/DL

## 2023-07-31 LAB
A/G RATIO: NORMAL
ALBUMIN SERPL-MCNC: 3.9 G/DL
ALBUMIN SERPL-MCNC: NORMAL G/DL
ALP BLD-CCNC: 88 U/L
ALP BLD-CCNC: NORMAL U/L
ALT SERPL-CCNC: 13 U/L
ALT SERPL-CCNC: NORMAL U/L
ANION GAP SERPL CALCULATED.3IONS-SCNC: NORMAL MMOL/L
AST SERPL-CCNC: 15 U/L
AST SERPL-CCNC: NORMAL U/L
AVERAGE GLUCOSE: NORMAL
BASOPHILS ABSOLUTE: 0 /ΜL
BASOPHILS RELATIVE PERCENT: 0.5 %
BILIRUB SERPL-MCNC: 0.6 MG/DL (ref 0.1–1.4)
BILIRUB SERPL-MCNC: NORMAL MG/DL
BILIRUBIN DIRECT: NORMAL
BILIRUBIN, INDIRECT: NORMAL
BUN BLDV-MCNC: 22 MG/DL
CALCIUM SERPL-MCNC: 9.7 MG/DL
CHLORIDE BLD-SCNC: 106 MMOL/L
CO2: 28 MMOL/L
CREAT SERPL-MCNC: 1.46 MG/DL
EGFR: 52
EOSINOPHILS ABSOLUTE: 0.2 /ΜL
EOSINOPHILS RELATIVE PERCENT: 1.9 %
GLOBULIN: NORMAL
GLUCOSE BLD-MCNC: 99 MG/DL
HBA1C MFR BLD: 5.1 %
HCT VFR BLD CALC: 46.1 % (ref 41–53)
HEMOGLOBIN: 15.5 G/DL (ref 13.5–17.5)
LYMPHOCYTES ABSOLUTE: 2.5 /ΜL
LYMPHOCYTES RELATIVE PERCENT: 28.7 %
MCH RBC QN AUTO: 31.8 PG
MCHC RBC AUTO-ENTMCNC: 33.7 G/DL
MCV RBC AUTO: 94.4 FL
MONOCYTES ABSOLUTE: 0.6 /ΜL
MONOCYTES RELATIVE PERCENT: 6.4 %
NEUTROPHILS ABSOLUTE: NORMAL
NEUTROPHILS RELATIVE PERCENT: 62.5 %
PLATELET # BLD: 306 K/ΜL
PMV BLD AUTO: NORMAL FL
POTASSIUM SERPL-SCNC: 4.7 MMOL/L
PROTEIN TOTAL: 8.3 G/DL
RBC # BLD: 4.88 10^6/ΜL
SODIUM BLD-SCNC: NORMAL MMOL/L
TOTAL PROTEIN: NORMAL
WBC # BLD: 8.6 10^3/ML

## 2023-08-08 ENCOUNTER — TELEPHONE (OUTPATIENT)
Dept: CARDIOLOGY CLINIC | Age: 68
End: 2023-08-08

## 2023-08-08 NOTE — TELEPHONE ENCOUNTER
Received labs from [facility]. Labs have been scanned and abstracted into pt chart. Ready for review.

## 2023-08-08 NOTE — TELEPHONE ENCOUNTER
Was able to find Cholesterol labs. Please review those. Labs abstracted and scanned into chart.          Blade Marlow MD  [facility] Staff 2 hours ago (12:41 PM)     RG  Kidney function is slightly weak   they did not do cholesterol levels

## 2023-08-09 NOTE — TELEPHONE ENCOUNTER
Left message for pt to call back           Ebonie Bar MD  [facility] Staff 39 minutes ago (12:13 PM)     RG  Cholesterol level LDL component very high 185.  He is suppose to be on atorvastatin 80mg daily  is he taking it

## 2023-10-06 ENCOUNTER — APPOINTMENT (OUTPATIENT)
Dept: GENERAL RADIOLOGY | Age: 68
DRG: 190 | End: 2023-10-06
Payer: MEDICARE

## 2023-10-06 ENCOUNTER — APPOINTMENT (OUTPATIENT)
Dept: CT IMAGING | Age: 68
DRG: 190 | End: 2023-10-06
Payer: MEDICARE

## 2023-10-06 ENCOUNTER — HOSPITAL ENCOUNTER (INPATIENT)
Age: 68
LOS: 4 days | Discharge: HOME HEALTH | DRG: 190 | End: 2023-10-10
Attending: EMERGENCY MEDICINE | Admitting: STUDENT IN AN ORGANIZED HEALTH CARE EDUCATION/TRAINING PROGRAM
Payer: MEDICARE

## 2023-10-06 DIAGNOSIS — J44.1 COPD EXACERBATION (HCC): ICD-10-CM

## 2023-10-06 DIAGNOSIS — R53.1 GENERAL WEAKNESS: ICD-10-CM

## 2023-10-06 DIAGNOSIS — R06.09 DYSPNEA ON EXERTION: Primary | ICD-10-CM

## 2023-10-06 LAB
ALBUMIN SERPL-MCNC: 4.2 G/DL (ref 3.4–5)
ALBUMIN/GLOB SERPL: 1.4 {RATIO} (ref 1.1–2.2)
ALP SERPL-CCNC: 96 U/L (ref 40–129)
ALT SERPL-CCNC: <5 U/L (ref 10–40)
ANION GAP SERPL CALCULATED.3IONS-SCNC: 14 MMOL/L (ref 3–16)
AST SERPL-CCNC: 14 U/L (ref 15–37)
BASE EXCESS BLDV CALC-SCNC: -2.3 MMOL/L (ref -3–3)
BASOPHILS # BLD: 0.1 K/UL (ref 0–0.2)
BASOPHILS NFR BLD: 0.9 %
BILIRUB SERPL-MCNC: 0.7 MG/DL (ref 0–1)
BUN SERPL-MCNC: 11 MG/DL (ref 7–20)
CALCIUM SERPL-MCNC: 9.6 MG/DL (ref 8.3–10.6)
CHLORIDE SERPL-SCNC: 105 MMOL/L (ref 99–110)
CO2 BLDV-SCNC: 23 MMOL/L
CO2 SERPL-SCNC: 21 MMOL/L (ref 21–32)
COHGB MFR BLDV: 5.8 % (ref 0–1.5)
CREAT SERPL-MCNC: 1.1 MG/DL (ref 0.8–1.3)
DEPRECATED RDW RBC AUTO: 14.3 % (ref 12.4–15.4)
EKG ATRIAL RATE: 150 BPM
EKG DIAGNOSIS: NORMAL
EKG Q-T INTERVAL: 366 MS
EKG QRS DURATION: 74 MS
EKG QTC CALCULATION (BAZETT): 425 MS
EKG R AXIS: -62 DEGREES
EKG T AXIS: 72 DEGREES
EKG VENTRICULAR RATE: 81 BPM
EOSINOPHIL # BLD: 0.2 K/UL (ref 0–0.6)
EOSINOPHIL NFR BLD: 1.2 %
FLUAV RNA RESP QL NAA+PROBE: NOT DETECTED
FLUBV RNA RESP QL NAA+PROBE: NOT DETECTED
GFR SERPLBLD CREATININE-BSD FMLA CKD-EPI: >60 ML/MIN/{1.73_M2}
GLUCOSE SERPL-MCNC: 96 MG/DL (ref 70–99)
HCO3 BLDV-SCNC: 21.4 MMOL/L (ref 23–29)
HCT VFR BLD AUTO: 44.9 % (ref 40.5–52.5)
HGB BLD-MCNC: 15.4 G/DL (ref 13.5–17.5)
LYMPHOCYTES # BLD: 2.4 K/UL (ref 1–5.1)
LYMPHOCYTES NFR BLD: 17.4 %
MCH RBC QN AUTO: 31.7 PG (ref 26–34)
MCHC RBC AUTO-ENTMCNC: 34.2 G/DL (ref 31–36)
MCV RBC AUTO: 92.5 FL (ref 80–100)
METHGB MFR BLDV: 0.3 %
MONOCYTES # BLD: 0.8 K/UL (ref 0–1.3)
MONOCYTES NFR BLD: 5.6 %
NEUTROPHILS # BLD: 10.5 K/UL (ref 1.7–7.7)
NEUTROPHILS NFR BLD: 74.9 %
NT-PROBNP SERPL-MCNC: 303 PG/ML (ref 0–124)
O2 CT VFR BLDV CALC: 16 VOL %
O2 THERAPY: ABNORMAL
PCO2 BLDV: 34.4 MMHG (ref 40–50)
PH BLDV: 7.41 [PH] (ref 7.35–7.45)
PLATELET # BLD AUTO: 331 K/UL (ref 135–450)
PMV BLD AUTO: 6.6 FL (ref 5–10.5)
PO2 BLDV: 36.1 MMHG (ref 25–40)
POTASSIUM SERPL-SCNC: 4 MMOL/L (ref 3.5–5.1)
PROT SERPL-MCNC: 7.3 G/DL (ref 6.4–8.2)
RBC # BLD AUTO: 4.86 M/UL (ref 4.2–5.9)
SAO2 % BLDV: 70 %
SARS-COV-2 RNA RESP QL NAA+PROBE: NOT DETECTED
SODIUM SERPL-SCNC: 140 MMOL/L (ref 136–145)
TROPONIN, HIGH SENSITIVITY: 14 NG/L (ref 0–22)
TROPONIN, HIGH SENSITIVITY: 14 NG/L (ref 0–22)
WBC # BLD AUTO: 14 K/UL (ref 4–11)

## 2023-10-06 PROCEDURE — 83880 ASSAY OF NATRIURETIC PEPTIDE: CPT

## 2023-10-06 PROCEDURE — 6370000000 HC RX 637 (ALT 250 FOR IP): Performed by: STUDENT IN AN ORGANIZED HEALTH CARE EDUCATION/TRAINING PROGRAM

## 2023-10-06 PROCEDURE — 84484 ASSAY OF TROPONIN QUANT: CPT

## 2023-10-06 PROCEDURE — 6360000002 HC RX W HCPCS: Performed by: STUDENT IN AN ORGANIZED HEALTH CARE EDUCATION/TRAINING PROGRAM

## 2023-10-06 PROCEDURE — 2580000003 HC RX 258: Performed by: STUDENT IN AN ORGANIZED HEALTH CARE EDUCATION/TRAINING PROGRAM

## 2023-10-06 PROCEDURE — 6360000004 HC RX CONTRAST MEDICATION: Performed by: EMERGENCY MEDICINE

## 2023-10-06 PROCEDURE — 6370000000 HC RX 637 (ALT 250 FOR IP): Performed by: EMERGENCY MEDICINE

## 2023-10-06 PROCEDURE — 99285 EMERGENCY DEPT VISIT HI MDM: CPT

## 2023-10-06 PROCEDURE — 80053 COMPREHEN METABOLIC PANEL: CPT

## 2023-10-06 PROCEDURE — 85025 COMPLETE CBC W/AUTO DIFF WBC: CPT

## 2023-10-06 PROCEDURE — 93005 ELECTROCARDIOGRAM TRACING: CPT | Performed by: EMERGENCY MEDICINE

## 2023-10-06 PROCEDURE — 93010 ELECTROCARDIOGRAM REPORT: CPT | Performed by: INTERNAL MEDICINE

## 2023-10-06 PROCEDURE — 71260 CT THORAX DX C+: CPT

## 2023-10-06 PROCEDURE — 93005 ELECTROCARDIOGRAM TRACING: CPT | Performed by: STUDENT IN AN ORGANIZED HEALTH CARE EDUCATION/TRAINING PROGRAM

## 2023-10-06 PROCEDURE — 1200000000 HC SEMI PRIVATE

## 2023-10-06 PROCEDURE — 36415 COLL VENOUS BLD VENIPUNCTURE: CPT

## 2023-10-06 PROCEDURE — 82803 BLOOD GASES ANY COMBINATION: CPT

## 2023-10-06 PROCEDURE — 87636 SARSCOV2 & INF A&B AMP PRB: CPT

## 2023-10-06 PROCEDURE — 71045 X-RAY EXAM CHEST 1 VIEW: CPT

## 2023-10-06 RX ORDER — IPRATROPIUM BROMIDE AND ALBUTEROL SULFATE 2.5; .5 MG/3ML; MG/3ML
1 SOLUTION RESPIRATORY (INHALATION) ONCE
Status: COMPLETED | OUTPATIENT
Start: 2023-10-06 | End: 2023-10-06

## 2023-10-06 RX ORDER — ALBUTEROL SULFATE 2.5 MG/3ML
2.5 SOLUTION RESPIRATORY (INHALATION) EVERY 6 HOURS PRN
Status: DISCONTINUED | OUTPATIENT
Start: 2023-10-06 | End: 2023-10-06

## 2023-10-06 RX ORDER — IPRATROPIUM BROMIDE AND ALBUTEROL SULFATE 2.5; .5 MG/3ML; MG/3ML
1 SOLUTION RESPIRATORY (INHALATION)
Status: DISCONTINUED | OUTPATIENT
Start: 2023-10-07 | End: 2023-10-07

## 2023-10-06 RX ORDER — AZITHROMYCIN 250 MG/1
250 TABLET, FILM COATED ORAL DAILY
Status: COMPLETED | OUTPATIENT
Start: 2023-10-07 | End: 2023-10-10

## 2023-10-06 RX ORDER — POTASSIUM CHLORIDE 20 MEQ/1
40 TABLET, EXTENDED RELEASE ORAL PRN
Status: DISCONTINUED | OUTPATIENT
Start: 2023-10-06 | End: 2023-10-10 | Stop reason: HOSPADM

## 2023-10-06 RX ORDER — MAGNESIUM SULFATE IN WATER 40 MG/ML
2000 INJECTION, SOLUTION INTRAVENOUS PRN
Status: DISCONTINUED | OUTPATIENT
Start: 2023-10-06 | End: 2023-10-10 | Stop reason: HOSPADM

## 2023-10-06 RX ORDER — ACETAMINOPHEN 650 MG/1
650 SUPPOSITORY RECTAL EVERY 6 HOURS PRN
Status: DISCONTINUED | OUTPATIENT
Start: 2023-10-06 | End: 2023-10-10 | Stop reason: HOSPADM

## 2023-10-06 RX ORDER — OXYCODONE HYDROCHLORIDE AND ACETAMINOPHEN 5; 325 MG/1; MG/1
2 TABLET ORAL ONCE
Status: COMPLETED | OUTPATIENT
Start: 2023-10-06 | End: 2023-10-06

## 2023-10-06 RX ORDER — PREDNISONE 20 MG/1
40 TABLET ORAL DAILY
Status: DISCONTINUED | OUTPATIENT
Start: 2023-10-07 | End: 2023-10-10 | Stop reason: HOSPADM

## 2023-10-06 RX ORDER — ALBUTEROL SULFATE 90 UG/1
2 AEROSOL, METERED RESPIRATORY (INHALATION) EVERY 6 HOURS PRN
Status: DISCONTINUED | OUTPATIENT
Start: 2023-10-06 | End: 2023-10-07

## 2023-10-06 RX ORDER — ONDANSETRON 4 MG/1
4 TABLET, ORALLY DISINTEGRATING ORAL EVERY 8 HOURS PRN
Status: DISCONTINUED | OUTPATIENT
Start: 2023-10-06 | End: 2023-10-10 | Stop reason: HOSPADM

## 2023-10-06 RX ORDER — ASPIRIN 81 MG/1
81 TABLET, CHEWABLE ORAL DAILY
Status: DISCONTINUED | OUTPATIENT
Start: 2023-10-07 | End: 2023-10-10 | Stop reason: HOSPADM

## 2023-10-06 RX ORDER — ATORVASTATIN CALCIUM 40 MG/1
80 TABLET, FILM COATED ORAL NIGHTLY
Status: DISCONTINUED | OUTPATIENT
Start: 2023-10-06 | End: 2023-10-10 | Stop reason: HOSPADM

## 2023-10-06 RX ORDER — ONDANSETRON 2 MG/ML
4 INJECTION INTRAMUSCULAR; INTRAVENOUS EVERY 6 HOURS PRN
Status: DISCONTINUED | OUTPATIENT
Start: 2023-10-06 | End: 2023-10-10 | Stop reason: HOSPADM

## 2023-10-06 RX ORDER — OXYCODONE AND ACETAMINOPHEN 10; 325 MG/1; MG/1
1 TABLET ORAL EVERY 4 HOURS PRN
Status: DISCONTINUED | OUTPATIENT
Start: 2023-10-06 | End: 2023-10-10 | Stop reason: HOSPADM

## 2023-10-06 RX ORDER — CARVEDILOL 6.25 MG/1
6.25 TABLET ORAL 2 TIMES DAILY WITH MEALS
Status: DISCONTINUED | OUTPATIENT
Start: 2023-10-07 | End: 2023-10-10 | Stop reason: HOSPADM

## 2023-10-06 RX ORDER — PREDNISONE 20 MG/1
60 TABLET ORAL ONCE
Status: COMPLETED | OUTPATIENT
Start: 2023-10-06 | End: 2023-10-06

## 2023-10-06 RX ORDER — POLYETHYLENE GLYCOL 3350 17 G/17G
17 POWDER, FOR SOLUTION ORAL DAILY PRN
Status: DISCONTINUED | OUTPATIENT
Start: 2023-10-06 | End: 2023-10-10 | Stop reason: HOSPADM

## 2023-10-06 RX ORDER — ALBUTEROL SULFATE 2.5 MG/3ML
2.5 SOLUTION RESPIRATORY (INHALATION) EVERY 4 HOURS PRN
Status: DISCONTINUED | OUTPATIENT
Start: 2023-10-06 | End: 2023-10-07

## 2023-10-06 RX ORDER — LOSARTAN POTASSIUM 25 MG/1
25 TABLET ORAL DAILY
Status: DISCONTINUED | OUTPATIENT
Start: 2023-10-07 | End: 2023-10-10 | Stop reason: HOSPADM

## 2023-10-06 RX ORDER — ACETAMINOPHEN 325 MG/1
650 TABLET ORAL EVERY 6 HOURS PRN
Status: DISCONTINUED | OUTPATIENT
Start: 2023-10-06 | End: 2023-10-10 | Stop reason: HOSPADM

## 2023-10-06 RX ORDER — SODIUM CHLORIDE 0.9 % (FLUSH) 0.9 %
5-40 SYRINGE (ML) INJECTION EVERY 12 HOURS SCHEDULED
Status: DISCONTINUED | OUTPATIENT
Start: 2023-10-06 | End: 2023-10-10 | Stop reason: HOSPADM

## 2023-10-06 RX ORDER — SODIUM CHLORIDE 9 MG/ML
INJECTION, SOLUTION INTRAVENOUS PRN
Status: DISCONTINUED | OUTPATIENT
Start: 2023-10-06 | End: 2023-10-10 | Stop reason: HOSPADM

## 2023-10-06 RX ORDER — TIOTROPIUM BROMIDE 18 UG/1
18 CAPSULE ORAL; RESPIRATORY (INHALATION)
Status: DISCONTINUED | OUTPATIENT
Start: 2023-10-07 | End: 2023-10-06 | Stop reason: SDUPTHER

## 2023-10-06 RX ORDER — GABAPENTIN 100 MG/1
200 CAPSULE ORAL 3 TIMES DAILY
Status: DISCONTINUED | OUTPATIENT
Start: 2023-10-07 | End: 2023-10-10 | Stop reason: HOSPADM

## 2023-10-06 RX ORDER — GABAPENTIN 100 MG/1
200 CAPSULE ORAL ONCE
Status: COMPLETED | OUTPATIENT
Start: 2023-10-06 | End: 2023-10-06

## 2023-10-06 RX ORDER — POTASSIUM CHLORIDE 7.45 MG/ML
10 INJECTION INTRAVENOUS PRN
Status: DISCONTINUED | OUTPATIENT
Start: 2023-10-06 | End: 2023-10-10 | Stop reason: HOSPADM

## 2023-10-06 RX ORDER — SODIUM CHLORIDE 0.9 % (FLUSH) 0.9 %
5-40 SYRINGE (ML) INJECTION PRN
Status: DISCONTINUED | OUTPATIENT
Start: 2023-10-06 | End: 2023-10-10 | Stop reason: HOSPADM

## 2023-10-06 RX ORDER — IPRATROPIUM BROMIDE AND ALBUTEROL SULFATE 2.5; .5 MG/3ML; MG/3ML
1 SOLUTION RESPIRATORY (INHALATION)
Status: DISCONTINUED | OUTPATIENT
Start: 2023-10-06 | End: 2023-10-06

## 2023-10-06 RX ORDER — ENOXAPARIN SODIUM 100 MG/ML
40 INJECTION SUBCUTANEOUS DAILY
Status: DISCONTINUED | OUTPATIENT
Start: 2023-10-07 | End: 2023-10-10 | Stop reason: HOSPADM

## 2023-10-06 RX ORDER — GABAPENTIN 100 MG/1
100 CAPSULE ORAL 3 TIMES DAILY
Status: DISCONTINUED | OUTPATIENT
Start: 2023-10-06 | End: 2023-10-06

## 2023-10-06 RX ORDER — OXYCODONE AND ACETAMINOPHEN 10; 325 MG/1; MG/1
1 TABLET ORAL EVERY 4 HOURS PRN
COMMUNITY

## 2023-10-06 RX ADMIN — GABAPENTIN 200 MG: 100 CAPSULE ORAL at 14:13

## 2023-10-06 RX ADMIN — GABAPENTIN 100 MG: 100 CAPSULE ORAL at 21:17

## 2023-10-06 RX ADMIN — Medication 10 ML: at 22:34

## 2023-10-06 RX ADMIN — TICAGRELOR 90 MG: 90 TABLET ORAL at 21:17

## 2023-10-06 RX ADMIN — PREDNISONE 60 MG: 20 TABLET ORAL at 20:30

## 2023-10-06 RX ADMIN — IPRATROPIUM BROMIDE AND ALBUTEROL SULFATE 1 DOSE: .5; 2.5 SOLUTION RESPIRATORY (INHALATION) at 20:30

## 2023-10-06 RX ADMIN — IOPAMIDOL 75 ML: 755 INJECTION, SOLUTION INTRAVENOUS at 15:15

## 2023-10-06 RX ADMIN — AZITHROMYCIN MONOHYDRATE 500 MG: 500 INJECTION, POWDER, LYOPHILIZED, FOR SOLUTION INTRAVENOUS at 20:28

## 2023-10-06 RX ADMIN — OXYCODONE AND ACETAMINOPHEN 2 TABLET: 5; 325 TABLET ORAL at 14:14

## 2023-10-06 RX ADMIN — OXYCODONE AND ACETAMINOPHEN 1 TABLET: 10; 325 TABLET ORAL at 21:17

## 2023-10-06 RX ADMIN — ATORVASTATIN CALCIUM 80 MG: 40 TABLET, FILM COATED ORAL at 22:33

## 2023-10-06 ASSESSMENT — PAIN - FUNCTIONAL ASSESSMENT: PAIN_FUNCTIONAL_ASSESSMENT: NONE - DENIES PAIN

## 2023-10-06 ASSESSMENT — PAIN DESCRIPTION - ORIENTATION: ORIENTATION: LEFT;RIGHT

## 2023-10-06 ASSESSMENT — PAIN DESCRIPTION - LOCATION
LOCATION: SHOULDER
LOCATION: SHOULDER

## 2023-10-06 ASSESSMENT — PAIN SCALES - GENERAL
PAINLEVEL_OUTOF10: 5
PAINLEVEL_OUTOF10: 7
PAINLEVEL_OUTOF10: 5

## 2023-10-06 ASSESSMENT — LIFESTYLE VARIABLES
HOW MANY STANDARD DRINKS CONTAINING ALCOHOL DO YOU HAVE ON A TYPICAL DAY: PATIENT DOES NOT DRINK
HOW OFTEN DO YOU HAVE A DRINK CONTAINING ALCOHOL: NEVER

## 2023-10-06 NOTE — ED PROVIDER NOTES
Emergency Department Provider Note  Location: [address] [place] [address] ED  10/6/2023     Patient Identification  Mariza Bhagat is a 80 y.o. male    Chief Complaint  Respiratory Distress (Beengoing on a couple days )          HPI  (History provided by patient)  Patient is a 50-year-old male with significant cardiovascular history including CAD multiple stents last PCI August 2022, ascending thoracic aortic aneurysm repair, right femoral-popliteal bypass graft 2022, current smoker, emphysema, presents with progressive shortness of breath over the past 2 weeks particularly worse over the past 3 days. States he cannot cross the room without getting significantly short of breath. General fatigue weakness. No overt chest pain no diaphoresis or nausea. Chronic smoker's cough no fevers or chills. No pleurisy. No leg swelling. I have reviewed the following nursing documentation:  Allergies: Allergies   Allergen Reactions    Ativan [Lorazepam]      Possible paradoxic reaction    Fentanyl And Related      Possible paradoxic reaction       Past medical history:  has a past medical history of CAD (coronary artery disease) (03/28/2013), Cardiomyopathy ([address]) (02/22/2021), Emphysema of lung ([address]), Heart attack ([address]) (03/28/2013), High cholesterol, HTN (hypertension), Prolonged emergence from general anesthesia, and Thoracic aortic aneurysm ([address]) (2000). Past surgical history:  has a past surgical history that includes Diagnostic Cardiac Cath Lab Procedure (12/10/2019); Thoracic aortic aneurysm repair (2000); Coronary angioplasty with stent (02/22/2021); Coronary angioplasty with stent (03/28/2013); Upper gastrointestinal endoscopy (N/A, 03/19/2021); Colonoscopy (N/A, 03/22/2021); and femoral bypass (Right, 10/3/2022). Home medications:   Prior to Admission medications    Medication Sig Start Date End Date Taking?  Authorizing Provider   Tiotropium Bromide Monohydrate (SPIRIVA HANDIHALER IN) Inhale 1 puff into

## 2023-10-06 NOTE — ED NOTES
Patient requesting his daily ordered medications for pain.  Dr Carter Clement gave verbal order with read back for his Oxycodone and Gabapentin     Nila Valencia RN  10/06/23 0450

## 2023-10-06 NOTE — ED PROVIDER NOTES
This patient was signed out to me at 8:27 PM. Please reference the off going provider's note for initial assessment and plan. This patient is signed out pending the following:    - reassess clinical condition  - review workup  - determine disposition  - fu CTPA    Special discussion: none    The remainder of the patient's ED course is as follows:    ED Course as of 10/06/23 2027   Fri Oct 06, 2023   2026 The patient was reassessed he was found resting comfortably in no acute distress. He remains afebrile hemodynamically stable. On repeat examination he again reports progressive dyspnea on exertion without chest pain or syncope or other symptoms. He also has generalized weakness and states that he is having difficulty caring for himself at home. On my assessment he has coarse rhonchorous breath sounds. Abdomen soft nontender nondistended extremities are warm and well-perfused. We discussed his history of emphysema and his cough. He has had some increasing shortness of breath and sputum production. His VBG here is reassuring but I do believe there is a component of COPD exacerbation contributing to his symptoms as well as general deconditioning reflected in his weakness. Given these considerations we will admit for treatment of his COPD as well as further consideration of his generalized weakness and deconditioning. The patient is agreeable to this disposition and this concludes his ED course.  [NG]      ED Course User Index  [NG] MD Octavio Cunningham Asp, Asp, MD  10/06/23 2027

## 2023-10-07 PROBLEM — I25.10 CORONARY ARTERY DISEASE INVOLVING NATIVE CORONARY ARTERY OF NATIVE HEART WITHOUT ANGINA PECTORIS: Status: ACTIVE | Noted: 2021-03-01

## 2023-10-07 PROBLEM — R53.1 GENERAL WEAKNESS: Status: ACTIVE | Noted: 2023-10-07

## 2023-10-07 PROBLEM — J44.1 COPD EXACERBATION (HCC): Status: ACTIVE | Noted: 2023-10-07

## 2023-10-07 LAB
ANION GAP SERPL CALCULATED.3IONS-SCNC: 13 MMOL/L (ref 3–16)
BASOPHILS # BLD: 0 K/UL (ref 0–0.2)
BASOPHILS NFR BLD: 0.2 %
BUN SERPL-MCNC: 18 MG/DL (ref 7–20)
CALCIUM SERPL-MCNC: 9 MG/DL (ref 8.3–10.6)
CHLORIDE SERPL-SCNC: 105 MMOL/L (ref 99–110)
CO2 SERPL-SCNC: 20 MMOL/L (ref 21–32)
CREAT SERPL-MCNC: 1.2 MG/DL (ref 0.8–1.3)
DEPRECATED RDW RBC AUTO: 13.7 % (ref 12.4–15.4)
EKG ATRIAL RATE: 74 BPM
EKG DIAGNOSIS: NORMAL
EKG P AXIS: 52 DEGREES
EKG P-R INTERVAL: 160 MS
EKG Q-T INTERVAL: 368 MS
EKG QRS DURATION: 78 MS
EKG QTC CALCULATION (BAZETT): 408 MS
EKG R AXIS: -26 DEGREES
EKG T AXIS: 53 DEGREES
EKG VENTRICULAR RATE: 74 BPM
EOSINOPHIL # BLD: 0 K/UL (ref 0–0.6)
EOSINOPHIL NFR BLD: 0 %
GFR SERPLBLD CREATININE-BSD FMLA CKD-EPI: >60 ML/MIN/{1.73_M2}
GLUCOSE SERPL-MCNC: 148 MG/DL (ref 70–99)
HCT VFR BLD AUTO: 42.3 % (ref 40.5–52.5)
HGB BLD-MCNC: 14.5 G/DL (ref 13.5–17.5)
LYMPHOCYTES # BLD: 1.5 K/UL (ref 1–5.1)
LYMPHOCYTES NFR BLD: 13.6 %
MCH RBC QN AUTO: 31.7 PG (ref 26–34)
MCHC RBC AUTO-ENTMCNC: 34.4 G/DL (ref 31–36)
MCV RBC AUTO: 92.3 FL (ref 80–100)
MONOCYTES # BLD: 0.1 K/UL (ref 0–1.3)
MONOCYTES NFR BLD: 1.3 %
NEUTROPHILS # BLD: 9.1 K/UL (ref 1.7–7.7)
NEUTROPHILS NFR BLD: 84.9 %
PLATELET # BLD AUTO: 304 K/UL (ref 135–450)
PMV BLD AUTO: 6.9 FL (ref 5–10.5)
POTASSIUM SERPL-SCNC: 3.9 MMOL/L (ref 3.5–5.1)
RBC # BLD AUTO: 4.58 M/UL (ref 4.2–5.9)
SODIUM SERPL-SCNC: 138 MMOL/L (ref 136–145)
T4 FREE SERPL-MCNC: 1.4 NG/DL (ref 0.9–1.8)
TSH SERPL DL<=0.005 MIU/L-ACNC: 0.19 UIU/ML (ref 0.27–4.2)
WBC # BLD AUTO: 10.7 K/UL (ref 4–11)

## 2023-10-07 PROCEDURE — 6360000002 HC RX W HCPCS: Performed by: STUDENT IN AN ORGANIZED HEALTH CARE EDUCATION/TRAINING PROGRAM

## 2023-10-07 PROCEDURE — 36415 COLL VENOUS BLD VENIPUNCTURE: CPT

## 2023-10-07 PROCEDURE — 84439 ASSAY OF FREE THYROXINE: CPT

## 2023-10-07 PROCEDURE — 94640 AIRWAY INHALATION TREATMENT: CPT

## 2023-10-07 PROCEDURE — 84443 ASSAY THYROID STIM HORMONE: CPT

## 2023-10-07 PROCEDURE — 93010 ELECTROCARDIOGRAM REPORT: CPT | Performed by: INTERNAL MEDICINE

## 2023-10-07 PROCEDURE — 2580000003 HC RX 258: Performed by: STUDENT IN AN ORGANIZED HEALTH CARE EDUCATION/TRAINING PROGRAM

## 2023-10-07 PROCEDURE — 85025 COMPLETE CBC W/AUTO DIFF WBC: CPT

## 2023-10-07 PROCEDURE — 97161 PT EVAL LOW COMPLEX 20 MIN: CPT

## 2023-10-07 PROCEDURE — 6370000000 HC RX 637 (ALT 250 FOR IP): Performed by: STUDENT IN AN ORGANIZED HEALTH CARE EDUCATION/TRAINING PROGRAM

## 2023-10-07 PROCEDURE — 97166 OT EVAL MOD COMPLEX 45 MIN: CPT

## 2023-10-07 PROCEDURE — 94761 N-INVAS EAR/PLS OXIMETRY MLT: CPT

## 2023-10-07 PROCEDURE — 97530 THERAPEUTIC ACTIVITIES: CPT

## 2023-10-07 PROCEDURE — 80048 BASIC METABOLIC PNL TOTAL CA: CPT

## 2023-10-07 PROCEDURE — 97116 GAIT TRAINING THERAPY: CPT

## 2023-10-07 PROCEDURE — 1200000000 HC SEMI PRIVATE

## 2023-10-07 PROCEDURE — 99233 SBSQ HOSP IP/OBS HIGH 50: CPT | Performed by: NURSE PRACTITIONER

## 2023-10-07 RX ORDER — IPRATROPIUM BROMIDE AND ALBUTEROL SULFATE 2.5; .5 MG/3ML; MG/3ML
1 SOLUTION RESPIRATORY (INHALATION) 3 TIMES DAILY
Status: DISCONTINUED | OUTPATIENT
Start: 2023-10-08 | End: 2023-10-09

## 2023-10-07 RX ADMIN — IPRATROPIUM BROMIDE AND ALBUTEROL SULFATE 1 DOSE: .5; 2.5 SOLUTION RESPIRATORY (INHALATION) at 19:33

## 2023-10-07 RX ADMIN — GABAPENTIN 200 MG: 100 CAPSULE ORAL at 08:49

## 2023-10-07 RX ADMIN — IPRATROPIUM BROMIDE AND ALBUTEROL SULFATE 1 DOSE: .5; 2.5 SOLUTION RESPIRATORY (INHALATION) at 11:12

## 2023-10-07 RX ADMIN — TICAGRELOR 90 MG: 90 TABLET ORAL at 21:10

## 2023-10-07 RX ADMIN — AZITHROMYCIN 250 MG: 250 TABLET, FILM COATED ORAL at 08:50

## 2023-10-07 RX ADMIN — CARVEDILOL 6.25 MG: 6.25 TABLET, FILM COATED ORAL at 18:21

## 2023-10-07 RX ADMIN — LOSARTAN POTASSIUM 25 MG: 25 TABLET, FILM COATED ORAL at 08:50

## 2023-10-07 RX ADMIN — OXYCODONE AND ACETAMINOPHEN 1 TABLET: 10; 325 TABLET ORAL at 07:29

## 2023-10-07 RX ADMIN — ATORVASTATIN CALCIUM 80 MG: 40 TABLET, FILM COATED ORAL at 21:10

## 2023-10-07 RX ADMIN — ASPIRIN 81 MG: 81 TABLET, CHEWABLE ORAL at 08:52

## 2023-10-07 RX ADMIN — IPRATROPIUM BROMIDE AND ALBUTEROL SULFATE 1 DOSE: .5; 2.5 SOLUTION RESPIRATORY (INHALATION) at 07:51

## 2023-10-07 RX ADMIN — OXYCODONE AND ACETAMINOPHEN 1 TABLET: 10; 325 TABLET ORAL at 03:19

## 2023-10-07 RX ADMIN — GABAPENTIN 200 MG: 100 CAPSULE ORAL at 15:49

## 2023-10-07 RX ADMIN — CARVEDILOL 6.25 MG: 6.25 TABLET, FILM COATED ORAL at 08:49

## 2023-10-07 RX ADMIN — TICAGRELOR 90 MG: 90 TABLET ORAL at 08:51

## 2023-10-07 RX ADMIN — OXYCODONE AND ACETAMINOPHEN 1 TABLET: 10; 325 TABLET ORAL at 21:10

## 2023-10-07 RX ADMIN — Medication 5 ML: at 08:53

## 2023-10-07 RX ADMIN — PREDNISONE 40 MG: 20 TABLET ORAL at 08:51

## 2023-10-07 RX ADMIN — ENOXAPARIN SODIUM 40 MG: 100 INJECTION SUBCUTANEOUS at 08:52

## 2023-10-07 RX ADMIN — Medication 10 ML: at 21:10

## 2023-10-07 RX ADMIN — OXYCODONE AND ACETAMINOPHEN 1 TABLET: 10; 325 TABLET ORAL at 15:49

## 2023-10-07 RX ADMIN — OXYCODONE AND ACETAMINOPHEN 1 TABLET: 10; 325 TABLET ORAL at 11:35

## 2023-10-07 RX ADMIN — IPRATROPIUM BROMIDE AND ALBUTEROL SULFATE 1 DOSE: .5; 2.5 SOLUTION RESPIRATORY (INHALATION) at 15:50

## 2023-10-07 RX ADMIN — GABAPENTIN 200 MG: 100 CAPSULE ORAL at 21:10

## 2023-10-07 ASSESSMENT — PAIN DESCRIPTION - ORIENTATION
ORIENTATION: RIGHT;LEFT
ORIENTATION: RIGHT;LEFT
ORIENTATION: RIGHT
ORIENTATION_2: RIGHT
ORIENTATION: RIGHT
ORIENTATION: LEFT;RIGHT

## 2023-10-07 ASSESSMENT — PAIN DESCRIPTION - PAIN TYPE
TYPE: CHRONIC PAIN
TYPE: NEUROPATHIC PAIN
TYPE: CHRONIC PAIN
TYPE: CHRONIC PAIN

## 2023-10-07 ASSESSMENT — PAIN DESCRIPTION - DESCRIPTORS
DESCRIPTORS: ACHING;DISCOMFORT
DESCRIPTORS: JABBING
DESCRIPTORS_2: STABBING;JABBING

## 2023-10-07 ASSESSMENT — PAIN DESCRIPTION - ONSET
ONSET: ON-GOING
ONSET: ON-GOING
ONSET: GRADUAL
ONSET: ON-GOING

## 2023-10-07 ASSESSMENT — PAIN DESCRIPTION - FREQUENCY
FREQUENCY: CONTINUOUS
FREQUENCY: INTERMITTENT
FREQUENCY: CONTINUOUS
FREQUENCY: CONTINUOUS

## 2023-10-07 ASSESSMENT — PAIN - FUNCTIONAL ASSESSMENT
PAIN_FUNCTIONAL_ASSESSMENT_SITE2: PREVENTS OR INTERFERES SOME ACTIVE ACTIVITIES AND ADLS
PAIN_FUNCTIONAL_ASSESSMENT: PREVENTS OR INTERFERES SOME ACTIVE ACTIVITIES AND ADLS
PAIN_FUNCTIONAL_ASSESSMENT: ACTIVITIES ARE NOT PREVENTED

## 2023-10-07 ASSESSMENT — PAIN SCALES - GENERAL
PAINLEVEL_OUTOF10: 8
PAINLEVEL_OUTOF10: 8
PAINLEVEL_OUTOF10: 4
PAINLEVEL_OUTOF10: 6
PAINLEVEL_OUTOF10: 7
PAINLEVEL_OUTOF10: 5
PAINLEVEL_OUTOF10: 7
PAINLEVEL_OUTOF10: 7
PAINLEVEL_OUTOF10: 5
PAINLEVEL_OUTOF10: 6

## 2023-10-07 ASSESSMENT — PAIN DESCRIPTION - LOCATION
LOCATION: SHOULDER
LOCATION: LEG
LOCATION: SHOULDER
LOCATION_2: LEG
LOCATION: SHOULDER
LOCATION: SHOULDER;LEG
LOCATION: SHOULDER;LEG

## 2023-10-07 ASSESSMENT — PAIN DESCRIPTION - INTENSITY: RATING_2: 9

## 2023-10-07 NOTE — PROGRESS NOTES
4 Eyes Skin Assessment     NAME:  Mehdi May  YOB: 1955  MEDICAL RECORD NUMBER:  [id number]    The patient is being assessed for  Admission    I agree that at least one RN has performed a thorough Head to Toe Skin Assessment on the patient. ALL assessment sites listed below have been assessed. Areas assessed by both nurses:    Head, Face, Ears, Shoulders, Back, Chest, Arms, Elbows, Hands, Sacrum. Buttock, Coccyx, Ischium, and Legs. Feet and Heels    Patient has missing toe on left foot, scattered bruising and scabs on bilateral arms and legs. Patient has no non healing wounds         Does the Patient have a Wound?  No noted wound(s)       Favio Prevention initiated by RN: No  Wound Care Orders initiated by RN: No    Pressure Injury (Stage 3,4, Unstageable, DTI, NWPT, and Complex wounds) if present, place Wound referral order by RN under : No    New Ostomies, if present place, Ostomy referral order under : No     Nurse 1 eSignature: Electronically signed by Dorys Higginbotham RN on 10/7/23 at 3:37 AM EDT    **SHARE this note so that the co-signing nurse can place an eSignature**    Nurse 2 eSignature: {Esignature:[id number]}

## 2023-10-07 NOTE — PROGRESS NOTES
Patient provided a COPD Educational Folder that includes the following materials:     [x]  [address]: Managing your COPD  [x]  ALA: Getting the Most Out of Medication Delivery Devices  [x]  ALA: My COPD Action Plan  [x]  Better Breathers Club: [facility]   [x]  Smoking Cessation Classes  [x]  Outpatient Spiritual Care Services  [x]  Magnet: Signs of COPD    PATIENT/CAREGIVER TEACHING:   Level of patient/caregiver understanding able to:   [x] Verbalize understanding   [] Demonstrate understanding       [] Teach back        [] Needs reinforcement     []  Other:

## 2023-10-07 NOTE — FLOWSHEET NOTE
Pt A/Ox4. VSS. Pain 6/10 to bilateral shoulders & 9/10 to right leg, see assessment below. Pt unlabored; respirations even, regular, effortless at this time. +Dyspnea on exertion. RA. No distress noted. Shift assessment complete. See flowsheet. AM med's given. See MAR. Denies needs at this time. Telemetry and continuous pulse ox remains in place. Bed alarm on. Side rails 2/4. Bed in low position. Call light within reach. Bedside Mobility Assessment Tool (BMAT):     Assessment Level 1- Sit and Shake    1. From a semi-reclined position, ask patient to sit up and rotate to a seated position at the side of the bed. Can use the bedrail. 2. Ask patient to reach out and grab your hand and shake making sure patient reaches across his/her midline. Pass- Patient is able to come to a seated position, maintain core strength. Maintains seated balance while reaching across midline. Move on to Assessment Level 2. Assessment Level 2- Stretch and Point   1. With patient in seated position at the side of the bed, have patient place both feet on the floor (or stool) with knees no higher than hips. 2. Ask patient to stretch one leg and straighten the knee, then bend the ankle/flex and point the toes. If appropriate, repeat with the other leg. Pass- Patient is able to demonstrate appropriate quad strength on intended weight bearing limb(s). Move onto Assessment Level 3. Assessment Level 3- Stand   1. Ask patient to elevate off the bed or chair (seated to standing) using an assistive device (cane, bedrail). 2. Patient should be able to raise buttocks off be and hold for a count of five. May repeat once. Pass- Patient maintains standing stability for at least 5 seconds, proceed to assessment level 4. Assessment Level 4- Walk   1. Ask patient to march in place at bedside. 2. Then ask patient to advance step and return each foot.  Some medical conditions may render a patient from stepping backwards, use your best clinical judgement. Fail- Patient not able to complete tasks OR requires use of assistive device. Patient is MOBILITY LEVEL 3. Mobility Level- 3      10/06/23 1129   Assessment   Charting Type Admission   Psychosocial   Psychosocial (WDL) WDL   Neurological   Neuro (WDL) WDL   Level of Consciousness 0   Swallow Screening   Is the patient unable to remain alert for testing? No   Is the patient on a modified diet (thickened liquids) due to pre-existing dysphagia? No   Is there presence of existing enteral tube feeding via the stomach or nose? No   Is there presence of head-of-bed restrictions (less than 30 degrees)? No   Is there presence of tracheotomy tube? No   Is the patient ordered nothing-by-mouth status? No   3 oz Water Swallow Screen Pass   [place] Coma Scale   Eye Opening 4   Best Verbal Response 5   Best Motor Response 6   Perry Coma Scale Score 15   NIHSS Stroke Scale   NIHSS Stroke Scale Assessed No   HEENT (Head, Ears, Eyes, Nose, & Throat)   HEENT (WDL) WDL   Respiratory   Respiratory (WDL) X   Respiratory Interventions Cough & deep breathe;H. O.B. elevated   Respiratory Pattern Regular   Respiratory Depth Normal   Respiratory Quality/Effort Unlabored   Chest Assessment Chest expansion symmetrical;Trachea midline   L Breath Sounds Diminished   R Breath Sounds Diminished   Level of Activity/Mobility 1   Cough/Sputum   Cough Non-productive   Frequency Frequent   Cardiac   Cardiac (WDL) X   Cardiac Regularity Irregular   Heart Sounds S1, S2   Cardiac Rhythm Sinus rhythm   Rhythm Interpretation   Pulse 80   Cardiac Monitor   Cardiac/Telemetry Monitor On Portable telemetry pack applied   Alarm Audible Yes   Alarms Set Yes   Electrodes Replaced Yes   Gastrointestinal   Abdominal (WDL) WDL   Genitourinary   Genitourinary (WDL) WDL   Suprapubic Tenderness No   Dysuria (Pain/Burning w/Urination) No   Difficulty Urinating/Starting Stream No   Urine Assessment   Urinary Status Voiding   Urinary

## 2023-10-07 NOTE — PROGRESS NOTES
Inpatient Physical Therapy Evaluation & Treatment    Unit: [id number]  Date:  10/7/2023  Patient Name:    Rolanda Riggs \"QVNU\"  Admitting diagnosis:  Dyspnea on exertion [R06.09]  VALLES (dyspnea on exertion) [R06.09]  General weakness [R53.1]  COPD exacerbation ([address]) [J44.1]  Admit Date:  10/6/2023  Precautions/Restrictions/WB Status/ Lines/ Wounds/ Oxygen: Fall risk and Bed/chair alarm      Pt seen for cotreatment this date due to patient safety and patient endurance    Treatment Time:  9526-9831  Treatment Number:  1   Timed Code Treatment Minutes: 30 minutes  Total Treatment Minutes:  40  minutes    Patient Stated Goals for Therapy: \" Get more independent \"          Discharge Recommendations: SNF  DME needs for discharge: Defer to facility       Therapy recommendation for EMS Transport: can transport by wheelchair    Therapy recommendations for staff:   Assist of 1 for ambulation with use of No AD and gait belt to/from bathroom (recommend use of RW if increased pain level in RLE)    History of Present Illness: Per H&P, \"Tray Roy is a 80 y.o. male with COPD, hypertension, hyperlipidemia, CAD, chronic pain with opioid dependence, AAA s/p repair presented with worsening shortness of breath. \"      Home Health S4 Level Recommendation:  NA        AM-PAC Mobility Score    AM-PAC Inpatient Mobility Raw Score : 17         Subjective  Patient lying reclined in bed with no family present. Pt agreeable to this PT session.      Cognition    A&O x4   Able to follow 2 step commands    Pain   Yes  Location: Bilateral shoulders and R Leg  Ratin /10 (reports this is his baseline)  Pain Medicine Status: No request made    Preadmission Environment:   Pt. Lives with    Alone  Home environment:    one story home with basement (laundry room here)  Steps to enter first floor:   4 steps to enter and hand rail unilateral  Steps to second floor/basement: Full flight of 12-13, Laundry in basement, and hand rail: unilateral  Laundry: steps with CGA with use of hand rail unilateral and LRAD (least restrictive assistive device)    Rehabilitation Potential: Good  Strengths for achieving goals include:   Pt cooperative   Barriers to achieving goals include:    Complexity of condition, Pain, and Weakness    Plan    To be seen 3-5 x / week  while in acute care setting for therapeutic exercises, bed mobility, transfers, progressive gait training, balance training, and family/patient education. Signature: VISHNU Leblanc PT, DPT #[id number]     If patient discharges from this facility prior to next visit, this note will serve as the Discharge Summary.

## 2023-10-07 NOTE — PROGRESS NOTES
Progress Note    Admit Date:  10/6/2023    Subjective:  Mr. Renteria American seen laying in bed. Feels very weak  He was not eating well and was declining significantly in the last 2 weeks. He knows he needs to go to Rehab facility, but worried about his dogs at home and things he needs to take care of at home. Objective:   Patient Vitals for the past 4 hrs:   Pulse Resp SpO2   10/07/23 1238 -- 16 --   10/07/23 1135 -- 16 --   10/07/23 1112 83 16 97 %          Intake/Output Summary (Last 24 hours) at 10/7/2023 1449  Last data filed at 10/7/2023 0849  Gross per 24 hour   Intake 15 ml   Output 200 ml   Net -185 ml       Physical Exam:  Gen: No distress. Alert. Eyes: PERRL. No sclera icterus. No conjunctival injection. ENT: No discharge. Pharynx clear. Neck: No JVD. No Carotid Bruit. Trachea midline. Resp: + mild accessory muscle use. LS diminished. Barrel chest. No crackles. No wheezes. No rhonchi. CV: Regular rate. Regular rhythm. No murmur. No rub. No edema. GI: Non-tender. Non-distended. Normal bowel sounds. Skin: Warm and dry. No nodule on exposed extremities. No rash on exposed extremities. M/S: No cyanosis. No joint deformity. No clubbing. Neuro: Awake. Grossly nonfocal    Psych: Oriented x 3. No anxiety or agitation    Data:  CBC:   Recent Labs     10/06/23  1215 10/07/23  0619   WBC 14.0* 10.7   HGB 15.4 14.5   HCT 44.9 42.3   MCV 92.5 92.3    304     BMP:   Recent Labs     10/06/23  1215 10/07/23  0619    138   K 4.0 3.9    105   CO2 21 20*   BUN 11 18   CREATININE 1.1 1.2     LIVER PROFILE:   Recent Labs     10/06/23  1215   AST 14*   ALT <5*   BILITOT 0.7   ALKPHOS 96     PT/INR: No results for input(s): \"PROTIME\", \"INR\" in the last 72 hours. CULTURES  COVID/flu: not detected    RADIOLOGY  CT CHEST PULMONARY EMBOLISM W CONTRAST   Final Result   No evidence of pulmonary embolism or acute pulmonary abnormality. Diffuse emphysematous changes. There is no pneumothorax.

## 2023-10-07 NOTE — H&P
V2.0  History and Physical      Name:  Oz Estrella /Age/Sex: 1955  (80 y.o. male)   MRN & CSN:  [id number] & [id number] Encounter Date/Time:10/6/2023 8:14 PM EDT   Location:   PCP: No primary care provider on file. Assessment and Plan:   Oz Estrella is a 80 y.o. male with COPD, hypertension, hyperlipidemia, CAD, chronic pain with opioid dependence, AAA s/p repair presented with worsening shortness of breath. COPD exacerbation  Active tobacco use  Negative COVID and influenza screen. CTA chest no PE or consolidation. No significant respiratory distress or supplemental O2 requirement. P.o. prednisone, scheduled DuoNeb, Z-Guido. Mainly patient is unable to take care of himself as he lives alone and has no social support. Unable to perform ADLs over the past 2 weeks. PT OT evaluation and possible placement. Hypertension  Continue Coreg and losartan    CAD with Hx of PCI  Continue aspirin Brilinta and Lipitor    Chronic pain with opioid dependence  Continue Percocet as needed, PDMP reviewed. AAA with Hx of repair    Inpatient, Medsurg w/ telemetry  Full Code    Disposition:   Current Living situation: Home alone  Expected Disposition: SNF  Estimated D/C: 2-3 days    Diet ADULT DIET; Regular  ADULT ORAL NUTRITION SUPPLEMENT; Breakfast; Standard High Calorie/High Protein Oral Supplement   DVT Prophylaxis x Lovenox, heparin, SCDs, ambulation, Eliquis, Xarelto, Coumadin   Code Status Full Code   Surrogate Decision Maker/ ELPIDIO Johnson     Personally reviewed Lab Studies and Imaging   EKG interpreted personally and results normal sinus rhythm, 81/min, PACs. History from:     Patient     History of Present Illness:     Chief Complaint: Shortness of breath, unable to perform ADLs    Oz Estrella is a 80 y.o. male with COPD, hypertension, hyperlipidemia, CAD, chronic pain with dependence presented with worsening shortness of breath.   Patient reports that over the past 2 weeks he has the administration of intravenous contrast.  Multiplanar reformatted images are provided for review. MIP images are provided for review. Automated exposure control, iterative reconstruction, and/or weight based adjustment of the mA/kV was utilized to reduce the radiation dose to as low as reasonably achievable. COMPARISON: None. HISTORY: ORDERING SYSTEM PROVIDED HISTORY: SOB TECHNOLOGIST PROVIDED HISTORY: Reason for exam:->SOB Decision Support Exception - unselect if not a suspected or confirmed emergency medical condition->Emergency Medical Condition (MA) Reason for Exam: SOB FINDINGS: Pulmonary Arteries: Diffuse emphysematous changes seen throughout the lungs. The pulmonary arteries are adequately opacified for evaluation. No evidence of intraluminal filling defect to suggest pulmonary embolism. Main pulmonary artery is normal in caliber. Mediastinum: No evidence of mediastinal lymphadenopathy. The heart and pericardium demonstrate no acute abnormality. There is no acute abnormality of the thoracic aorta. The ascending thoracic aorta measures 4.3 x 3.8 cm and is mildly aneurysmal. The descending thoracic aorta is also aneurysmal measuring 4.4 x 4.9 cm with extensive atherosclerotic changes seen about the aorta. Lungs/pleura: The lungs are without acute process. No focal consolidation or pulmonary edema. No evidence of pleural effusion or pneumothorax. Upper Abdomen: Limited images of the upper abdomen are unremarkable. Soft Tissues/Bones: No acute bone or soft tissue abnormality. No evidence of pulmonary embolism or acute pulmonary abnormality. Diffuse emphysematous changes. There is no pneumothorax. There is ascending and descending thoracic aortic aneurysm. The study was not optimized for visualization of the aneurysm as the aorta is not opacified.   There is given the difference in densities of the aorta wall there may represent mural thrombus, postsurgical changes if there is a history of thoracic

## 2023-10-07 NOTE — PROGRESS NOTES
Telemetry Assignment Communication Form    Patient has orders for continuous telemetry OR pulse oximeter only orders    To be filled out by Clinical    Patient has Admission or Transfer orders and is assigned to 217      (Once this top section is completed:  Select \"Route\" and send note to Fax number: [phone]))      ___________________________________________________________________________      To be filled out by [facility]    Patient assigned to tele box number: __________________               (to be written in by [facility] when telemetry box is assigned to patient)    ___________________________________________________________________________      Bedside RN confirming that the box listed above is in fact the telemetry box number being placed on the patient listed above.         X________________________________________ RN signature        __________________________________________RN assigned to Patient (please print)    _______________ Date    ____________ Time

## 2023-10-07 NOTE — PLAN OF CARE
Problem: Discharge Planning  Goal: Discharge to home or other facility with appropriate resources  Outcome: Progressing     Problem: Pain  Goal: Verbalizes/displays adequate comfort level or baseline comfort level  Outcome: Progressing  Flowsheets (Taken 10/7/2023 9793)  Verbalizes/displays adequate comfort level or baseline comfort level:   Encourage patient to monitor pain and request assistance   Assess pain using appropriate pain scale   Administer analgesics based on type and severity of pain and evaluate response   Implement non-pharmacological measures as appropriate and evaluate response     Problem: Safety - Adult  Goal: Free from fall injury  Outcome: Progressing     Problem: Nutrition Deficit:  Goal: Optimize nutritional status  Outcome: Progressing

## 2023-10-07 NOTE — ED NOTES
Report called to 2W RN - Medications released and given. Patient expressing frustration over Gabapentin dose ordered only 100 mg - requesting his usual 200 mg tid. Perfect Serve message to doctor.       Servando López RN  10/06/23 2121

## 2023-10-07 NOTE — PROGRESS NOTES
Comprehensive Nutrition Assessment    Type and Reason for Visit:  Initial, Positive Nutrition Screen (wt loss)    Nutrition Recommendations/Plan:   Continue Regular diet  D/c ensure  Offer Magic cups TID (orange or columba)     Malnutrition Assessment:  Malnutrition Status:  Severe malnutrition (10/07/23 3878)    Context:  Acute Illness     Findings of the 6 clinical characteristics of malnutrition:  Energy Intake:  50% or less of estimated energy requirements for 5 or more days  Weight Loss:  7.5% over 3 months     Body Fat Loss: Moderate body fat loss Orbital, Triceps, Buccal region   Muscle Mass Loss: Moderate muscle mass loss Temples (temporalis), Scapula (trapezius), Clavicles (pectoralis & deltoids)  Fluid Accumulation:  No significant fluid accumulation Extremities   Strength:  Not Performed    Nutrition Assessment:    Pt. severely malnourished AEB sig wt loss, muscle wasting, fat loss and poor intakes. At risk for further nutritional compromise r/t c/o no appetite and feeling of depression. Will continue diet and adjust supps . Nutrition Related Findings:    pt is A & O x4; describes that he has not had an appetite for > 1 year;  his weight was 170# prior to this change;  he also reports pyscho/social change which has also impacted his appetiet recently his 72y brother comitted suicide 3 moth ago after being dx with diabetic HF; can not drink ensure Wound Type: None       Current Nutrition Intake & Therapies:    Average Meal Intake: Unable to assess  Average Supplements Intake: Refusing to take  ADULT DIET; Regular  ADULT ORAL NUTRITION SUPPLEMENT; Breakfast; Standard High Calorie/High Protein Oral Supplement    Anthropometric Measures:  Height: 5' 9\" (175.3 cm)  Ideal Body Weight (IBW): 160 lbs (73 kg)    Admission Body Weight: 119 lb (54 kg)  Current Body Weight: 119 lb (54 kg), 74.4 % IBW.  Weight Source: Bed Scale  Current BMI (kg/m2): 17.6  Usual Body Weight: 129 lb (58.5 kg)  % Weight Change (Calculated): -7.8                    BMI Categories: Underweight (BMI less than 22) age over 72    Estimated Daily Nutrient Needs:  Energy Requirements Based On: Kcal/kg  Weight Used for Energy Requirements: Pre-pregnancy  Energy (kcal/day): 7649-0813 based ~ 32-35 kcal/kg cbw  Weight Used for Protein Requirements: Current  Protein (g/day): 65-76 based ~ 1.2-1.4 gr/kg cbw  Method Used for Fluid Requirements: 1 ml/kcal  Fluid (ml/day): 2598-2232    Nutrition Diagnosis:   Severe malnutrition related to psychological cause or life stress, inadequate protein-energy intake, impaired respiratory function as evidenced by poor intake prior to admission, BMI, weight loss 7.5% in 3 months, severe muscle loss, severe loss of subcutaneous fat    Nutrition Interventions:   Food and/or Nutrient Delivery: Continue Current Diet, Modify Oral Nutrition Supplement  Nutrition Education/Counseling: No recommendation at this time          Goals:     Goals: PO intake 50% or greater, by next RD assessment       Nutrition Monitoring and Evaluation:   Behavioral-Environmental Outcomes: None Identified  Food/Nutrient Intake Outcomes: Food and Nutrient Intake, Supplement Intake  Physical Signs/Symptoms Outcomes: Nutrition Focused Physical Findings, Biochemical Data, Weight    Discharge Planning:     Too soon to determine     Ines Harris, [id number] [facility] [place]   Contact: [phone]

## 2023-10-07 NOTE — PROGRESS NOTES
Inpatient Occupational Therapy Evaluation and Treatment    Unit: [facility]  Date:  10/7/2023  Patient Name:    Rosa M Lai  Admitting diagnosis:  Dyspnea on exertion [R06.09]  VALLES (dyspnea on exertion) [R06.09]  General weakness [R53.1]  COPD exacerbation ([address]) [J44.1]  Admit Date:  10/6/2023  Precautions/Restrictions/WB Status/ Lines/ Wounds/ Oxygen: Fall risk and Bed/chair alarm    Pt seen for cotreatment this date due to patient safety and patient endurance    Treatment Time:  4126-4381  Treatment Number:  1  Timed Code Treatment Minutes: 30 minutes  Total Treatment Minutes:  40  minutes    Patient Goals for Therapy: \"get more independent \"          Discharge Recommendations: SNF  DME needs for discharge: Defer to facility       Therapy recommendations for staff:   Assist of 1 for ambulation with use of gait belt to/from bathroom    History of Present Illness: Per H&P, \"Tray Weaver is a 80 y.o. male with COPD, hypertension, hyperlipidemia, CAD, chronic pain with opioid dependence, AAA s/p repair presented with worsening shortness of breath. \"    Home Health S4 Level Recommendation:  NA    AM-PAC Score: AM-PAC Inpatient Daily Activity Raw Score: 14     Subjective:  Patient lying reclined in bed with no family present. Pt agreeable to this OT session.      Cognition:    A&O x4   Able to follow 2 step commands    Pain   Yes  Location: Bilateral shoulders and R Leg  Ratin /10 (reports this is his baseline)  Pain Medicine Status: No request made    Preadmission Environment:   Pt. Lives with                                       Alone  Home environment:                            one story home with basement (laundry room here)  Steps to enter first floor:                     4 steps to enter and hand rail unilateral  Steps to second floor/basement:        Full flight of 12-13, Laundry in basement, and hand rail: unilateral  Laundry:                                              Basement  Bathroom: Comments   Supine at rest       Seated at EOB       Standing       End of session   RA, unable to obtain reading in room      Positioning Needs:   Pt in bed, alarm set, call light provided and all needs within reach . Ther Ex / Activities Initiated:   N/A    Patient/Family Education:   Pt educated on role of inpatient OT, plan of care, importance of continued activity, DC recommendations, Energy conservation, and Calling for assist with mobility. CHF Education  N/A    Assessment:  Pt seen for Occupational therapy evaluation in acute care setting. Pt demonstrated decreased Activity tolerance, ADLs, and IADLs. Pt functioning below baseline and will likely benefit from skilled occupational therapy services to maximize safety and independence. Recommending SNF upon discharge as patient functioning well below baseline, demonstrates good rehab potential and unable to return home due to limited or no family support, inability to negotiate stairs to enter home/bedroom/bathroom, home environment not conducive to patient recovery, and limited safety awareness. Goal(s) : To be met in 3 Visits:  Bed to toilet/BSC:       Supervision  Pt will complete 3/3 CHF goals     N/A    To be met in 5 Visits:  Supine to/from Sit in preparation for ADL task:   Supervision  Toileting        Supervision  Grooming       Modified Independent  Upper Body Dressing:      Modified Independent  Lower Body Dressing:      Modified Independent  Pt to demonstrate UE therapeutic exs x 15 reps with minimal cues    Rehabilitation Potential: Good  Strengths for achieving goals include: Pt cooperative   Barriers to achieving goals include:  Complexity of condition, Pain, and Weakness    Plan: To be seen 3-5 x/wk while in acute care setting for therapeutic exercises, bed mobility, transfers, family/patient education, ADL/IADL retraining, and energy conservation training.     Electronically signed by Ekaterina Conklin OT on 10/7/2023 at 12:52

## 2023-10-07 NOTE — ED NOTES
Patient verbalizing frustration over waiting for his usual pm meds. Admitting MD Dr Jeffrey Huang has recently been in to evaluate patient. Perfect serve sent to admitting MD regarding this matter. Patient updated to situation.       Dana Dalton RN  10/06/23 2040

## 2023-10-08 PROCEDURE — 6370000000 HC RX 637 (ALT 250 FOR IP): Performed by: STUDENT IN AN ORGANIZED HEALTH CARE EDUCATION/TRAINING PROGRAM

## 2023-10-08 PROCEDURE — 94640 AIRWAY INHALATION TREATMENT: CPT

## 2023-10-08 PROCEDURE — 99233 SBSQ HOSP IP/OBS HIGH 50: CPT | Performed by: NURSE PRACTITIONER

## 2023-10-08 PROCEDURE — 94761 N-INVAS EAR/PLS OXIMETRY MLT: CPT

## 2023-10-08 PROCEDURE — 2580000003 HC RX 258: Performed by: STUDENT IN AN ORGANIZED HEALTH CARE EDUCATION/TRAINING PROGRAM

## 2023-10-08 PROCEDURE — 1200000000 HC SEMI PRIVATE

## 2023-10-08 PROCEDURE — 99223 1ST HOSP IP/OBS HIGH 75: CPT | Performed by: INTERNAL MEDICINE

## 2023-10-08 PROCEDURE — 6360000002 HC RX W HCPCS: Performed by: STUDENT IN AN ORGANIZED HEALTH CARE EDUCATION/TRAINING PROGRAM

## 2023-10-08 PROCEDURE — 6370000000 HC RX 637 (ALT 250 FOR IP): Performed by: INTERNAL MEDICINE

## 2023-10-08 RX ORDER — IPRATROPIUM BROMIDE AND ALBUTEROL SULFATE 2.5; .5 MG/3ML; MG/3ML
1 SOLUTION RESPIRATORY (INHALATION) EVERY 4 HOURS PRN
Status: DISCONTINUED | OUTPATIENT
Start: 2023-10-08 | End: 2023-10-10 | Stop reason: HOSPADM

## 2023-10-08 RX ADMIN — PREDNISONE 40 MG: 20 TABLET ORAL at 08:31

## 2023-10-08 RX ADMIN — GABAPENTIN 200 MG: 100 CAPSULE ORAL at 14:10

## 2023-10-08 RX ADMIN — TICAGRELOR 90 MG: 90 TABLET ORAL at 08:31

## 2023-10-08 RX ADMIN — OXYCODONE AND ACETAMINOPHEN 1 TABLET: 10; 325 TABLET ORAL at 22:08

## 2023-10-08 RX ADMIN — GABAPENTIN 200 MG: 100 CAPSULE ORAL at 08:31

## 2023-10-08 RX ADMIN — ATORVASTATIN CALCIUM 80 MG: 40 TABLET, FILM COATED ORAL at 22:08

## 2023-10-08 RX ADMIN — OXYCODONE AND ACETAMINOPHEN 1 TABLET: 10; 325 TABLET ORAL at 02:00

## 2023-10-08 RX ADMIN — LOSARTAN POTASSIUM 25 MG: 25 TABLET, FILM COATED ORAL at 08:31

## 2023-10-08 RX ADMIN — OXYCODONE AND ACETAMINOPHEN 1 TABLET: 10; 325 TABLET ORAL at 18:13

## 2023-10-08 RX ADMIN — IPRATROPIUM BROMIDE AND ALBUTEROL SULFATE 1 DOSE: .5; 2.5 SOLUTION RESPIRATORY (INHALATION) at 19:46

## 2023-10-08 RX ADMIN — TICAGRELOR 90 MG: 90 TABLET ORAL at 22:08

## 2023-10-08 RX ADMIN — IPRATROPIUM BROMIDE AND ALBUTEROL SULFATE 1 DOSE: .5; 2.5 SOLUTION RESPIRATORY (INHALATION) at 11:34

## 2023-10-08 RX ADMIN — AZITHROMYCIN 250 MG: 250 TABLET, FILM COATED ORAL at 08:31

## 2023-10-08 RX ADMIN — ASPIRIN 81 MG: 81 TABLET, CHEWABLE ORAL at 08:31

## 2023-10-08 RX ADMIN — ENOXAPARIN SODIUM 40 MG: 100 INJECTION SUBCUTANEOUS at 08:31

## 2023-10-08 RX ADMIN — CARVEDILOL 6.25 MG: 6.25 TABLET, FILM COATED ORAL at 08:30

## 2023-10-08 RX ADMIN — CARVEDILOL 6.25 MG: 6.25 TABLET, FILM COATED ORAL at 17:25

## 2023-10-08 RX ADMIN — IPRATROPIUM BROMIDE AND ALBUTEROL SULFATE 1 DOSE: .5; 2.5 SOLUTION RESPIRATORY (INHALATION) at 07:28

## 2023-10-08 RX ADMIN — OXYCODONE AND ACETAMINOPHEN 1 TABLET: 10; 325 TABLET ORAL at 06:03

## 2023-10-08 RX ADMIN — OXYCODONE AND ACETAMINOPHEN 1 TABLET: 10; 325 TABLET ORAL at 10:05

## 2023-10-08 RX ADMIN — GABAPENTIN 200 MG: 100 CAPSULE ORAL at 22:08

## 2023-10-08 RX ADMIN — Medication 5 ML: at 08:32

## 2023-10-08 RX ADMIN — OXYCODONE AND ACETAMINOPHEN 1 TABLET: 10; 325 TABLET ORAL at 14:10

## 2023-10-08 RX ADMIN — Medication 10 ML: at 22:08

## 2023-10-08 ASSESSMENT — PAIN DESCRIPTION - DESCRIPTORS
DESCRIPTORS: THROBBING;JABBING
DESCRIPTORS_2: STABBING
DESCRIPTORS: ACHING
DESCRIPTORS: SHARP;THROBBING;JABBING
DESCRIPTORS: JABBING;THROBBING
DESCRIPTORS: SHARP;JABBING

## 2023-10-08 ASSESSMENT — PAIN - FUNCTIONAL ASSESSMENT
PAIN_FUNCTIONAL_ASSESSMENT: ACTIVITIES ARE NOT PREVENTED
PAIN_FUNCTIONAL_ASSESSMENT: ACTIVITIES ARE NOT PREVENTED
PAIN_FUNCTIONAL_ASSESSMENT: PREVENTS OR INTERFERES SOME ACTIVE ACTIVITIES AND ADLS
PAIN_FUNCTIONAL_ASSESSMENT: ACTIVITIES ARE NOT PREVENTED
PAIN_FUNCTIONAL_ASSESSMENT: PREVENTS OR INTERFERES WITH MANY ACTIVE NOT PASSIVE ACTIVITIES
PAIN_FUNCTIONAL_ASSESSMENT_SITE2: ACTIVITIES ARE NOT PREVENTED
PAIN_FUNCTIONAL_ASSESSMENT: ACTIVITIES ARE NOT PREVENTED

## 2023-10-08 ASSESSMENT — PAIN SCALES - GENERAL
PAINLEVEL_OUTOF10: 8
PAINLEVEL_OUTOF10: 6
PAINLEVEL_OUTOF10: 6
PAINLEVEL_OUTOF10: 7
PAINLEVEL_OUTOF10: 6
PAINLEVEL_OUTOF10: 8
PAINLEVEL_OUTOF10: 7

## 2023-10-08 ASSESSMENT — PAIN DESCRIPTION - ORIENTATION
ORIENTATION: RIGHT;LEFT
ORIENTATION: RIGHT;LEFT
ORIENTATION_2: RIGHT
ORIENTATION: LEFT;RIGHT
ORIENTATION: RIGHT;LEFT
ORIENTATION: LEFT;RIGHT
ORIENTATION: LEFT;RIGHT

## 2023-10-08 ASSESSMENT — PAIN DESCRIPTION - LOCATION
LOCATION: SHOULDER
LOCATION: SHOULDER;LEG
LOCATION: SHOULDER
LOCATION_2: LEG
LOCATION: SHOULDER

## 2023-10-08 ASSESSMENT — PAIN DESCRIPTION - ONSET
ONSET: ON-GOING

## 2023-10-08 ASSESSMENT — PAIN DESCRIPTION - FREQUENCY
FREQUENCY: CONTINUOUS

## 2023-10-08 ASSESSMENT — PAIN DESCRIPTION - PAIN TYPE
TYPE: CHRONIC PAIN

## 2023-10-08 ASSESSMENT — PAIN DESCRIPTION - INTENSITY: RATING_2: 6

## 2023-10-08 NOTE — PLAN OF CARE
Problem: Discharge Planning  Goal: Discharge to home or other facility with appropriate resources  10/8/2023 0129 by Juve Wayne RN  Outcome: Progressing  10/7/2023 1911 by Bobby Garcia RN  Outcome: Progressing     Problem: Pain  Goal: Verbalizes/displays adequate comfort level or baseline comfort level  10/8/2023 0129 by Juve Wayne RN  Outcome: Progressing  10/7/2023 1911 by Bobby Garcia RN  Outcome: Progressing  Flowsheets (Taken 10/7/2023 0845)  Verbalizes/displays adequate comfort level or baseline comfort level:   Encourage patient to monitor pain and request assistance   Assess pain using appropriate pain scale   Administer analgesics based on type and severity of pain and evaluate response   Implement non-pharmacological measures as appropriate and evaluate response     Problem: Safety - Adult  Goal: Free from fall injury  10/8/2023 0129 by Juve Wayne RN  Outcome: Progressing  10/7/2023 1911 by Bobby Garcia RN  Outcome: Progressing     Problem: Nutrition Deficit:  Goal: Optimize nutritional status  10/8/2023 0129 by Juve Wayne RN  Outcome: Progressing  10/7/2023 1911 by Bobby Garcia RN  Outcome: Progressing

## 2023-10-08 NOTE — CARE COORDINATION
Case Management Assessment  Initial Evaluation    Date/Time of Evaluation: 10/8/2023 9:27 AM  Assessment Completed by: Arnold Sylvester RN    If patient is discharged prior to next notation, then this note serves as note for discharge by case management. Patient Name: Oren Santiago                   YOB: 1955  Diagnosis: Dyspnea on exertion [R06.09]  VALLES (dyspnea on exertion) [R06.09]  General weakness [R53.1]  COPD exacerbation ([address]) [J44.1]                   Date / Time: 10/6/2023 11:13 AM    Patient Admission Status: Inpatient   Readmission Risk (Low < 19, Mod (19-27), High > 27): Readmission Risk Score: 10.7    Current PCP: No primary care provider on file. PCP verified by CM? Yes ([facility] in [place] was Marlyce Smoke)    Chart Reviewed: Yes      History Provided by: Patient  Patient Orientation: Alert and Oriented    Patient Cognition: Alert    Hospitalization in the last 30 days (Readmission):  No    If yes, Readmission Assessment in CM Navigator will be completed. Advance Directives:      Code Status: Full Code   Patient's Primary Decision Maker is: Legal Next of Kin    Primary Decision Maker: PatelTucker santiago - Brother/Sister - [phone]    Secondary Decision Maker: Jose A Jakoblaw - Brother/Sister - [phone]    Discharge Planning:    Patient lives with: Alone Type of Home: House  Primary Care Giver: Self  Patient Support Systems include: Family Members, Friends/Neighbors   Current Financial resources: Medicare  Current community resources: None  Current services prior to admission: Durable Medical Equipment            Current DME: Joanne Silva, Jose (Comment) ([employer])            Type of Home Care services:  None    ADLS  Prior functional level:  Other (see comment) (Was IPT two weeks ago when he started feeling weaker)  Current functional level: Assistance with the following:, Bathing, Dressing, Toileting, Cooking, Housework, Shopping, Mobility    PT AM-PAC: 17 /24  OT AM-PAC: 14

## 2023-10-08 NOTE — PLAN OF CARE
Problem: Discharge Planning  Goal: Discharge to home or other facility with appropriate resources  10/8/2023 1329 by Merle Dior RN  Outcome: Progressing  Flowsheets (Taken 10/8/2023 0825)  Discharge to home or other facility with appropriate resources:   Identify barriers to discharge with patient and caregiver   Refer to discharge planning if patient needs post-hospital services based on physician order or complex needs related to functional status, cognitive ability or social support system  10/8/2023 0129 by Marianne Sanders RN  Outcome: Progressing     Problem: Pain  Goal: Verbalizes/displays adequate comfort level or baseline comfort level  10/8/2023 1329 by Merle Dior RN  Outcome: Progressing  Flowsheets (Taken 10/8/2023 0825)  Verbalizes/displays adequate comfort level or baseline comfort level:   Encourage patient to monitor pain and request assistance   Assess pain using appropriate pain scale   Administer analgesics based on type and severity of pain and evaluate response   Implement non-pharmacological measures as appropriate and evaluate response  10/8/2023 0129 by Marianne Sanders RN  Outcome: Progressing     Problem: Safety - Adult  Goal: Free from fall injury  10/8/2023 1329 by Merle Dior RN  Outcome: Progressing  10/8/2023 0129 by Marianne Sanders RN  Outcome: Progressing     Problem: Nutrition Deficit:  Goal: Optimize nutritional status  10/8/2023 1329 by Merle Dior RN  Outcome: Progressing  10/8/2023 0129 by Marianne Sanders RN  Outcome: Progressing     Problem: Respiratory - Adult  Goal: Achieves optimal ventilation and oxygenation  Outcome: Progressing  Flowsheets (Taken 10/8/2023 0825)  Achieves optimal ventilation and oxygenation:   Assess for changes in respiratory status   Assess for changes in mentation and behavior   Position to facilitate oxygenation and minimize respiratory effort   Respiratory therapy support as indicated   Assess and instruct to report

## 2023-10-08 NOTE — FLOWSHEET NOTE
Pt A/Ox4. VSS. Pain 6/10 to bilateral shoulders & right leg, see assessment below. Pt unlabored; respirations even, regular, effortless at this time. +Dyspnea on exertion. RA. No distress noted. Shift assessment complete. See flowsheet. AM med's given. See MAR. Denies needs at this time. Telemetry and continuous pulse ox remains in place. Bed alarm on. Side rails 2/4. Bed in low position. Call light within reach. Bedside Mobility Assessment Tool (BMAT):     Assessment Level 1- Sit and Shake    1. From a semi-reclined position, ask patient to sit up and rotate to a seated position at the side of the bed. Can use the bedrail. 2. Ask patient to reach out and grab your hand and shake making sure patient reaches across his/her midline. Pass- Patient is able to come to a seated position, maintain core strength. Maintains seated balance while reaching across midline. Move on to Assessment Level 2. Assessment Level 2- Stretch and Point   1. With patient in seated position at the side of the bed, have patient place both feet on the floor (or stool) with knees no higher than hips. 2. Ask patient to stretch one leg and straighten the knee, then bend the ankle/flex and point the toes. If appropriate, repeat with the other leg. Pass- Patient is able to demonstrate appropriate quad strength on intended weight bearing limb(s). Move onto Assessment Level 3. Assessment Level 3- Stand   1. Ask patient to elevate off the bed or chair (seated to standing) using an assistive device (cane, bedrail). 2. Patient should be able to raise buttocks off be and hold for a count of five. May repeat once. Pass- Patient maintains standing stability for at least 5 seconds, proceed to assessment level 4. Assessment Level 4- Walk   1. Ask patient to march in place at bedside. 2. Then ask patient to advance step and return each foot.  Some medical conditions may render a patient from stepping backwards, use your best

## 2023-10-08 NOTE — PROGRESS NOTES
increased work of breathing using Per Protocol order mode. 4-6 - enter or revise RT Bronchodilator order(s) to two equivalent RT bronchodilator orders with one order with BID Frequency and one order with Frequency of every 4 hours PRN wheezing or increased work of breathing using Per Protocol order mode. 7-10 - enter or revise RT Bronchodilator order(s) to two equivalent RT bronchodilator orders with one order with TID Frequency and one order with Frequency of every 4 hours PRN wheezing or increased work of breathing using Per Protocol order mode. 11-13 - enter or revise RT Bronchodilator order(s) to one equivalent RT bronchodilator order with QID Frequency and an Albuterol order with Frequency of every 4 hours PRN wheezing or increased work of breathing using Per Protocol order mode. Greater than 13 - enter or revise RT Bronchodilator order(s) to one equivalent RT bronchodilator order with every 4 hours Frequency and an Albuterol order with Frequency of every 2 hours PRN wheezing or increased work of breathing using Per Protocol order mode.        Electronically signed by Lenora Stephenson RCP on 10/8/2023 at 7:48 PM

## 2023-10-08 NOTE — CONSULTS
[facility]                                 Pulmonary Consult /Progress Note :                                                                  CHIEF COMPLAINT: SOB     Consulting provider: Dr Samantha Brandon   Reason for consult : COPD    HPI:   Patient is 16-year-old female with history of COPD and 47-year smoking history patient with chronic pain l  Patient presented with worsening shortness of breath that has been going on for the last few days and he was admitted on Friday 10 /6 with worse.      Nicol Posada reports that over the last 2 weeks she has been progressively worsening shortness of breath and dyspnea on exertion along with cough    He was diagnosed with COPD but he never had PFT however his CAT scan shows significant emphysema    He lives alone and has some social issues as well    He has cough with change in sputum color to creamy and yellow sometimes    Started on inhalers and I be steroids and he is using no oxygen at home        Past Medical History:   Diagnosis Date    CAD (coronary artery disease) 03/28/2013    Cardiomyopathy ([address]) 02/22/2021    EF= 45%    Emphysema of lung ([address])     Heart attack ([address]) 03/28/2013    3/28/2013 and 2/22/2021    High cholesterol     HTN (hypertension)     Prolonged emergence from general anesthesia     AWAKES VERY QUICKLY, ONCE AWOKE VERY CONFUSED AFTER ATIVAN AND FENTANYL    Thoracic aortic aneurysm ([address]) 2000    ARCH       Past Surgical History:        Procedure Laterality Date    COLONOSCOPY N/A 03/22/2021    COLONOSCOPY WITH BIOPSY performed by Alicia Liang MD at [id number] [facility]  02/22/2021    SAMM- 3.0 x 38 and 3.0 x 34 to pRCA  (TOTAL 7 STENTS)    CORONARY ANGIOPLASTY WITH STENT PLACEMENT  03/28/2013    SAMM- 3.5 x 18 to RCA    DIAGNOSTIC CARDIAC CATH LAB PROCEDURE  12/10/2019    Non Obs CAD    FEMORAL BYPASS Right 10/3/2022    RIGHT FEMORAL POPLITEAL INSITU BYPASS GRAFT primary       Thank you very much for allowing me to participate in the care of this pleasant patient , should you have any questions ,please do not hesitate to contact me      David Villalpando MD,[facility]  Pulmonary&Critical Care Medicine   Professor Yves Doan    NOTE: This report was transcribed using voice recognition software. Every effort was made to ensure accuracy; however, inadvertent computerized transcription errors may be present.

## 2023-10-09 PROCEDURE — 94761 N-INVAS EAR/PLS OXIMETRY MLT: CPT

## 2023-10-09 PROCEDURE — 6360000002 HC RX W HCPCS: Performed by: STUDENT IN AN ORGANIZED HEALTH CARE EDUCATION/TRAINING PROGRAM

## 2023-10-09 PROCEDURE — 6370000000 HC RX 637 (ALT 250 FOR IP): Performed by: STUDENT IN AN ORGANIZED HEALTH CARE EDUCATION/TRAINING PROGRAM

## 2023-10-09 PROCEDURE — 6370000000 HC RX 637 (ALT 250 FOR IP): Performed by: INTERNAL MEDICINE

## 2023-10-09 PROCEDURE — 99232 SBSQ HOSP IP/OBS MODERATE 35: CPT | Performed by: INTERNAL MEDICINE

## 2023-10-09 PROCEDURE — 2580000003 HC RX 258: Performed by: STUDENT IN AN ORGANIZED HEALTH CARE EDUCATION/TRAINING PROGRAM

## 2023-10-09 PROCEDURE — 94640 AIRWAY INHALATION TREATMENT: CPT

## 2023-10-09 PROCEDURE — 94010 BREATHING CAPACITY TEST: CPT

## 2023-10-09 PROCEDURE — 1200000000 HC SEMI PRIVATE

## 2023-10-09 RX ORDER — PREDNISONE 10 MG/1
TABLET ORAL
Qty: 30 TABLET | Refills: 0 | Status: SHIPPED | OUTPATIENT
Start: 2023-10-09

## 2023-10-09 RX ORDER — IPRATROPIUM BROMIDE AND ALBUTEROL SULFATE 2.5; .5 MG/3ML; MG/3ML
1 SOLUTION RESPIRATORY (INHALATION)
Status: DISCONTINUED | OUTPATIENT
Start: 2023-10-09 | End: 2023-10-10 | Stop reason: HOSPADM

## 2023-10-09 RX ORDER — AZITHROMYCIN 250 MG/1
250 TABLET, FILM COATED ORAL DAILY
Qty: 1 TABLET | Refills: 0 | Status: SHIPPED | OUTPATIENT
Start: 2023-10-10 | End: 2023-10-11

## 2023-10-09 RX ADMIN — OXYCODONE AND ACETAMINOPHEN 1 TABLET: 10; 325 TABLET ORAL at 18:26

## 2023-10-09 RX ADMIN — OXYCODONE AND ACETAMINOPHEN 1 TABLET: 10; 325 TABLET ORAL at 09:56

## 2023-10-09 RX ADMIN — LOSARTAN POTASSIUM 25 MG: 25 TABLET, FILM COATED ORAL at 08:29

## 2023-10-09 RX ADMIN — PREDNISONE 40 MG: 20 TABLET ORAL at 08:29

## 2023-10-09 RX ADMIN — GABAPENTIN 200 MG: 100 CAPSULE ORAL at 13:49

## 2023-10-09 RX ADMIN — ATORVASTATIN CALCIUM 80 MG: 40 TABLET, FILM COATED ORAL at 20:57

## 2023-10-09 RX ADMIN — IPRATROPIUM BROMIDE AND ALBUTEROL SULFATE 1 DOSE: .5; 2.5 SOLUTION RESPIRATORY (INHALATION) at 07:29

## 2023-10-09 RX ADMIN — CARVEDILOL 6.25 MG: 6.25 TABLET, FILM COATED ORAL at 16:39

## 2023-10-09 RX ADMIN — TICAGRELOR 90 MG: 90 TABLET ORAL at 20:57

## 2023-10-09 RX ADMIN — OXYCODONE AND ACETAMINOPHEN 1 TABLET: 10; 325 TABLET ORAL at 02:10

## 2023-10-09 RX ADMIN — OXYCODONE AND ACETAMINOPHEN 1 TABLET: 10; 325 TABLET ORAL at 22:34

## 2023-10-09 RX ADMIN — AZITHROMYCIN 250 MG: 250 TABLET, FILM COATED ORAL at 08:29

## 2023-10-09 RX ADMIN — ENOXAPARIN SODIUM 40 MG: 100 INJECTION SUBCUTANEOUS at 08:30

## 2023-10-09 RX ADMIN — Medication 10 ML: at 08:30

## 2023-10-09 RX ADMIN — OXYCODONE AND ACETAMINOPHEN 1 TABLET: 10; 325 TABLET ORAL at 06:08

## 2023-10-09 RX ADMIN — GABAPENTIN 200 MG: 100 CAPSULE ORAL at 08:29

## 2023-10-09 RX ADMIN — OXYCODONE AND ACETAMINOPHEN 1 TABLET: 10; 325 TABLET ORAL at 14:18

## 2023-10-09 RX ADMIN — ASPIRIN 81 MG: 81 TABLET, CHEWABLE ORAL at 08:29

## 2023-10-09 RX ADMIN — GABAPENTIN 200 MG: 100 CAPSULE ORAL at 20:57

## 2023-10-09 RX ADMIN — CARVEDILOL 6.25 MG: 6.25 TABLET, FILM COATED ORAL at 08:30

## 2023-10-09 RX ADMIN — Medication 10 ML: at 20:58

## 2023-10-09 RX ADMIN — TICAGRELOR 90 MG: 90 TABLET ORAL at 08:29

## 2023-10-09 ASSESSMENT — PAIN SCALES - GENERAL
PAINLEVEL_OUTOF10: 7
PAINLEVEL_OUTOF10: 3
PAINLEVEL_OUTOF10: 3
PAINLEVEL_OUTOF10: 8
PAINLEVEL_OUTOF10: 5
PAINLEVEL_OUTOF10: 7
PAINLEVEL_OUTOF10: 7
PAINLEVEL_OUTOF10: 2

## 2023-10-09 ASSESSMENT — PAIN DESCRIPTION - PAIN TYPE
TYPE: CHRONIC PAIN

## 2023-10-09 ASSESSMENT — PAIN DESCRIPTION - ORIENTATION
ORIENTATION: RIGHT;LEFT

## 2023-10-09 ASSESSMENT — PAIN - FUNCTIONAL ASSESSMENT
PAIN_FUNCTIONAL_ASSESSMENT: PREVENTS OR INTERFERES WITH MANY ACTIVE NOT PASSIVE ACTIVITIES
PAIN_FUNCTIONAL_ASSESSMENT: ACTIVITIES ARE NOT PREVENTED
PAIN_FUNCTIONAL_ASSESSMENT: PREVENTS OR INTERFERES SOME ACTIVE ACTIVITIES AND ADLS
PAIN_FUNCTIONAL_ASSESSMENT: PREVENTS OR INTERFERES SOME ACTIVE ACTIVITIES AND ADLS

## 2023-10-09 ASSESSMENT — PAIN DESCRIPTION - LOCATION
LOCATION: LEG;SHOULDER
LOCATION: SHOULDER
LOCATION: LEG;SHOULDER

## 2023-10-09 ASSESSMENT — COPD QUESTIONNAIRES
QUESTION7_SLEEPQUALITY: 5
QUESTION8_ENERGYLEVEL: 5
QUESTION3_CHESTTIGHTNESS: 3
QUESTION4_WALKINCLINE: 5
QUESTION6_LEAVINGHOUSE: 5
QUESTION1_COUGHFREQUENCY: 5
QUESTION5_HOMEACTIVITIES: 1
QUESTION2_CHESTPHLEGM: 0
CAT_TOTALSCORE: 29

## 2023-10-09 ASSESSMENT — PAIN DESCRIPTION - ONSET
ONSET: ON-GOING

## 2023-10-09 ASSESSMENT — PAIN DESCRIPTION - DESCRIPTORS
DESCRIPTORS: ACHING;DISCOMFORT;SORE
DESCRIPTORS: ACHING
DESCRIPTORS: ACHING;DISCOMFORT
DESCRIPTORS: DISCOMFORT;THROBBING

## 2023-10-09 ASSESSMENT — PAIN DESCRIPTION - FREQUENCY
FREQUENCY: CONTINUOUS

## 2023-10-09 NOTE — PROGRESS NOTES
Notified patient of follow up appointment with pulmonologist Dr. Tylor Lisa next Monday 10/16 at 1500.      Electronically signed by Sherry Leblanc RN on 10/9/2023 at 3:25 PM

## 2023-10-09 NOTE — CARE COORDINATION
INTERDISCIPLINARY PLAN OF CARE CONFERENCE    Date/Time: 10/9/2023 5:11 PM  Completed by: Ty Hemphill RN, Case Management      Patient Name:  Arsalan Garcia  YOB: 1955  Admitting Diagnosis: Dyspnea on exertion [R06.09]  VALLES (dyspnea on exertion) [R06.09]  General weakness [R53.1]  COPD exacerbation ([address]) [J44.1]     Admit Date/Time:  10/6/2023 11:13 AM    Chart reviewed. Interdisciplinary team contacted or reviewed plan related to patient progress and discharge plans. Disciplines included Case Management, Nursing, and Dietitian. Current Status:Stable  PT/OT recommendation for discharge plan of care: SNF    Expected D/C Disposition:  Home  Confirmed plan with patient  Yes   Met with:patient  Discharge Plan Comments: Reviewed chart and order ofr dc noted. Discussed with pt who states will return home at dc and after \"take care of things\" if needs [facility] PT states has no ride home today. Nsg to inform Dr and pt will stay til AM and plan to dc around 12:00. Will attempt to discuss [facility] prior to dc. Pt given IMM today    Home O2 in place on admit: Yes  Pt informed of need to bring portable home O2 tank on day of discharge for nursing to connect prior to leaving:  Yes  Verbalized agreement/Understanding:   Yes

## 2023-10-09 NOTE — DISCHARGE INSTR - DIET

## 2023-10-09 NOTE — DISCHARGE SUMMARY
Name:  Kristi Augustin  Room:  0217/0217-02  MRN:    [id number]    Discharge Summary      This discharge summary is in conjunction with a complete physical exam done on the day of discharge. Discharging Provider: Dr. Meaghan Mahoney MD      Admit: 10/6/2023  Discharge: 10/10/23      HPI taken from admission H&P:    Pura Mendiola is a 80 y.o. male with COPD, hypertension, hyperlipidemia, CAD, chronic pain with dependence presented with worsening shortness of breath. Patient reports that over the past 2 weeks he has been having progressively worsening shortness of breath, dyspnea on exertion and difficult to perform activities of daily life. Patient states that he lives alone and has no social support he is unable to prepare food or clean up his home during this time. Patient denies any change in quantity of sputum but does report change in character, describing it as more thicker. Denies fever chills night sweats. Continues to smoke. During my evaluation patient was not on supplemental oxygen, without any respiratory distress. Alert oriented x3 and hemodynamically stable. Diagnoses this Admission and Hospital Course   COPD exacerbation  -Negative COVID and influenza screen. -CTA chest no PE or consolidation. No significant respiratory distress or supplemental O2 requirement.    -P.o. prednisone, scheduled DuoNeb, RENATO-Guido.   -pulmonology consulted per patient request.   -needs PFT's as outpatient.   -finish azithromycin and prednisone taper at discharge     Hypertension   -continue Coreg and losartan     CAD with Hx of PCI  -continue aspirin Brilinta and Lipitor     Cardiomyopathy  -last EF 50%  -f/w Dr. Dian Sierra     Chronic pain with opioid dependence  -continue Percocet as needed, PDMP reviewed     AAA  -hx of repair     Weakness  -patient is unable to take care of himself as he lives alone and has no social support.    -Unable to perform ADLs over the past 2 weeks.     -PT OT evaluation-recommending

## 2023-10-09 NOTE — PLAN OF CARE
Problem: Discharge Planning  Goal: Discharge to home or other facility with appropriate resources  10/9/2023 1048 by Emerson Garces RN  Outcome: Progressing  10/8/2023 2351 by Shital Zacarias RN  Outcome: Progressing     Problem: Pain  Goal: Verbalizes/displays adequate comfort level or baseline comfort level  10/9/2023 1048 by Emerson Garces RN  Outcome: Progressing  10/8/2023 2351 by Shital Zacarias RN  Outcome: Progressing     Problem: Safety - Adult  Goal: Free from fall injury  10/9/2023 1048 by Emerson Garces RN  Outcome: Progressing  10/8/2023 2351 by Shital Zacarias RN  Outcome: Progressing     Problem: Nutrition Deficit:  Goal: Optimize nutritional status  10/9/2023 1048 by Emerson Garces RN  Outcome: Progressing  10/8/2023 2351 by Shital Zacarias RN  Outcome: Progressing     Problem: Respiratory - Adult  Goal: Achieves optimal ventilation and oxygenation  Outcome: Progressing     Problem: Cardiovascular - Adult  Goal: Maintains optimal cardiac output and hemodynamic stability  Outcome: Progressing  Goal: Absence of cardiac dysrhythmias or at baseline  Outcome: Progressing     Problem: Skin/Tissue Integrity - Adult  Goal: Skin integrity remains intact  Outcome: Progressing  Flowsheets (Taken 10/8/2023 2353 by Shital Zacarias RN)  Skin Integrity Remains Intact: Monitor for areas of redness and/or skin breakdown     Problem: Musculoskeletal - Adult  Goal: Return mobility to safest level of function  Outcome: Progressing  Goal: Return ADL status to a safe level of function  Outcome: Progressing

## 2023-10-09 NOTE — PROGRESS NOTES
Patient provided a COPD Educational Folder that includes the following materials:     [x]  [address]: Managing your COPD  [x]  ALA: Getting the Most Out of Medication Delivery Devices  [x]  ALA: My COPD Action Plan  [x]  Better Breathers Club: [facility]   [x]  Smoking Cessation Classes  [x]  Outpatient Spiritual Care Services  [x]  Magnet: Signs of COPD    PATIENT/CAREGIVER TEACHING:   Level of patient/caregiver understanding able to:   [x] Verbalize understanding   [] Demonstrate understanding       [] Teach back        [] Needs reinforcement     []  Other:     Electronically signed by Melissa Murphy RN on 10/9/2023 at 10:49 AM

## 2023-10-09 NOTE — PLAN OF CARE
Problem: Discharge Planning  Goal: Discharge to home or other facility with appropriate resources  10/8/2023 2351 by Jennifer Roland RN  Outcome: Progressing  10/8/2023 1329 by Chio Perez RN  Outcome: Progressing  Flowsheets (Taken 10/8/2023 0825)  Discharge to home or other facility with appropriate resources:   Identify barriers to discharge with patient and caregiver   Refer to discharge planning if patient needs post-hospital services based on physician order or complex needs related to functional status, cognitive ability or social support system     Problem: Pain  Goal: Verbalizes/displays adequate comfort level or baseline comfort level  10/8/2023 2351 by Jennifer Roland RN  Outcome: Progressing  10/8/2023 1329 by Chio Perez RN  Outcome: Progressing  Flowsheets (Taken 10/8/2023 0825)  Verbalizes/displays adequate comfort level or baseline comfort level:   Encourage patient to monitor pain and request assistance   Assess pain using appropriate pain scale   Administer analgesics based on type and severity of pain and evaluate response   Implement non-pharmacological measures as appropriate and evaluate response     Problem: Safety - Adult  Goal: Free from fall injury  10/8/2023 2351 by Jennifer Roland RN  Outcome: Progressing  10/8/2023 1329 by Chio Perez RN  Outcome: Progressing     Problem: Nutrition Deficit:  Goal: Optimize nutritional status  10/8/2023 2351 by Jennifer Roland RN  Outcome: Progressing  10/8/2023 1329 by Chio Perez RN  Outcome: Progressing     Problem: Respiratory - Adult  Goal: Achieves optimal ventilation and oxygenation  10/8/2023 1329 by Chio Perez RN  Outcome: Progressing  Flowsheets (Taken 10/8/2023 0825)  Achieves optimal ventilation and oxygenation:   Assess for changes in respiratory status   Assess for changes in mentation and behavior   Position to facilitate oxygenation and minimize respiratory effort   Respiratory therapy support as

## 2023-10-09 NOTE — DISCHARGE INSTRUCTIONS
Your information:  Name: Ab Esquivel  : 1955    Your instructions: Follow instructions below. Follow- up with PCP within 1-2 weeks. Follow-up with pulmonology. What to do after you leave the hospital:    Recommended diet: regular diet    Recommended activity: activity as tolerated        The following personal items were collected during your admission and were returned to you:    Belongings  Dental Appliances: Uppers, Lowers  Vision - Corrective Lenses: Eyeglasses  Hearing Aid: None  Clothing: Shirt, Pants, Footwear  Jewelry: None  Body Piercings Removed: N/A  Electronic Devices: Cell Phone,   Weapons (Notify Protective Services/Security): None  Other Valuables: At home  Home Medications: None  Valuables Given To: Patient  Provide Name(s) of Who Valuable(s) Were Given To: patient    Information obtained by:  By signing below, I understand that if any problems occur once I leave the hospital I am to contact MD.  I understand and acknowledge receipt of the instructions indicated above.

## 2023-10-10 VITALS
HEIGHT: 69 IN | OXYGEN SATURATION: 97 % | RESPIRATION RATE: 18 BRPM | BODY MASS INDEX: 18.93 KG/M2 | WEIGHT: 127.8 LBS | TEMPERATURE: 98.1 F | HEART RATE: 57 BPM | SYSTOLIC BLOOD PRESSURE: 166 MMHG | DIASTOLIC BLOOD PRESSURE: 76 MMHG

## 2023-10-10 PROCEDURE — 99238 HOSP IP/OBS DSCHRG MGMT 30/<: CPT | Performed by: INTERNAL MEDICINE

## 2023-10-10 PROCEDURE — 2580000003 HC RX 258: Performed by: STUDENT IN AN ORGANIZED HEALTH CARE EDUCATION/TRAINING PROGRAM

## 2023-10-10 PROCEDURE — 6370000000 HC RX 637 (ALT 250 FOR IP): Performed by: INTERNAL MEDICINE

## 2023-10-10 PROCEDURE — 99232 SBSQ HOSP IP/OBS MODERATE 35: CPT | Performed by: INTERNAL MEDICINE

## 2023-10-10 PROCEDURE — 6370000000 HC RX 637 (ALT 250 FOR IP): Performed by: STUDENT IN AN ORGANIZED HEALTH CARE EDUCATION/TRAINING PROGRAM

## 2023-10-10 PROCEDURE — 6360000002 HC RX W HCPCS: Performed by: STUDENT IN AN ORGANIZED HEALTH CARE EDUCATION/TRAINING PROGRAM

## 2023-10-10 RX ADMIN — TICAGRELOR 90 MG: 90 TABLET ORAL at 08:16

## 2023-10-10 RX ADMIN — Medication 10 ML: at 08:18

## 2023-10-10 RX ADMIN — GABAPENTIN 200 MG: 100 CAPSULE ORAL at 08:16

## 2023-10-10 RX ADMIN — LOSARTAN POTASSIUM 25 MG: 25 TABLET, FILM COATED ORAL at 08:16

## 2023-10-10 RX ADMIN — CARVEDILOL 6.25 MG: 6.25 TABLET, FILM COATED ORAL at 08:16

## 2023-10-10 RX ADMIN — OXYCODONE AND ACETAMINOPHEN 1 TABLET: 10; 325 TABLET ORAL at 06:42

## 2023-10-10 RX ADMIN — PREDNISONE 40 MG: 20 TABLET ORAL at 08:15

## 2023-10-10 RX ADMIN — ENOXAPARIN SODIUM 40 MG: 100 INJECTION SUBCUTANEOUS at 08:15

## 2023-10-10 RX ADMIN — OXYCODONE AND ACETAMINOPHEN 1 TABLET: 10; 325 TABLET ORAL at 02:26

## 2023-10-10 RX ADMIN — OXYCODONE AND ACETAMINOPHEN 1 TABLET: 10; 325 TABLET ORAL at 10:55

## 2023-10-10 RX ADMIN — ASPIRIN 81 MG: 81 TABLET, CHEWABLE ORAL at 08:16

## 2023-10-10 RX ADMIN — AZITHROMYCIN 250 MG: 250 TABLET, FILM COATED ORAL at 08:16

## 2023-10-10 ASSESSMENT — PAIN DESCRIPTION - ORIENTATION
ORIENTATION: RIGHT;LEFT
ORIENTATION: RIGHT;LEFT

## 2023-10-10 ASSESSMENT — PAIN SCALES - WONG BAKER: WONGBAKER_NUMERICALRESPONSE: 2

## 2023-10-10 ASSESSMENT — PAIN SCALES - GENERAL
PAINLEVEL_OUTOF10: 7
PAINLEVEL_OUTOF10: 8
PAINLEVEL_OUTOF10: 8
PAINLEVEL_OUTOF10: 4
PAINLEVEL_OUTOF10: 3

## 2023-10-10 ASSESSMENT — PAIN DESCRIPTION - PAIN TYPE
TYPE: CHRONIC PAIN
TYPE: CHRONIC PAIN

## 2023-10-10 ASSESSMENT — PAIN DESCRIPTION - LOCATION
LOCATION: LEG;SHOULDER
LOCATION: SHOULDER;LEG

## 2023-10-10 ASSESSMENT — PAIN DESCRIPTION - ONSET
ONSET: ON-GOING
ONSET: ON-GOING

## 2023-10-10 ASSESSMENT — PAIN DESCRIPTION - FREQUENCY
FREQUENCY: CONTINUOUS
FREQUENCY: CONTINUOUS

## 2023-10-10 ASSESSMENT — PAIN DESCRIPTION - DESCRIPTORS
DESCRIPTORS: ACHING;DISCOMFORT;SORE
DESCRIPTORS: ACHING;DISCOMFORT;SORE

## 2023-10-10 NOTE — PLAN OF CARE
Problem: Discharge Planning  Goal: Discharge to home or other facility with appropriate resources  10/9/2023 2201 by Yannick Fontaine RN  Outcome: Progressing  10/9/2023 1048 by Lesly Gan RN  Outcome: Progressing     Problem: Pain  Goal: Verbalizes/displays adequate comfort level or baseline comfort level  10/9/2023 2201 by Yannick Fontaine RN  Outcome: Progressing  10/9/2023 1048 by Lesly Gan RN  Outcome: Progressing     Problem: Safety - Adult  Goal: Free from fall injury  10/9/2023 2201 by Yannick Fontaine RN  Outcome: Progressing  10/9/2023 1048 by Lesly Gan RN  Outcome: Progressing

## 2023-10-10 NOTE — PLAN OF CARE
Problem: Discharge Planning  Goal: Discharge to home or other facility with appropriate resources  10/10/2023 1039 by Christopher Carrero RN  Outcome: Completed  10/9/2023 2201 by Sameera Salazar RN  Outcome: Progressing     Problem: Pain  Goal: Verbalizes/displays adequate comfort level or baseline comfort level  10/10/2023 1039 by Christopher Carrero RN  Outcome: Completed  10/9/2023 2201 by Sameera Salazar RN  Outcome: Progressing     Problem: Safety - Adult  Goal: Free from fall injury  10/10/2023 1039 by Christopher Carrero RN  Outcome: Completed  10/9/2023 2201 by Sameera Salazar RN  Outcome: Progressing     Problem: Nutrition Deficit:  Goal: Optimize nutritional status  Outcome: Completed     Problem: Respiratory - Adult  Goal: Achieves optimal ventilation and oxygenation  Outcome: Completed     Problem: Cardiovascular - Adult  Goal: Maintains optimal cardiac output and hemodynamic stability  Outcome: Completed  Goal: Absence of cardiac dysrhythmias or at baseline  Outcome: Completed     Problem: Skin/Tissue Integrity - Adult  Goal: Skin integrity remains intact  Outcome: Completed     Problem: Musculoskeletal - Adult  Goal: Return mobility to safest level of function  Outcome: Completed  Goal: Return ADL status to a safe level of function  Outcome: Completed

## 2023-10-10 NOTE — PROGRESS NOTES
Discussed discharge with patient including home care and medications. Family present at bedside for transport home. No new concerns at this time.

## 2023-10-10 NOTE — DISCHARGE INSTR - COC
CASE MANAGEMENT/SOCIAL WORK SECTION    Inpatient Status Date: 10/6/2023    Readmission Risk Assessment Score:  Readmission Risk              Risk of Unplanned Readmission:  12           Discharging to Facility/ Agency   Name: [facility].  Phone: [phone]        / signature: Electronically signed by Moon Allen on 10/10/23 at 10:14 AM EDT

## 2023-10-10 NOTE — PROGRESS NOTES
Bedside report given to [facility] RN  pt in stable condition no needs at this time.  Call light within reach

## 2023-10-10 NOTE — PROGRESS NOTES
Pt is alert and oriented during shift assessment. He is calm and accepting of care. Remains on room air. No SOB noted at rest. Tolerating food and fluids well. Pt does have some desats on exertion but quickly recovers at rest. Pt due for discharge today. No new concerns at this time. Bedside Mobility Assessment Tool (BMAT):     Assessment Level 1- Sit and Shake    1. From a semi-reclined position, ask patient to sit up and rotate to a seated position at the side of the bed. Can use the bedrail. 2. Ask patient to reach out and grab your hand and shake making sure patient reaches across his/her midline. Pass- Patient is able to come to a seated position, maintain core strength. Maintains seated balance while reaching across midline. Move on to Assessment Level 2. Assessment Level 2- Stretch and Point   1. With patient in seated position at the side of the bed, have patient place both feet on the floor (or stool) with knees no higher than hips. 2. Ask patient to stretch one leg and straighten the knee, then bend the ankle/flex and point the toes. If appropriate, repeat with the other leg. Pass- Patient is able to demonstrate appropriate quad strength on intended weight bearing limb(s). Move onto Assessment Level 3. Assessment Level 3- Stand   1. Ask patient to elevate off the bed or chair (seated to standing) using an assistive device (cane, bedrail). 2. Patient should be able to raise buttocks off be and hold for a count of five. May repeat once. Pass- Patient maintains standing stability for at least 5 seconds, proceed to assessment level 4. Assessment Level 4- Walk   1. Ask patient to march in place at bedside. 2. Then ask patient to advance step and return each foot. Some medical conditions may render a patient from stepping backwards, use your best clinical judgement.    Pass- Patient demonstrates balance while shifting weight and ability to step, takes independent steps, does not

## 2023-10-10 NOTE — CARE COORDINATION
DISCHARGE ORDER  Date/Time 10/10/2023 10:22 AM  Completed by: Jass Geiger, Case Management    Patient Name: Tita Dent      : 1955  Admitting Diagnosis: Dyspnea on exertion [R06.09]  VALLES (dyspnea on exertion) [R06.09]  General weakness [R53.1]  COPD exacerbation ([address]) [J44.1]      Admit order Date and Status: 10/6/2023 Stable  (verify MD's last order for status of admission)      Noted discharge order. If applicable PT/OT recommendation at Discharge: Discharge Recommendations: SNF  DME needs for discharge: Defer to facility    Confirmed discharge plan: Yes  with whom___David____________  If pt confirmed DC plan does family need to be contacted by CM No    Discharge Plan: Reviewed chart, noted DC order, met with pt at bedside. Pt to be DC home today, famil;y providing transportation. Pt declines SNF at DC but states he would like [facility]. Referral placed, info faxed. Date of Last IMM Given: 10/9/2023    Reviewed chart. Role of discharge planner explained and patient verbalized understanding. Discharge order is noted. Has Home O2 in place on admit:  No  Informed of need to bring portable home O2 tank on day of discharge for nursing to connect prior to leaving:   Not Indicated  Verbalized agreement/Understanding:   Not Indicated    Pt is being d/c'd to home today. Pt's O2 sats are 98% on RA. Discharge timeout done with CM, PT, Demetra RN. All discharge needs and concerns addressed.

## 2023-10-11 NOTE — PROGRESS NOTES
Physician Progress Note      PATIENT:               [place]  CSN #:                  [id number]  :                       1955  ADMIT DATE:       10/6/2023 11:13 AM  DISCH DATE:        10/10/2023 11:29 AM  RESPONDING  PROVIDER #:        Jobe Mcburney MD          QUERY TEXT:    Patient with documentation of acute hypoxic/hypercapnic respiratory failure on   the progress note dated 10/8. Patient has been on room air since admission. No documentation of respiratory distress or increased work of breathing. In   order to support the diagnosis of acute respiratory failure, please include   additional clinical indicators in your documentation. Or please document if   the diagnosis of acute respiratory failure has been ruled out after further   study. The medical record reflects the following:  Risk Factors: copd, emphysema, smoker  Clinical Indicators: per IM PN on 10/8-No significant respiratory distress or   supplemental O2 requirement. Treatment: pulmonary consult, po prednisone, duonebs, z-pack    Acute Respiratory Failure Clinical Indicators per [employer] [employer] Training Guide and   Quick Reference Guide:  pO2 < 60 mmHg or SpO2 (pulse oximetry) < 91% breathing room air  pCO2 > 50 and pH < 7.35  P/F ratio (pO2 / FIO2) < 300  pO2 decrease or pCO2 increase by 10 mmHg from baseline (if known)  Supplemental oxygen of 40% or more  Presence of respiratory distress, tachypnea, dyspnea, shortness of breath,   wheezing  Unable to speak in complete sentences  Use of accessory muscles to breathe  Extreme anxiety and feeling of impending doom  Tripod position  Confusion/altered mental status/obtunded    Thank you,  Kristy Porras RN,BSN,CCDS,CRCR  Options provided:  -- Acute Respiratory Failure as evidenced by, Please document evidence.   -- Acute Respiratory Failure ruled out after study  -- Other - I will add my own diagnosis  -- Disagree - Not applicable / Not valid  -- Disagree - Clinically unable to determine / Unknown  -- Refer to Clinical Documentation Reviewer    PROVIDER RESPONSE TEXT:    He was not in respiratory failure when I saw and was ruled out  'i will edit my note    Query created by: Olena Castillo on 10/10/2023 9:46 AM      Electronically signed by:  Ayala Maldonado MD 10/11/2023 11:55 AM

## 2023-10-16 ENCOUNTER — OFFICE VISIT (OUTPATIENT)
Dept: PULMONOLOGY | Age: 68
End: 2023-10-16
Payer: MEDICARE

## 2023-10-16 VITALS
HEIGHT: 69 IN | SYSTOLIC BLOOD PRESSURE: 134 MMHG | HEART RATE: 66 BPM | DIASTOLIC BLOOD PRESSURE: 82 MMHG | WEIGHT: 138.2 LBS | RESPIRATION RATE: 16 BRPM | OXYGEN SATURATION: 97 % | BODY MASS INDEX: 20.47 KG/M2

## 2023-10-16 DIAGNOSIS — J43.9 PULMONARY EMPHYSEMA, UNSPECIFIED EMPHYSEMA TYPE (HCC): Primary | ICD-10-CM

## 2023-10-16 PROCEDURE — 3023F SPIROM DOC REV: CPT | Performed by: INTERNAL MEDICINE

## 2023-10-16 PROCEDURE — 4004F PT TOBACCO SCREEN RCVD TLK: CPT | Performed by: INTERNAL MEDICINE

## 2023-10-16 PROCEDURE — 1111F DSCHRG MED/CURRENT MED MERGE: CPT | Performed by: INTERNAL MEDICINE

## 2023-10-16 PROCEDURE — G8427 DOCREV CUR MEDS BY ELIG CLIN: HCPCS | Performed by: INTERNAL MEDICINE

## 2023-10-16 PROCEDURE — 1123F ACP DISCUSS/DSCN MKR DOCD: CPT | Performed by: INTERNAL MEDICINE

## 2023-10-16 PROCEDURE — G8484 FLU IMMUNIZE NO ADMIN: HCPCS | Performed by: INTERNAL MEDICINE

## 2023-10-16 PROCEDURE — 3017F COLORECTAL CA SCREEN DOC REV: CPT | Performed by: INTERNAL MEDICINE

## 2023-10-16 PROCEDURE — 99214 OFFICE O/P EST MOD 30 MIN: CPT | Performed by: INTERNAL MEDICINE

## 2023-10-16 PROCEDURE — G8420 CALC BMI NORM PARAMETERS: HCPCS | Performed by: INTERNAL MEDICINE

## 2023-10-16 RX ORDER — NALOXONE HYDROCHLORIDE 4 MG/.1ML
SPRAY NASAL
COMMUNITY
Start: 2023-07-14

## 2023-10-16 RX ORDER — PREDNISONE 10 MG/1
10 TABLET ORAL DAILY
Qty: 10 TABLET | Refills: 0 | Status: SHIPPED | OUTPATIENT
Start: 2023-10-16 | End: 2023-10-26

## 2023-10-16 RX ORDER — ROSUVASTATIN CALCIUM 40 MG/1
40 TABLET, COATED ORAL
COMMUNITY
Start: 2023-07-31

## 2023-10-16 NOTE — PROGRESS NOTES
[facility]                                 Pulmonary Consult /Progress Note :                                                                  CHIEF COMPLAINT: SOB       Reason for consult : COPD    HPI:   Patient is 50-year-old female with history of COPD and 55-year smoking history patient with chronic pain l  Patient presented with worsening shortness of breath that has been going on for the last few days and he was admitted on Friday 10 /6 with worse.      Nicol Posada reports that over the last 2 weeks she has been progressively worsening shortness of breath and dyspnea on exertion along with cough    He was diagnosed with COPD but he never had PFT however his CAT scan shows significant emphysema    He lives alone and has some social issues as well    He has cough with change in sputum color to creamy and yellow sometimes    Started on inhalers and I be steroids and he is using no oxygen at home    Today Visit   He has been doing fair post hospital admission   Cough improved after hospital  Still with steroids   Still with VALLES and occasional wheezing     Past Medical History:   Diagnosis Date    CAD (coronary artery disease) 03/28/2013    Cardiomyopathy ([address]) 02/22/2021    EF= 45%    Emphysema of lung ([address])     Heart attack ([address]) 03/28/2013    3/28/2013 and 2/22/2021    High cholesterol     HTN (hypertension)     Prolonged emergence from general anesthesia     AWAKES VERY QUICKLY, ONCE AWOKE [address]    Thoracic aortic aneurysm ([address]) 2000    ARCH       Past Surgical History:        Procedure Laterality Date    COLONOSCOPY N/A 03/22/2021    COLONOSCOPY WITH BIOPSY performed by Alicia Liang MD at [address] [facility]  02/22/2021    SAMM- 3.0 x 38 and 3.0 x 34 to pRCA  (TOTAL 7 STENTS)    CORONARY ANGIOPLASTY WITH STENT PLACEMENT  03/28/2013    SAMM- 3.5 x 18 to RCA    DIAGNOSTIC CARDIAC

## 2023-10-17 ENCOUNTER — TELEPHONE (OUTPATIENT)
Dept: PULMONOLOGY | Age: 68
End: 2023-10-17

## 2023-10-17 NOTE — TELEPHONE ENCOUNTER
Pt is scheduled for pft on 12-15-23 @ 12pm. Where can I put pt for f/u after pft?    (Pt has transportation issues.)

## 2023-11-02 NOTE — TELEPHONE ENCOUNTER
Called [phone] (home)   Spoke w/ pt and informed per Dr. Todd pt can be seen anytime on 12-15-23. Pt has pft sched 12-15-23 at 1200. Pt requested same day appt due to transportation issues. Pt was informed that he may wait longer to be seen. Pt stated this was ok. Pt voiced verbal understanding.

## 2023-11-30 ENCOUNTER — APPOINTMENT (RX ONLY)
Dept: URBAN - NONMETROPOLITAN AREA CLINIC 22 | Facility: CLINIC | Age: 68
Setting detail: DERMATOLOGY
End: 2023-11-30

## 2023-11-30 DIAGNOSIS — L82.1 OTHER SEBORRHEIC KERATOSIS: ICD-10-CM

## 2023-11-30 DIAGNOSIS — Z85.828 PERSONAL HISTORY OF OTHER MALIGNANT NEOPLASM OF SKIN: ICD-10-CM

## 2023-11-30 DIAGNOSIS — L57.0 ACTINIC KERATOSIS: ICD-10-CM

## 2023-11-30 DIAGNOSIS — D485 NEOPLASM OF UNCERTAIN BEHAVIOR OF SKIN: ICD-10-CM

## 2023-11-30 DIAGNOSIS — L57.8 OTHER SKIN CHANGES DUE TO CHRONIC EXPOSURE TO NONIONIZING RADIATION: ICD-10-CM

## 2023-11-30 DIAGNOSIS — L81.4 OTHER MELANIN HYPERPIGMENTATION: ICD-10-CM

## 2023-11-30 DIAGNOSIS — D22 MELANOCYTIC NEVI: ICD-10-CM

## 2023-11-30 DIAGNOSIS — D18.0 HEMANGIOMA: ICD-10-CM

## 2023-11-30 PROBLEM — D22.5 MELANOCYTIC NEVI OF TRUNK: Status: ACTIVE | Noted: 2023-11-30

## 2023-11-30 PROBLEM — D18.01 HEMANGIOMA OF SKIN AND SUBCUTANEOUS TISSUE: Status: ACTIVE | Noted: 2023-11-30

## 2023-11-30 PROBLEM — D48.5 NEOPLASM OF UNCERTAIN BEHAVIOR OF SKIN: Status: ACTIVE | Noted: 2023-11-30

## 2023-11-30 PROCEDURE — ? TREATMENT REGIMEN

## 2023-11-30 PROCEDURE — 11102 TANGNTL BX SKIN SINGLE LES: CPT

## 2023-11-30 PROCEDURE — 11103 TANGNTL BX SKIN EA SEP/ADDL: CPT

## 2023-11-30 PROCEDURE — ? COUNSELING

## 2023-11-30 PROCEDURE — 99213 OFFICE O/P EST LOW 20 MIN: CPT | Mod: 25

## 2023-11-30 PROCEDURE — ? BIOPSY BY SHAVE METHOD

## 2023-11-30 PROCEDURE — ? SUNSCREEN RECOMMENDATIONS

## 2023-11-30 ASSESSMENT — LOCATION SIMPLE DESCRIPTION DERM
LOCATION SIMPLE: SCALP
LOCATION SIMPLE: NOSE
LOCATION SIMPLE: LEFT CHEEK
LOCATION SIMPLE: SUPERIOR FOREHEAD
LOCATION SIMPLE: RIGHT CHEEK
LOCATION SIMPLE: RIGHT UPPER BACK
LOCATION SIMPLE: RIGHT LIP
LOCATION SIMPLE: LEFT SHOULDER
LOCATION SIMPLE: LEFT UPPER BACK
LOCATION SIMPLE: RIGHT LOWER BACK

## 2023-11-30 ASSESSMENT — LOCATION DETAILED DESCRIPTION DERM
LOCATION DETAILED: RIGHT SUPERIOR UPPER BACK
LOCATION DETAILED: LEFT CENTRAL MALAR CHEEK
LOCATION DETAILED: LEFT CENTRAL PARIETAL SCALP
LOCATION DETAILED: RIGHT LOWER CUTANEOUS LIP
LOCATION DETAILED: RIGHT MID-UPPER BACK
LOCATION DETAILED: RIGHT CENTRAL PARIETAL SCALP
LOCATION DETAILED: RIGHT SUPERIOR MEDIAL MIDBACK
LOCATION DETAILED: LEFT POSTERIOR SHOULDER
LOCATION DETAILED: NASAL DORSUM
LOCATION DETAILED: RIGHT INFERIOR UPPER BACK
LOCATION DETAILED: LEFT SUPERIOR UPPER BACK
LOCATION DETAILED: RIGHT SUPERIOR MEDIAL LOWER BACK
LOCATION DETAILED: RIGHT CENTRAL MALAR CHEEK
LOCATION DETAILED: SUPERIOR MID FOREHEAD

## 2023-11-30 ASSESSMENT — LOCATION ZONE DERM
LOCATION ZONE: TRUNK
LOCATION ZONE: LIP
LOCATION ZONE: FACE
LOCATION ZONE: ARM
LOCATION ZONE: SCALP
LOCATION ZONE: NOSE

## 2023-11-30 NOTE — PROCEDURE: BIOPSY BY SHAVE METHOD
Detail Level: Detailed
Depth Of Biopsy: dermis
Was A Bandage Applied: Yes
Size Of Lesion In Cm: 0
Biopsy Type: H and E
Biopsy Method: Dermablade
Anesthesia Type: 1% lidocaine with epinephrine
Anesthesia Volume In Cc: 0.5
Hemostasis: Donavon's
Wound Care: Petrolatum
Dressing: bandage
Destruction After The Procedure: No
Type Of Destruction Used: Curettage
Curettage Text: The wound bed was treated with curettage after the biopsy was performed.
Cryotherapy Text: The wound bed was treated with cryotherapy after the biopsy was performed.
Electrodesiccation Text: The wound bed was treated with electrodesiccation after the biopsy was performed.
Electrodesiccation And Curettage Text: The wound bed was treated with electrodesiccation and curettage after the biopsy was performed.
Silver Nitrate Text: The wound bed was treated with silver nitrate after the biopsy was performed.
Lab: 6
Consent: Written consent was obtained and risks were reviewed including but not limited to scarring, infection, bleeding, scabbing, incomplete removal, nerve damage and allergy to anesthesia.
Post-Care Instructions: I reviewed with the patient in detail post-care instructions. Patient is to keep the biopsy site dry overnight, and then apply bacitracin twice daily until healed. Patient may apply hydrogen peroxide soaks to remove any crusting.
Notification Instructions: Patient will be notified of biopsy results typically in 1-2 weeks. However, patient instructed to call the office if not contacted within 3-4 weeks.
Billing Type: Third-Party Bill
Information: Selecting Yes will display possible errors in your note based on the variables you have selected. This validation is only offered as a suggestion for you. PLEASE NOTE THAT THE VALIDATION TEXT WILL BE REMOVED WHEN YOU FINALIZE YOUR NOTE. IF YOU WANT TO FAX A PRELIMINARY NOTE YOU WILL NEED TO TOGGLE THIS TO 'NO' IF YOU DO NOT WANT IT IN YOUR FAXED NOTE.

## 2023-11-30 NOTE — PROCEDURE: MIPS QUALITY
Additional Notes: The E/M service provided during this visit was medically necessary, significant, and independent from the procedure(s) provided on the same date of service and included documented elements above and beyond the usual pre-, intra-, and post-operative work associated with the procedure(s).
Detail Level: Detailed
Quality 47: Advance Care Plan: Advance care planning not documented, reason not otherwise specified.
Quality 226: Preventive Care And Screening: Tobacco Use: Screening And Cessation Intervention: Patient screened for tobacco use and is an ex/non-smoker
Quality 111:Pneumonia Vaccination Status For Older Adults: Pneumococcal Vaccination Previously Received
Quality 431: Preventive Care And Screening: Unhealthy Alcohol Use - Screening: Patient not identified as an unhealthy alcohol user when screened for unhealthy alcohol use using a systematic screening method
Quality 130: Documentation Of Current Medications In The Medical Record: Current Medications Documented

## 2023-11-30 NOTE — PROCEDURE: TREATMENT REGIMEN
Detail Level: Zone
Initiate Treatment: Pt has 5fu, will treat scalp and whole face bid x 2 weeks max \\nWill treat lower lip bid x 1 week

## 2023-11-30 NOTE — PROCEDURE: SUNSCREEN RECOMMENDATIONS
General Sunscreen Counseling: I recommended a broad spectrum sunscreen with a SPF of 30 or higher.  I explained that SPF 30 sunscreens block approximately 97 percent of the sun's harmful rays.  Sunscreens should be applied at least 15 minutes prior to expected sun exposure and then every 2 hours after that as long as sun exposure continues. If swimming or exercising, sunscreen should be reapplied every 45 minutes to an hour after getting wet or sweating. I also recommended a lip balm with a sunscreen as well. Sun protective clothing can be used in lieu of sunscreen but must be worn the entire time you are exposed to the sun's rays, and they only help to mitigate sun exposure in the areas they cover.
Detail Level: Detailed
Products Recommended: Mineral sunscreen rather than chemical, brands such as Blue Lizard or Neutrogena

## 2023-12-15 ENCOUNTER — HOSPITAL ENCOUNTER (OUTPATIENT)
Dept: PULMONOLOGY | Age: 68
Discharge: HOME | End: 2023-12-15
Payer: MEDICARE

## 2023-12-15 DIAGNOSIS — J43.9 PULMONARY EMPHYSEMA, UNSPECIFIED EMPHYSEMA TYPE (HCC): ICD-10-CM

## 2023-12-15 LAB
DLCO %PRED: 27 %
DLCO PRED: NORMAL
DLCO/VA %PRED: NORMAL
DLCO/VA PRED: NORMAL
DLCO/VA: NORMAL
DLCO: NORMAL
EXPIRATORY TIME-POST: NORMAL
EXPIRATORY TIME: NORMAL
FEF 25-75% %CHNG: NORMAL
FEF 25-75% %PRED-POST: NORMAL
FEF 25-75% %PRED-PRE: NORMAL
FEF 25-75% PRED: NORMAL
FEF 25-75%-POST: NORMAL
FEF 25-75%-PRE: NORMAL
FEV1 %PRED-POST: NORMAL
FEV1 %PRED-PRE: 28 %
FEV1 PRED: NORMAL
FEV1-POST: NORMAL
FEV1-PRE: NORMAL
FEV1/FVC %PRED-POST: NORMAL
FEV1/FVC %PRED-PRE: NORMAL
FEV1/FVC PRED: NORMAL
FEV1/FVC-POST: NORMAL
FEV1/FVC-PRE: 38 %
FVC %PRED-POST: NORMAL
FVC %PRED-PRE: NORMAL
FVC PRED: NORMAL
FVC-POST: NORMAL
FVC-PRE: NORMAL
GAW %PRED: NORMAL
GAW PRED: NORMAL
GAW: NORMAL
IC %PRED: NORMAL
IC PRED: NORMAL
IC: NORMAL
MEP: NORMAL
MIP: NORMAL
MVV %PRED-PRE: NORMAL
MVV PRED: NORMAL
MVV-PRE: NORMAL
PEF %PRED-POST: NORMAL
PEF %PRED-PRE: NORMAL
PEF PRED: NORMAL
PEF%CHNG: NORMAL
PEF-POST: NORMAL
PEF-PRE: NORMAL
RAW %PRED: NORMAL
RAW PRED: NORMAL
RAW: NORMAL
RV %PRED: NORMAL
RV PRED: NORMAL
RV: NORMAL
SVC %PRED: NORMAL
SVC PRED: NORMAL
SVC: NORMAL
TLC %PRED: 93 %
TLC PRED: NORMAL
TLC: NORMAL
VA %PRED: NORMAL
VA PRED: NORMAL
VA: NORMAL
VTG %PRED: NORMAL
VTG PRED: NORMAL
VTG: NORMAL

## 2023-12-15 PROCEDURE — 94729 DIFFUSING CAPACITY: CPT

## 2023-12-15 PROCEDURE — 94618 PULMONARY STRESS TESTING: CPT

## 2023-12-15 PROCEDURE — 94010 BREATHING CAPACITY TEST: CPT

## 2023-12-15 PROCEDURE — 94726 PLETHYSMOGRAPHY LUNG VOLUMES: CPT

## 2023-12-15 PROCEDURE — 6370000000 HC RX 637 (ALT 250 FOR IP): Performed by: INTERNAL MEDICINE

## 2023-12-15 RX ORDER — ALBUTEROL SULFATE 90 UG/1
4 AEROSOL, METERED RESPIRATORY (INHALATION) ONCE
Status: DISCONTINUED | OUTPATIENT
Start: 2023-12-15 | End: 2023-12-15

## 2023-12-15 ASSESSMENT — PULMONARY FUNCTION TESTS
FEV1/FVC_PRE: 38
FEV1_PERCENT_PREDICTED_PRE: 28

## 2023-12-16 PROBLEM — J43.9 PULMONARY EMPHYSEMA (HCC): Status: ACTIVE | Noted: 2023-12-16

## 2023-12-16 NOTE — PROCEDURES
[facility],[address], [address]                               PULMONARY FUNCTION    PATIENT NAME: Arsalan Garcia                      :        1955  MED REC NO:   [id number]                          ROOM:  ACCOUNT NO:   [de-identified]                           ADMIT DATE: 12/15/2023  PROVIDER:     Wily Caldwell MD    DATE OF PROCEDURE:  12/15/2023    INDICATION:  Emphysema. FINDINGS:  1. Spirometry revealed evidence of very severe obstructive defect. FEV1 is 0.91 liters. FEV1/FVC ratio of  38%. 2.  Lung volume revealed normal total lung capacity which is 6.37  liters, which is 93% of predicted. Air trapping residual volume of 3.74  liters which is 160% of predicted. 3.  Diffusion capacity is severely decreased at 8.67, which is 27% of  predicted. 4.  Flow volume loops consistent with obstructive defect. 5.  Six-minute walk done per [facility] protocol. The  patient was able to walk 560 feet. Saturation on room air at rest was  96% with a heart rate of 68. No significant desaturation on exertion. Max heart rate of 110. The patient stopped due to leg pain and  shortness of breath. CONCLUSION:  1. Very severe obstructive defect with air trapping and severely  decreased diffusion capacity. 2.  Six-minute walk submaximal, walked only 560 feet, stopped due to leg  pain with no significant desaturation.         Wily Caldwell MD    D: 12/15/2023 16:26:07       T: 2023 2:31:08     [id number]  Job#: [id number]     Doc#: [id number]    CC:

## 2024-01-08 ENCOUNTER — TELEPHONE (OUTPATIENT)
Dept: PULMONOLOGY | Age: 69
End: 2024-01-08

## 2024-01-08 NOTE — TELEPHONE ENCOUNTER
Pt is wanting to know the results of his PFT test.  Asap Pt stated that he needs these results Pt is having a hard time breathing.

## 2024-01-11 ENCOUNTER — APPOINTMENT (RX ONLY)
Dept: URBAN - NONMETROPOLITAN AREA CLINIC 22 | Facility: CLINIC | Age: 69
Setting detail: DERMATOLOGY
End: 2024-01-11

## 2024-01-11 DIAGNOSIS — L57.0 ACTINIC KERATOSIS: ICD-10-CM | Status: IMPROVED

## 2024-01-11 DIAGNOSIS — D485 NEOPLASM OF UNCERTAIN BEHAVIOR OF SKIN: ICD-10-CM

## 2024-01-11 PROBLEM — D48.5 NEOPLASM OF UNCERTAIN BEHAVIOR OF SKIN: Status: ACTIVE | Noted: 2024-01-11

## 2024-01-11 PROCEDURE — 11102 TANGNTL BX SKIN SINGLE LES: CPT

## 2024-01-11 PROCEDURE — 99213 OFFICE O/P EST LOW 20 MIN: CPT | Mod: 25

## 2024-01-11 PROCEDURE — ? COUNSELING

## 2024-01-11 PROCEDURE — ? BIOPSY BY SHAVE METHOD

## 2024-01-11 ASSESSMENT — LOCATION SIMPLE DESCRIPTION DERM
LOCATION SIMPLE: RIGHT FOREHEAD
LOCATION SIMPLE: RIGHT CHEEK
LOCATION SIMPLE: ANTERIOR SCALP
LOCATION SIMPLE: LEFT CHEEK
LOCATION SIMPLE: NOSE

## 2024-01-11 ASSESSMENT — LOCATION DETAILED DESCRIPTION DERM
LOCATION DETAILED: NASAL TIP
LOCATION DETAILED: RIGHT CENTRAL MALAR CHEEK
LOCATION DETAILED: LEFT CENTRAL MALAR CHEEK
LOCATION DETAILED: RIGHT SUPERIOR MEDIAL FOREHEAD
LOCATION DETAILED: MID-FRONTAL SCALP

## 2024-01-11 ASSESSMENT — LOCATION ZONE DERM
LOCATION ZONE: FACE
LOCATION ZONE: NOSE
LOCATION ZONE: SCALP

## 2024-01-11 NOTE — PROCEDURE: MIPS QUALITY
Quality 226: Preventive Care And Screening: Tobacco Use: Screening And Cessation Intervention: Patient screened for tobacco use and is an ex/non-smoker
Additional Notes: The E/M service provided during this visit was medically necessary, significant, and independent from the procedure(s) provided on the same date of service and included documented elements above and beyond the usual pre-, intra-, and post-operative work associated with the procedure(s).
Quality 130: Documentation Of Current Medications In The Medical Record: Current Medications Documented
Quality 47: Advance Care Plan: Advance care planning not documented, reason not otherwise specified.
Detail Level: Detailed
Quality 431: Preventive Care And Screening: Unhealthy Alcohol Use - Screening: Patient not identified as an unhealthy alcohol user when screened for unhealthy alcohol use using a systematic screening method
Quality 111:Pneumonia Vaccination Status For Older Adults: Pneumococcal Vaccination Previously Received

## 2024-01-22 RX ORDER — FLUTICASONE FUROATE, UMECLIDINIUM BROMIDE AND VILANTEROL TRIFENATATE 100; 62.5; 25 UG/1; UG/1; UG/1
1 POWDER RESPIRATORY (INHALATION) DAILY
Qty: 1 EACH | Refills: 1 | Status: SHIPPED | OUTPATIENT
Start: 2024-01-22 | End: 2024-02-26

## 2024-01-23 NOTE — TELEPHONE ENCOUNTER
[facility] called stated that trelegy is not on the formulary.  He wanted ov notes and pft results so he can start the pa sent over to wojciech @ fax number [phone]

## 2024-01-26 ENCOUNTER — TELEPHONE (OUTPATIENT)
Dept: PULMONOLOGY | Age: 69
End: 2024-01-26

## 2024-01-26 NOTE — TELEPHONE ENCOUNTER
Pt was called and asked to r/s per provider request. Pt disconnected half-way through call. When called back, pt answered \"yes, I hung up on you. Don't call back.\"  Appointment has been cancelled.    Pt was initially scheduled for 1/29/24 @ 1:00.   Appointment time offered: 1/30/24 @ 2:45.

## 2024-02-08 ENCOUNTER — APPOINTMENT (RX ONLY)
Dept: URBAN - NONMETROPOLITAN AREA CLINIC 22 | Facility: CLINIC | Age: 69
Setting detail: DERMATOLOGY
End: 2024-02-08

## 2024-02-08 DIAGNOSIS — S31000A OPEN WOUND(S) (MULTIPLE) OF UNSPECIFIED SITE(S), WITHOUT MENTION OF COMPLICATION: ICD-10-CM

## 2024-02-08 DIAGNOSIS — Z01.818 ENCOUNTER FOR OTHER PREPROCEDURAL EXAMINATION: ICD-10-CM

## 2024-02-08 PROBLEM — S01.20XA UNSPECIFIED OPEN WOUND OF NOSE, INITIAL ENCOUNTER: Status: ACTIVE | Noted: 2024-02-08

## 2024-02-08 PROBLEM — C44.321 SQUAMOUS CELL CARCINOMA OF SKIN OF NOSE: Status: ACTIVE | Noted: 2024-02-08

## 2024-02-08 PROCEDURE — 17311 MOHS 1 STAGE H/N/HF/G: CPT

## 2024-02-08 PROCEDURE — ? REPAIR NOTE

## 2024-02-08 PROCEDURE — ? COUNSELING

## 2024-02-08 PROCEDURE — ? SURGICAL DECISION MAKING

## 2024-02-08 PROCEDURE — 14061 TIS TRNFR E/N/E/L10.1-30SQCM: CPT

## 2024-02-08 PROCEDURE — 99203 OFFICE O/P NEW LOW 30 MIN: CPT | Mod: 57

## 2024-02-08 PROCEDURE — ? MOHS SURGERY

## 2024-02-08 ASSESSMENT — LOCATION DETAILED DESCRIPTION DERM: LOCATION DETAILED: NASAL TIP

## 2024-02-08 ASSESSMENT — LOCATION ZONE DERM: LOCATION ZONE: NOSE

## 2024-02-08 ASSESSMENT — LOCATION SIMPLE DESCRIPTION DERM: LOCATION SIMPLE: NOSE

## 2024-02-08 NOTE — PROCEDURE: REPAIR NOTE
Referring Provider (Optional): MAC
Anesthesia Volume In Cc: 2
Did You Provide Opioid Counseling: No
Repair Type: Flap
Which Eyelid Repair Cpt Are You Using?: 56805
Suturegard Retention Suture: 2-0 Nylon
Retention Suture Bite Size: 3 mm
Length To Time In Minutes Device Was In Place: 10
Number Of Hemigard Strips Per Side: 1
Simple / Intermediate / Complex Repair - Final Wound Length In Cm: 0
Undermining Type: Entire Wound
Debridement Text: The wound edges were debrided prior to proceeding with the closure to facilitate wound healing.
Helical Rim Text: The closure involved the helical rim.
Vermilion Border Text: The closure involved the vermilion border.
Nostril Rim Text: The closure involved the nostril rim.
Retention Suture Text: Retention sutures were placed to support the closure and prevent dehiscence.
Flap Type: Bilobed Flap
Primary Defect Length (In Cm): 0.9
Secondary Defect Length (In Cm): 4.2
Secondary Defect Width (In Cm): 2.6
Skin Substitute: EpiFix Micronized
Suture Removal: 7 days
Type Of Previous Surgery (Optional- Ie Mohs Surgery): mohs
Date Of Previous Surgery (Optional): 2/8/24
Include The Following Details In The Note (If No Details Will Only Be Reflected In The Surgical Fax): Yes
Pre-Op Size Of Lesion Removed (Optional): 0.5
Mohs Case Number (Optional): 
Detail Level: Detailed
Anesthesia Type: 1% lidocaine with epinephrine
Hemostasis: Electrocautery
Estimated Blood Loss (Cc): minimal
Brow Lift Text: A midfrontal incision was made medially to the defect to allow access to the tissues just superior to the left eyebrow. Following careful dissection inferiorly in a supraperiosteal plane to the level of the left eyebrow, several 3-0 monocryl sutures were used to resuspend the eyebrow orbicularis oculi muscular unit to the superior frontal bone periosteum. This resulted in an appropriate reapproximation of static eyebrow symmetry and correction of the left brow ptosis.
Deep Sutures: 5-0 Monocryl
Epidermal Sutures: 5-0 Prolene
Epidermal Closure: running
Wound Care: Vaseline
Dressing: pressure dressing with telfa
Unna Boot Text: An Unna boot was placed to help immobilize the limb and facilitate more rapid healing.
Suturegard Intro: Intraoperative tissue expansion was performed, utilizing the SUTUREGARD device, in order to reduce wound tension.
Suturegard Body: The suture ends were repeatedly re-tightened and re-clamped to achieve the desired tissue expansion.
Hemigard Intro: Due to skin fragility and wound tension, it was decided to use HEMIGARD adhesive retention suture devices to permit a linear closure. The skin was cleaned and dried for a 6cm distance away from the wound. Excessive hair, if present, was removed to allow for adhesion.
Hemigard Postcare Instructions: The HEMIGARD strips are to remain completely dry for at least 5-7 days.
Graft Donor Site Dermal Sutures (Optional): 3-0 Monocryl
Graft Donor Site Epidermal Sutures (Optional): 4-0 Monocryl
Epidermal Closure Graft Donor Site (Optional): subcuticular
Graft Donor Site Bandage (Optional-Leave Blank If You Don't Want In Note): glue
Closure 2 Information: This tab is for additional flaps and grafts, including complex repair and grafts and complex repair and flaps. You can also specify a different location for the additional defect, if the location is the same you do not need to select a new one. We will insert the automated text for the repair you select below just as we do for solitary flaps and grafts. Please note that at this time if you select a location with a different insurance zone you will need to override the ICD10 and CPT if appropriate.
Closure 3 Information: This tab is for additional flaps and grafts above and beyond our usual structured repairs.  Please note if you enter information here it will not currently bill and you will need to add the billing information manually.
Complex Repair Preamble Text (Leave Blank If You Do Not Want): Extensive wide undermining was performed.
Intermediate Repair Preamble Text (Leave Blank If You Do Not Want): Undermining was performed with blunt dissection.
Flap Thinning Complex Repair Preamble Text (Leave Blank If You Do Not Want): An incision was made along the previous flap suture line. Undermining was performed beneath the flap and redundant tissue was removed to restore the normal contour of the skin.
Non-Graft Cartilage Fenestration Text: The cartilage was fenestrated with a 2mm punch biopsy to help facilitate healing.
Graft Cartilage Fenestration Text: The cartilage was fenestrated with a 2mm punch biopsy to help facilitate graft survival and healing.
Preparation Of Recipient Site - Flap: The eschar was removed surgically with sharp dissection to facilitate appropriate wound healing of the following adjacent tissue rearrangement.
Preparation Of Recipient Site - Graft: The eschar was removed surgically with sharp dissection to facilitate appropriate survival of the following graft.
Preparation Of Recipient Site - Flap Takedown: The eschar and granulation tissue was removed surgically with sharp dissection to facilitate appropriate healing after division and inset of the proximal and distal interpolation flap.
Secondary Intention Text (Leave Blank If You Do Not Want): The defect will heal with secondary intention.
No Repair - Repaired With Adjacent Surgical Defect Text (Leave Blank If You Do Not Want): After obtaining clear surgical margins the defect was repaired concurrently with another surgical defect which was in close approximation.
Referred To Oculoplastics For Closure Text (Leave Blank If You Do Not Want): After obtaining clear surgical margins the patient was sent to oculoplastics for surgical repair.  The patient understands they will receive post-surgical care and follow-up from the referring physician's office.
Referred To Otolaryngology For Closure Text (Leave Blank If You Do Not Want): After obtaining clear surgical margins the patient was sent to otolaryngology for surgical repair.  The patient understands they will receive post-surgical care and follow-up from the referring physician's office.
Referred To Plastics For Closure Text (Leave Blank If You Do Not Want): After obtaining clear surgical margins the patient was sent to plastics for surgical repair.  The patient understands they will receive post-surgical care and follow-up from the referring physician's office.
Referred To Asc For Closure Text (Leave Blank If You Do Not Want): After obtaining clear surgical margins the patient was sent to an ASC for surgical repair.  The patient understands they will receive post-surgical care and follow-up from the ASC physician.
Referred To Mid-Level For Closure Text (Leave Blank If You Do Not Want): After obtaining clear surgical margins the patient was sent to a mid-level provider for surgical repair.  The patient understands they will receive post-surgical care and follow-up from the mid-level provider.
Repair Performed By Another Provider Text (Leave Blank If You Do Not Want): After obtaining clear surgical margins the defect was repaired by another provider.
Adjacent Tissue Transfer Text: The defect edges were debeveled with a #15 scalpel blade. Given the location of the defect and the proximity to free margins an adjacent tissue transfer was deemed most appropriate. Using a sterile surgical marker, an appropriate flap was drawn incorporating the defect and placing the expected incisions within the relaxed skin tension lines where possible. The area thus outlined was incised deep to adipose tissue with a #15 scalpel blade. The skin margins were undermined to an appropriate distance in all directions utilizing iris scissors and carried over to close the primary defect.
Advancement Flap (Single) Text: The defect edges were debeveled with a #15 scalpel blade. Given the location of the defect and the proximity to free margins a single advancement flap was deemed most appropriate. Using a sterile surgical marker, an appropriate advancement flap was drawn incorporating the defect and placing the expected incisions within the relaxed skin tension lines where possible. The area thus outlined was incised deep to adipose tissue with a #15 scalpel blade. The skin margins were undermined to an appropriate distance in all directions utilizing iris scissors. Following this, the designed flap was advanced and carried over into the primary defect and sutured into place.
Advancement Flap (Double) Text: The defect edges were debeveled with a #15 scalpel blade. Given the location of the defect and the proximity to free margins a double advancement flap was deemed most appropriate. Using a sterile surgical marker, the appropriate advancement flaps were drawn incorporating the defect and placing the expected incisions within the relaxed skin tension lines where possible. The area thus outlined was incised deep to adipose tissue with a #15 scalpel blade. The skin margins were undermined to an appropriate distance in all directions utilizing iris scissors. Following this, the designed flaps were advanced and carried over into the primary defect and sutured into place.
Burow's Advancement Flap Text: The defect edges were debeveled with a #15 scalpel blade. Given the location of the defect and the proximity to free margins a Burow's advancement flap was deemed most appropriate. Using a sterile surgical marker, the appropriate advancement flap was drawn incorporating the defect and placing the expected incisions within the relaxed skin tension lines where possible. The area thus outlined was incised deep to adipose tissue with a #15 scalpel blade. The skin margins were undermined to an appropriate distance in all directions utilizing iris scissors. Following this, the designed flap was advanced and carried over into the primary defect and sutured into place.
Chonodrocutaneous Helical Advancement Flap Text: The defect edges were debeveled with a #15 scalpel blade. Given the location of the defect and the proximity to free margins a chondrocutaneous helical advancement flap was deemed most appropriate. Using a sterile surgical marker, the appropriate advancement flap was drawn incorporating the defect and placing the expected incisions within the relaxed skin tension lines where possible. The area thus outlined was incised deep to adipose tissue with a #15 scalpel blade. The skin margins were undermined to an appropriate distance in all directions utilizing iris scissors. Following this, the designed flap was advanced and carried over into the primary defect and sutured into place.
Crescentic Advancement Flap Text: The defect edges were debeveled with a #15 scalpel blade. Given the location of the defect and the proximity to free margins a crescentic advancement flap was deemed most appropriate. Using a sterile surgical marker, the appropriate advancement flap was drawn incorporating the defect and placing the expected incisions within the relaxed skin tension lines where possible. The area thus outlined was incised deep to adipose tissue with a #15 scalpel blade. The skin margins were undermined to an appropriate distance in all directions utilizing iris scissors. Following this, the designed flap was advanced and carried over into the primary defect and sutured into place.
A-T Advancement Flap Text: The defect edges were debeveled with a #15 scalpel blade. Given the location of the defect, shape of the defect and the proximity to free margins an A-T advancement flap was deemed most appropriate. Using a sterile surgical marker, an appropriate advancement flap was drawn incorporating the defect and placing the expected incisions within the relaxed skin tension lines where possible. The area thus outlined was incised deep to adipose tissue with a #15 scalpel blade. The skin margins were undermined to an appropriate distance in all directions utilizing iris scissors. Following this, the designed flap was advanced and carried over into the primary defect and sutured into place.
O-T Advancement Flap Text: The defect edges were debeveled with a #15 scalpel blade. Given the location of the defect, shape of the defect and the proximity to free margins an O-T advancement flap was deemed most appropriate. Using a sterile surgical marker, an appropriate advancement flap was drawn incorporating the defect and placing the expected incisions within the relaxed skin tension lines where possible. The area thus outlined was incised deep to adipose tissue with a #15 scalpel blade. The skin margins were undermined to an appropriate distance in all directions utilizing iris scissors. Following this, the designed flap was advanced and carried over into the primary defect and sutured into place.
O-L Flap Text: The defect edges were debeveled with a #15 scalpel blade. Given the location of the defect, shape of the defect and the proximity to free margins an O-L flap was deemed most appropriate. Using a sterile surgical marker, an appropriate advancement flap was drawn incorporating the defect and placing the expected incisions within the relaxed skin tension lines where possible. The area thus outlined was incised deep to adipose tissue with a #15 scalpel blade. The skin margins were undermined to an appropriate distance in all directions utilizing iris scissors. Following this, the designed flap was advanced and carried over into the primary defect and sutured into place.
O-Z Flap Text: The defect edges were debeveled with a #15 scalpel blade. Given the location of the defect, shape of the defect and the proximity to free margins an O-Z flap was deemed most appropriate. Using a sterile surgical marker, an appropriate transposition flap was drawn incorporating the defect and placing the expected incisions within the relaxed skin tension lines where possible. The area thus outlined was incised deep to adipose tissue with a #15 scalpel blade. The skin margins were undermined to an appropriate distance in all directions utilizing iris scissors. Following this, the designed flap was carried over into the primary defect and sutured into place.
Double O-Z Flap Text: The defect edges were debeveled with a #15 scalpel blade. Given the location of the defect, shape of the defect and the proximity to free margins a Double O-Z flap was deemed most appropriate. Using a sterile surgical marker, an appropriate transposition flap was drawn incorporating the defect and placing the expected incisions within the relaxed skin tension lines where possible. The area thus outlined was incised deep to adipose tissue with a #15 scalpel blade. The skin margins were undermined to an appropriate distance in all directions utilizing iris scissors. Following this, the designed flap was carried over into the primary defect and sutured into place.
V-Y Flap Text: The defect edges were debeveled with a #15 scalpel blade. Given the location of the defect, shape of the defect and the proximity to free margins a V-Y flap was deemed most appropriate. Using a sterile surgical marker, an appropriate advancement flap was drawn incorporating the defect and placing the expected incisions within the relaxed skin tension lines where possible. The area thus outlined was incised deep to adipose tissue with a #15 scalpel blade. The skin margins were undermined to an appropriate distance in all directions utilizing iris scissors. Following this, the designed flap was advanced and carried over into the primary defect and sutured into place.
Advancement-Rotation Flap Text: The defect edges were debeveled with a #15 scalpel blade. Given the location of the defect, shape of the defect and the proximity to free margins an advancement-rotation flap was deemed most appropriate. Using a sterile surgical marker, an appropriate flap was drawn incorporating the defect and placing the expected incisions within the relaxed skin tension lines where possible. The area thus outlined was incised deep to adipose tissue with a #15 scalpel blade. The skin margins were undermined to an appropriate distance in all directions utilizing iris scissors. Following this, the designed flap was carried over into the primary defect and sutured into place.
Mercedes Flap Text: The defect edges were debeveled with a #15 scalpel blade. Given the location of the defect, shape of the defect and the proximity to free margins a Mercedes flap was deemed most appropriate. Using a sterile surgical marker, an appropriate advancement flap was drawn incorporating the defect and placing the expected incisions within the relaxed skin tension lines where possible. The area thus outlined was incised deep to adipose tissue with a #15 scalpel blade. The skin margins were undermined to an appropriate distance in all directions utilizing iris scissors. Following this, the designed flap was advanced and carried over into the primary defect and sutured into place.
Modified Advancement Flap Text: The defect edges were debeveled with a #15 scalpel blade. Given the location of the defect, shape of the defect and the proximity to free margins a modified advancement flap was deemed most appropriate. Using a sterile surgical marker, an appropriate advancement flap was drawn incorporating the defect and placing the expected incisions within the relaxed skin tension lines where possible. The area thus outlined was incised deep to adipose tissue with a #15 scalpel blade. The skin margins were undermined to an appropriate distance in all directions utilizing iris scissors. Following this, the designed flap was advanced and carried over into the primary defect and sutured into place.
Mucosal Advancement Flap Text: Given the location of the defect, shape of the defect and the proximity to free margins a mucosal advancement flap was deemed most appropriate. Incisions were made with a 15 blade scalpel in the appropriate fashion along the cutaneous vermilion border and the mucosal lip. The remaining actinically damaged mucosal tissue was excised.  The mucosal advancement flap was then elevated to the gingival sulcus with care taken to preserve the neurovascular structures and advanced into the primary defect. Care was taken to ensure that precise realignment of the vermilion border was achieved.
Peng Advancement Flap Text: The defect edges were debeveled with a #15 scalpel blade. Given the location of the defect, shape of the defect and the proximity to free margins a Peng advancement flap was deemed most appropriate. Using a sterile surgical marker, an appropriate advancement flap was drawn incorporating the defect and placing the expected incisions within the relaxed skin tension lines where possible. The area thus outlined was incised deep to adipose tissue with a #15 scalpel blade. The skin margins were undermined to an appropriate distance in all directions utilizing iris scissors. Following this, the designed flap was advanced and carried over into the primary defect and sutured into place.
Hatchet Flap Text: The defect edges were debeveled with a #15 scalpel blade. Given the location of the defect, shape of the defect and the proximity to free margins a hatchet flap was deemed most appropriate. Using a sterile surgical marker, an appropriate hatchet flap was drawn incorporating the defect and placing the expected incisions within the relaxed skin tension lines where possible. The area thus outlined was incised deep to adipose tissue with a #15 scalpel blade. The skin margins were undermined to an appropriate distance in all directions utilizing iris scissors. Following this, the designed flap was carried over into the primary defect and sutured into place.
Rotation Flap Text: The defect edges were debeveled with a #15 scalpel blade. Given the location of the defect, shape of the defect and the proximity to free margins a rotation flap was deemed most appropriate. Using a sterile surgical marker, an appropriate rotation flap was drawn incorporating the defect and placing the expected incisions within the relaxed skin tension lines where possible. The area thus outlined was incised deep to adipose tissue with a #15 scalpel blade. The skin margins were undermined to an appropriate distance in all directions utilizing iris scissors. Following this, the designed flap was carried over into the primary defect and sutured into place.
Bilateral Rotation Flap Text: The defect edges were debeveled with a #15 scalpel blade. Given the location of the defect, shape of the defect and the proximity to free margins a bilateral rotation flap was deemed most appropriate. Using a sterile surgical marker, an appropriate rotation flap was drawn incorporating the defect and placing the expected incisions within the relaxed skin tension lines where possible. The area thus outlined was incised deep to adipose tissue with a #15 scalpel blade. The skin margins were undermined to an appropriate distance in all directions utilizing iris scissors. Following this, the designed flap was carried over into the primary defect and sutured into place.
Spiral Flap Text: The defect edges were debeveled with a #15 scalpel blade. Given the location of the defect, shape of the defect and the proximity to free margins a spiral flap was deemed most appropriate. Using a sterile surgical marker, an appropriate rotation flap was drawn incorporating the defect and placing the expected incisions within the relaxed skin tension lines where possible. The area thus outlined was incised deep to adipose tissue with a #15 scalpel blade. The skin margins were undermined to an appropriate distance in all directions utilizing iris scissors. Following this, the designed flap was carried over into the primary defect and sutured into place.
Staged Advancement Flap Text: The defect edges were debeveled with a #15 scalpel blade. Given the location of the defect, shape of the defect and the proximity to free margins a staged advancement flap was deemed most appropriate. Using a sterile surgical marker, an appropriate advancement flap was drawn incorporating the defect and placing the expected incisions within the relaxed skin tension lines where possible. The area thus outlined was incised deep to adipose tissue with a #15 scalpel blade. The skin margins were undermined to an appropriate distance in all directions utilizing iris scissors. Following this, the designed flap was carried over into the primary defect and sutured into place.
Star Wedge Flap Text: The defect edges were debeveled with a #15 scalpel blade. Given the location of the defect, shape of the defect and the proximity to free margins a star wedge flap was deemed most appropriate. Using a sterile surgical marker, an appropriate rotation flap was drawn incorporating the defect and placing the expected incisions within the relaxed skin tension lines where possible. The area thus outlined was incised deep to adipose tissue with a #15 scalpel blade. The skin margins were undermined to an appropriate distance in all directions utilizing iris scissors. Following this, the designed flap was carried over into the primary defect and sutured into place.
Transposition Flap Text: The defect edges were debeveled with a #15 scalpel blade. Given the location of the defect and the proximity to free margins a transposition flap was deemed most appropriate. Using a sterile surgical marker, an appropriate transposition flap was drawn incorporating the defect. The area thus outlined was incised deep to adipose tissue with a #15 scalpel blade. The skin margins were undermined to an appropriate distance in all directions utilizing iris scissors. Following this, the designed flap was carried over into the primary defect and sutured into place.
Muscle Hinge Flap Text: The defect edges were debeveled with a #15 scalpel blade.  Given the size, depth and location of the defect and the proximity to free margins a muscle hinge flap was deemed most appropriate. Using a sterile surgical marker, an appropriate hinge flap was drawn incorporating the defect. The area thus outlined was incised with a #15 scalpel blade. The skin margins were undermined to an appropriate distance in all directions utilizing iris scissors. Following this, the designed flap was carried into the primary defect and sutured into place.
Mustarde Flap Text: The defect edges were debeveled with a #15 scalpel blade.  Given the size, depth and location of the defect and the proximity to free margins a Mustarde flap was deemed most appropriate. Using a sterile surgical marker, an appropriate flap was drawn incorporating the defect. The area thus outlined was incised with a #15 scalpel blade. The skin margins were undermined to an appropriate distance in all directions utilizing iris scissors. Following this, the designed flap was carried into the primary defect and sutured into place.
Nasal Turnover Hinge Flap Text: The defect edges were debeveled with a #15 scalpel blade.  Given the size, depth, location of the defect and the defect being full thickness a nasal turnover hinge flap was deemed most appropriate. Using a sterile surgical marker, an appropriate hinge flap was drawn incorporating the defect. The area thus outlined was incised with a #15 scalpel blade. The flap was designed to recreate the nasal mucosal lining and the alar rim. The skin margins were undermined to an appropriate distance in all directions utilizing iris scissors. Following this, the designed flap was carried over into the primary defect and sutured into place
Nasalis-Muscle-Based Myocutaneous Island Pedicle Flap Text: Using a #15 blade, an incision was made around the donor flap to the level of the nasalis muscle. Wide lateral undermining was then performed in both the subcutaneous plane above the nasalis muscle, and in a submuscular plane just above periosteum. This allowed the formation of a free nasalis muscle axial pedicle (based on the angular artery) which was still attached to the actual cutaneous flap, increasing its mobility and vascular viability. Hemostasis was obtained with pinpoint electrocoagulation. The flap was mobilized into position and the pivotal anchor points positioned and stabilized with buried interrupted sutures. Subcutaneous and dermal tissues were closed in a multilayered fashion with sutures. Tissue redundancies were excised, and the epidermal edges were apposed without significant tension and sutured with sutures.
Orbicularis Oris Muscle Flap Text: The defect edges were debeveled with a #15 scalpel blade.  Given that the defect affected the competency of the oral sphincter an orbicularis oris muscle flap was deemed most appropriate to restore this competency and normal muscle function.  Using a sterile surgical marker, an appropriate flap was drawn incorporating the defect. The area thus outlined was incised with a #15 scalpel blade. Following this, the designed flap was carried over into the primary defect and sutured into place.
Melolabial Transposition Flap Text: The defect edges were debeveled with a #15 scalpel blade. Given the location of the defect and the proximity to free margins a melolabial flap was deemed most appropriate. Using a sterile surgical marker, an appropriate melolabial transposition flap was drawn incorporating the defect. The area thus outlined was incised deep to adipose tissue with a #15 scalpel blade. The skin margins were undermined to an appropriate distance in all directions utilizing iris scissors. Following this, the designed flap was carried over into the primary defect and sutured into place.
Rhombic Flap Text: The defect edges were debeveled with a #15 scalpel blade. Given the location of the defect and the proximity to free margins a rhombic flap was deemed most appropriate. Using a sterile surgical marker, an appropriate rhombic flap was drawn incorporating the defect. The area thus outlined was incised deep to adipose tissue with a #15 scalpel blade. The skin margins were undermined to an appropriate distance in all directions utilizing iris scissors. Following this, the designed flap was carried over into the primary defect and sutured into place.
Rhomboid Transposition Flap Text: The defect edges were debeveled with a #15 scalpel blade. Given the location of the defect and the proximity to free margins a rhomboid transposition flap was deemed most appropriate. Using a sterile surgical marker, an appropriate rhomboid flap was drawn incorporating the defect. The area thus outlined was incised deep to adipose tissue with a #15 scalpel blade. The skin margins were undermined to an appropriate distance in all directions utilizing iris scissors. Following this, the designed flap was carried over into the primary defect and sutured into place.
Bi-Rhombic Flap Text: The defect edges were debeveled with a #15 scalpel blade. Given the location of the defect and the proximity to free margins a bi-rhombic flap was deemed most appropriate. Using a sterile surgical marker, an appropriate rhombic flap was drawn incorporating the defect. The area thus outlined was incised deep to adipose tissue with a #15 scalpel blade. The skin margins were undermined to an appropriate distance in all directions utilizing iris scissors. Following this, the designed flap was carried over into the primary defect and sutured into place.
Helical Rim Advancement Flap Text: The defect edges were debeveled with a #15 blade scalpel.  Given the location of the defect and the proximity to free margins (helical rim) a double helical rim advancement flap was deemed most appropriate. Using a sterile surgical marker, the appropriate advancement flaps were drawn incorporating the defect and placing the expected incisions between the helical rim and antihelix where possible.  The area thus outlined was incised through and through with a #15 scalpel blade.  With a skin hook and iris scissors, the flaps were gently and sharply undermined and freed up. Folllowing this, the designed flaps were carried over into the primary defect and sutured into place.
Bilateral Helical Rim Advancement Flap Text: The defect edges were debeveled with a #15 blade scalpel.  Given the location of the defect and the proximity to free margins (helical rim) a bilateral helical rim advancement flap was deemed most appropriate. Using a sterile surgical marker, the appropriate advancement flaps were drawn incorporating the defect and placing the expected incisions between the helical rim and antihelix where possible.  The area thus outlined was incised through and through with a #15 scalpel blade.  With a skin hook and iris scissors, the flaps were gently and sharply undermined and freed up. Following this, the designed flaps were placed into the primary defect and sutured into place.
Ear Star Wedge Flap Text: The defect edges were debeveled with a #15 blade scalpel.  Given the location of the defect and the proximity to free margins (helical rim) an ear star wedge flap was deemed most appropriate. Using a sterile surgical marker, the appropriate flap was drawn incorporating the defect and placing the expected incisions between the helical rim and antihelix where possible.  The area thus outlined was incised through and through with a #15 scalpel blade. Following this, the designed flap was carried over into the primary defect and sutured into place.
Banner Transposition Flap Text: The defect edges were debeveled with a #15 scalpel blade. Given the location of the defect and the proximity to free margins a Banner transposition flap was deemed most appropriate. Using a sterile surgical marker, an appropriate flap was drawn around the defect. The area thus outlined was incised deep to adipose tissue with a #15 scalpel blade. The skin margins were undermined to an appropriate distance in all directions utilizing iris scissors. Following this, the designed flap was carried into the primary defect and sutured into place.
Bilobed Flap Text: The defect edges were debeveled with a #15 scalpel blade. Given the location of the defect and the proximity to free margins a bilobe flap was deemed most appropriate. Using a sterile surgical marker, an appropriate bilobe flap drawn around the defect. The area thus outlined was incised deep to adipose tissue with a #15 scalpel blade. The skin margins were undermined to an appropriate distance in all directions utilizing iris scissors. Following this, the designed flap was carried over into the primary defect and sutured into place.
Bilobed Transposition Flap Text: The defect edges were debeveled with a #15 scalpel blade. Given the location of the defect and the proximity to free margins a bilobed transposition flap was deemed most appropriate. Using a sterile surgical marker, an appropriate bilobe flap drawn around the defect. The area thus outlined was incised deep to adipose tissue with a #15 scalpel blade. The skin margins were undermined to an appropriate distance in all directions utilizing iris scissors. Following this, the designed flap was carried over into the primary defect and sutured into place.
Trilobed Flap Text: The defect edges were debeveled with a #15 scalpel blade. Given the location of the defect and the proximity to free margins a trilobed flap was deemed most appropriate. Using a sterile surgical marker, an appropriate trilobed flap was drawn around the defect. The area thus outlined was incised deep to adipose tissue with a #15 scalpel blade. The skin margins were undermined to an appropriate distance in all directions utilizing iris scissors. Following this, the designed flap was carried into the primary defect and sutured into place.
Dorsal Nasal Flap Text: The defect edges were debeveled with a #15 scalpel blade. Given the location of the defect and the proximity to free margins a dorsal nasal flap was deemed most appropriate. Using a sterile surgical marker, an appropriate dorsal nasal flap was drawn around the defect. The area thus outlined was incised deep to adipose tissue with a #15 scalpel blade. The skin margins were undermined to an appropriate distance in all directions utilizing iris scissors. Following this, the designed flap was carried into the primary defect and sutured into place.
Island Pedicle Flap Text: The defect edges were debeveled with a #15 scalpel blade. Given the location of the defect, shape of the defect and the proximity to free margins an island pedicle advancement flap was deemed most appropriate. Using a sterile surgical marker, an appropriate advancement flap was drawn incorporating the defect, outlining the appropriate donor tissue and placing the expected incisions within the relaxed skin tension lines where possible. The area thus outlined was incised deep to adipose tissue with a #15 scalpel blade. The skin margins were undermined to an appropriate distance in all directions around the primary defect and laterally outward around the island pedicle utilizing iris scissors.  There was minimal undermining beneath the pedicle flap. Following this, the flap was carried over into the primary defect and sutured into place.
Island Pedicle Flap With Canthal Suspension Text: The defect edges were debeveled with a #15 scalpel blade. Given the location of the defect, shape of the defect and the proximity to free margins an island pedicle advancement flap was deemed most appropriate. Using a sterile surgical marker, an appropriate advancement flap was drawn incorporating the defect, outlining the appropriate donor tissue and placing the expected incisions within the relaxed skin tension lines where possible. The area thus outlined was incised deep to adipose tissue with a #15 scalpel blade. The skin margins were undermined to an appropriate distance in all directions around the primary defect and laterally outward around the island pedicle utilizing iris scissors.  There was minimal undermining beneath the pedicle flap. A suspension suture was placed in the canthal tendon to prevent tension and prevent ectropion. Following this, the designed flap was placed into the primary defect and sutured into place.
Alar Island Pedicle Flap Text: The defect edges were debeveled with a #15 scalpel blade. Given the location of the defect, shape of the defect and the proximity to the alar rim an island pedicle advancement flap was deemed most appropriate. Using a sterile surgical marker, an appropriate advancement flap was drawn incorporating the defect, outlining the appropriate donor tissue and placing the expected incisions within the nasal ala running parallel to the alar rim. The area thus outlined was incised with a #15 scalpel blade. The skin margins were undermined minimally to an appropriate distance in all directions around the primary defect and laterally outward around the island pedicle utilizing iris scissors.  There was minimal undermining beneath the pedicle flap. Following this, the designed flap was carried over into the primary defect and sutured into place.
Double Island Pedicle Flap Text: The defect edges were debeveled with a #15 scalpel blade. Given the location of the defect, shape of the defect and the proximity to free margins a double island pedicle advancement flap was deemed most appropriate. Using a sterile surgical marker, an appropriate advancement flap was drawn incorporating the defect, outlining the appropriate donor tissue and placing the expected incisions within the relaxed skin tension lines where possible. The area thus outlined was incised deep to adipose tissue with a #15 scalpel blade. The skin margins were undermined to an appropriate distance in all directions around the primary defect and laterally outward around the island pedicle utilizing iris scissors.  There was minimal undermining beneath the pedicle flap. Following this, the flap was carried over into the primary defect and sutured into place.
Island Pedicle Flap-Requiring Vessel Identification Text: The defect edges were debeveled with a #15 scalpel blade. Given the location of the defect, shape of the defect and the proximity to free margins an island pedicle advancement flap was deemed most appropriate. Using a sterile surgical marker, an appropriate advancement flap was drawn, based on the axial vessel mentioned above, incorporating the defect, outlining the appropriate donor tissue and placing the expected incisions within the relaxed skin tension lines where possible. The area thus outlined was incised deep to adipose tissue with a #15 scalpel blade. The skin margins were undermined to an appropriate distance in all directions around the primary defect and laterally outward around the island pedicle utilizing iris scissors.  There was minimal undermining beneath the pedicle flap. Following this, the designed flap was carried over into the primary defect and sutured into place.
Keystone Flap Text: The defect edges were debeveled with a #15 scalpel blade. Given the location of the defect, shape of the defect a keystone flap was deemed most appropriate. Using a sterile surgical marker, an appropriate keystone flap was drawn incorporating the defect, outlining the appropriate donor tissue and placing the expected incisions within the relaxed skin tension lines where possible. The area thus outlined was incised deep to adipose tissue with a #15 scalpel blade. The skin margins were undermined to an appropriate distance in all directions around the primary defect and laterally outward around the flap utilizing iris scissors. Following this, the designed flap was carried into the primary defect and sutured into place.
O-T Plasty Text: The defect edges were debeveled with a #15 scalpel blade. Given the location of the defect, shape of the defect and the proximity to free margins an O-T plasty was deemed most appropriate. Using a sterile surgical marker, an appropriate O-T plasty was drawn incorporating the defect and placing the expected incisions within the relaxed skin tension lines where possible. The area thus outlined was incised deep to adipose tissue with a #15 scalpel blade. The skin margins were undermined to an appropriate distance in all directions utilizing iris scissors. Following this, the designed flap was carried over into the primary defect and sutured into place.
O-Z Plasty Text: The defect edges were debeveled with a #15 scalpel blade. Given the location of the defect, shape of the defect and the proximity to free margins an O-Z plasty (double transposition flap) was deemed most appropriate. Using a sterile surgical marker, the appropriate transposition flaps were drawn incorporating the defect and placing the expected incisions within the relaxed skin tension lines where possible. The area thus outlined was incised deep to adipose tissue with a #15 scalpel blade. The skin margins were undermined to an appropriate distance in all directions utilizing iris scissors. Hemostasis was achieved with electrocautery. The flaps were then transposed and carried over into place, one clockwise and the other counterclockwise, and anchored with interrupted buried subcutaneous sutures.
Double O-Z Plasty Text: The defect edges were debeveled with a #15 scalpel blade. Given the location of the defect, shape of the defect and the proximity to free margins a Double O-Z plasty (double transposition flap) was deemed most appropriate. Using a sterile surgical marker, the appropriate transposition flaps were drawn incorporating the defect and placing the expected incisions within the relaxed skin tension lines where possible. The area thus outlined was incised deep to adipose tissue with a #15 scalpel blade. The skin margins were undermined to an appropriate distance in all directions utilizing iris scissors. Hemostasis was achieved with electrocautery. The flaps were then transposed and carried over into place, one clockwise and the other counterclockwise, and anchored with interrupted buried subcutaneous sutures.
V-Y Plasty Text: The defect edges were debeveled with a #15 scalpel blade. Given the location of the defect, shape of the defect and the proximity to free margins an V-Y advancement flap was deemed most appropriate. Using a sterile surgical marker, an appropriate advancement flap was drawn incorporating the defect and placing the expected incisions within the relaxed skin tension lines where possible. The area thus outlined was incised deep to adipose tissue with a #15 scalpel blade. The skin margins were undermined to an appropriate distance in all directions utilizing iris scissors. Following this, the designed flap was advanced and carried over into the primary defect and sutured into place.
H Plasty Text: Given the location of the defect, shape of the defect and the proximity to free margins a H-plasty was deemed most appropriate for repair. Using a sterile surgical marker, the appropriate advancement arms of the H-plasty were drawn incorporating the defect and placing the expected incisions within the relaxed skin tension lines where possible. The area thus outlined was incised deep to adipose tissue with a #15 scalpel blade. The skin margins were undermined to an appropriate distance in all directions utilizing iris scissors.  The opposing advancement arms were then advanced and carried over into place in opposite direction and anchored with interrupted buried subcutaneous sutures.
W Plasty Text: The lesion was extirpated to the level of the fat with a #15 scalpel blade. Given the location of the defect, shape of the defect and the proximity to free margins a W-plasty was deemed most appropriate for repair. Using a sterile surgical marker, the appropriate transposition arms of the W-plasty were drawn incorporating the defect and placing the expected incisions within the relaxed skin tension lines where possible. The area thus outlined was incised deep to adipose tissue with a #15 scalpel blade. The skin margins were undermined to an appropriate distance in all directions utilizing iris scissors. The opposing transposition arms were then transposed and carried over into place in opposite direction and anchored with interrupted buried subcutaneous sutures.
Z Plasty Text: The lesion was extirpated to the level of the fat with a #15 scalpel blade. Given the location of the defect, shape of the defect and the proximity to free margins a Z-plasty was deemed most appropriate for repair. Using a sterile surgical marker, the appropriate transposition arms of the Z-plasty were drawn incorporating the defect and placing the expected incisions within the relaxed skin tension lines where possible. The area thus outlined was incised deep to adipose tissue with a #15 scalpel blade. The skin margins were undermined to an appropriate distance in all directions utilizing iris scissors. The opposing transposition arms were then transposed and carried over into place in opposite direction and anchored with interrupted buried subcutaneous sutures.
Double Z Plasty Text: The lesion was extirpated to the level of the fat with a #15 scalpel blade. Given the location of the defect, shape of the defect and the proximity to free margins a double Z-plasty was deemed most appropriate for repair. Using a sterile surgical marker, the appropriate transposition arms of the double Z-plasty were drawn incorporating the defect and placing the expected incisions within the relaxed skin tension lines where possible. The area thus outlined was incised deep to adipose tissue with a #15 scalpel blade. The skin margins were undermined to an appropriate distance in all directions utilizing iris scissors. The opposing transposition arms were then transposed and carried over into place in opposite direction and anchored with interrupted buried subcutaneous sutures.
Zygomaticofacial Flap Text: Given the location of the defect, shape of the defect and the proximity to free margins a zygomaticofacial flap was deemed most appropriate for repair. Using a sterile surgical marker, the appropriate flap was drawn incorporating the defect and placing the expected incisions within the relaxed skin tension lines where possible. The area thus outlined was incised deep to adipose tissue with a #15 scalpel blade with preservation of a vascular pedicle.  The skin margins were undermined to an appropriate distance in all directions utilizing iris scissors. The flap was then carried over into the defect and anchored with interrupted buried subcutaneous sutures.
Cheek Interpolation Flap Text: A decision was made to reconstruct the defect utilizing an interpolation axial flap and a staged reconstruction.  A telfa template was made of the defect.  This telfa template was then used to outline the Cheek Interpolation flap.  The donor area for the pedicle flap was then injected with anesthesia.  The flap was excised through the skin and subcutaneous tissue down to the layer of the underlying musculature.  The interpolation flap was carefully excised within this deep plane to maintain its blood supply.  The edges of the donor site were undermined.   The donor site was closed in a primary fashion.  The pedicle was then rotated into position and sutured.  Once the tube was sutured into place, adequate blood supply was confirmed with blanching and refill.  The pedicle was then wrapped with xeroform gauze and dressed appropriately with a telfa and gauze bandage to ensure continued blood supply and protect the attached pedicle.
Cheek-To-Nose Interpolation Flap Text: A decision was made to reconstruct the defect utilizing an interpolation axial flap and a staged reconstruction.  A telfa template was made of the defect.  This telfa template was then used to outline the Cheek-To-Nose Interpolation flap.  The donor area for the pedicle flap was then injected with anesthesia.  The flap was excised through the skin and subcutaneous tissue down to the layer of the underlying musculature.  The interpolation flap was carefully excised within this deep plane to maintain its blood supply.  The edges of the donor site were undermined.   The donor site was closed in a primary fashion.  The pedicle was then rotated into position and sutured.  Once the tube was sutured into place, adequate blood supply was confirmed with blanching and refill.  The pedicle was then wrapped with xeroform gauze and dressed appropriately with a telfa and gauze bandage to ensure continued blood supply and protect the attached pedicle.
Interpolation Flap Text: A decision was made to reconstruct the defect utilizing an interpolation axial flap and a staged reconstruction.  A telfa template was made of the defect.  This telfa template was then used to outline the interpolation flap.  The donor area for the pedicle flap was then injected with anesthesia.  The flap was excised through the skin and subcutaneous tissue down to the layer of the underlying musculature.  The interpolation flap was carefully excised within this deep plane to maintain its blood supply.  The edges of the donor site were undermined.   The donor site was closed in a primary fashion.  The pedicle was then rotated into position and sutured.  Once the tube was sutured into place, adequate blood supply was confirmed with blanching and refill.  The pedicle was then wrapped with xeroform gauze and dressed appropriately with a telfa and gauze bandage to ensure continued blood supply and protect the attached pedicle.
Melolabial Interpolation Flap Text: A decision was made to reconstruct the defect utilizing an interpolation axial flap and a staged reconstruction.  A telfa template was made of the defect.  This telfa template was then used to outline the melolabial interpolation flap.  The donor area for the pedicle flap was then injected with anesthesia.  The flap was excised through the skin and subcutaneous tissue down to the layer of the underlying musculature.  The pedicle flap was carefully excised within this deep plane to maintain its blood supply.  The edges of the donor site were undermined.   The donor site was closed in a primary fashion.  The pedicle was then rotated into position and sutured.  Once the tube was sutured into place, adequate blood supply was confirmed with blanching and refill.  The pedicle was then wrapped with xeroform gauze and dressed appropriately with a telfa and gauze bandage to ensure continued blood supply and protect the attached pedicle.
Mastoid Interpolation Flap Text: A decision was made to reconstruct the defect utilizing an interpolation axial flap and a staged reconstruction.  A telfa template was made of the defect.  This telfa template was then used to outline the mastoid interpolation flap.  The donor area for the pedicle flap was then injected with anesthesia.  The flap was excised through the skin and subcutaneous tissue down to the layer of the underlying musculature.  The pedicle flap was carefully excised within this deep plane to maintain its blood supply.  The edges of the donor site were undermined.   The donor site was closed in a primary fashion.  The pedicle was then rotated into position and sutured.  Once the tube was sutured into place, adequate blood supply was confirmed with blanching and refill.  The pedicle was then wrapped with xeroform gauze and dressed appropriately with a telfa and gauze bandage to ensure continued blood supply and protect the attached pedicle.
Posterior Auricular Interpolation Flap Text: A decision was made to reconstruct the defect utilizing an interpolation axial flap and a staged reconstruction.  A telfa template was made of the defect.  This telfa template was then used to outline the posterior auricular interpolation flap.  The donor area for the pedicle flap was then injected with anesthesia.  The flap was excised through the skin and subcutaneous tissue down to the layer of the underlying musculature.  The pedicle flap was carefully excised within this deep plane to maintain its blood supply.  The edges of the donor site were undermined.   The donor site was closed in a primary fashion.  The pedicle was then rotated into position and sutured.  Once the tube was sutured into place, adequate blood supply was confirmed with blanching and refill.  The pedicle was then wrapped with xeroform gauze and dressed appropriately with a telfa and gauze bandage to ensure continued blood supply and protect the attached pedicle.
Paramedian Forehead Flap Text: A decision was made to reconstruct the defect utilizing an interpolation axial flap and a staged reconstruction.  A telfa template was made of the defect.  This telfa template was then used to outline the paramedian forehead pedicle flap.  The donor area for the pedicle flap was then injected with anesthesia.  The flap was excised through the skin and subcutaneous tissue down to the layer of the underlying musculature.  The pedicle flap was carefully excised within this deep plane to maintain its blood supply.  The edges of the donor site were undermined.   The donor site was closed in a primary fashion.  The pedicle was then rotated into position and sutured.  Once the tube was sutured into place, adequate blood supply was confirmed with blanching and refill.  The pedicle was then wrapped with xeroform gauze and dressed appropriately with a telfa and gauze bandage to ensure continued blood supply and protect the attached pedicle.
Abbe Flap (Upper To Lower Lip) Text: The defect of the lower lip was assessed and measured.  Given the location and size of the defect, an Abbe flap was deemed most appropriate. Using a sterile surgical marker, an appropriate Abbe flap was measured and drawn on the upper lip. Local anesthesia was then infiltrated.  A scalpel was then used to incise the upper lip through and through the skin, vermilion, muscle and mucosa, leaving the flap pedicled on the opposite side.  The flap was then rotated and transferred to the lower lip defect.  The flap was then sutured into place with a three layer technique, closing the orbicularis oris muscle layer with subcutaneous buried sutures, followed by a mucosal layer and an epidermal layer.
Abbe Flap (Lower To Upper Lip) Text: The defect of the upper lip was assessed and measured.  Given the location and size of the defect, an Abbe flap was deemed most appropriate. Using a sterile surgical marker, an appropriate Abbe flap was measured and drawn on the lower lip. Local anesthesia was then infiltrated. A scalpel was then used to incise the upper lip through and through the skin, vermilion, muscle and mucosa, leaving the flap pedicled on the opposite side.  The flap was then rotated and transferred to the lower lip defect.  The flap was then sutured into place with a three layer technique, closing the orbicularis oris muscle layer with subcutaneous buried sutures, followed by a mucosal layer and an epidermal layer.
Estlander Flap (Upper To Lower Lip) Text: The defect of the lower lip was assessed and measured.  Given the location and size of the defect, an Estlander flap was deemed most appropriate. Using a sterile surgical marker, an appropriate Estlander flap was measured and drawn on the upper lip. Local anesthesia was then infiltrated. A scalpel was then used to incise the lateral aspect of the flap, through skin, muscle and mucosa, leaving the flap pedicled medially.  The flap was then rotated and positioned to fill the lower lip defect.  The flap was then sutured into place with a three layer technique, closing the orbicularis oris muscle layer with subcutaneous buried sutures, followed by a mucosal layer and an epidermal layer.
Cheiloplasty (Less Than 50%) Text: A decision was made to reconstruct the defect with a  cheiloplasty.  The defect was undermined extensively.  Additional orbicularis oris muscle was excised with a 15 blade scalpel.  The defect was converted into a full thickness wedge, of less than 50% of the vertical height of the lip, to facilite a better cosmetic result.  Small vessels were then tied off with 5-0 monocyrl. The orbicularis oris, superficial fascia, adipose and dermis were then reapproximated.  After the deeper layers were approximated the epidermis was reapproximated with particular care given to realign the vermilion border.
Cheiloplasty (Complex) Text: A decision was made to reconstruct the defect with a  cheiloplasty.  The defect was undermined extensively.  Additional orbicularis oris muscle was excised with a 15 blade scalpel.  The defect was converted into a full thickness wedge to facilite a better cosmetic result.  Small vessels were then tied off with 5-0 monocyrl. The orbicularis oris, superficial fascia, adipose and dermis were then reapproximated.  After the deeper layers were approximated the epidermis was reapproximated with particular care given to realign the vermilion border.
Ear Wedge Repair Text: A wedge excision was completed by carrying down an excision through the full thickness of the ear and cartilage with an inward facing Burow's triangle. The wound was then closed in a layered fashion.
Full Thickness Lip Wedge Repair (Flap) Text: Given the location of the defect and the proximity to free margins a full thickness wedge repair was deemed most appropriate. Using a sterile surgical marker, the appropriate repair was drawn incorporating the defect and placing the expected incisions perpendicular to the vermilion border.  The vermilion border was also meticulously outlined to ensure appropriate reapproximation during the repair.  The area thus outlined was incised through and through with a #15 scalpel blade.  The muscularis and dermis were reaproximated with deep sutures following hemostasis. Care was taken to realign the vermilion border before proceeding with the superficial closure.  Once the vermilion was realigned the superfical and mucosal closure was finished.
Ftsg Text: The defect edges were debeveled with a #15 scalpel blade. Given the location of the defect, shape of the defect and the proximity to free margins a full thickness skin graft was deemed most appropriate. Using a sterile surgical marker, the primary defect shape was transferred to the donor site. The area thus outlined was incised deep to adipose tissue with a #15 scalpel blade.  The harvested graft was then trimmed of adipose tissue until only dermis and epidermis was left.  The skin margins of the secondary defect were undermined to an appropriate distance in all directions utilizing iris scissors.  The secondary defect was closed with interrupted buried subcutaneous sutures.  The skin edges were then re-apposed with running  sutures.  The skin graft was then placed in the primary defect and oriented appropriately.
Split-Thickness Skin Graft Text: The defect edges were debeveled with a #15 scalpel blade. Given the location of the defect, shape of the defect and the proximity to free margins a split thickness skin graft was deemed most appropriate. Using a sterile surgical marker, the primary defect shape was transferred to the donor site. The split thickness graft was then harvested.  The skin graft was then placed in the primary defect and oriented appropriately.
Pinch Graft Text: The defect edges were debeveled with a #15 scalpel blade. Given the location of the defect, shape of the defect and the proximity to free margins a pinch graft was deemed most appropriate. Using a sterile surgical marker, the primary defect shape was transferred to the donor site. The area thus outlined was incised deep to adipose tissue with a #15 scalpel blade.  The harvested graft was then trimmed of adipose tissue until only dermis and epidermis was left. The skin margins of the secondary defect were undermined to an appropriate distance in all directions utilizing iris scissors.  The secondary defect was closed with interrupted buried subcutaneous sutures.  The skin edges were then re-apposed with running  sutures.  The skin graft was then placed in the primary defect and oriented appropriately.
Burow's Graft Text: The defect edges were debeveled with a #15 scalpel blade. Given the location of the defect, shape of the defect, the proximity to free margins and the presence of a standing cone deformity a Burow's skin graft was deemed most appropriate. The standing cone was removed and this tissue was then trimmed to the shape of the primary defect. The adipose tissue was also removed until only dermis and epidermis were left.  The skin margins of the secondary defect were undermined to an appropriate distance in all directions utilizing iris scissors.  The secondary defect was closed with interrupted buried subcutaneous sutures.  The skin edges were then re-apposed with running  sutures.  The skin graft was then placed in the primary defect and oriented appropriately.
Cartilage Graft Text: The defect edges were debeveled with a #15 scalpel blade. Given the location of the defect, shape of the defect, the fact the defect involved a full thickness cartilage defect a cartilage graft was deemed most appropriate.  An appropriate donor site was identified, cleansed, and anesthetized. The cartilage graft was then harvested and transferred to the recipient site, oriented appropriately and then sutured into place.  The secondary defect was then repaired using a primary closure.
Composite Graft Text: The defect edges were debeveled with a #15 scalpel blade. Given the location of the defect, shape of the defect, the proximity to free margins and the fact the defect was full thickness a composite graft was deemed most appropriate.  The defect was outline and then transferred to the donor site.  A full thickness graft was then excised from the donor site. The graft was then placed in the primary defect, oriented appropriately and then sutured into place.  The secondary defect was then repaired using a primary closure.
Epidermal Autograft Text: The defect edges were debeveled with a #15 scalpel blade. Given the location of the defect, shape of the defect and the proximity to free margins an epidermal autograft was deemed most appropriate. Using a sterile surgical marker, the primary defect shape was transferred to the donor site. The epidermal graft was then harvested.  The skin graft was then placed in the primary defect and oriented appropriately.
Dermal Autograft Text: The defect edges were debeveled with a #15 scalpel blade. Given the location of the defect, shape of the defect and the proximity to free margins a dermal autograft was deemed most appropriate. Using a sterile surgical marker, the primary defect shape was transferred to the donor site. The area thus outlined was incised deep to adipose tissue with a #15 scalpel blade.  The harvested graft was then trimmed of adipose and epidermal tissue until only dermis was left.  The skin graft was then placed in the primary defect and oriented appropriately.
Skin Substitute Text: The defect edges were debeveled with a #15 scalpel blade. Given the location of the defect, shape of the defect and the proximity to free margins a skin substitute graft was deemed most appropriate.  The graft material was trimmed to fit the size of the defect. The graft was then placed in the primary defect and oriented appropriately.
Skin Substitute Paste Text: The defect edges were debeveled with a #15 scalpel blade. Given the location of the defect, shape of the defect and the proximity to free margins a skin substitute micronized graft was deemed most appropriate.  The entire vial contents were admixed with 0.5ccs of sterile saline, formed into a paste and then evenly spread over the entire wound bed.
Skin Substitute Injection Text: The defect edges were debeveled with a #15 scalpel blade. Given the location of the defect, shape of the defect and the proximity to free margins a skin substitute micronized graft was deemed most appropriate.  The entire vial contents were admixed with 3.0ccs of sterile saline and then injected subcutaneously throughout the entire wound bed.
Tissue Cultured Epidermal Autograft Text: The defect edges were debeveled with a #15 scalpel blade. Given the location of the defect, shape of the defect and the proximity to free margins a tissue cultured epidermal autograft was deemed most appropriate.  The graft was then trimmed to fit the size of the defect.  The graft was then placed in the primary defect and oriented appropriately.
Xenograft Text: The defect edges were debeveled with a #15 scalpel blade. Given the location of the defect, shape of the defect and the proximity to free margins a xenograft was deemed most appropriate.  The graft was then trimmed to fit the size of the defect.  The graft was then placed in the primary defect and oriented appropriately.
Purse String (Simple) Text: Given the location of the defect and the characteristics of the surrounding skin a purse string closure was deemed most appropriate.  Undermining was performed circumferentially around the surgical defect.  A purse string suture was then placed and tightened.
Purse String (Intermediate) Text: Given the location of the defect and the characteristics of the surrounding skin a purse string intermediate closure was deemed most appropriate.  Undermining was performed circumferentially around the surgical defect.  A purse string suture was then placed and tightened.
Partial Purse String (Simple) Text: Given the location of the defect and the characteristics of the surrounding skin a simple purse string closure was deemed most appropriate.  Undermining was performed circumferentially around the surgical defect.  A purse string suture was then placed and tightened. Wound tension only allowed a partial closure of the circular defect.
Partial Purse String (Intermediate) Text: Given the location of the defect and the characteristics of the surrounding skin an intermediate purse string closure was deemed most appropriate.  Undermining was performed circumferentially around the surgical defect.  A purse string suture was then placed and tightened. Wound tension only allowed a partial closure of the circular defect.
Localized Dermabrasion With Wire Brush Text: The patient was draped in routine manner.  Localized dermabrasion using 3 x 17 mm wire brush was performed in routine manner to papillary dermis. This spot dermabrasion is being performed to complete skin cancer reconstruction. It also will eliminate the other sun damaged precancerous cells that are known to be part of the regional effect of a lifetime's worth of sun exposure. This localized dermabrasion is therapeutic and should not be considered cosmetic in any regard.
Tarsorrhaphy Text: A tarsorrhaphy was performed using Frost sutures.
Intermediate Repair And Flap Additional Text (Will Appearing After The Standard Complex Repair Text): The intermediate repair was not sufficient to completely close the primary defect. The remaining additional defect was repaired with the flap mentioned below.
Intermediate Repair And Graft Additional Text (Will Appearing After The Standard Complex Repair Text): The intermediate repair was not sufficient to completely close the primary defect. The remaining additional defect was repaired with the graft mentioned below.
Complex Repair And Flap Additional Text (Will Appearing After The Standard Complex Repair Text): The complex repair was not sufficient to completely close the primary defect. The remaining additional defect was repaired with the flap mentioned below.
Complex Repair And Graft Additional Text (Will Appearing After The Standard Complex Repair Text): The complex repair was not sufficient to completely close the primary defect. The remaining additional defect was repaired with the graft mentioned below.
Eyelid Full Thickness Repair - 16764: The eyelid defect was full thickness which required a wedge repair of the eyelid. Special care was taken to ensure that the eyelid margin was realligned when placing sutures.
Eyelid Partial Thickness Repair - 29116: The eyelid defect was partial thickness which required a wedge repair of the eyelid. Special care was taken to ensure that the eyelid margin was realligned when placing sutures.
Cheek Interpolation Flap Division And Inset Text: Division and inset of the cheek interpolation flap was performed to achieve optimal aesthetic result, restore normal anatomic appearance and avoid distortion of normal anatomy, expedite and facilitate wound healing, achieve optimal functional result and because linear closure either not possible or would produce suboptimal result. The patient was prepped and draped in the usual manner. The pedicle was infiltrated with local anesthesia. The pedicle was sectioned with a #15 blade. The pedicle was de-bulked and trimmed to match the shape of the defect. Hemostasis was achieved. The flap donor site and free margin of the flap were secured with deep buried sutures and the wound edges were re-approximated.
Cheek To Nose Interpolation Flap Division And Inset Text: Division and inset of the cheek to nose interpolation flap was performed to achieve optimal aesthetic result, restore normal anatomic appearance and avoid distortion of normal anatomy, expedite and facilitate wound healing, achieve optimal functional result and because linear closure either not possible or would produce suboptimal result. The patient was prepped and draped in the usual manner. The pedicle was infiltrated with local anesthesia. The pedicle was sectioned with a #15 blade. The pedicle was de-bulked and trimmed to match the shape of the defect. Hemostasis was achieved. The flap donor site and free margin of the flap were secured with deep buried sutures and the wound edges were re-approximated.
Melolabial Interpolation Flap Division And Inset Text: Division and inset of the melolabial interpolation flap was performed to achieve optimal aesthetic result, restore normal anatomic appearance and avoid distortion of normal anatomy, expedite and facilitate wound healing, achieve optimal functional result and because linear closure either not possible or would produce suboptimal result. The patient was prepped and draped in the usual manner. The pedicle was infiltrated with local anesthesia. The pedicle was sectioned with a #15 blade. The pedicle was de-bulked and trimmed to match the shape of the defect. Hemostasis was achieved. The flap donor site and free margin of the flap were secured with deep buried sutures and the wound edges were re-approximated.
Mastoid Interpolation Flap Division And Inset Text: Division and inset of the mastoid interpolation flap was performed to achieve optimal aesthetic result, restore normal anatomic appearance and avoid distortion of normal anatomy, expedite and facilitate wound healing, achieve optimal functional result and because linear closure either not possible or would produce suboptimal result. The patient was prepped and draped in the usual manner. The pedicle was infiltrated with local anesthesia. The pedicle was sectioned with a #15 blade. The pedicle was de-bulked and trimmed to match the shape of the defect. Hemostasis was achieved. The flap donor site and free margin of the flap were secured with deep buried sutures and the wound edges were re-approximated.
Paramedian Forehead Flap Division And Inset Text: Division and inset of the paramedian forehead flap was performed to achieve optimal aesthetic result, restore normal anatomic appearance and avoid distortion of normal anatomy, expedite and facilitate wound healing, achieve optimal functional result and because linear closure either not possible or would produce suboptimal result. The patient was prepped and draped in the usual manner. The pedicle was infiltrated with local anesthesia. The pedicle was sectioned with a #15 blade. The pedicle was de-bulked and trimmed to match the shape of the defect. Hemostasis was achieved. The flap donor site and free margin of the flap were secured with deep buried sutures and the wound edges were re-approximated.
Posterior Auricular Interpolation Flap Division And Inset Text: Division and inset of the posterior auricular interpolation flap was performed to achieve optimal aesthetic result, restore normal anatomic appearance and avoid distortion of normal anatomy, expedite and facilitate wound healing, achieve optimal functional result and because linear closure either not possible or would produce suboptimal result. The patient was prepped and draped in the usual manner. The pedicle was infiltrated with local anesthesia. The pedicle was sectioned with a #15 blade. The pedicle was de-bulked and trimmed to match the shape of the defect. Hemostasis was achieved. The flap donor site and free margin of the flap were secured with deep buried sutures and the wound edges were re-approximated.
Manual Repair Warning Statement: We plan on removing the manually selected variable below in favor of our much easier automatic structured text blocks found in the previous tab. We decided to do this to help make the flow better and give you the full power of structured data. Manual selection is never going to be ideal in our platform and I would encourage you to avoid using manual selection from this point on, especially since I will be sunsetting this feature. It is important that you do one of two things with the customized text below. First, you can save all of the text in a word file so you can have it for future reference. Second, transfer the text to the appropriate area in the Library tab. Lastly, if there is a flap or graft type which we do not have you need to let us know right away so I can add it in before the variable is hidden. No need to panic, we plan to give you roughly 6 months to make the change.
Consent: The rationale for the repair was explained to the patient and consent was obtained. The risks, benefits and alternatives to therapy were discussed in detail. Specifically, the risks of infection, scarring, bleeding, prolonged wound healing, incomplete removal, allergy to anesthesia, nerve injury and recurrence were addressed. Prior to the procedure, the treatment site was clearly identified and confirmed by the patient. All components of Universal Protocol/PAUSE Rule completed.
Post-Care Instructions: I reviewed with the patient in detail post-care instructions. Patient is not to engage in any heavy lifting, exercise, or swimming for the next 14 days. Should the patient develop any fevers, chills, bleeding, severe pain patient will contact the office immediately.
Pain Refusal Text: I offered to prescribe pain medication but the patient refused to take this medication.
Where Do You Want The Question To Include Opioid Counseling Located?: Case Summary Tab
Information: Selecting Yes will display possible errors in your note based on the variables you have selected. This validation is only offered as a suggestion for you. PLEASE NOTE THAT THE VALIDATION TEXT WILL BE REMOVED WHEN YOU FINALIZE YOUR NOTE. IF YOU WANT TO FAX A PRELIMINARY NOTE YOU WILL NEED TO TOGGLE THIS TO 'NO' IF YOU DO NOT WANT IT IN YOUR FAXED NOTE.

## 2024-02-08 NOTE — PROCEDURE: MOHS SURGERY
Mohs Case Number: 
Biopsy Photograph Reviewed: Yes
Referring Physician (Optional): CTW
Consent Type: Consent 1 (Standard)
Eye Shield Used: No
Initial Size Of Lesion: 0.5
Number Of Stages: 1
Primary Defect Length In Cm (Final Defect Size - Required For Flaps/Grafts): 0.9
Repair Type: Repair performed by another provider
Which Eyelid Repair Cpt Are You Using?: 49084
Oculoplastic Surgeon Procedure Text (A): After obtaining clear surgical margins the patient was sent to oculoplastics for surgical repair.  The patient understands they will receive post-surgical care and follow-up from the referring physician's office.
Otolaryngologist Procedure Text (A): After obtaining clear surgical margins the patient was sent to otolaryngology for surgical repair.  The patient understands they will receive post-surgical care and follow-up from the referring physician's office.
Plastic Surgeon Procedure Text (A): After obtaining clear surgical margins the patient was sent to plastics for surgical repair.  The patient understands they will receive post-surgical care and follow-up from the referring physician's office.
Mid-Level Procedure Text (A): After obtaining clear surgical margins the patient was sent to a mid-level provider for surgical repair.  The patient understands they will receive post-surgical care and follow-up from the mid-level provider.
Which Provider Will Be Performing The Repair?: A
Provider (A): Dr. Plummer
Provider Procedure Text (A): After obtaining clear surgical margins the defect was repaired by another provider.
Asc Procedure Text (A): After obtaining clear surgical margins the patient was sent to an ASC for surgical repair.  The patient understands they will receive post-surgical care and follow-up from the ASC physician.
Simple / Intermediate / Complex Repair - Final Wound Length In Cm: 0
Suturegard Retention Suture: 2-0 Nylon
Retention Suture Bite Size: 3 mm
Length To Time In Minutes Device Was In Place: 10
Undermining Type: Entire Wound
Debridement Text: The wound edges were debrided prior to proceeding with the closure to facilitate wound healing.
Helical Rim Text: The closure involved the helical rim.
Vermilion Border Text: The closure involved the vermilion border.
Nostril Rim Text: The closure involved the nostril rim.
Retention Suture Text: Retention sutures were placed to support the closure and prevent dehiscence.
Patient Specific Indications Override: H
Area H Indication Text: Tumors in this location are included in Area H (eyelids, eyebrows, nose, lips, chin, ear, pre-auricular, post-auricular, temple, genitalia, hands, feet, ankles and areola).  Tissue conservation is critical in these anatomic locations.
Area M Indication Text: Tumors in this location are included in Area M (cheek, forehead, scalp, neck, jawline and pretibial skin).  Mohs surgery is indicated for tumors in these anatomic locations.
Area L Indication Text: Tumors in this location are included in Area L (trunk and extremities).  Mohs surgery is indicated for larger tumors, or tumors with aggressive histologic features, in these anatomic locations.
Depth Of Tumor Invasion (For Histology): tumor not visualized
Perineural Invasion (For Histology - Be Specific If Possible): absent
Presence Of Scar Tissue (For Histology): present
Special Stains Stage 1 - Results: Base On Clearance Noted Above
Stage 2: Additional Anesthesia Type: 1% lidocaine with epinephrine
Staging Info: By selecting yes to the question above you will include information on AJCC 8 tumor staging in your Mohs note. Information on tumor staging will be automatically added for SCCs on the head and neck. AJCC 8 includes tumor size, tumor depth, perineural involvement and bone invasion.
Tumor Depth: Less than 6mm from granular layer and no invasion beyond the subcutaneous fat
Was The Patient On Physician Recommended Anticoagulation Therapy?: Please Select the Appropriate Response
Medical Necessity Statement: Based on my medical judgement, Mohs surgery is the most appropriate treatment for this cancer compared to other treatments.
Alternatives Discussed Intro (Do Not Add Period): I discussed alternative treatments to Mohs surgery and specifically discussed the risks and benefits of
Consent 1/Introductory Paragraph: The rationale for Mohs was explained to the patient and consent was obtained. The risks, benefits and alternatives to therapy were discussed in detail. Specifically, the risks of infection, scarring, bleeding, prolonged wound healing, incomplete removal, allergy to anesthesia, nerve injury and recurrence were addressed. Prior to the procedure, the treatment site was clearly identified and confirmed by the patient. All components of Universal Protocol/PAUSE Rule completed.
Consent 2/Introductory Paragraph: Mohs surgery was explained to the patient and consent was obtained. The risks, benefits and alternatives to therapy were discussed in detail. Specifically, the risks of infection, scarring, bleeding, prolonged wound healing, incomplete removal, allergy to anesthesia, nerve injury and recurrence were addressed. Prior to the procedure, the treatment site was clearly identified and confirmed by the patient. All components of Universal Protocol/PAUSE Rule completed.
Consent 3/Introductory Paragraph: I gave the patient a chance to ask questions they had about the procedure.  Following this I explained the Mohs procedure and consent was obtained. The risks, benefits and alternatives to therapy were discussed in detail. Specifically, the risks of infection, scarring, bleeding, prolonged wound healing, incomplete removal, allergy to anesthesia, nerve injury and recurrence were addressed. Prior to the procedure, the treatment site was clearly identified and confirmed by the patient. All components of Universal Protocol/PAUSE Rule completed.
Consent (Temporal Branch)/Introductory Paragraph: The rationale for Mohs was explained to the patient and consent was obtained. The risks, benefits and alternatives to therapy were discussed in detail. Specifically, the risks of damage to the temporal branch of the facial nerve, infection, scarring, bleeding, prolonged wound healing, incomplete removal, allergy to anesthesia, and recurrence were addressed. Prior to the procedure, the treatment site was clearly identified and confirmed by the patient. All components of Universal Protocol/PAUSE Rule completed.
Consent (Marginal Mandibular)/Introductory Paragraph: The rationale for Mohs was explained to the patient and consent was obtained. The risks, benefits and alternatives to therapy were discussed in detail. Specifically, the risks of damage to the marginal mandibular branch of the facial nerve, infection, scarring, bleeding, prolonged wound healing, incomplete removal, allergy to anesthesia, and recurrence were addressed. Prior to the procedure, the treatment site was clearly identified and confirmed by the patient. All components of Universal Protocol/PAUSE Rule completed.
Consent (Spinal Accessory)/Introductory Paragraph: The rationale for Mohs was explained to the patient and consent was obtained. The risks, benefits and alternatives to therapy were discussed in detail. Specifically, the risks of damage to the spinal accessory nerve, infection, scarring, bleeding, prolonged wound healing, incomplete removal, allergy to anesthesia, and recurrence were addressed. Prior to the procedure, the treatment site was clearly identified and confirmed by the patient. All components of Universal Protocol/PAUSE Rule completed.
Consent (Near Eyelid Margin)/Introductory Paragraph: The rationale for Mohs was explained to the patient and consent was obtained. The risks, benefits and alternatives to therapy were discussed in detail. Specifically, the risks of ectropion or eyelid deformity, infection, scarring, bleeding, prolonged wound healing, incomplete removal, allergy to anesthesia, nerve injury and recurrence were addressed. Prior to the procedure, the treatment site was clearly identified and confirmed by the patient. All components of Universal Protocol/PAUSE Rule completed.
Consent (Ear)/Introductory Paragraph: The rationale for Mohs was explained to the patient and consent was obtained. The risks, benefits and alternatives to therapy were discussed in detail. Specifically, the risks of ear deformity, infection, scarring, bleeding, prolonged wound healing, incomplete removal, allergy to anesthesia, nerve injury and recurrence were addressed. Prior to the procedure, the treatment site was clearly identified and confirmed by the patient. All components of Universal Protocol/PAUSE Rule completed.
Consent (Nose)/Introductory Paragraph: The rationale for Mohs was explained to the patient and consent was obtained. The risks, benefits and alternatives to therapy were discussed in detail. Specifically, the risks of nasal deformity, changes in the flow of air through the nose, infection, scarring, bleeding, prolonged wound healing, incomplete removal, allergy to anesthesia, nerve injury and recurrence were addressed. Prior to the procedure, the treatment site was clearly identified and confirmed by the patient. All components of Universal Protocol/PAUSE Rule completed.
Consent (Lip)/Introductory Paragraph: The rationale for Mohs was explained to the patient and consent was obtained. The risks, benefits and alternatives to therapy were discussed in detail. Specifically, the risks of lip deformity, changes in the oral aperture, infection, scarring, bleeding, prolonged wound healing, incomplete removal, allergy to anesthesia, nerve injury and recurrence were addressed. Prior to the procedure, the treatment site was clearly identified and confirmed by the patient. All components of Universal Protocol/PAUSE Rule completed.
Consent (Scalp)/Introductory Paragraph: The rationale for Mohs was explained to the patient and consent was obtained. The risks, benefits and alternatives to therapy were discussed in detail. Specifically, the risks of changes in hair growth pattern secondary to repair, infection, scarring, bleeding, prolonged wound healing, incomplete removal, allergy to anesthesia, nerve injury and recurrence were addressed. Prior to the procedure, the treatment site was clearly identified and confirmed by the patient. All components of Universal Protocol/PAUSE Rule completed.
Detail Level: Detailed
Postop Diagnosis: same
Anesthesia Type: 1% Xylocaine with [id number] epinephrine and sodium bicarbonate
Anesthesia Volume In Cc: 3
Hemostasis: Electrodesiccation
Estimated Blood Loss (Cc): minimal
Brow Lift Text: A midfrontal incision was made medially to the defect to allow access to the tissues just superior to the left eyebrow. Following careful dissection inferiorly in a supraperiosteal plane to the level of the left eyebrow, several 3-0 monocryl sutures were used to resuspend the eyebrow orbicularis oculi muscular unit to the superior frontal bone periosteum. This resulted in an appropriate reapproximation of static eyebrow symmetry and correction of the left brow ptosis.
Suturegard Intro: Intraoperative tissue expansion was performed, utilizing the SUTUREGARD device, in order to reduce wound tension.
Suturegard Body: The suture ends were repeatedly re-tightened and re-clamped to achieve the desired tissue expansion.
Hemigard Intro: Due to skin fragility and wound tension, it was decided to use HEMIGARD adhesive retention suture devices to permit a linear closure. The skin was cleaned and dried for a 6cm distance away from the wound. Excessive hair, if present, was removed to allow for adhesion.
Hemigard Postcare Instructions: The HEMIGARD strips are to remain completely dry for at least 5-7 days.
Donor Site Anesthesia Type: same as repair anesthesia
Graft Basting Suture (Optional): 5-0 Prolene
Graft Donor Site Dermal Sutures (Optional): 5-0 Monocryl
Epidermal Closure Graft Donor Site (Optional): running
Graft Donor Site Bandage (Optional-Leave Blank If You Don't Want In Note): Steri-strips and a pressure bandage were applied to the donor site.
Closure 2 Information: This tab is for additional flaps and grafts, including complex repair and grafts and complex repair and flaps. You can also specify a different location for the additional defect, if the location is the same you do not need to select a new one. We will insert the automated text for the repair you select below just as we do for solitary flaps and grafts. Please note that at this time if you select a location with a different insurance zone you will need to override the ICD10 and CPT if appropriate.
Closure 3 Information: This tab is for additional flaps and grafts above and beyond our usual structured repairs.  Please note if you enter information here it will not currently bill and you will need to add the billing information manually.
Wound Care: Petrolatum
Dressing: pressure dressing
Unna Boot Text: An Unna boot was placed to help immobilize the limb and facilitate more rapid healing.
Home Suture Removal Text: Patient was provided instructions on removing sutures and will remove their sutures at home.  If they have any questions or difficulties they will call the office.
Post-Care Instructions: I reviewed with the patient in detail post-care instructions. Patient is not to engage in any heavy lifting, exercise, or swimming for the next 14 days. Should the patient develop any fevers, chills, bleeding, severe pain patient will contact the office immediately.
Pain Refusal Text: I offered to prescribe pain medication but the patient refused to take this medication.
Mauc Instructions: By selecting yes to the question below the MAUC number will be added into the note.  This will be calculated automatically based on the diagnosis chosen, the size entered, the body zone selected (H,M,L) and the specific indications you chose. You will also have the option to override the Mohs AUC if you disagree with the automatically calculated number and this option is found in the Case Summary tab.
Where Do You Want The Question To Include Opioid Counseling Located?: Case Summary Tab
Eye Protection Verbiage: Before proceeding with the stage, a plastic scleral shield was inserted. The globe was anesthetized with a few drops of 1% lidocaine with 1:100,000 epinephrine. Then, an appropriate sized scleral shield was chosen and coated with lacrilube ointment. The shield was gently inserted and left in place for the duration of each stage. After the stage was completed, the shield was gently removed.
Mohs Method Verbiage: An incision at a 45 degree angle following the standard Mohs approach was done and the specimen was harvested as a microscopic controlled layer.
Surgeon/Pathologist Verbiage (Will Incorporate Name Of Surgeon From Intro If Not Blank): operated in two distinct and integrated capacities as the surgeon and pathologist.
Mohs Histo Method Verbiage: Each section was then chromacoded and processed in the Mohs lab using the Mohs protocol and submitted for frozen section.
Subsequent Stages Histo Method Verbiage: Using a similar technique to that described above, a thin layer of tissue was removed from all areas where tumor was visible on the previous stage.  The tissue was again oriented, mapped, dyed, and processed as above.
Mohs Rapid Report Verbiage: The area of clinically evident tumor was marked with skin marking ink and appropriately hatched.  The initial incision was made following the Mohs approach through the skin.  The specimen was taken to the lab, divided into the necessary number of pieces, chromacoded and processed according to the Mohs protocol.  This was repeated in successive stages until a tumor free defect was achieved.
Complex Repair Preamble Text (Leave Blank If You Do Not Want): Extensive wide undermining was performed.
Intermediate Repair Preamble Text (Leave Blank If You Do Not Want): Undermining was performed with blunt dissection.
Non-Graft Cartilage Fenestration Text: The cartilage was fenestrated with a 2mm punch biopsy to help facilitate healing.
Graft Cartilage Fenestration Text: The cartilage was fenestrated with a 2mm punch biopsy to help facilitate graft survival and healing.
Secondary Intention Text (Leave Blank If You Do Not Want): The defect will heal with secondary intention.
No Repair - Repaired With Adjacent Surgical Defect Text (Leave Blank If You Do Not Want): After obtaining clear surgical margins the defect was repaired concurrently with another surgical defect which was in close approximation.
Adjacent Tissue Transfer Text: The defect edges were debeveled with a #15 scalpel blade.  Given the location of the defect and the proximity to free margins an adjacent tissue transfer was deemed most appropriate.  Using a sterile surgical marker, an appropriate flap was drawn incorporating the defect and placing the expected incisions within the relaxed skin tension lines where possible.    The area thus outlined was incised deep to adipose tissue with a #15 scalpel blade.  The skin margins were undermined to an appropriate distance in all directions utilizing iris scissors.
Advancement Flap (Single) Text: The defect edges were debeveled with a #15 scalpel blade.  Given the location of the defect and the proximity to free margins a single advancement flap was deemed most appropriate.  Using a sterile surgical marker, an appropriate advancement flap was drawn incorporating the defect and placing the expected incisions within the relaxed skin tension lines where possible.    The area thus outlined was incised deep to adipose tissue with a #15 scalpel blade.  The skin margins were undermined to an appropriate distance in all directions utilizing iris scissors.
Advancement Flap (Double) Text: The defect edges were debeveled with a #15 scalpel blade.  Given the location of the defect and the proximity to free margins a double advancement flap was deemed most appropriate.  Using a sterile surgical marker, the appropriate advancement flaps were drawn incorporating the defect and placing the expected incisions within the relaxed skin tension lines where possible.    The area thus outlined was incised deep to adipose tissue with a #15 scalpel blade.  The skin margins were undermined to an appropriate distance in all directions utilizing iris scissors.
Burow's Advancement Flap Text: The defect edges were debeveled with a #15 scalpel blade.  Given the location of the defect and the proximity to free margins a Burow's advancement flap was deemed most appropriate.  Using a sterile surgical marker, the appropriate advancement flap was drawn incorporating the defect and placing the expected incisions within the relaxed skin tension lines where possible.    The area thus outlined was incised deep to adipose tissue with a #15 scalpel blade.  The skin margins were undermined to an appropriate distance in all directions utilizing iris scissors.
Chonodrocutaneous Helical Advancement Flap Text: The defect edges were debeveled with a #15 scalpel blade.  Given the location of the defect and the proximity to free margins a chondrocutaneous helical advancement flap was deemed most appropriate.  Using a sterile surgical marker, the appropriate advancement flap was drawn incorporating the defect and placing the expected incisions within the relaxed skin tension lines where possible.    The area thus outlined was incised deep to adipose tissue with a #15 scalpel blade.  The skin margins were undermined to an appropriate distance in all directions utilizing iris scissors.
Crescentic Advancement Flap Text: The defect edges were debeveled with a #15 scalpel blade.  Given the location of the defect and the proximity to free margins a crescentic advancement flap was deemed most appropriate.  Using a sterile surgical marker, the appropriate advancement flap was drawn incorporating the defect and placing the expected incisions within the relaxed skin tension lines where possible.    The area thus outlined was incised deep to adipose tissue with a #15 scalpel blade.  The skin margins were undermined to an appropriate distance in all directions utilizing iris scissors.
A-T Advancement Flap Text: The defect edges were debeveled with a #15 scalpel blade.  Given the location of the defect, shape of the defect and the proximity to free margins an A-T advancement flap was deemed most appropriate.  Using a sterile surgical marker, an appropriate advancement flap was drawn incorporating the defect and placing the expected incisions within the relaxed skin tension lines where possible.    The area thus outlined was incised deep to adipose tissue with a #15 scalpel blade.  The skin margins were undermined to an appropriate distance in all directions utilizing iris scissors.
O-T Advancement Flap Text: The defect edges were debeveled with a #15 scalpel blade.  Given the location of the defect, shape of the defect and the proximity to free margins an O-T advancement flap was deemed most appropriate.  Using a sterile surgical marker, an appropriate advancement flap was drawn incorporating the defect and placing the expected incisions within the relaxed skin tension lines where possible.    The area thus outlined was incised deep to adipose tissue with a #15 scalpel blade.  The skin margins were undermined to an appropriate distance in all directions utilizing iris scissors.
O-L Flap Text: The defect edges were debeveled with a #15 scalpel blade.  Given the location of the defect, shape of the defect and the proximity to free margins an O-L flap was deemed most appropriate.  Using a sterile surgical marker, an appropriate advancement flap was drawn incorporating the defect and placing the expected incisions within the relaxed skin tension lines where possible.    The area thus outlined was incised deep to adipose tissue with a #15 scalpel blade.  The skin margins were undermined to an appropriate distance in all directions utilizing iris scissors.
O-Z Flap Text: The defect edges were debeveled with a #15 scalpel blade.  Given the location of the defect, shape of the defect and the proximity to free margins an O-Z flap was deemed most appropriate.  Using a sterile surgical marker, an appropriate transposition flap was drawn incorporating the defect and placing the expected incisions within the relaxed skin tension lines where possible. The area thus outlined was incised deep to adipose tissue with a #15 scalpel blade.  The skin margins were undermined to an appropriate distance in all directions utilizing iris scissors.
Double O-Z Flap Text: The defect edges were debeveled with a #15 scalpel blade.  Given the location of the defect, shape of the defect and the proximity to free margins a Double O-Z flap was deemed most appropriate.  Using a sterile surgical marker, an appropriate transposition flap was drawn incorporating the defect and placing the expected incisions within the relaxed skin tension lines where possible. The area thus outlined was incised deep to adipose tissue with a #15 scalpel blade.  The skin margins were undermined to an appropriate distance in all directions utilizing iris scissors.
V-Y Flap Text: The defect edges were debeveled with a #15 scalpel blade.  Given the location of the defect, shape of the defect and the proximity to free margins a V-Y flap was deemed most appropriate.  Using a sterile surgical marker, an appropriate advancement flap was drawn incorporating the defect and placing the expected incisions within the relaxed skin tension lines where possible.    The area thus outlined was incised deep to adipose tissue with a #15 scalpel blade.  The skin margins were undermined to an appropriate distance in all directions utilizing iris scissors.
Advancement-Rotation Flap Text: The defect edges were debeveled with a #15 scalpel blade.  Given the location of the defect, shape of the defect and the proximity to free margins an advancement-rotation flap was deemed most appropriate.  Using a sterile surgical marker, an appropriate flap was drawn incorporating the defect and placing the expected incisions within the relaxed skin tension lines where possible. The area thus outlined was incised deep to adipose tissue with a #15 scalpel blade.  The skin margins were undermined to an appropriate distance in all directions utilizing iris scissors.
Mercedes Flap Text: The defect edges were debeveled with a #15 scalpel blade.  Given the location of the defect, shape of the defect and the proximity to free margins a Mercedes flap was deemed most appropriate.  Using a sterile surgical marker, an appropriate advancement flap was drawn incorporating the defect and placing the expected incisions within the relaxed skin tension lines where possible. The area thus outlined was incised deep to adipose tissue with a #15 scalpel blade.  The skin margins were undermined to an appropriate distance in all directions utilizing iris scissors.
Modified Advancement Flap Text: The defect edges were debeveled with a #15 scalpel blade.  Given the location of the defect, shape of the defect and the proximity to free margins a modified advancement flap was deemed most appropriate.  Using a sterile surgical marker, an appropriate advancement flap was drawn incorporating the defect and placing the expected incisions within the relaxed skin tension lines where possible.    The area thus outlined was incised deep to adipose tissue with a #15 scalpel blade.  The skin margins were undermined to an appropriate distance in all directions utilizing iris scissors.
Mucosal Advancement Flap Text: Given the location of the defect, shape of the defect and the proximity to free margins a mucosal advancement flap was deemed most appropriate. Incisions were made with a 15 blade scalpel in the appropriate fashion along the cutaneous vermilion border and the mucosal lip. The remaining actinically damaged mucosal tissue was excised.  The mucosal advancement flap was then elevated to the gingival sulcus with care taken to preserve the neurovascular structures and advanced into the primary defect. Care was taken to ensure that precise realignment of the vermilion border was achieved.
Peng Advancement Flap Text: The defect edges were debeveled with a #15 scalpel blade.  Given the location of the defect, shape of the defect and the proximity to free margins a Peng advancement flap was deemed most appropriate.  Using a sterile surgical marker, an appropriate advancement flap was drawn incorporating the defect and placing the expected incisions within the relaxed skin tension lines where possible. The area thus outlined was incised deep to adipose tissue with a #15 scalpel blade.  The skin margins were undermined to an appropriate distance in all directions utilizing iris scissors.
Hatchet Flap Text: The defect edges were debeveled with a #15 scalpel blade.  Given the location of the defect, shape of the defect and the proximity to free margins a hatchet flap was deemed most appropriate.  Using a sterile surgical marker, an appropriate hatchet flap was drawn incorporating the defect and placing the expected incisions within the relaxed skin tension lines where possible.    The area thus outlined was incised deep to adipose tissue with a #15 scalpel blade.  The skin margins were undermined to an appropriate distance in all directions utilizing iris scissors.
Rotation Flap Text: The defect edges were debeveled with a #15 scalpel blade.  Given the location of the defect, shape of the defect and the proximity to free margins a rotation flap was deemed most appropriate.  Using a sterile surgical marker, an appropriate rotation flap was drawn incorporating the defect and placing the expected incisions within the relaxed skin tension lines where possible.    The area thus outlined was incised deep to adipose tissue with a #15 scalpel blade.  The skin margins were undermined to an appropriate distance in all directions utilizing iris scissors.
Bilateral Rotation Flap Text: The defect edges were debeveled with a #15 scalpel blade. Given the location of the defect, shape of the defect and the proximity to free margins a bilateral rotation flap was deemed most appropriate. Using a sterile surgical marker, an appropriate rotation flap was drawn incorporating the defect and placing the expected incisions within the relaxed skin tension lines where possible. The area thus outlined was incised deep to adipose tissue with a #15 scalpel blade. The skin margins were undermined to an appropriate distance in all directions utilizing iris scissors. Following this, the designed flap was carried over into the primary defect and sutured into place.
Spiral Flap Text: The defect edges were debeveled with a #15 scalpel blade.  Given the location of the defect, shape of the defect and the proximity to free margins a spiral flap was deemed most appropriate.  Using a sterile surgical marker, an appropriate rotation flap was drawn incorporating the defect and placing the expected incisions within the relaxed skin tension lines where possible. The area thus outlined was incised deep to adipose tissue with a #15 scalpel blade.  The skin margins were undermined to an appropriate distance in all directions utilizing iris scissors.
Staged Advancement Flap Text: The defect edges were debeveled with a #15 scalpel blade.  Given the location of the defect, shape of the defect and the proximity to free margins a staged advancement flap was deemed most appropriate.  Using a sterile surgical marker, an appropriate advancement flap was drawn incorporating the defect and placing the expected incisions within the relaxed skin tension lines where possible. The area thus outlined was incised deep to adipose tissue with a #15 scalpel blade.  The skin margins were undermined to an appropriate distance in all directions utilizing iris scissors.
Star Wedge Flap Text: The defect edges were debeveled with a #15 scalpel blade.  Given the location of the defect, shape of the defect and the proximity to free margins a star wedge flap was deemed most appropriate.  Using a sterile surgical marker, an appropriate rotation flap was drawn incorporating the defect and placing the expected incisions within the relaxed skin tension lines where possible. The area thus outlined was incised deep to adipose tissue with a #15 scalpel blade.  The skin margins were undermined to an appropriate distance in all directions utilizing iris scissors.
Transposition Flap Text: The defect edges were debeveled with a #15 scalpel blade.  Given the location of the defect and the proximity to free margins a transposition flap was deemed most appropriate.  Using a sterile surgical marker, an appropriate transposition flap was drawn incorporating the defect.    The area thus outlined was incised deep to adipose tissue with a #15 scalpel blade.  The skin margins were undermined to an appropriate distance in all directions utilizing iris scissors.
Muscle Hinge Flap Text: The defect edges were debeveled with a #15 scalpel blade.  Given the size, depth and location of the defect and the proximity to free margins a muscle hinge flap was deemed most appropriate.  Using a sterile surgical marker, an appropriate hinge flap was drawn incorporating the defect. The area thus outlined was incised with a #15 scalpel blade.  The skin margins were undermined to an appropriate distance in all directions utilizing iris scissors.
Mustarde Flap Text: The defect edges were debeveled with a #15 scalpel blade.  Given the size, depth and location of the defect and the proximity to free margins a Mustarde flap was deemed most appropriate.  Using a sterile surgical marker, an appropriate flap was drawn incorporating the defect. The area thus outlined was incised with a #15 scalpel blade.  The skin margins were undermined to an appropriate distance in all directions utilizing iris scissors.
Nasal Turnover Hinge Flap Text: The defect edges were debeveled with a #15 scalpel blade.  Given the size, depth, location of the defect and the defect being full thickness a nasal turnover hinge flap was deemed most appropriate.  Using a sterile surgical marker, an appropriate hinge flap was drawn incorporating the defect. The area thus outlined was incised with a #15 scalpel blade. The flap was designed to recreate the nasal mucosal lining and the alar rim. The skin margins were undermined to an appropriate distance in all directions utilizing iris scissors.
Nasalis-Muscle-Based Myocutaneous Island Pedicle Flap Text: Using a #15 blade, an incision was made around the donor flap to the level of the nasalis muscle. Wide lateral undermining was then performed in both the subcutaneous plane above the nasalis muscle, and in a submuscular plane just above periosteum. This allowed the formation of a free nasalis muscle axial pedicle (based on the angular artery) which was still attached to the actual cutaneous flap, increasing its mobility and vascular viability. Hemostasis was obtained with pinpoint electrocoagulation. The flap was mobilized into position and the pivotal anchor points positioned and stabilized with buried interrupted sutures. Subcutaneous and dermal tissues were closed in a multilayered fashion with sutures. Tissue redundancies were excised, and the epidermal edges were apposed without significant tension and sutured with sutures.
Orbicularis Oris Muscle Flap Text: The defect edges were debeveled with a #15 scalpel blade.  Given that the defect affected the competency of the oral sphincter an obicularis oris muscle flap was deemed most appropriate to restore this competency and normal muscle function.  Using a sterile surgical marker, an appropriate flap was drawn incorporating the defect. The area thus outlined was incised with a #15 scalpel blade.
Melolabial Transposition Flap Text: The defect edges were debeveled with a #15 scalpel blade.  Given the location of the defect and the proximity to free margins a melolabial flap was deemed most appropriate.  Using a sterile surgical marker, an appropriate melolabial transposition flap was drawn incorporating the defect.    The area thus outlined was incised deep to adipose tissue with a #15 scalpel blade.  The skin margins were undermined to an appropriate distance in all directions utilizing iris scissors.
Rhombic Flap Text: The defect edges were debeveled with a #15 scalpel blade.  Given the location of the defect and the proximity to free margins a rhombic flap was deemed most appropriate.  Using a sterile surgical marker, an appropriate rhombic flap was drawn incorporating the defect.    The area thus outlined was incised deep to adipose tissue with a #15 scalpel blade.  The skin margins were undermined to an appropriate distance in all directions utilizing iris scissors.
Rhomboid Transposition Flap Text: The defect edges were debeveled with a #15 scalpel blade.  Given the location of the defect and the proximity to free margins a rhomboid transposition flap was deemed most appropriate.  Using a sterile surgical marker, an appropriate rhomboid flap was drawn incorporating the defect.    The area thus outlined was incised deep to adipose tissue with a #15 scalpel blade.  The skin margins were undermined to an appropriate distance in all directions utilizing iris scissors.
Bi-Rhombic Flap Text: The defect edges were debeveled with a #15 scalpel blade.  Given the location of the defect and the proximity to free margins a bi-rhombic flap was deemed most appropriate.  Using a sterile surgical marker, an appropriate rhombic flap was drawn incorporating the defect. The area thus outlined was incised deep to adipose tissue with a #15 scalpel blade.  The skin margins were undermined to an appropriate distance in all directions utilizing iris scissors.
Helical Rim Advancement Flap Text: The defect edges were debeveled with a #15 blade scalpel.  Given the location of the defect and the proximity to free margins (helical rim) a double helical rim advancement flap was deemed most appropriate.  Using a sterile surgical marker, the appropriate advancement flaps were drawn incorporating the defect and placing the expected incisions between the helical rim and antihelix where possible.  The area thus outlined was incised through and through with a #15 scalpel blade.  With a skin hook and iris scissors, the flaps were gently and sharply undermined and freed up.
Bilateral Helical Rim Advancement Flap Text: The defect edges were debeveled with a #15 blade scalpel.  Given the location of the defect and the proximity to free margins (helical rim) a bilateral helical rim advancement flap was deemed most appropriate.  Using a sterile surgical marker, the appropriate advancement flaps were drawn incorporating the defect and placing the expected incisions between the helical rim and antihelix where possible.  The area thus outlined was incised through and through with a #15 scalpel blade.  With a skin hook and iris scissors, the flaps were gently and sharply undermined and freed up.
Ear Star Wedge Flap Text: The defect edges were debeveled with a #15 blade scalpel.  Given the location of the defect and the proximity to free margins (helical rim) an ear star wedge flap was deemed most appropriate.  Using a sterile surgical marker, the appropriate flap was drawn incorporating the defect and placing the expected incisions between the helical rim and antihelix where possible.  The area thus outlined was incised through and through with a #15 scalpel blade.
Banner Transposition Flap Text: The defect edges were debeveled with a #15 scalpel blade.  Given the location of the defect and the proximity to free margins a Banner transposition flap was deemed most appropriate.  Using a sterile surgical marker, an appropriate flap drawn around the defect. The area thus outlined was incised deep to adipose tissue with a #15 scalpel blade.  The skin margins were undermined to an appropriate distance in all directions utilizing iris scissors.
Bilobed Flap Text: The defect edges were debeveled with a #15 scalpel blade.  Given the location of the defect and the proximity to free margins a bilobe flap was deemed most appropriate.  Using a sterile surgical marker, an appropriate bilobe flap drawn around the defect.    The area thus outlined was incised deep to adipose tissue with a #15 scalpel blade.  The skin margins were undermined to an appropriate distance in all directions utilizing iris scissors.
Bilobed Transposition Flap Text: The defect edges were debeveled with a #15 scalpel blade.  Given the location of the defect and the proximity to free margins a bilobed transposition flap was deemed most appropriate.  Using a sterile surgical marker, an appropriate bilobe flap drawn around the defect.    The area thus outlined was incised deep to adipose tissue with a #15 scalpel blade.  The skin margins were undermined to an appropriate distance in all directions utilizing iris scissors.
Trilobed Flap Text: The defect edges were debeveled with a #15 scalpel blade.  Given the location of the defect and the proximity to free margins a trilobed flap was deemed most appropriate.  Using a sterile surgical marker, an appropriate trilobed flap drawn around the defect.    The area thus outlined was incised deep to adipose tissue with a #15 scalpel blade.  The skin margins were undermined to an appropriate distance in all directions utilizing iris scissors.
Dorsal Nasal Flap Text: The defect edges were debeveled with a #15 scalpel blade.  Given the location of the defect and the proximity to free margins a dorsal nasal flap was deemed most appropriate.  Using a sterile surgical marker, an appropriate dorsal nasal flap was drawn around the defect.    The area thus outlined was incised deep to adipose tissue with a #15 scalpel blade.  The skin margins were undermined to an appropriate distance in all directions utilizing iris scissors.
Island Pedicle Flap Text: The defect edges were debeveled with a #15 scalpel blade.  Given the location of the defect, shape of the defect and the proximity to free margins an island pedicle advancement flap was deemed most appropriate.  Using a sterile surgical marker, an appropriate advancement flap was drawn incorporating the defect, outlining the appropriate donor tissue and placing the expected incisions within the relaxed skin tension lines where possible.    The area thus outlined was incised deep to adipose tissue with a #15 scalpel blade.  The skin margins were undermined to an appropriate distance in all directions around the primary defect and laterally outward around the island pedicle utilizing iris scissors.  There was minimal undermining beneath the pedicle flap.
Island Pedicle Flap With Canthal Suspension Text: The defect edges were debeveled with a #15 scalpel blade.  Given the location of the defect, shape of the defect and the proximity to free margins an island pedicle advancement flap was deemed most appropriate.  Using a sterile surgical marker, an appropriate advancement flap was drawn incorporating the defect, outlining the appropriate donor tissue and placing the expected incisions within the relaxed skin tension lines where possible. The area thus outlined was incised deep to adipose tissue with a #15 scalpel blade.  The skin margins were undermined to an appropriate distance in all directions around the primary defect and laterally outward around the island pedicle utilizing iris scissors.  There was minimal undermining beneath the pedicle flap. A suspension suture was placed in the canthal tendon to prevent tension and prevent ectropion.
Alar Island Pedicle Flap Text: The defect edges were debeveled with a #15 scalpel blade.  Given the location of the defect, shape of the defect and the proximity to the alar rim an island pedicle advancement flap was deemed most appropriate.  Using a sterile surgical marker, an appropriate advancement flap was drawn incorporating the defect, outlining the appropriate donor tissue and placing the expected incisions within the nasal ala running parallel to the alar rim. The area thus outlined was incised with a #15 scalpel blade.  The skin margins were undermined minimally to an appropriate distance in all directions around the primary defect and laterally outward around the island pedicle utilizing iris scissors.  There was minimal undermining beneath the pedicle flap.
Double Island Pedicle Flap Text: The defect edges were debeveled with a #15 scalpel blade.  Given the location of the defect, shape of the defect and the proximity to free margins a double island pedicle advancement flap was deemed most appropriate.  Using a sterile surgical marker, an appropriate advancement flap was drawn incorporating the defect, outlining the appropriate donor tissue and placing the expected incisions within the relaxed skin tension lines where possible.    The area thus outlined was incised deep to adipose tissue with a #15 scalpel blade.  The skin margins were undermined to an appropriate distance in all directions around the primary defect and laterally outward around the island pedicle utilizing iris scissors.  There was minimal undermining beneath the pedicle flap.
Island Pedicle Flap-Requiring Vessel Identification Text: The defect edges were debeveled with a #15 scalpel blade.  Given the location of the defect, shape of the defect and the proximity to free margins an island pedicle advancement flap was deemed most appropriate.  Using a sterile surgical marker, an appropriate advancement flap was drawn, based on the axial vessel mentioned above, incorporating the defect, outlining the appropriate donor tissue and placing the expected incisions within the relaxed skin tension lines where possible.    The area thus outlined was incised deep to adipose tissue with a #15 scalpel blade.  The skin margins were undermined to an appropriate distance in all directions around the primary defect and laterally outward around the island pedicle utilizing iris scissors.  There was minimal undermining beneath the pedicle flap.
Keystone Flap Text: The defect edges were debeveled with a #15 scalpel blade.  Given the location of the defect, shape of the defect a keystone flap was deemed most appropriate.  Using a sterile surgical marker, an appropriate keystone flap was drawn incorporating the defect, outlining the appropriate donor tissue and placing the expected incisions within the relaxed skin tension lines where possible. The area thus outlined was incised deep to adipose tissue with a #15 scalpel blade.  The skin margins were undermined to an appropriate distance in all directions around the primary defect and laterally outward around the flap utilizing iris scissors.
O-T Plasty Text: The defect edges were debeveled with a #15 scalpel blade.  Given the location of the defect, shape of the defect and the proximity to free margins an O-T plasty was deemed most appropriate.  Using a sterile surgical marker, an appropriate O-T plasty was drawn incorporating the defect and placing the expected incisions within the relaxed skin tension lines where possible.    The area thus outlined was incised deep to adipose tissue with a #15 scalpel blade.  The skin margins were undermined to an appropriate distance in all directions utilizing iris scissors.
O-Z Plasty Text: The defect edges were debeveled with a #15 scalpel blade.  Given the location of the defect, shape of the defect and the proximity to free margins an O-Z plasty (double transposition flap) was deemed most appropriate.  Using a sterile surgical marker, the appropriate transposition flaps were drawn incorporating the defect and placing the expected incisions within the relaxed skin tension lines where possible.    The area thus outlined was incised deep to adipose tissue with a #15 scalpel blade.  The skin margins were undermined to an appropriate distance in all directions utilizing iris scissors.  Hemostasis was achieved with electrocautery.  The flaps were then transposed into place, one clockwise and the other counterclockwise, and anchored with interrupted buried subcutaneous sutures.
Double O-Z Plasty Text: The defect edges were debeveled with a #15 scalpel blade.  Given the location of the defect, shape of the defect and the proximity to free margins a Double O-Z plasty (double transposition flap) was deemed most appropriate.  Using a sterile surgical marker, the appropriate transposition flaps were drawn incorporating the defect and placing the expected incisions within the relaxed skin tension lines where possible. The area thus outlined was incised deep to adipose tissue with a #15 scalpel blade.  The skin margins were undermined to an appropriate distance in all directions utilizing iris scissors.  Hemostasis was achieved with electrocautery.  The flaps were then transposed into place, one clockwise and the other counterclockwise, and anchored with interrupted buried subcutaneous sutures.
V-Y Plasty Text: The defect edges were debeveled with a #15 scalpel blade.  Given the location of the defect, shape of the defect and the proximity to free margins an V-Y advancement flap was deemed most appropriate.  Using a sterile surgical marker, an appropriate advancement flap was drawn incorporating the defect and placing the expected incisions within the relaxed skin tension lines where possible.    The area thus outlined was incised deep to adipose tissue with a #15 scalpel blade.  The skin margins were undermined to an appropriate distance in all directions utilizing iris scissors.
H Plasty Text: Given the location of the defect, shape of the defect and the proximity to free margins a H-plasty was deemed most appropriate for repair.  Using a sterile surgical marker, the appropriate advancement arms of the H-plasty were drawn incorporating the defect and placing the expected incisions within the relaxed skin tension lines where possible. The area thus outlined was incised deep to adipose tissue with a #15 scalpel blade. The skin margins were undermined to an appropriate distance in all directions utilizing iris scissors.  The opposing advancement arms were then advanced into place in opposite direction and anchored with interrupted buried subcutaneous sutures.
W Plasty Text: The lesion was extirpated to the level of the fat with a #15 scalpel blade.  Given the location of the defect, shape of the defect and the proximity to free margins a W-plasty was deemed most appropriate for repair.  Using a sterile surgical marker, the appropriate transposition arms of the W-plasty were drawn incorporating the defect and placing the expected incisions within the relaxed skin tension lines where possible.    The area thus outlined was incised deep to adipose tissue with a #15 scalpel blade.  The skin margins were undermined to an appropriate distance in all directions utilizing iris scissors.  The opposing transposition arms were then transposed into place in opposite direction and anchored with interrupted buried subcutaneous sutures.
Z Plasty Text: The lesion was extirpated to the level of the fat with a #15 scalpel blade.  Given the location of the defect, shape of the defect and the proximity to free margins a Z-plasty was deemed most appropriate for repair.  Using a sterile surgical marker, the appropriate transposition arms of the Z-plasty were drawn incorporating the defect and placing the expected incisions within the relaxed skin tension lines where possible.    The area thus outlined was incised deep to adipose tissue with a #15 scalpel blade.  The skin margins were undermined to an appropriate distance in all directions utilizing iris scissors.  The opposing transposition arms were then transposed into place in opposite direction and anchored with interrupted buried subcutaneous sutures.
Double Z Plasty Text: The lesion was extirpated to the level of the fat with a #15 scalpel blade. Given the location of the defect, shape of the defect and the proximity to free margins a double Z-plasty was deemed most appropriate for repair. Using a sterile surgical marker, the appropriate transposition arms of the double Z-plasty were drawn incorporating the defect and placing the expected incisions within the relaxed skin tension lines where possible. The area thus outlined was incised deep to adipose tissue with a #15 scalpel blade. The skin margins were undermined to an appropriate distance in all directions utilizing iris scissors. The opposing transposition arms were then transposed and carried over into place in opposite direction and anchored with interrupted buried subcutaneous sutures.
Zygomaticofacial Flap Text: Given the location of the defect, shape of the defect and the proximity to free margins a zygomaticofacial flap was deemed most appropriate for repair.  Using a sterile surgical marker, the appropriate flap was drawn incorporating the defect and placing the expected incisions within the relaxed skin tension lines where possible. The area thus outlined was incised deep to adipose tissue with a #15 scalpel blade with preservation of a vascular pedicle.  The skin margins were undermined to an appropriate distance in all directions utilizing iris scissors.  The flap was then placed into the defect and anchored with interrupted buried subcutaneous sutures.
Cheek Interpolation Flap Text: A decision was made to reconstruct the defect utilizing an interpolation axial flap and a staged reconstruction.  A telfa template was made of the defect.  This telfa template was then used to outline the Cheek Interpolation flap.  The donor area for the pedicle flap was then injected with anesthesia.  The flap was excised through the skin and subcutaneous tissue down to the layer of the underlying musculature.  The interpolation flap was carefully excised within this deep plane to maintain its blood supply.  The edges of the donor site were undermined.   The donor site was closed in a primary fashion.  The pedicle was then rotated into position and sutured.  Once the tube was sutured into place, adequate blood supply was confirmed with blanching and refill.  The pedicle was then wrapped with xeroform gauze and dressed appropriately with a telfa and gauze bandage to ensure continued blood supply and protect the attached pedicle.
Cheek-To-Nose Interpolation Flap Text: A decision was made to reconstruct the defect utilizing an interpolation axial flap and a staged reconstruction.  A telfa template was made of the defect.  This telfa template was then used to outline the Cheek-To-Nose Interpolation flap.  The donor area for the pedicle flap was then injected with anesthesia.  The flap was excised through the skin and subcutaneous tissue down to the layer of the underlying musculature.  The interpolation flap was carefully excised within this deep plane to maintain its blood supply.  The edges of the donor site were undermined.   The donor site was closed in a primary fashion.  The pedicle was then rotated into position and sutured.  Once the tube was sutured into place, adequate blood supply was confirmed with blanching and refill.  The pedicle was then wrapped with xeroform gauze and dressed appropriately with a telfa and gauze bandage to ensure continued blood supply and protect the attached pedicle.
Interpolation Flap Text: A decision was made to reconstruct the defect utilizing an interpolation axial flap and a staged reconstruction.  A telfa template was made of the defect.  This telfa template was then used to outline the interpolation flap.  The donor area for the pedicle flap was then injected with anesthesia.  The flap was excised through the skin and subcutaneous tissue down to the layer of the underlying musculature.  The interpolation flap was carefully excised within this deep plane to maintain its blood supply.  The edges of the donor site were undermined.   The donor site was closed in a primary fashion.  The pedicle was then rotated into position and sutured.  Once the tube was sutured into place, adequate blood supply was confirmed with blanching and refill.  The pedicle was then wrapped with xeroform gauze and dressed appropriately with a telfa and gauze bandage to ensure continued blood supply and protect the attached pedicle.
Melolabial Interpolation Flap Text: A decision was made to reconstruct the defect utilizing an interpolation axial flap and a staged reconstruction.  A telfa template was made of the defect.  This telfa template was then used to outline the melolabial interpolation flap.  The donor area for the pedicle flap was then injected with anesthesia.  The flap was excised through the skin and subcutaneous tissue down to the layer of the underlying musculature.  The pedicle flap was carefully excised within this deep plane to maintain its blood supply.  The edges of the donor site were undermined.   The donor site was closed in a primary fashion.  The pedicle was then rotated into position and sutured.  Once the tube was sutured into place, adequate blood supply was confirmed with blanching and refill.  The pedicle was then wrapped with xeroform gauze and dressed appropriately with a telfa and gauze bandage to ensure continued blood supply and protect the attached pedicle.
Mastoid Interpolation Flap Text: A decision was made to reconstruct the defect utilizing an interpolation axial flap and a staged reconstruction.  A telfa template was made of the defect.  This telfa template was then used to outline the mastoid interpolation flap.  The donor area for the pedicle flap was then injected with anesthesia.  The flap was excised through the skin and subcutaneous tissue down to the layer of the underlying musculature.  The pedicle flap was carefully excised within this deep plane to maintain its blood supply.  The edges of the donor site were undermined.   The donor site was closed in a primary fashion.  The pedicle was then rotated into position and sutured.  Once the tube was sutured into place, adequate blood supply was confirmed with blanching and refill.  The pedicle was then wrapped with xeroform gauze and dressed appropriately with a telfa and gauze bandage to ensure continued blood supply and protect the attached pedicle.
Posterior Auricular Interpolation Flap Text: A decision was made to reconstruct the defect utilizing an interpolation axial flap and a staged reconstruction.  A telfa template was made of the defect.  This telfa template was then used to outline the posterior auricular interpolation flap.  The donor area for the pedicle flap was then injected with anesthesia.  The flap was excised through the skin and subcutaneous tissue down to the layer of the underlying musculature.  The pedicle flap was carefully excised within this deep plane to maintain its blood supply.  The edges of the donor site were undermined.   The donor site was closed in a primary fashion.  The pedicle was then rotated into position and sutured.  Once the tube was sutured into place, adequate blood supply was confirmed with blanching and refill.  The pedicle was then wrapped with xeroform gauze and dressed appropriately with a telfa and gauze bandage to ensure continued blood supply and protect the attached pedicle.
Paramedian Forehead Flap Text: A decision was made to reconstruct the defect utilizing an interpolation axial flap and a staged reconstruction.  A telfa template was made of the defect.  This telfa template was then used to outline the paramedian forehead pedicle flap.  The donor area for the pedicle flap was then injected with anesthesia.  The flap was excised through the skin and subcutaneous tissue down to the layer of the underlying musculature.  The pedicle flap was carefully excised within this deep plane to maintain its blood supply.  The edges of the donor site were undermined.   The donor site was closed in a primary fashion.  The pedicle was then rotated into position and sutured.  Once the tube was sutured into place, adequate blood supply was confirmed with blanching and refill.  The pedicle was then wrapped with xeroform gauze and dressed appropriately with a telfa and gauze bandage to ensure continued blood supply and protect the attached pedicle.
Abbe Flap (Upper To Lower Lip) Text: The defect of the lower lip was assessed and measured.  Given the location and size of the defect, an Abbe flap was deemed most appropriate.  Using a sterile surgical marker, an appropriate Abbe flap was measured and drawn on the upper lip. Local anesthesia was then infiltrated.  A scalpel was then used to incise the upper lip through and through the skin, vermilion, muscle and mucosa, leaving the flap pedicled on the opposite side.  The flap was then rotated and transferred to the lower lip defect.  The flap was then sutured into place with a three layer technique, closing the orbicularis oris muscle layer with subcutaneous buried sutures, followed by a mucosal layer and an epidermal layer.
Abbe Flap (Lower To Upper Lip) Text: The defect of the upper lip was assessed and measured.  Given the location and size of the defect, an Abbe flap was deemed most appropriate.  Using a sterile surgical marker, an appropriate Abbe flap was measured and drawn on the lower lip. Local anesthesia was then infiltrated. A scalpel was then used to incise the upper lip through and through the skin, vermilion, muscle and mucosa, leaving the flap pedicled on the opposite side.  The flap was then rotated and transferred to the lower lip defect.  The flap was then sutured into place with a three layer technique, closing the orbicularis oris muscle layer with subcutaneous buried sutures, followed by a mucosal layer and an epidermal layer.
Estlander Flap (Upper To Lower Lip) Text: The defect of the lower lip was assessed and measured.  Given the location and size of the defect, an Estlander flap was deemed most appropriate.  Using a sterile surgical marker, an appropriate Estlander flap was measured and drawn on the upper lip. Local anesthesia was then infiltrated. A scalpel was then used to incise the lateral aspect of the flap, through skin, muscle and mucosa, leaving the flap pedicled medially.  The flap was then rotated and positioned to fill the lower lip defect.  The flap was then sutured into place with a three layer technique, closing the orbicularis oris muscle layer with subcutaneous buried sutures, followed by a mucosal layer and an epidermal layer.
Cheiloplasty (Less Than 50%) Text: A decision was made to reconstruct the defect with a  cheiloplasty.  The defect was undermined extensively.  Additional obicularis oris muscle was excised with a 15 blade scalpel.  The defect was converted into a full thickness wedge, of less than 50% of the vertical height of the lip, to facilite a better cosmetic result.  Small vessels were then tied off with 5-0 monocyrl. The obicularis oris, superficial fascia, adipose and dermis were then reapproximated.  After the deeper layers were approximated the epidermis was reapproximated with particular care given to realign the vermilion border.
Cheiloplasty (Complex) Text: A decision was made to reconstruct the defect with a  cheiloplasty.  The defect was undermined extensively.  Additional obicularis oris muscle was excised with a 15 blade scalpel.  The defect was converted into a full thickness wedge to facilite a better cosmetic result.  Small vessels were then tied off with 5-0 monocyrl. The obicularis oris, superficial fascia, adipose and dermis were then reapproximated.  After the deeper layers were approximated the epidermis was reapproximated with particular care given to realign the vermilion border.
Ear Wedge Repair Text: A wedge excision was completed by carrying down an excision through the full thickness of the ear and cartilage with an inward facing Burow's triangle. The wound was then closed in a layered fashion.
Full Thickness Lip Wedge Repair (Flap) Text: Given the location of the defect and the proximity to free margins a full thickness wedge repair was deemed most appropriate.  Using a sterile surgical marker, the appropriate repair was drawn incorporating the defect and placing the expected incisions perpendicular to the vermilion border.  The vermilion border was also meticulously outlined to ensure appropriate reapproximation during the repair.  The area thus outlined was incised through and through with a #15 scalpel blade.  The muscularis and dermis were reaproximated with deep sutures following hemostasis. Care was taken to realign the vermilion border before proceeding with the superficial closure.  Once the vermilion was realigned the superfical and mucosal closure was finished.
Ftsg Text: The defect edges were debeveled with a #15 scalpel blade.  Given the location of the defect, shape of the defect and the proximity to free margins a full thickness skin graft was deemed most appropriate.  Using a sterile surgical marker, the primary defect shape was transferred to the donor site. The area thus outlined was incised deep to adipose tissue with a #15 scalpel blade.  The harvested graft was then trimmed of adipose tissue until only dermis and epidermis was left.  The skin margins of the secondary defect were undermined to an appropriate distance in all directions utilizing iris scissors.  The secondary defect was closed with interrupted buried subcutaneous sutures.  The skin edges were then re-apposed with running  sutures.  The skin graft was then placed in the primary defect and oriented appropriately.
Split-Thickness Skin Graft Text: The defect edges were debeveled with a #15 scalpel blade.  Given the location of the defect, shape of the defect and the proximity to free margins a split thickness skin graft was deemed most appropriate.  Using a sterile surgical marker, the primary defect shape was transferred to the donor site. The split thickness graft was then harvested.  The skin graft was then placed in the primary defect and oriented appropriately.
Pinch Graft Text: The defect edges were debeveled with a #15 scalpel blade. Given the location of the defect, shape of the defect and the proximity to free margins a pinch graft was deemed most appropriate. Using a sterile surgical marker, the primary defect shape was transferred to the donor site. The area thus outlined was incised deep to adipose tissue with a #15 scalpel blade.  The harvested graft was then trimmed of adipose tissue until only dermis and epidermis was left. The skin margins of the secondary defect were undermined to an appropriate distance in all directions utilizing iris scissors.  The secondary defect was closed with interrupted buried subcutaneous sutures.  The skin edges were then re-apposed with running  sutures.  The skin graft was then placed in the primary defect and oriented appropriately.
Burow's Graft Text: The defect edges were debeveled with a #15 scalpel blade.  Given the location of the defect, shape of the defect, the proximity to free margins and the presence of a standing cone deformity a Burow's skin graft was deemed most appropriate. The standing cone was removed and this tissue was then trimmed to the shape of the primary defect. The adipose tissue was also removed until only dermis and epidermis were left.  The skin margins of the secondary defect were undermined to an appropriate distance in all directions utilizing iris scissors.  The secondary defect was closed with interrupted buried subcutaneous sutures.  The skin edges were then re-apposed with running  sutures.  The skin graft was then placed in the primary defect and oriented appropriately.
Cartilage Graft Text: The defect edges were debeveled with a #15 scalpel blade.  Given the location of the defect, shape of the defect, the fact the defect involved a full thickness cartilage defect a cartilage graft was deemed most appropriate.  An appropriate donor site was identified, cleansed, and anesthetized. The cartilage graft was then harvested and transferred to the recipient site, oriented appropriately and then sutured into place.  The secondary defect was then repaired using a primary closure.
Composite Graft Text: The defect edges were debeveled with a #15 scalpel blade.  Given the location of the defect, shape of the defect, the proximity to free margins and the fact the defect was full thickness a composite graft was deemed most appropriate.  The defect was outline and then transferred to the donor site.  A full thickness graft was then excised from the donor site. The graft was then placed in the primary defect, oriented appropriately and then sutured into place.  The secondary defect was then repaired using a primary closure.
Epidermal Autograft Text: The defect edges were debeveled with a #15 scalpel blade.  Given the location of the defect, shape of the defect and the proximity to free margins an epidermal autograft was deemed most appropriate.  Using a sterile surgical marker, the primary defect shape was transferred to the donor site. The epidermal graft was then harvested.  The skin graft was then placed in the primary defect and oriented appropriately.
Dermal Autograft Text: The defect edges were debeveled with a #15 scalpel blade.  Given the location of the defect, shape of the defect and the proximity to free margins a dermal autograft was deemed most appropriate.  Using a sterile surgical marker, the primary defect shape was transferred to the donor site. The area thus outlined was incised deep to adipose tissue with a #15 scalpel blade.  The harvested graft was then trimmed of adipose and epidermal tissue until only dermis was left.  The skin graft was then placed in the primary defect and oriented appropriately.
Skin Substitute Text: The defect edges were debeveled with a #15 scalpel blade.  Given the location of the defect, shape of the defect and the proximity to free margins a skin substitute graft was deemed most appropriate.  The graft material was trimmed to fit the size of the defect. The graft was then placed in the primary defect and oriented appropriately.
Tissue Cultured Epidermal Autograft Text: The defect edges were debeveled with a #15 scalpel blade.  Given the location of the defect, shape of the defect and the proximity to free margins a tissue cultured epidermal autograft was deemed most appropriate.  The graft was then trimmed to fit the size of the defect.  The graft was then placed in the primary defect and oriented appropriately.
Xenograft Text: The defect edges were debeveled with a #15 scalpel blade.  Given the location of the defect, shape of the defect and the proximity to free margins a xenograft was deemed most appropriate.  The graft was then trimmed to fit the size of the defect.  The graft was then placed in the primary defect and oriented appropriately.
Purse String (Simple) Text: Given the location of the defect and the characteristics of the surrounding skin a purse string closure was deemed most appropriate.  Undermining was performed circumfirentially around the surgical defect.  A purse string suture was then placed and tightened.
Purse String (Intermediate) Text: Given the location of the defect and the characteristics of the surrounding skin a purse string intermediate closure was deemed most appropriate.  Undermining was performed circumfirentially around the surgical defect.  A purse string suture was then placed and tightened.
Partial Purse String (Simple) Text: Given the location of the defect and the characteristics of the surrounding skin a simple purse string closure was deemed most appropriate.  Undermining was performed circumfirentially around the surgical defect.  A purse string suture was then placed and tightened. Wound tension only allowed a partial closure of the circular defect.
Partial Purse String (Intermediate) Text: Given the location of the defect and the characteristics of the surrounding skin an intermediate purse string closure was deemed most appropriate.  Undermining was performed circumfirentially around the surgical defect.  A purse string suture was then placed and tightened. Wound tension only allowed a partial closure of the circular defect.
Localized Dermabrasion With Wire Brush Text: The patient was draped in routine manner.  Localized dermabrasion using 3 x 17 mm wire brush was performed in routine manner to papillary dermis. This spot dermabrasion is being performed to complete skin cancer reconstruction. It also will eliminate the other sun damaged precancerous cells that are known to be part of the regional effect of a lifetime's worth of sun exposure. This localized dermabrasion is therapeutic and should not be considered cosmetic in any regard.
Tarsorrhaphy Text: A tarsorrhaphy was performed using Frost sutures.
Intermediate Repair And Flap Additional Text (Will Appearing After The Standard Complex Repair Text): The intermediate repair was not sufficient to completely close the primary defect. The remaining additional defect was repaired with the flap mentioned below.
Intermediate Repair And Graft Additional Text (Will Appearing After The Standard Complex Repair Text): The intermediate repair was not sufficient to completely close the primary defect. The remaining additional defect was repaired with the graft mentioned below.
Complex Repair And Flap Additional Text (Will Appearing After The Standard Complex Repair Text): The complex repair was not sufficient to completely close the primary defect. The remaining additional defect was repaired with the flap mentioned below.
Complex Repair And Graft Additional Text (Will Appearing After The Standard Complex Repair Text): The complex repair was not sufficient to completely close the primary defect. The remaining additional defect was repaired with the graft mentioned below.
Eyelid Full Thickness Repair - 91745: The eyelid defect was full thickness which required a wedge repair of the eyelid. Special care was taken to ensure that the eyelid margin was realligned when placing sutures.
Eyelid Partial Thickness Repair - 22303: The eyelid defect was partial thickness which required a wedge repair of the eyelid. Special care was taken to ensure that the eyelid margin was realligned when placing sutures.
Manual Repair Warning Statement: We plan on removing the manually selected variable below in favor of our much easier automatic structured text blocks found in the previous tab. We decided to do this to help make the flow better and give you the full power of structured data. Manual selection is never going to be ideal in our platform and I would encourage you to avoid using manual selection from this point on, especially since I will be sunsetting this feature. It is important that you do one of two things with the customized text below. First, you can save all of the text in a word file so you can have it for future reference. Second, transfer the text to the appropriate area in the Library tab. Lastly, if there is a flap or graft type which we do not have you need to let us know right away so I can add it in before the variable is hidden. No need to panic, we plan to give you roughly 6 months to make the change.
Same Histology In Subsequent Stages Text: The pattern and morphology of the tumor is as described in the first stage.
No Residual Tumor Seen Histology Text: There were no malignant cells seen in the sections examined.
Inflammation Suggestive Of Cancer Camouflage Histology Text: There was a dense lymphocytic infiltrate which prevented adequate histologic evaluation of adjacent structures.
Bcc Histology Text: There were numerous aggregates of basaloid cells.
Bcc Infiltrative Histology Text: There were numerous aggregates of basaloid cells demonstrating an infiltrative pattern.
Mart-1 - Positive Histology Text: MART-1 staining demonstrates areas of higher density and clustering of melanocytes with Pagetoid spread upwards within the epidermis. The surgical margins are positive for tumor cells.
Mart-1 - Negative Histology Text: MART-1 staining demonstrates a normal density and pattern of melanocytes along the dermal-epidermal junction. The surgical margins are negative for tumor cells.
Information: Selecting Yes will display possible errors in your note based on the variables you have selected. This validation is only offered as a suggestion for you. PLEASE NOTE THAT THE VALIDATION TEXT WILL BE REMOVED WHEN YOU FINALIZE YOUR NOTE. IF YOU WANT TO FAX A PRELIMINARY NOTE YOU WILL NEED TO TOGGLE THIS TO 'NO' IF YOU DO NOT WANT IT IN YOUR FAXED NOTE.

## 2024-02-08 NOTE — PROCEDURE: SURGICAL DECISION MAKING
Identified Risk Factors Documented?: yes
Risk Assessment Explanation (Does Not Render In The Note): Clinical determination of the probability and/or consequences of an event, such as surgery. Clinical assessment of the level of risk is affected by the nature of the event under consideration for the patient. Modifier 57 is used to indicate an Evaluation and Management (E/M) service resulted in the initial decision to perform surgery either the day before a major surgery (90 day global) or the day of a major surgery.
Date Of Surgery - Today Or Tomorrow?: today
Complexity (Necessary For Coding; Major - 90 Day Global With Some Exceptions; Minor - 10 Day Global): major
Discussion: An extensive history and exam was performed with attention to the anatomic location and the patient's overall medical status and co-morbidities. The treatment options for repair were reviewed with the patient in detail. These include healing by secondary intention, primary closure, skin graft or local flap.

## 2024-02-15 ENCOUNTER — APPOINTMENT (RX ONLY)
Dept: URBAN - NONMETROPOLITAN AREA CLINIC 22 | Facility: CLINIC | Age: 69
Setting detail: DERMATOLOGY
End: 2024-02-15

## 2024-02-15 DIAGNOSIS — Z48.02 ENCOUNTER FOR REMOVAL OF SUTURES: ICD-10-CM

## 2024-02-15 PROCEDURE — 99024 POSTOP FOLLOW-UP VISIT: CPT

## 2024-02-15 PROCEDURE — ? SUTURE REMOVAL (GLOBAL PERIOD)

## 2024-02-15 ASSESSMENT — LOCATION SIMPLE DESCRIPTION DERM: LOCATION SIMPLE: NOSE

## 2024-02-15 ASSESSMENT — LOCATION ZONE DERM: LOCATION ZONE: NOSE

## 2024-02-15 ASSESSMENT — LOCATION DETAILED DESCRIPTION DERM: LOCATION DETAILED: NASAL DORSUM

## 2024-02-15 NOTE — PROCEDURE: SUTURE REMOVAL (GLOBAL PERIOD)
Detail Level: Detailed
Add 04617 Cpt? (Important Note: In 2017 The Use Of 17935 Is Being Tracked By Cms To Determine Future Global Period Reimbursement For Global Periods): yes

## 2024-02-21 NOTE — PROGRESS NOTES
artifact but cannot rule out inferior wall ischemia.  Post-stress LVEF is normal at >70%.  Sub-optimal study. Equivocal study for inducible ischemia.     VL Ankle art 8/17/22  Summary        Moderate bilateral arterial insufficiency worse in the right lower    extremity.    The right superficial femoral artery is likely occluded and there is    evidence of aortoiliac inflow disease.    There is mild left aorto iliac inflow disease and infrapopliteal disease.        Signature       EKG 7/1/21   Normal sinus rhythmLow voltage in the limb leadsPossible Inferior infarct (cited on or before 22-FEB-2021)Abnormal ECGWhen compared with ECG of 30-JUN-2021 12:37,No significant change was foundConfirmed by RUBEN DEL REAL MD ([id number]) on 7/1/2021 7:28:24 PM    [facility] 6/30/21   LVGRAM     LVEDP  10   GRADIENT ACROSS AORTIC VALVE  none   LV FUNCTION EF 60%   WALL MOTION  normal   MITRAL REGURGITATION  mild         CORONARY ARTERIES     LM Less than 10% dexsalwi-bpw-mhkljn stenosis            LAD Proximal 10 to 20% stenosis, mid 70 to 75% stenosis.  Mid-distal bridging is noted with 10 to 20% stenosis.     D1 is a medium to large size vessel with proximal-mid 60 to 70% stenosis.         LCX  10% proximal stenosis, mid 20 to 30% stenosis.         RCA Dominant, large vessel, proximal pressure damping and ventricularization is noted with diagnostic catheters and there is proximal 60% to 70% stenosis followed by mid 80% stenosis.  Stents are noted throughout the gfptvdnk-eux-ivrcld vessel.  Distally there is less than 10% stenosis.   Successful PCI of RCA with single drug-eluting stent     Consider staged PCI of LAD, may need groin approach and/or anesthesia due to radial spasm.    ECHO: 5/27/21  Summary   Left ventricular systolic function is normal with ejection fraction   estimated at 55-60 %.   No regional wall motion abnormalities are noted.   Left ventricular size is decreased.   There is mild concentric left ventricular

## 2024-02-26 ENCOUNTER — OFFICE VISIT (OUTPATIENT)
Dept: CARDIOLOGY CLINIC | Age: 69
End: 2024-02-26
Payer: MEDICARE

## 2024-02-26 VITALS
HEIGHT: 69 IN | BODY MASS INDEX: 19.26 KG/M2 | WEIGHT: 130 LBS | SYSTOLIC BLOOD PRESSURE: 122 MMHG | HEART RATE: 56 BPM | OXYGEN SATURATION: 98 % | DIASTOLIC BLOOD PRESSURE: 60 MMHG

## 2024-02-26 DIAGNOSIS — R06.09 DYSPNEA ON EXERTION: ICD-10-CM

## 2024-02-26 DIAGNOSIS — I10 PRIMARY HYPERTENSION: ICD-10-CM

## 2024-02-26 DIAGNOSIS — J43.9 PULMONARY EMPHYSEMA, UNSPECIFIED EMPHYSEMA TYPE (HCC): ICD-10-CM

## 2024-02-26 DIAGNOSIS — E78.00 HIGH CHOLESTEROL: ICD-10-CM

## 2024-02-26 DIAGNOSIS — I73.9 INTERMITTENT CLAUDICATION (HCC): ICD-10-CM

## 2024-02-26 DIAGNOSIS — F17.200 SMOKER: ICD-10-CM

## 2024-02-26 DIAGNOSIS — I25.10 CORONARY ARTERY DISEASE INVOLVING NATIVE CORONARY ARTERY OF NATIVE HEART WITHOUT ANGINA PECTORIS: Primary | ICD-10-CM

## 2024-02-26 PROBLEM — I20.0 UNSTABLE ANGINA (HCC): Status: RESOLVED | Noted: 2022-08-24 | Resolved: 2024-02-26

## 2024-02-26 PROCEDURE — 99214 OFFICE O/P EST MOD 30 MIN: CPT | Performed by: INTERNAL MEDICINE

## 2024-02-26 PROCEDURE — 3023F SPIROM DOC REV: CPT | Performed by: INTERNAL MEDICINE

## 2024-02-26 PROCEDURE — 3078F DIAST BP <80 MM HG: CPT | Performed by: INTERNAL MEDICINE

## 2024-02-26 PROCEDURE — 1123F ACP DISCUSS/DSCN MKR DOCD: CPT | Performed by: INTERNAL MEDICINE

## 2024-02-26 PROCEDURE — G8420 CALC BMI NORM PARAMETERS: HCPCS | Performed by: INTERNAL MEDICINE

## 2024-02-26 PROCEDURE — 3017F COLORECTAL CA SCREEN DOC REV: CPT | Performed by: INTERNAL MEDICINE

## 2024-02-26 PROCEDURE — G8427 DOCREV CUR MEDS BY ELIG CLIN: HCPCS | Performed by: INTERNAL MEDICINE

## 2024-02-26 PROCEDURE — G8484 FLU IMMUNIZE NO ADMIN: HCPCS | Performed by: INTERNAL MEDICINE

## 2024-02-26 PROCEDURE — 3074F SYST BP LT 130 MM HG: CPT | Performed by: INTERNAL MEDICINE

## 2024-02-26 PROCEDURE — 4004F PT TOBACCO SCREEN RCVD TLK: CPT | Performed by: INTERNAL MEDICINE

## 2024-02-26 RX ORDER — CILOSTAZOL 50 MG/1
50 TABLET ORAL 2 TIMES DAILY
Qty: 180 TABLET | Refills: 3 | Status: SHIPPED | OUTPATIENT
Start: 2024-02-26

## 2024-02-26 RX ORDER — PREDNISONE 10 MG/1
10 TABLET ORAL DAILY
Qty: 30 TABLET | Refills: 2 | Status: SHIPPED | OUTPATIENT
Start: 2024-02-26

## 2024-03-13 NOTE — PATIENT INSTRUCTIONS
Plan:  HUMERA scan both lower legs  Stress test Rosemarie Myoview-chest pain  Call [phone] to schedule these tests.  Will call with results  Follow up in one month with me No

## 2024-05-23 ENCOUNTER — APPOINTMENT (RX ONLY)
Dept: URBAN - NONMETROPOLITAN AREA CLINIC 22 | Facility: CLINIC | Age: 69
Setting detail: DERMATOLOGY
End: 2024-05-23

## 2024-05-23 DIAGNOSIS — Z02.9 ENCOUNTER FOR ADMINISTRATIVE EXAMINATIONS, UNSPECIFIED: ICD-10-CM

## 2024-05-30 ENCOUNTER — APPOINTMENT (RX ONLY)
Dept: URBAN - NONMETROPOLITAN AREA CLINIC 22 | Facility: CLINIC | Age: 69
Setting detail: DERMATOLOGY
End: 2024-05-30

## 2024-05-30 VITALS — HEIGHT: 76 IN | WEIGHT: 200 LBS

## 2024-05-30 DIAGNOSIS — Z85.820 PERSONAL HISTORY OF MALIGNANT MELANOMA OF SKIN: ICD-10-CM

## 2024-05-30 DIAGNOSIS — Z85.828 PERSONAL HISTORY OF OTHER MALIGNANT NEOPLASM OF SKIN: ICD-10-CM

## 2024-05-30 DIAGNOSIS — D22 MELANOCYTIC NEVI: ICD-10-CM

## 2024-05-30 DIAGNOSIS — L57.0 ACTINIC KERATOSIS: ICD-10-CM | Status: INADEQUATELY CONTROLLED

## 2024-05-30 DIAGNOSIS — L82.1 OTHER SEBORRHEIC KERATOSIS: ICD-10-CM

## 2024-05-30 DIAGNOSIS — L57.8 OTHER SKIN CHANGES DUE TO CHRONIC EXPOSURE TO NONIONIZING RADIATION: ICD-10-CM

## 2024-05-30 PROBLEM — D22.5 MELANOCYTIC NEVI OF TRUNK: Status: ACTIVE | Noted: 2024-05-30

## 2024-05-30 PROCEDURE — ? FULL BODY SKIN EXAM

## 2024-05-30 PROCEDURE — ? ADDITIONAL NOTES

## 2024-05-30 PROCEDURE — ? COUNSELING

## 2024-05-30 PROCEDURE — ? LIQUID NITROGEN

## 2024-05-30 PROCEDURE — 17000 DESTRUCT PREMALG LESION: CPT

## 2024-05-30 PROCEDURE — 99213 OFFICE O/P EST LOW 20 MIN: CPT | Mod: 25

## 2024-05-30 PROCEDURE — 17003 DESTRUCT PREMALG LES 2-14: CPT

## 2024-05-30 PROCEDURE — ? SUNSCREEN RECOMMENDATIONS

## 2024-05-30 ASSESSMENT — LOCATION DETAILED DESCRIPTION DERM
LOCATION DETAILED: LEFT INFERIOR HELIX
LOCATION DETAILED: RIGHT LATERAL ZYGOMA
LOCATION DETAILED: RIGHT MEDIAL ZYGOMA
LOCATION DETAILED: RIGHT ANTERIOR DISTAL THIGH
LOCATION DETAILED: RIGHT SUPERIOR MEDIAL UPPER BACK
LOCATION DETAILED: LEFT PROXIMAL PRETIBIAL REGION
LOCATION DETAILED: RIGHT SUPERIOR FOREHEAD
LOCATION DETAILED: RIGHT ANTERIOR SHOULDER
LOCATION DETAILED: LEFT SUPERIOR CENTRAL MALAR CHEEK
LOCATION DETAILED: MIDDLE STERNUM
LOCATION DETAILED: LEFT SUPERIOR LATERAL BUCCAL CHEEK
LOCATION DETAILED: RIGHT SUPERIOR LATERAL NECK
LOCATION DETAILED: LEFT ANTERIOR PROXIMAL UPPER ARM
LOCATION DETAILED: EPIGASTRIC SKIN

## 2024-05-30 ASSESSMENT — LOCATION SIMPLE DESCRIPTION DERM
LOCATION SIMPLE: CHEST
LOCATION SIMPLE: RIGHT ZYGOMA
LOCATION SIMPLE: LEFT CHEEK
LOCATION SIMPLE: RIGHT SHOULDER
LOCATION SIMPLE: RIGHT THIGH
LOCATION SIMPLE: LEFT PRETIBIAL REGION
LOCATION SIMPLE: NECK
LOCATION SIMPLE: RIGHT UPPER BACK
LOCATION SIMPLE: LEFT EAR
LOCATION SIMPLE: RIGHT FOREHEAD
LOCATION SIMPLE: LEFT UPPER ARM
LOCATION SIMPLE: ABDOMEN

## 2024-05-30 ASSESSMENT — LOCATION ZONE DERM
LOCATION ZONE: FACE
LOCATION ZONE: TRUNK
LOCATION ZONE: LEG
LOCATION ZONE: EAR
LOCATION ZONE: NECK
LOCATION ZONE: ARM

## 2024-05-30 NOTE — PROCEDURE: MIPS QUALITY
Quality 47: Advance Care Plan: Advance Care Planning discussed and documented; advance care plan or surrogate decision maker documented in the medical record.
Quality 226: Preventive Care And Screening: Tobacco Use: Screening And Cessation Intervention: Patient screened for tobacco use and is an ex/non-smoker
Detail Level: Detailed
Quality 431: Preventive Care And Screening: Unhealthy Alcohol Use - Screening: Patient not identified as an unhealthy alcohol user when screened for unhealthy alcohol use using a systematic screening method
Quality 130: Documentation Of Current Medications In The Medical Record: Current Medications Documented
Quality 137: Melanoma: Continuity Of Care - Recall System: Patient information entered into a recall system that includes: target date for the next exam specified AND a process to follow up with patients regarding missed or unscheduled appointments
Quality 111:Pneumonia Vaccination Status For Older Adults: Pneumococcal vaccine (PPSV23) administered on or after patient’s 60th birthday and before the end of the measurement period
Double O-Z Flap Text: Due to geometric and functional constraints, a flap reconstruction was performed to reconstruct the defect. To that end, adjacent tissue was incised and carried over to close the defect in the following manner: The defect edges were debeveled with a #15 scalpel blade.  Given the location of the defect, shape of the defect and the proximity to free margins a Double O-Z flap was deemed most appropriate.  Using a sterile surgical marker, an appropriate transposition flap was drawn incorporating the defect and placing the expected incisions within the relaxed skin tension lines where possible. The area thus outlined was incised deep to adipose tissue with a #15 scalpel blade.  The skin margins were undermined to an appropriate distance in all directions utilizing iris scissors.

## 2024-05-30 NOTE — PROCEDURE: LIQUID NITROGEN
Consent: The patient's consent was obtained including but not limited to risks of crusting, scabbing, blistering, scarring, darker or lighter pigmentary change, recurrence, incomplete removal and infection.
Number Of Freeze-Thaw Cycles: 1 freeze-thaw cycle
Render Post-Care Instructions In Note?: no
Show Aperture Variable?: Yes
Duration Of Freeze Thaw-Cycle (Seconds): 3
Post-Care Instructions: I reviewed with the patient in detail post-care instructions. Patient is to wear sunprotection, and avoid picking at any of the treated lesions. Pt may apply Vaseline to crusted or scabbing areas.
Application Tool (Optional): Liquid Nitrogen Sprayer
Detail Level: Detailed

## 2024-12-27 ENCOUNTER — TELEPHONE (OUTPATIENT)
Dept: CARDIOLOGY CLINIC | Age: 69
End: 2024-12-27

## 2024-12-27 NOTE — TELEPHONE ENCOUNTER
[facility] called wanting pt to schedule the 6 month follow up to renew his insurance.     Tried calling pt, left VM to call the office back.

## 2025-02-28 ENCOUNTER — TELEPHONE (OUTPATIENT)
Dept: CARDIOLOGY CLINIC | Age: 70
End: 2025-02-28

## 2025-02-28 NOTE — TELEPHONE ENCOUNTER
Ayah with the [facility] called stating if pt calls to schedule another apt, please refer back to the [facility] because pt will need to be covered through insurance. Ayah will be canceling there authorizations for their next visits due to pt now showing for their past apts with [facility]. ARISTIDES

## 2025-04-29 DIAGNOSIS — I73.9 CLAUDICATION IN PERIPHERAL VASCULAR DISEASE: Primary | ICD-10-CM

## 2025-05-02 ENCOUNTER — OFFICE VISIT (OUTPATIENT)
Dept: VASCULAR SURGERY | Age: 70
End: 2025-05-02
Payer: MEDICARE

## 2025-05-02 ENCOUNTER — HOSPITAL ENCOUNTER (OUTPATIENT)
Dept: VASCULAR LAB | Age: 70
Discharge: HOME | End: 2025-05-02
Attending: SURGERY
Payer: MEDICARE

## 2025-05-02 VITALS
WEIGHT: 146 LBS | DIASTOLIC BLOOD PRESSURE: 62 MMHG | BODY MASS INDEX: 21.62 KG/M2 | HEIGHT: 69 IN | SYSTOLIC BLOOD PRESSURE: 142 MMHG

## 2025-05-02 DIAGNOSIS — I73.9 PVD (PERIPHERAL VASCULAR DISEASE): Primary | ICD-10-CM

## 2025-05-02 DIAGNOSIS — I73.9 CLAUDICATION IN PERIPHERAL VASCULAR DISEASE: ICD-10-CM

## 2025-05-02 DIAGNOSIS — M79.606 ISCHEMIC REST PAIN OF LOWER EXTREMITY: ICD-10-CM

## 2025-05-02 DIAGNOSIS — I99.8 ISCHEMIC REST PAIN OF LOWER EXTREMITY: ICD-10-CM

## 2025-05-02 LAB
VAS LEFT ABI: 0.43
VAS LEFT ARM BP: 181 MMHG
VAS LEFT ATA DIST PSV: 26.6 CM/S
VAS LEFT CFA DIST PSV: 73.5 CM/S
VAS LEFT CFA PROX PSV: 282 CM/S
VAS LEFT DORSALIS PEDIS BP: 80 MMHG
VAS LEFT PERONEAL MID PSV: 23.5 CM/S
VAS LEFT PFA PROX PSV: 76 CM/S
VAS LEFT POP A DIST PSV: 33.9 CM/S
VAS LEFT POP A PROX PSV: 76.8 CM/S
VAS LEFT PTA DIST PSV: 34.3 CM/S
VAS LEFT PTA MID PSV: 28.7 CM/S
VAS LEFT SFA DIST PSV: 0 CM/S
VAS LEFT SFA MID PSV: 0 CM/S
VAS LEFT SFA PROX PSV: 0 CM/S
VAS LEFT SFA PROX VEL RATIO: 0
VAS RIGHT ABI: 1.13
VAS RIGHT ARM BP: 188 MMHG
VAS RIGHT ARTERIAL PROX ANASTOMOSIS PSV: 87 CM/S
VAS RIGHT ATA DIST PSV: 112 CM/S
VAS RIGHT CFA DIST PSV: 165 CM/S
VAS RIGHT CFA PROX PSV: 183 CM/S
VAS RIGHT DIST OUTFLOW PSV: 105 CM/S
VAS RIGHT DORSALIS PEDIS BP: 206 MMHG
VAS RIGHT GRAFT 1: ABNORMAL
VAS RIGHT INFLOW ARTERY PSV: 130 CM/S
VAS RIGHT MID OUTFLOW PSV: 127 CM/S
VAS RIGHT OUTFLOW VESSEL PSV: 83.9 CM/S
VAS RIGHT PERONEAL MID PSV: 61.5 CM/S
VAS RIGHT PFA PROX PSV: 115 CM/S
VAS RIGHT POP A DIST PSV: 58.2 CM/S
VAS RIGHT POP A PROX PSV: 98 CM/S
VAS RIGHT PROX OUTFLOW PSV: 87.8 CM/S
VAS RIGHT PTA BP: 212 MMHG
VAS RIGHT PTA DIST PSV: 97.4 CM/S
VAS RIGHT PTA MID PSV: 104 CM/S
VAS RIGHT VENOUS DIST ANASTOMOSIS PSV: 96.4 CM/S

## 2025-05-02 PROCEDURE — 99214 OFFICE O/P EST MOD 30 MIN: CPT | Performed by: SURGERY

## 2025-05-02 PROCEDURE — G8420 CALC BMI NORM PARAMETERS: HCPCS | Performed by: SURGERY

## 2025-05-02 PROCEDURE — 1123F ACP DISCUSS/DSCN MKR DOCD: CPT | Performed by: SURGERY

## 2025-05-02 PROCEDURE — 1159F MED LIST DOCD IN RCRD: CPT | Performed by: SURGERY

## 2025-05-02 PROCEDURE — 4004F PT TOBACCO SCREEN RCVD TLK: CPT | Performed by: SURGERY

## 2025-05-02 PROCEDURE — G8427 DOCREV CUR MEDS BY ELIG CLIN: HCPCS | Performed by: SURGERY

## 2025-05-02 PROCEDURE — 93925 LOWER EXTREMITY STUDY: CPT

## 2025-05-02 PROCEDURE — 1160F RVW MEDS BY RX/DR IN RCRD: CPT | Performed by: SURGERY

## 2025-05-02 PROCEDURE — 93925 LOWER EXTREMITY STUDY: CPT | Performed by: SURGERY

## 2025-05-02 PROCEDURE — 3017F COLORECTAL CA SCREEN DOC REV: CPT | Performed by: SURGERY

## 2025-05-02 NOTE — PROGRESS NOTES
Outpatient Follow Up Note    Tray Saavedra is 69 y.o. male who presents today for  follow up regarding:    Chief Complaint   Patient presents with    Other     Patient returns with pvd/arterial duplex today. Pamlr        History of PVD s/p R Fem-Pop 10/2022.  Has had no f/u since then.  Reports severe pain in left foot ongoing for past 6 months.  Can barely ambulate.      Past Medical History:   Diagnosis Date    CAD (coronary artery disease) 03/28/2013    Cardiomyopathy (HCC) 02/22/2021    EF= 45%    Emphysema of lung (HCC)     Heart attack (HCC) 03/28/2013    3/28/2013 and 2/22/2021    High cholesterol     HTN (hypertension)     Prolonged emergence from general anesthesia     AWAKES VERY QUICKLY, ONCE AWOKE VERY CONFUSED AFTER ATIVAN AND FENTANYL    Thoracic aortic aneurysm 2000    [facility]       Past Surgical History:   Procedure Laterality Date    COLONOSCOPY N/A 03/22/2021    COLONOSCOPY WITH BIOPSY performed by Bianca Reilly MD at [facility] ENDOSCOPY    CORONARY ANGIOPLASTY WITH STENT PLACEMENT  02/22/2021    SAMM- 3.0 x 38 and 3.0 x 34 to pRCA  (TOTAL 7 STENTS)    CORONARY ANGIOPLASTY WITH STENT PLACEMENT  03/28/2013    SAMM- 3.5 x 18 to RCA    DIAGNOSTIC CARDIAC CATH LAB PROCEDURE  12/10/2019    Non Obs CAD    FEMORAL BYPASS Right 10/3/2022    RIGHT FEMORAL POPLITEAL INSITU BYPASS GRAFT performed by Misbah Silva MD at [facility] OR    THORACIC AORTIC ANEURYSM REPAIR  2000    UPPER GASTROINTESTINAL ENDOSCOPY N/A 03/19/2021    EGD BIOPSY performed by Bianca Reilly MD at [facility] ENDOSCOPY     Social History:    Social History     Tobacco Use   Smoking Status Some Days    Current packs/day: 0.00    Average packs/day: 2.0 packs/day for 50.0 years (100.0 ttl pk-yrs)    Types: Cigarettes    Start date: 6/1/1971    Last attempt to quit: 6/1/2021    Years since quitting: 3.9   Smokeless Tobacco Never     Current Medications:  Current Outpatient Medications   Medication Sig Dispense Refill    predniSONE (DELTASONE) 10 MG

## 2025-05-05 ENCOUNTER — TELEPHONE (OUTPATIENT)
Dept: VASCULAR SURGERY | Age: 70
End: 2025-05-05

## 2025-05-05 ENCOUNTER — PREP FOR PROCEDURE (OUTPATIENT)
Dept: VASCULAR SURGERY | Age: 70
End: 2025-05-05

## 2025-05-05 RX ORDER — SODIUM CHLORIDE 0.9 % (FLUSH) 0.9 %
5-40 SYRINGE (ML) INJECTION PRN
Status: CANCELLED | OUTPATIENT
Start: 2025-05-05

## 2025-05-05 RX ORDER — SODIUM CHLORIDE 0.9 % (FLUSH) 0.9 %
5-40 SYRINGE (ML) INJECTION EVERY 12 HOURS SCHEDULED
Status: CANCELLED | OUTPATIENT
Start: 2025-05-05

## 2025-05-05 RX ORDER — SODIUM CHLORIDE 9 MG/ML
INJECTION, SOLUTION INTRAVENOUS PRN
Status: CANCELLED | OUTPATIENT
Start: 2025-05-05

## 2025-05-05 NOTE — TELEPHONE ENCOUNTER
Returned call to patient regarding rescheduling his procedure as wants to reschedule to the May 27, 2025 and arrive at 6:30am and npo after midnight and hold his blood thinners. Radha

## 2025-05-06 ENCOUNTER — TELEPHONE (OUTPATIENT)
Dept: VASCULAR SURGERY | Age: 70
End: 2025-05-06

## 2025-05-06 ENCOUNTER — PREP FOR PROCEDURE (OUTPATIENT)
Dept: VASCULAR SURGERY | Age: 70
End: 2025-05-06

## 2025-05-06 DIAGNOSIS — I73.9 PERIPHERAL VASCULAR DISEASE, UNSPECIFIED: ICD-10-CM

## 2025-05-06 NOTE — TELEPHONE ENCOUNTER
Patient called and moved his angiogram from 5/6/2025 to 5/27/2025. Arrive at 6:30am for 7:30am procedure npo after midnight. Radha

## 2025-05-20 ENCOUNTER — TELEPHONE (OUTPATIENT)
Dept: VASCULAR SURGERY | Age: 70
End: 2025-05-20

## 2025-05-20 NOTE — TELEPHONE ENCOUNTER
Called patient regarding his surgery next week as well as angiogram next Tuesday. He is stop Brillinta per Dr Silva as of now and Losartan last dose on Monday next week. He understands and will proceed. Radha

## 2025-05-23 ENCOUNTER — PREP FOR PROCEDURE (OUTPATIENT)
Dept: VASCULAR SURGERY | Age: 70
End: 2025-05-23

## 2025-05-27 ENCOUNTER — HOSPITAL ENCOUNTER (INPATIENT)
Dept: VASCULAR LAB | Age: 70
Discharge: HOME | End: 2025-05-29
Attending: SURGERY
Payer: MEDICARE

## 2025-05-27 ENCOUNTER — HOSPITAL ENCOUNTER (INPATIENT)
Age: 70
LOS: 3 days | Discharge: HOME | End: 2025-05-30
Attending: SURGERY | Admitting: SURGERY
Payer: MEDICARE

## 2025-05-27 ENCOUNTER — ANESTHESIA EVENT (OUTPATIENT)
Dept: OPERATING ROOM | Age: 70
End: 2025-05-27
Payer: MEDICARE

## 2025-05-27 DIAGNOSIS — I73.9 PERIPHERAL VASCULAR DISEASE: ICD-10-CM

## 2025-05-27 DIAGNOSIS — I73.9 PERIPHERAL VASCULAR DISEASE, UNSPECIFIED: Primary | ICD-10-CM

## 2025-05-27 DIAGNOSIS — G89.18 ACUTE POSTOPERATIVE PAIN: ICD-10-CM

## 2025-05-27 LAB
ANION GAP SERPL CALCULATED.3IONS-SCNC: 11 MMOL/L (ref 3–16)
BUN SERPL-MCNC: 23 MG/DL (ref 7–20)
CALCIUM SERPL-MCNC: 9.2 MG/DL (ref 8.3–10.6)
CHLORIDE SERPL-SCNC: 109 MMOL/L (ref 99–110)
CO2 SERPL-SCNC: 22 MMOL/L (ref 21–32)
CREAT SERPL-MCNC: 1.1 MG/DL (ref 0.8–1.3)
DEPRECATED RDW RBC AUTO: 14.9 % (ref 12.4–15.4)
ECHO BSA: 1.75 M2
ECHO BSA: 1.75 M2
EKG ATRIAL RATE: 72 BPM
EKG DIAGNOSIS: NORMAL
EKG P AXIS: 40 DEGREES
EKG P-R INTERVAL: 138 MS
EKG Q-T INTERVAL: 380 MS
EKG QRS DURATION: 72 MS
EKG QTC CALCULATION (BAZETT): 416 MS
EKG R AXIS: -20 DEGREES
EKG T AXIS: 60 DEGREES
EKG VENTRICULAR RATE: 72 BPM
GFR SERPLBLD CREATININE-BSD FMLA CKD-EPI: 73 ML/MIN/{1.73_M2}
GLUCOSE SERPL-MCNC: 90 MG/DL (ref 70–99)
HCT VFR BLD AUTO: 39.5 % (ref 40.5–52.5)
HGB BLD-MCNC: 13.4 G/DL (ref 13.5–17.5)
MCH RBC QN AUTO: 32.3 PG (ref 26–34)
MCHC RBC AUTO-ENTMCNC: 33.9 G/DL (ref 31–36)
MCV RBC AUTO: 95.3 FL (ref 80–100)
PLATELET # BLD AUTO: 451 K/UL (ref 135–450)
PMV BLD AUTO: 6.5 FL (ref 5–10.5)
POTASSIUM SERPL-SCNC: 3.8 MMOL/L (ref 3.5–5.1)
RBC # BLD AUTO: 4.14 M/UL (ref 4.2–5.9)
SODIUM SERPL-SCNC: 142 MMOL/L (ref 136–145)
VAS LEFT GSV ANKLE DIAM: 2.83 MM
VAS LEFT GSV AT KNEE DIAM: 2.92 MM
VAS LEFT GSV BK DIST DIAM: 2.92 MM
VAS LEFT GSV BK MID DIAM: 2.79 MM
VAS LEFT GSV BK PROX DIAM: 3.15 MM
VAS LEFT GSV JUNC DIAM: 6.15 MM
VAS LEFT GSV THIGH DIST DIAM: 3.24 MM
VAS LEFT GSV THIGH MID DIAM: 1.92 MM
VAS LEFT GSV THIGH PROX DIAM: 3.1 MM
WBC # BLD AUTO: 10.5 K/UL (ref 4–11)

## 2025-05-27 PROCEDURE — 6370000000 HC RX 637 (ALT 250 FOR IP): Performed by: SURGERY

## 2025-05-27 PROCEDURE — 6360000002 HC RX W HCPCS: Performed by: CLINICAL NURSE SPECIALIST

## 2025-05-27 PROCEDURE — 93971 EXTREMITY STUDY: CPT | Performed by: INTERNAL MEDICINE

## 2025-05-27 PROCEDURE — 2709999900 HC NON-CHARGEABLE SUPPLY: Performed by: SURGERY

## 2025-05-27 PROCEDURE — 76937 US GUIDE VASCULAR ACCESS: CPT | Performed by: SURGERY

## 2025-05-27 PROCEDURE — 99152 MOD SED SAME PHYS/QHP 5/>YRS: CPT | Performed by: SURGERY

## 2025-05-27 PROCEDURE — 93005 ELECTROCARDIOGRAM TRACING: CPT | Performed by: SURGERY

## 2025-05-27 PROCEDURE — 6360000004 HC RX CONTRAST MEDICATION: Performed by: SURGERY

## 2025-05-27 PROCEDURE — C1894 INTRO/SHEATH, NON-LASER: HCPCS | Performed by: SURGERY

## 2025-05-27 PROCEDURE — 2060000000 HC ICU INTERMEDIATE R&B

## 2025-05-27 PROCEDURE — 7100000010 HC PHASE II RECOVERY - FIRST 15 MIN: Performed by: SURGERY

## 2025-05-27 PROCEDURE — 7100000011 HC PHASE II RECOVERY - ADDTL 15 MIN: Performed by: SURGERY

## 2025-05-27 PROCEDURE — 80048 BASIC METABOLIC PNL TOTAL CA: CPT

## 2025-05-27 PROCEDURE — 85027 COMPLETE CBC AUTOMATED: CPT

## 2025-05-27 PROCEDURE — 94640 AIRWAY INHALATION TREATMENT: CPT

## 2025-05-27 PROCEDURE — 6370000000 HC RX 637 (ALT 250 FOR IP): Performed by: CLINICAL NURSE SPECIALIST

## 2025-05-27 PROCEDURE — 36246 INS CATH ABD/L-EXT ART 2ND: CPT | Performed by: SURGERY

## 2025-05-27 PROCEDURE — 75710 ARTERY X-RAYS ARM/LEG: CPT | Performed by: SURGERY

## 2025-05-27 PROCEDURE — C1887 CATHETER, GUIDING: HCPCS | Performed by: SURGERY

## 2025-05-27 PROCEDURE — 93971 EXTREMITY STUDY: CPT

## 2025-05-27 PROCEDURE — 6360000002 HC RX W HCPCS: Performed by: SURGERY

## 2025-05-27 PROCEDURE — 93010 ELECTROCARDIOGRAM REPORT: CPT | Performed by: INTERNAL MEDICINE

## 2025-05-27 PROCEDURE — C1769 GUIDE WIRE: HCPCS | Performed by: SURGERY

## 2025-05-27 PROCEDURE — 2500000003 HC RX 250 WO HCPCS: Performed by: SURGERY

## 2025-05-27 RX ORDER — ONDANSETRON 2 MG/ML
4 INJECTION INTRAMUSCULAR; INTRAVENOUS EVERY 6 HOURS PRN
Status: DISCONTINUED | OUTPATIENT
Start: 2025-05-27 | End: 2025-05-28

## 2025-05-27 RX ORDER — SODIUM CHLORIDE 9 MG/ML
INJECTION, SOLUTION INTRAVENOUS PRN
Status: DISCONTINUED | OUTPATIENT
Start: 2025-05-27 | End: 2025-05-28

## 2025-05-27 RX ORDER — SODIUM CHLORIDE 0.9 % (FLUSH) 0.9 %
5-40 SYRINGE (ML) INJECTION EVERY 12 HOURS SCHEDULED
Status: DISCONTINUED | OUTPATIENT
Start: 2025-05-27 | End: 2025-05-28

## 2025-05-27 RX ORDER — SODIUM CHLORIDE 0.9 % (FLUSH) 0.9 %
5-40 SYRINGE (ML) INJECTION PRN
Status: DISCONTINUED | OUTPATIENT
Start: 2025-05-27 | End: 2025-05-28

## 2025-05-27 RX ORDER — OXYCODONE HYDROCHLORIDE 5 MG/1
5 TABLET ORAL EVERY 4 HOURS PRN
Refills: 0 | Status: DISCONTINUED | OUTPATIENT
Start: 2025-05-27 | End: 2025-05-28

## 2025-05-27 RX ORDER — MIDAZOLAM 1 MG/ML
INJECTION INTRAMUSCULAR; INTRAVENOUS PRN
Status: DISCONTINUED | OUTPATIENT
Start: 2025-05-27 | End: 2025-05-27 | Stop reason: HOSPADM

## 2025-05-27 RX ORDER — LOSARTAN POTASSIUM 25 MG/1
25 TABLET ORAL DAILY
Status: DISCONTINUED | OUTPATIENT
Start: 2025-05-27 | End: 2025-05-30 | Stop reason: HOSPADM

## 2025-05-27 RX ORDER — TIOTROPIUM BROMIDE 18 UG/1
18 CAPSULE ORAL; RESPIRATORY (INHALATION)
Status: DISCONTINUED | OUTPATIENT
Start: 2025-05-27 | End: 2025-05-27 | Stop reason: CLARIF

## 2025-05-27 RX ORDER — IOPAMIDOL 612 MG/ML
INJECTION, SOLUTION INTRAVASCULAR PRN
Status: DISCONTINUED | OUTPATIENT
Start: 2025-05-27 | End: 2025-05-27 | Stop reason: HOSPADM

## 2025-05-27 RX ORDER — ROSUVASTATIN CALCIUM 10 MG/1
40 TABLET, COATED ORAL NIGHTLY
Status: DISCONTINUED | OUTPATIENT
Start: 2025-05-27 | End: 2025-05-30 | Stop reason: HOSPADM

## 2025-05-27 RX ORDER — SODIUM CHLORIDE 0.9 % (FLUSH) 0.9 %
5-40 SYRINGE (ML) INJECTION EVERY 12 HOURS SCHEDULED
Status: DISCONTINUED | OUTPATIENT
Start: 2025-05-27 | End: 2025-05-27 | Stop reason: HOSPADM

## 2025-05-27 RX ORDER — MAGNESIUM SULFATE IN WATER 40 MG/ML
2000 INJECTION, SOLUTION INTRAVENOUS PRN
Status: DISCONTINUED | OUTPATIENT
Start: 2025-05-27 | End: 2025-05-28

## 2025-05-27 RX ORDER — MORPHINE SULFATE 2 MG/ML
2 INJECTION, SOLUTION INTRAMUSCULAR; INTRAVENOUS ONCE
Status: COMPLETED | OUTPATIENT
Start: 2025-05-27 | End: 2025-05-27

## 2025-05-27 RX ORDER — OXYCODONE HYDROCHLORIDE 5 MG/1
10 TABLET ORAL EVERY 4 HOURS PRN
Refills: 0 | Status: DISCONTINUED | OUTPATIENT
Start: 2025-05-27 | End: 2025-05-28

## 2025-05-27 RX ORDER — ASPIRIN 325 MG
325 TABLET ORAL ONCE
Status: COMPLETED | OUTPATIENT
Start: 2025-05-27 | End: 2025-05-27

## 2025-05-27 RX ORDER — GABAPENTIN 100 MG/1
100 CAPSULE ORAL 3 TIMES DAILY
Status: DISCONTINUED | OUTPATIENT
Start: 2025-05-27 | End: 2025-05-28

## 2025-05-27 RX ORDER — SODIUM CHLORIDE 0.9 % (FLUSH) 0.9 %
5-40 SYRINGE (ML) INJECTION PRN
Status: DISCONTINUED | OUTPATIENT
Start: 2025-05-27 | End: 2025-05-27 | Stop reason: HOSPADM

## 2025-05-27 RX ORDER — OXYCODONE HYDROCHLORIDE 5 MG/1
2.5 TABLET ORAL EVERY 4 HOURS PRN
Refills: 0 | Status: DISCONTINUED | OUTPATIENT
Start: 2025-05-27 | End: 2025-05-28

## 2025-05-27 RX ORDER — SODIUM CHLORIDE 9 MG/ML
INJECTION, SOLUTION INTRAVENOUS PRN
Status: DISCONTINUED | OUTPATIENT
Start: 2025-05-27 | End: 2025-05-27 | Stop reason: HOSPADM

## 2025-05-27 RX ORDER — CARVEDILOL 6.25 MG/1
6.25 TABLET ORAL 2 TIMES DAILY WITH MEALS
Status: DISCONTINUED | OUTPATIENT
Start: 2025-05-27 | End: 2025-05-30 | Stop reason: HOSPADM

## 2025-05-27 RX ORDER — MORPHINE SULFATE 2 MG/ML
INJECTION, SOLUTION INTRAMUSCULAR; INTRAVENOUS PRN
Status: DISCONTINUED | OUTPATIENT
Start: 2025-05-27 | End: 2025-05-27 | Stop reason: HOSPADM

## 2025-05-27 RX ORDER — ONDANSETRON 4 MG/1
4 TABLET, ORALLY DISINTEGRATING ORAL EVERY 8 HOURS PRN
Status: DISCONTINUED | OUTPATIENT
Start: 2025-05-27 | End: 2025-05-28

## 2025-05-27 RX ADMIN — CARVEDILOL 6.25 MG: 6.25 TABLET, FILM COATED ORAL at 11:48

## 2025-05-27 RX ADMIN — OXYCODONE 10 MG: 5 TABLET ORAL at 20:59

## 2025-05-27 RX ADMIN — LOSARTAN POTASSIUM 25 MG: 25 TABLET, FILM COATED ORAL at 11:48

## 2025-05-27 RX ADMIN — TIOTROPIUM BROMIDE INHALATION SPRAY 2 PUFF: 3.12 SPRAY, METERED RESPIRATORY (INHALATION) at 11:47

## 2025-05-27 RX ADMIN — CARVEDILOL 6.25 MG: 6.25 TABLET, FILM COATED ORAL at 16:26

## 2025-05-27 RX ADMIN — GABAPENTIN 100 MG: 100 CAPSULE ORAL at 21:00

## 2025-05-27 RX ADMIN — OXYCODONE 10 MG: 5 TABLET ORAL at 08:44

## 2025-05-27 RX ADMIN — ASPIRIN 325 MG: 325 TABLET ORAL at 06:58

## 2025-05-27 RX ADMIN — GABAPENTIN 100 MG: 100 CAPSULE ORAL at 13:31

## 2025-05-27 RX ADMIN — MORPHINE SULFATE 2 MG: 2 INJECTION, SOLUTION INTRAMUSCULAR; INTRAVENOUS at 11:47

## 2025-05-27 RX ADMIN — OXYCODONE 10 MG: 5 TABLET ORAL at 16:45

## 2025-05-27 RX ADMIN — OXYCODONE 10 MG: 5 TABLET ORAL at 12:46

## 2025-05-27 RX ADMIN — ROSUVASTATIN CALCIUM 40 MG: 10 TABLET, FILM COATED ORAL at 21:00

## 2025-05-27 ASSESSMENT — PAIN SCALES - GENERAL
PAINLEVEL_OUTOF10: 7
PAINLEVEL_OUTOF10: 10
PAINLEVEL_OUTOF10: 6

## 2025-05-27 ASSESSMENT — PAIN DESCRIPTION - ORIENTATION: ORIENTATION: LEFT

## 2025-05-27 ASSESSMENT — PAIN SCALES - WONG BAKER
WONGBAKER_NUMERICALRESPONSE: HURTS WHOLE LOT
WONGBAKER_NUMERICALRESPONSE: HURTS A LITTLE BIT

## 2025-05-27 ASSESSMENT — ENCOUNTER SYMPTOMS: SHORTNESS OF BREATH: 1

## 2025-05-27 ASSESSMENT — PAIN DESCRIPTION - LOCATION: LOCATION: LEG;FOOT

## 2025-05-27 ASSESSMENT — COPD QUESTIONNAIRES: CAT_SEVERITY: MODERATE

## 2025-05-27 NOTE — H&P
History and Physical / Pre-Sedation Assessment    Patient:  Tray Saavedra  :   1955    Intended Procedure: LLE angiogram with possible intervention      HPI: Ischemic rest pain - See Office note 5/2  Nurses notes reviewed and agreed.  Medications reviewed  Allergies:   Ativan [lorazepam], Buprenorphine, and Fentanyl and related        Physical Exam:   CURRENT VITALS:  Ht 1.753 m (5' 9\")   Wt 62.6 kg (138 lb)   BMI 20.38 kg/m²   Airway:Normal  Cardiac:Normal  Pulmonary:Normal  Abdomen:Normal        Pre-Procedure Assessment/Plan:  ASA 2 - Patient with mild systemic disease with no functional limitations    Mallampati Airway Assessment:  Mallampati Class II - (soft palate, fauces & uvula are visible)    Level of Sedation Plan:Moderate sedation    Post Procedure plan: Return to same level of care    I assessed the patient and find that the patient is in satisfactory condition to proceed with the planned procedure and sedation plan.    I have explained the risk, benefits, and alternatives to the procedure. The patient understands and agrees to proceed.  Yes    Misbah Silva

## 2025-05-27 NOTE — FLOWSHEET NOTE
Patient resting in bed CLWR    05/27/25 1106   Assessment   Charting Type Admission   Psychosocial   Psychosocial (WDL) WDL   Neurological   Level of Consciousness 0   Orientation Level Oriented X4   Cognition Appropriate judgement;Appropriate safety awareness;Follows commands   Speech Clear   Neuro (WDL) WDL   Cambridge Coma Scale   Eye Opening 4   Best Verbal Response 5   Best Motor Response 6   Perry Coma Scale Score 15   NIH Stroke Scale   NIH Stroke Scale Assessed No   HEENT (Head, Ears, Eyes, Nose, & Throat)   HEENT (WDL) X   Right Eye Glasses;Impaired vision   Left Eye Glasses;Impaired vision   Lips Dry;Pink   Teeth Dentures upper;Dentures lower   Respiratory   Respiratory Pattern Regular   Respiratory Depth Normal   Respiratory Quality/Effort Unlabored   Chest Assessment Chest expansion symmetrical   L Breath Sounds Crackles;Expiratory Wheezes   R Breath Sounds Crackles;Expiratory Wheezes   Level of Activity/Mobility 1   Subcutaneous Air/Crepitus None   Respiratory (WDL) X   Respiratory Interventions H.O.B. elevated   Cough/Sputum   Cough None   Cardiac   Cardiac (WDL) WDL   Gastrointestinal   Abdominal (WDL) WDL   Abdomen Inspection Flat   Last BM (including prior to admit) 05/26/25   RUQ Bowel Sounds Active   LUQ Bowel Sounds Active   RLQ Bowel Sounds Active   LLQ Bowel Sounds Active   Tenderness Nontender;No guarding   Passing Flatus Y   Genitourinary   Genitourinary (WDL) WDL   Flank Tenderness   (denies)   Suprapubic Tenderness No   Dysuria (Pain/Burning w/Urination) No   Difficulty Urinating/Starting Stream No   Peripheral Vascular   Peripheral Vascular (WDL) X   Edema None   LUE Neurovascular Assessment   Capillary Refill Less than/Equal to 3 seconds   Color Appropriate for Ethnicity   Temperature Warm   L Radial Pulse +2 (Moderate)   RLE Neurovascular Assessment   R Post Tibial Pulse +2 (Moderate)   R Pedal Pulse +2   LLE Neurovascular Assessment   L Post Tibial Pulse Doppler   L Pedal Pulse Doppler    Dual Clinician Skin Assessment   Dual Skin Assessment (4 Eyes) X   Pressure Injury Documentation Pressure Injury Noted-See Wound LDA to Document   Second Clinical  (First and Last Name) Stephanie Hall   Skin Integumentary    Skin Color Pale   Skin Condition/Temp Warm   Skin Integrity Incision (see LDA)   Location Left wrist   Skin Fold Management No   Skin Integumentary (WDL) X   Wound Prevention and Protection Methods   Wound Offloading (Prevention Methods) Pillows

## 2025-05-27 NOTE — CARE COORDINATION
Attempt to see earlier for dc planning assessment; pt in visible pain; RN at bedside to administer morphine. Unable to complete assessment. Pt reportedly IPTA; has one step to enter home. Bypass planned for tomorrow per vascular surgery.      Will f/u post op.     LG

## 2025-05-27 NOTE — ANESTHESIA PRE PROCEDURE
Department of Anesthesiology  Preprocedure Note       Name:  Tray Saavedra   Age:  69 y.o.  :  1955                                          MRN:  [id number]         Date:  2025      Surgeon: Surgeon(s):  Misbah Silva MD    Procedure: Procedure(s):  LEFT LEG BYPASS GRAFT    Medications prior to admission:   Prior to Admission medications    Medication Sig Start Date End Date Taking? Authorizing Provider   predniSONE (DELTASONE) 10 MG tablet Take 1 tablet by mouth daily 24  Yes Manjit Prajapati MD   rosuvastatin (CRESTOR) 40 MG tablet 1 tablet 23  Yes Jerry Cheney MD   Tiotropium Bromide Monohydrate (SPIRIVA HANDIHALER IN) Inhale 1 puff into the lungs   Yes Jerry Cheney MD   oxyCODONE-acetaminophen (PERCOCET)  MG per tablet Take 1 tablet by mouth every 4 hours as needed for Pain.   Yes Jerry Cheney MD   carvedilol (COREG) 6.25 MG tablet Take 1 tablet by mouth 2 times daily (with meals) 23  Yes Manjit Prajapati MD   losartan (COZAAR) 25 MG tablet Take 1 tablet by mouth daily 23  Yes Manjit Prajapati MD   gabapentin (NEURONTIN) 100 MG capsule Take 1 capsule by mouth 3 times daily for 90 days. 22 Yes Misbah Silva MD   aspirin 81 MG chewable tablet Take 1 tablet by mouth daily 21  Yes Olive Ambrocio MD   albuterol sulfate HFA (VENTOLIN HFA) 108 (90 Base) MCG/ACT inhaler Inhale 2 puffs into the lungs every 6 hours as needed for Wheezing or Shortness of Breath 3/3/20  Yes Gagandeep Thompson MD   albuterol (PROVENTIL) (2.5 MG/3ML) 0.083% nebulizer solution Take 3 mLs by nebulization every 6 hours as needed for Wheezing 3/3/20  Yes Gagandeep Thompson MD   cilostazol (PLETAL) 50 MG tablet Take 1 tablet by mouth 2 times daily  Patient not taking: Reported on 2025   Manjit Prajapati MD   ticagrelor (BRILINTA) 90 MG TABS tablet TAKE 1 TABLET BY MOUTH TWO TIMES A DAY 23   Manjit Prajapati MD       Current medications:    Current

## 2025-05-27 NOTE — PROGRESS NOTES
Patient arrived on unit from cath lab by stretcher.  Patient A/Ox4 RA.  Left radial site dressing clean dry intact with arm board intact +2 pulses.  VSS cyclying. CLWR

## 2025-05-28 ENCOUNTER — ANESTHESIA (OUTPATIENT)
Dept: OPERATING ROOM | Age: 70
End: 2025-05-28
Payer: MEDICARE

## 2025-05-28 LAB
ABO/RH: NORMAL
ANTIBODY SCREEN: NORMAL

## 2025-05-28 PROCEDURE — 6360000002 HC RX W HCPCS: Performed by: SURGERY

## 2025-05-28 PROCEDURE — 94761 N-INVAS EAR/PLS OXIMETRY MLT: CPT

## 2025-05-28 PROCEDURE — 2500000003 HC RX 250 WO HCPCS

## 2025-05-28 PROCEDURE — 6370000000 HC RX 637 (ALT 250 FOR IP): Performed by: SURGERY

## 2025-05-28 PROCEDURE — 86900 BLOOD TYPING SEROLOGIC ABO: CPT

## 2025-05-28 PROCEDURE — C1894 INTRO/SHEATH, NON-LASER: HCPCS | Performed by: SURGERY

## 2025-05-28 PROCEDURE — 6360000004 HC RX CONTRAST MEDICATION: Performed by: SURGERY

## 2025-05-28 PROCEDURE — 041L09L BYPASS LEFT FEMORAL ARTERY TO POPLITEAL ARTERY WITH AUTOLOGOUS VENOUS TISSUE, OPEN APPROACH: ICD-10-PCS | Performed by: SURGERY

## 2025-05-28 PROCEDURE — 2700000000 HC OXYGEN THERAPY PER DAY

## 2025-05-28 PROCEDURE — 7100000000 HC PACU RECOVERY - FIRST 15 MIN: Performed by: SURGERY

## 2025-05-28 PROCEDURE — 2500000003 HC RX 250 WO HCPCS: Performed by: SURGERY

## 2025-05-28 PROCEDURE — C2628 CATHETER, OCCLUSION: HCPCS | Performed by: SURGERY

## 2025-05-28 PROCEDURE — 35583 VEIN BYP GRFT FEM-POPLITEAL: CPT | Performed by: SURGERY

## 2025-05-28 PROCEDURE — 2709999900 HC NON-CHARGEABLE SUPPLY: Performed by: SURGERY

## 2025-05-28 PROCEDURE — 86901 BLOOD TYPING SEROLOGIC RH(D): CPT

## 2025-05-28 PROCEDURE — 2060000000 HC ICU INTERMEDIATE R&B

## 2025-05-28 PROCEDURE — 3700000001 HC ADD 15 MINUTES (ANESTHESIA): Performed by: SURGERY

## 2025-05-28 PROCEDURE — C1768 GRAFT, VASCULAR: HCPCS | Performed by: SURGERY

## 2025-05-28 PROCEDURE — 2580000003 HC RX 258

## 2025-05-28 PROCEDURE — 04CL0ZZ EXTIRPATION OF MATTER FROM LEFT FEMORAL ARTERY, OPEN APPROACH: ICD-10-PCS | Performed by: SURGERY

## 2025-05-28 PROCEDURE — 2580000003 HC RX 258: Performed by: SURGERY

## 2025-05-28 PROCEDURE — 6360000002 HC RX W HCPCS: Performed by: ANESTHESIOLOGY

## 2025-05-28 PROCEDURE — 3600000017 HC SURGERY HYBRID ADDL 15MIN: Performed by: SURGERY

## 2025-05-28 PROCEDURE — 6360000002 HC RX W HCPCS

## 2025-05-28 PROCEDURE — 3600000007 HC SURGERY HYBRID BASE: Performed by: SURGERY

## 2025-05-28 PROCEDURE — 86850 RBC ANTIBODY SCREEN: CPT

## 2025-05-28 PROCEDURE — 6370000000 HC RX 637 (ALT 250 FOR IP): Performed by: ANESTHESIOLOGY

## 2025-05-28 PROCEDURE — 2720000010 HC SURG SUPPLY STERILE: Performed by: SURGERY

## 2025-05-28 PROCEDURE — 7100000001 HC PACU RECOVERY - ADDTL 15 MIN: Performed by: SURGERY

## 2025-05-28 PROCEDURE — 3700000000 HC ANESTHESIA ATTENDED CARE: Performed by: SURGERY

## 2025-05-28 DEVICE — XENOSURE BIOLOGIC PATCH, 0.8CM X 8CM, EIFU
Type: IMPLANTABLE DEVICE | Site: LEG | Status: FUNCTIONAL
Brand: XENOSURE BIOLOGIC PATCH

## 2025-05-28 RX ORDER — LABETALOL HYDROCHLORIDE 5 MG/ML
5 INJECTION, SOLUTION INTRAVENOUS EVERY 10 MIN PRN
Status: DISCONTINUED | OUTPATIENT
Start: 2025-05-28 | End: 2025-05-28 | Stop reason: HOSPADM

## 2025-05-28 RX ORDER — PROPOFOL 10 MG/ML
INJECTION, EMULSION INTRAVENOUS
Status: DISCONTINUED | OUTPATIENT
Start: 2025-05-28 | End: 2025-05-28 | Stop reason: SDUPTHER

## 2025-05-28 RX ORDER — SODIUM CHLORIDE 0.9 % (FLUSH) 0.9 %
5-40 SYRINGE (ML) INJECTION PRN
Status: DISCONTINUED | OUTPATIENT
Start: 2025-05-28 | End: 2025-05-28 | Stop reason: HOSPADM

## 2025-05-28 RX ORDER — ONDANSETRON 2 MG/ML
INJECTION INTRAMUSCULAR; INTRAVENOUS
Status: DISCONTINUED | OUTPATIENT
Start: 2025-05-28 | End: 2025-05-28 | Stop reason: SDUPTHER

## 2025-05-28 RX ORDER — HYDRALAZINE HYDROCHLORIDE 20 MG/ML
10 INJECTION INTRAMUSCULAR; INTRAVENOUS
Status: DISCONTINUED | OUTPATIENT
Start: 2025-05-28 | End: 2025-05-30 | Stop reason: HOSPADM

## 2025-05-28 RX ORDER — SODIUM CHLORIDE 0.9 % (FLUSH) 0.9 %
5-40 SYRINGE (ML) INJECTION PRN
Status: DISCONTINUED | OUTPATIENT
Start: 2025-05-28 | End: 2025-05-30 | Stop reason: HOSPADM

## 2025-05-28 RX ORDER — IPRATROPIUM BROMIDE AND ALBUTEROL SULFATE 2.5; .5 MG/3ML; MG/3ML
1 SOLUTION RESPIRATORY (INHALATION) ONCE
Status: COMPLETED | OUTPATIENT
Start: 2025-05-28 | End: 2025-05-28

## 2025-05-28 RX ORDER — NALOXONE HYDROCHLORIDE 0.4 MG/ML
INJECTION, SOLUTION INTRAMUSCULAR; INTRAVENOUS; SUBCUTANEOUS PRN
Status: DISCONTINUED | OUTPATIENT
Start: 2025-05-28 | End: 2025-05-28 | Stop reason: HOSPADM

## 2025-05-28 RX ORDER — OXYCODONE HYDROCHLORIDE 5 MG/1
5 TABLET ORAL PRN
Status: DISCONTINUED | OUTPATIENT
Start: 2025-05-28 | End: 2025-05-28 | Stop reason: HOSPADM

## 2025-05-28 RX ORDER — PHENYLEPHRINE HCL IN 0.9% NACL 1 MG/10 ML
SYRINGE (ML) INTRAVENOUS
Status: DISCONTINUED | OUTPATIENT
Start: 2025-05-28 | End: 2025-05-28 | Stop reason: SDUPTHER

## 2025-05-28 RX ORDER — SODIUM CHLORIDE 0.9 % (FLUSH) 0.9 %
5-40 SYRINGE (ML) INJECTION EVERY 12 HOURS SCHEDULED
Status: DISCONTINUED | OUTPATIENT
Start: 2025-05-28 | End: 2025-05-30 | Stop reason: HOSPADM

## 2025-05-28 RX ORDER — HEPARIN SODIUM 1000 [USP'U]/ML
INJECTION, SOLUTION INTRAVENOUS; SUBCUTANEOUS
Status: DISCONTINUED | OUTPATIENT
Start: 2025-05-28 | End: 2025-05-28 | Stop reason: SDUPTHER

## 2025-05-28 RX ORDER — IOPAMIDOL 612 MG/ML
INJECTION, SOLUTION INTRAVASCULAR PRN
Status: DISCONTINUED | OUTPATIENT
Start: 2025-05-28 | End: 2025-05-28 | Stop reason: HOSPADM

## 2025-05-28 RX ORDER — DIPHENHYDRAMINE HYDROCHLORIDE 50 MG/ML
12.5 INJECTION, SOLUTION INTRAMUSCULAR; INTRAVENOUS
Status: DISCONTINUED | OUTPATIENT
Start: 2025-05-28 | End: 2025-05-28 | Stop reason: HOSPADM

## 2025-05-28 RX ORDER — OXYCODONE HYDROCHLORIDE 5 MG/1
5 TABLET ORAL EVERY 4 HOURS PRN
Status: DISCONTINUED | OUTPATIENT
Start: 2025-05-28 | End: 2025-05-30 | Stop reason: HOSPADM

## 2025-05-28 RX ORDER — ROCURONIUM BROMIDE 10 MG/ML
INJECTION, SOLUTION INTRAVENOUS
Status: DISCONTINUED | OUTPATIENT
Start: 2025-05-28 | End: 2025-05-28 | Stop reason: SDUPTHER

## 2025-05-28 RX ORDER — SODIUM CHLORIDE 0.9 % (FLUSH) 0.9 %
5-40 SYRINGE (ML) INJECTION EVERY 12 HOURS SCHEDULED
Status: DISCONTINUED | OUTPATIENT
Start: 2025-05-28 | End: 2025-05-28 | Stop reason: HOSPADM

## 2025-05-28 RX ORDER — ONDANSETRON 2 MG/ML
4 INJECTION INTRAMUSCULAR; INTRAVENOUS EVERY 6 HOURS PRN
Status: DISCONTINUED | OUTPATIENT
Start: 2025-05-28 | End: 2025-05-30 | Stop reason: HOSPADM

## 2025-05-28 RX ORDER — OXYCODONE HYDROCHLORIDE 5 MG/1
10 TABLET ORAL EVERY 4 HOURS PRN
Status: DISCONTINUED | OUTPATIENT
Start: 2025-05-28 | End: 2025-05-30 | Stop reason: HOSPADM

## 2025-05-28 RX ORDER — SODIUM CHLORIDE 9 MG/ML
INJECTION, SOLUTION INTRAVENOUS PRN
Status: DISCONTINUED | OUTPATIENT
Start: 2025-05-28 | End: 2025-05-30 | Stop reason: HOSPADM

## 2025-05-28 RX ORDER — OXYCODONE HYDROCHLORIDE 5 MG/1
10 TABLET ORAL PRN
Status: DISCONTINUED | OUTPATIENT
Start: 2025-05-28 | End: 2025-05-28 | Stop reason: HOSPADM

## 2025-05-28 RX ORDER — LIDOCAINE HYDROCHLORIDE 20 MG/ML
INJECTION, SOLUTION EPIDURAL; INFILTRATION; INTRACAUDAL; PERINEURAL
Status: DISCONTINUED | OUTPATIENT
Start: 2025-05-28 | End: 2025-05-28 | Stop reason: SDUPTHER

## 2025-05-28 RX ORDER — SODIUM CHLORIDE 9 MG/ML
INJECTION, SOLUTION INTRAVENOUS PRN
Status: DISCONTINUED | OUTPATIENT
Start: 2025-05-28 | End: 2025-05-28 | Stop reason: HOSPADM

## 2025-05-28 RX ORDER — ONDANSETRON 2 MG/ML
4 INJECTION INTRAMUSCULAR; INTRAVENOUS
Status: DISCONTINUED | OUTPATIENT
Start: 2025-05-28 | End: 2025-05-28 | Stop reason: HOSPADM

## 2025-05-28 RX ORDER — PROMETHAZINE HYDROCHLORIDE 25 MG/1
12.5 TABLET ORAL EVERY 6 HOURS PRN
Status: DISCONTINUED | OUTPATIENT
Start: 2025-05-28 | End: 2025-05-30 | Stop reason: HOSPADM

## 2025-05-28 RX ORDER — FENTANYL CITRATE 50 UG/ML
INJECTION, SOLUTION INTRAMUSCULAR; INTRAVENOUS
Status: DISCONTINUED | OUTPATIENT
Start: 2025-05-28 | End: 2025-05-28 | Stop reason: SDUPTHER

## 2025-05-28 RX ORDER — MORPHINE SULFATE 4 MG/ML
4 INJECTION, SOLUTION INTRAMUSCULAR; INTRAVENOUS
Status: DISCONTINUED | OUTPATIENT
Start: 2025-05-28 | End: 2025-05-30 | Stop reason: HOSPADM

## 2025-05-28 RX ORDER — DEXAMETHASONE SODIUM PHOSPHATE 4 MG/ML
INJECTION, SOLUTION INTRA-ARTICULAR; INTRALESIONAL; INTRAMUSCULAR; INTRAVENOUS; SOFT TISSUE
Status: DISCONTINUED | OUTPATIENT
Start: 2025-05-28 | End: 2025-05-28 | Stop reason: SDUPTHER

## 2025-05-28 RX ORDER — MORPHINE SULFATE 2 MG/ML
2 INJECTION, SOLUTION INTRAMUSCULAR; INTRAVENOUS
Status: DISCONTINUED | OUTPATIENT
Start: 2025-05-28 | End: 2025-05-30 | Stop reason: HOSPADM

## 2025-05-28 RX ADMIN — Medication 15 MG: at 09:35

## 2025-05-28 RX ADMIN — ROCURONIUM BROMIDE 25 MG: 50 INJECTION, SOLUTION INTRAVENOUS at 09:17

## 2025-05-28 RX ADMIN — MORPHINE SULFATE 4 MG: 4 INJECTION, SOLUTION INTRAMUSCULAR; INTRAVENOUS at 15:47

## 2025-05-28 RX ADMIN — DEXMEDETOMIDINE HYDROCHLORIDE 4 MCG: 100 INJECTION, SOLUTION INTRAVENOUS at 10:24

## 2025-05-28 RX ADMIN — ROCURONIUM BROMIDE 50 MG: 50 INJECTION, SOLUTION INTRAVENOUS at 07:39

## 2025-05-28 RX ADMIN — Medication 100 MCG: at 08:21

## 2025-05-28 RX ADMIN — DEXMEDETOMIDINE HYDROCHLORIDE 4 MCG: 100 INJECTION, SOLUTION INTRAVENOUS at 10:43

## 2025-05-28 RX ADMIN — ROCURONIUM BROMIDE 25 MG: 50 INJECTION, SOLUTION INTRAVENOUS at 10:10

## 2025-05-28 RX ADMIN — LOSARTAN POTASSIUM 25 MG: 25 TABLET, FILM COATED ORAL at 13:12

## 2025-05-28 RX ADMIN — OXYCODONE 10 MG: 5 TABLET ORAL at 23:15

## 2025-05-28 RX ADMIN — DEXMEDETOMIDINE HYDROCHLORIDE 4 MCG: 100 INJECTION, SOLUTION INTRAVENOUS at 11:00

## 2025-05-28 RX ADMIN — Medication 50 MCG: at 07:48

## 2025-05-28 RX ADMIN — PROPOFOL 150 MG: 10 INJECTION, EMULSION INTRAVENOUS at 07:39

## 2025-05-28 RX ADMIN — DEXMEDETOMIDINE HYDROCHLORIDE 4 MCG: 100 INJECTION, SOLUTION INTRAVENOUS at 08:57

## 2025-05-28 RX ADMIN — Medication 100 MCG: at 07:55

## 2025-05-28 RX ADMIN — FENTANYL CITRATE 50 MCG: 50 INJECTION, SOLUTION INTRAMUSCULAR; INTRAVENOUS at 07:32

## 2025-05-28 RX ADMIN — OXYCODONE 10 MG: 5 TABLET ORAL at 00:57

## 2025-05-28 RX ADMIN — Medication 10 MG: at 09:57

## 2025-05-28 RX ADMIN — DEXAMETHASONE SODIUM PHOSPHATE 8 MG: 4 INJECTION, SOLUTION INTRAMUSCULAR; INTRAVENOUS at 07:46

## 2025-05-28 RX ADMIN — Medication 25 MG: at 08:02

## 2025-05-28 RX ADMIN — CEFAZOLIN 2000 MG: 2 INJECTION, POWDER, FOR SOLUTION INTRAVENOUS at 07:48

## 2025-05-28 RX ADMIN — IPRATROPIUM BROMIDE AND ALBUTEROL SULFATE 1 DOSE: .5; 3 SOLUTION RESPIRATORY (INHALATION) at 07:09

## 2025-05-28 RX ADMIN — FENTANYL CITRATE 50 MCG: 50 INJECTION, SOLUTION INTRAMUSCULAR; INTRAVENOUS at 07:39

## 2025-05-28 RX ADMIN — HYDROMORPHONE HYDROCHLORIDE 0.5 MG: 1 INJECTION, SOLUTION INTRAMUSCULAR; INTRAVENOUS; SUBCUTANEOUS at 11:55

## 2025-05-28 RX ADMIN — SODIUM CHLORIDE: 9 INJECTION, SOLUTION INTRAVENOUS at 07:30

## 2025-05-28 RX ADMIN — Medication 100 MCG: at 08:06

## 2025-05-28 RX ADMIN — ONDANSETRON 4 MG: 2 INJECTION INTRAMUSCULAR; INTRAVENOUS at 10:39

## 2025-05-28 RX ADMIN — SODIUM CHLORIDE, PRESERVATIVE FREE 5 ML: 5 INJECTION INTRAVENOUS at 21:18

## 2025-05-28 RX ADMIN — OXYCODONE 10 MG: 5 TABLET ORAL at 13:12

## 2025-05-28 RX ADMIN — DEXMEDETOMIDINE HYDROCHLORIDE 4 MCG: 100 INJECTION, SOLUTION INTRAVENOUS at 11:21

## 2025-05-28 RX ADMIN — ROSUVASTATIN CALCIUM 40 MG: 10 TABLET, FILM COATED ORAL at 20:41

## 2025-05-28 RX ADMIN — MORPHINE SULFATE 4 MG: 4 INJECTION, SOLUTION INTRAMUSCULAR; INTRAVENOUS at 20:41

## 2025-05-28 RX ADMIN — CARVEDILOL 6.25 MG: 6.25 TABLET, FILM COATED ORAL at 17:46

## 2025-05-28 RX ADMIN — OXYCODONE 10 MG: 5 TABLET ORAL at 17:49

## 2025-05-28 RX ADMIN — Medication 100 MCG: at 10:21

## 2025-05-28 RX ADMIN — SODIUM CHLORIDE: 9 INJECTION, SOLUTION INTRAVENOUS at 09:42

## 2025-05-28 RX ADMIN — SUGAMMADEX 200 MG: 100 INJECTION, SOLUTION INTRAVENOUS at 11:22

## 2025-05-28 RX ADMIN — Medication 100 MCG: at 08:10

## 2025-05-28 RX ADMIN — HEPARIN SODIUM 5000 UNITS: 1000 INJECTION INTRAVENOUS; SUBCUTANEOUS at 09:09

## 2025-05-28 RX ADMIN — PHENYLEPHRINE HYDROCHLORIDE 40 MCG/MIN: 10 INJECTION INTRAVENOUS at 08:28

## 2025-05-28 RX ADMIN — LIDOCAINE HYDROCHLORIDE 100 MG: 20 INJECTION, SOLUTION EPIDURAL; INFILTRATION; INTRACAUDAL at 07:39

## 2025-05-28 ASSESSMENT — PAIN DESCRIPTION - FREQUENCY: FREQUENCY: CONTINUOUS

## 2025-05-28 ASSESSMENT — PAIN SCALES - WONG BAKER
WONGBAKER_NUMERICALRESPONSE: HURTS A LITTLE BIT

## 2025-05-28 ASSESSMENT — PAIN SCALES - GENERAL
PAINLEVEL_OUTOF10: 8
PAINLEVEL_OUTOF10: 10
PAINLEVEL_OUTOF10: 7
PAINLEVEL_OUTOF10: 10
PAINLEVEL_OUTOF10: 3
PAINLEVEL_OUTOF10: 8
PAINLEVEL_OUTOF10: 8
PAINLEVEL_OUTOF10: 6
PAINLEVEL_OUTOF10: 4

## 2025-05-28 ASSESSMENT — PAIN DESCRIPTION - LOCATION
LOCATION: LEG

## 2025-05-28 ASSESSMENT — PAIN DESCRIPTION - ORIENTATION
ORIENTATION: LEFT
ORIENTATION: LEFT;LOWER
ORIENTATION: LEFT
ORIENTATION: LEFT
ORIENTATION: LEFT;LOWER

## 2025-05-28 ASSESSMENT — PAIN DESCRIPTION - ONSET: ONSET: ON-GOING

## 2025-05-28 NOTE — OP NOTE
[address] [address]                            OPERATIVE REPORT      PATIENT NAME: ANILA MENDEZ                : 1955  MED REC NO: [id number]                      ROOM: 0441  ACCOUNT NO: [id number]                       ADMIT DATE: 2025  PROVIDER: Misbah Silva MD      DATE OF PROCEDURE:  2025    SURGEON:  Misbah Silva MD    PREOPERATIVE DIAGNOSIS:  Ischemic rest pain of the left lower extremity.    POSTOPERATIVE DIAGNOSIS:  Ischemic rest pain of the left lower extremity.    PROCEDURES PERFORMED:    1. Left common femoral endarterectomy.  2. Left femoral to below-knee popliteal in situ bypass.    ANESTHESIA:  General endotracheal.    INDICATIONS:  The patient is a 69-year-old male with ischemic rest pain of the left lower extremity. Angiograms had been performed showing occlusion of the common femoral artery with reconstitution of the deep femoral artery.  The superficial femoral artery was completely occluded with reconstitution of the popliteal vessel.  The patient is brought to the operating room at this time to undergo the above procedures.    DESCRIPTION OF PROCEDURE:  The patient was brought to the operating room, and placed in supine position.  General endotracheal anesthesia was induced.  After adequate anesthesia, the left lower extremity was prepped and draped in sterile fashion.  An incision was made in the medial proximal calf just below the knee.  The saphenous vein was identified here, ligating and dividing side branches using 3-0 silk ties and hemoclips.  The fascia was then incised, and the gastroc muscle was reflected posteriorly.  The below-knee popliteal artery was dissected for several centimeters and encircled proximally and distally with vessel loops.  The artery was punctured with a 20-gauge Angio-catheter and below-knee angiography was performed showing a patent popliteal artery and 3-

## 2025-05-28 NOTE — PROGRESS NOTES
Patient admitted to [facility] 4 in preparation for surgery, VSS. Consent confirmed. IV inserted into R hand, NS infusing. Belongings dentures upper and lower removed. NPO since 2230. Family at bedside, phone number in system for text updates, call light within reach.

## 2025-05-28 NOTE — BRIEF OP NOTE
Brief Postoperative Note      Patient: Tray Saavedra  YOB: 1955  MRN: [id number]    Date of Procedure: 5/28/2025    Pre-Op Diagnosis Codes:      * Peripheral vascular disease, unspecified [I73.9]    Post-Op Diagnosis: Same       Procedure(s):  LEFT FEMORAL POPLITEAL BYPASS GRAFT    Surgeon(s):  Misbah Silva MD    Assistant:  Surgical Assistant: Hilario Gallardo    Anesthesia: General    Estimated Blood Loss (mL): less than 100     Complications: None    Specimens:   * No specimens in log *    Implants:  Implant Name Type Inv. Item Serial No.  Lot No. LRB No. Used Action   GRAFT VASC W0.8XL8CM THK0.35-0.75MM CAR PERICARD PROC BOV - [id number] Vascular grafts GRAFT VASC W0.8XL8CM THK0.35-0.75MM CAR PERICARD PROC BOV  [employer] [id number] Left 1 Implanted         Drains:   Urinary Catheter 05/28/25 2 Way;Fang (Active)       Findings:  Infection Present At Time Of Surgery (PATOS) (choose all levels that have infection present):  No infection present      Electronically signed by Misbah Silva MD on 5/28/2025 at 11:00 AM

## 2025-05-28 NOTE — PROGRESS NOTES
Patient arrived back from PACU by transport.  VSS patient alert and oriented, on 3 liters 02.  Left leg incision sites x4 dressings clean dry intact.  Fang in place draining clear yellow urine.  Patient resting in bed no c/o CLWR

## 2025-05-28 NOTE — DISCHARGE INSTRUCTIONS
[facility]      Femoropopliteal Bypass Discharge Instructions    WHAT YOU SHOULD KNOW:   Femoropopliteal (fem-er-o-pop-lih-t-ull) bypass surgery is surgery on a blocked artery in your leg. It is also called a \"fem-pop\" bypass. Blood cannot flow into the leg when this artery is blocked. The femoral artery is in the thigh and the popliteal (pop-lih-t-ull) artery is near the knee. The blocked part of the femoral artery is bypassed or cut out, and replaced with a man-made graft.    AFTER YOU LEAVE:   Medicines:   Always take your medicine as directed by caregivers. Call your caregiver if you think your medicines are not helping or if you feel you are having side effects. Do not quit taking it until you discuss it with your caregiver. If you are taking antibiotics (dp-ml-on-ah-tiks), take them until they are all gone even if you feel better.    Keep a written list of what medicines you take and when and why you take them. Bring the list of your medicines or the pill bottles when you see your caregivers. Learn why you take each medicine. Ask your caregiver for information about your medicine. Do not take any medicines, over-the-counter drugs, vitamins, herbs, or food supplements without first talking to caregivers.    Your caregiver may prescribe blood thinners such as aspirin after you go home. This is to replace the blood thinning medicine you may have been receiving in your IV in the hospital. This medicine helps to stop blood from forming clots.    Blood pressure medicine may be needed to lower your blood pressure after your surgery. You may have to take it for the rest of the life.    If you are taking medicine that makes you drowsy, do not drive or use heavy equipment.     Activity:   You may feel like resting more after surgery. Slowly start to do more each day. Rest when you feel it is needed.    Talk to your caregiver before you start exercising. Together you can plan the best

## 2025-05-28 NOTE — PLAN OF CARE
Problem: Discharge Planning  Goal: Discharge to home or other facility with appropriate resources  5/28/2025 0549 by Judy Bateman RN  Outcome: Progressing  5/27/2025 2145 by Bhavana Sotelo RN  Outcome: Progressing     Problem: Pain  Goal: Verbalizes/displays adequate comfort level or baseline comfort level  5/28/2025 0549 by Judy Bateman RN  Outcome: Progressing  5/27/2025 2145 by Bhavana Sotelo RN  Outcome: Not Progressing     Problem: Skin/Tissue Integrity  Goal: Skin integrity remains intact  Description: 1.  Monitor for areas of redness and/or skin breakdown2.  Assess vascular access sites hourly3.  Every 4-6 hours minimum:  Change oxygen saturation probe site4.  Every 4-6 hours:  If on nasal continuous positive airway pressure, respiratory therapy assess nares and determine need for appliance change or resting period  5/28/2025 0549 by Judy Bateman RN  Outcome: Progressing  5/27/2025 2145 by Bhavana Sotelo RN  Outcome: Progressing     Problem: ABCDS Injury Assessment  Goal: Absence of physical injury  5/28/2025 0549 by Judy Bateman RN  Outcome: Progressing  5/27/2025 2145 by Bhavana Sotelo RN  Outcome: Progressing     Problem: Safety - Adult  Goal: Free from fall injury  5/28/2025 0549 by Judy Bateman RN  Outcome: Progressing  5/27/2025 2145 by Bhavana Sotelo RN  Outcome: Progressing     Problem: Pain  Goal: Verbalizes/displays adequate comfort level or baseline comfort level  5/28/2025 0549 by Judy Bateman RN  Outcome: Progressing  5/27/2025 2145 by Bhavana Sotelo RN  Outcome: Not Progressing

## 2025-05-28 NOTE — CARE COORDINATION
Patient has just returned from PACU following  Lt Femoral Popliteal Bypass Graft per Dr Silva. Too lethargic to complete iinitial Assessment at this time. He was able to tell me that he is IPTA and lives alone in an apartment. He also stated that he has initiated Passport request.   CM will follow.      Linda Ferguson RN

## 2025-05-28 NOTE — PLAN OF CARE
Problem: Discharge Planning  Goal: Discharge to home or other facility with appropriate resources  5/28/2025 1443 by Stephanie Hall RN  Outcome: Progressing  5/28/2025 0549 by Judy Bateman RN  Outcome: Progressing     Problem: Pain  Goal: Verbalizes/displays adequate comfort level or baseline comfort level  5/28/2025 0549 by Judy Bateman, RN  Outcome: Progressing     Problem: Skin/Tissue Integrity  Goal: Skin integrity remains intact  Description: 1.  Monitor for areas of redness and/or skin breakdown2.  Assess vascular access sites hourly3.  Every 4-6 hours minimum:  Change oxygen saturation probe site4.  Every 4-6 hours:  If on nasal continuous positive airway pressure, respiratory therapy assess nares and determine need for appliance change or resting period  5/28/2025 1443 by Stephanie Hall, RN  Outcome: Progressing     Problem: ABCDS Injury Assessment  Goal: Absence of physical injury  5/28/2025 1443 by Stephanie Hall, RN  Outcome: Progressing  5/28/2025 0549 by Judy Bateman, RN  Outcome: Progressing     Problem: Safety - Adult  Goal: Free from fall injury  5/28/2025 1443 by Stephanie Hall, RN  Outcome: Progressing  5/28/2025 0549 by Judy Bateman RN  Outcome: Progressing

## 2025-05-28 NOTE — PROGRESS NOTES
Bedside report given to Chito RN.  Pulses checked by all Rns on bilateral lower extremities at this time.  Pedal pulse palpable RLE, pedal pulse feels palpable LLE, confirmed with doppler.

## 2025-05-29 LAB
ANION GAP SERPL CALCULATED.3IONS-SCNC: 8 MMOL/L (ref 3–16)
BUN SERPL-MCNC: 25 MG/DL (ref 7–20)
CALCIUM SERPL-MCNC: 8.1 MG/DL (ref 8.3–10.6)
CHLORIDE SERPL-SCNC: 110 MMOL/L (ref 99–110)
CO2 SERPL-SCNC: 23 MMOL/L (ref 21–32)
CREAT SERPL-MCNC: 1.1 MG/DL (ref 0.8–1.3)
DEPRECATED RDW RBC AUTO: 15.3 % (ref 12.4–15.4)
GFR SERPLBLD CREATININE-BSD FMLA CKD-EPI: 73 ML/MIN/{1.73_M2}
GLUCOSE SERPL-MCNC: 123 MG/DL (ref 70–99)
HCT VFR BLD AUTO: 35.2 % (ref 40.5–52.5)
HGB BLD-MCNC: 11.7 G/DL (ref 13.5–17.5)
MCH RBC QN AUTO: 32 PG (ref 26–34)
MCHC RBC AUTO-ENTMCNC: 33.1 G/DL (ref 31–36)
MCV RBC AUTO: 96.4 FL (ref 80–100)
PLATELET # BLD AUTO: 338 K/UL (ref 135–450)
PMV BLD AUTO: 7 FL (ref 5–10.5)
POTASSIUM SERPL-SCNC: 4.5 MMOL/L (ref 3.5–5.1)
RBC # BLD AUTO: 3.65 M/UL (ref 4.2–5.9)
SODIUM SERPL-SCNC: 141 MMOL/L (ref 136–145)
WBC # BLD AUTO: 13.8 K/UL (ref 4–11)

## 2025-05-29 PROCEDURE — 94761 N-INVAS EAR/PLS OXIMETRY MLT: CPT

## 2025-05-29 PROCEDURE — 2060000000 HC ICU INTERMEDIATE R&B

## 2025-05-29 PROCEDURE — 97116 GAIT TRAINING THERAPY: CPT

## 2025-05-29 PROCEDURE — 6370000000 HC RX 637 (ALT 250 FOR IP): Performed by: SURGERY

## 2025-05-29 PROCEDURE — 6360000002 HC RX W HCPCS: Performed by: SURGERY

## 2025-05-29 PROCEDURE — 97530 THERAPEUTIC ACTIVITIES: CPT

## 2025-05-29 PROCEDURE — 36415 COLL VENOUS BLD VENIPUNCTURE: CPT

## 2025-05-29 PROCEDURE — 85027 COMPLETE CBC AUTOMATED: CPT

## 2025-05-29 PROCEDURE — 2500000003 HC RX 250 WO HCPCS: Performed by: SURGERY

## 2025-05-29 PROCEDURE — 2700000000 HC OXYGEN THERAPY PER DAY

## 2025-05-29 PROCEDURE — 97162 PT EVAL MOD COMPLEX 30 MIN: CPT

## 2025-05-29 PROCEDURE — 80048 BASIC METABOLIC PNL TOTAL CA: CPT

## 2025-05-29 PROCEDURE — 94640 AIRWAY INHALATION TREATMENT: CPT

## 2025-05-29 RX ADMIN — OXYCODONE 10 MG: 5 TABLET ORAL at 10:03

## 2025-05-29 RX ADMIN — OXYCODONE 10 MG: 5 TABLET ORAL at 04:42

## 2025-05-29 RX ADMIN — MORPHINE SULFATE 4 MG: 4 INJECTION, SOLUTION INTRAMUSCULAR; INTRAVENOUS at 20:23

## 2025-05-29 RX ADMIN — TIOTROPIUM BROMIDE INHALATION SPRAY 2 PUFF: 3.12 SPRAY, METERED RESPIRATORY (INHALATION) at 08:32

## 2025-05-29 RX ADMIN — CARVEDILOL 6.25 MG: 6.25 TABLET, FILM COATED ORAL at 16:18

## 2025-05-29 RX ADMIN — LOSARTAN POTASSIUM 25 MG: 25 TABLET, FILM COATED ORAL at 09:30

## 2025-05-29 RX ADMIN — Medication 10 ML: at 12:54

## 2025-05-29 RX ADMIN — MORPHINE SULFATE 4 MG: 4 INJECTION, SOLUTION INTRAMUSCULAR; INTRAVENOUS at 07:05

## 2025-05-29 RX ADMIN — MORPHINE SULFATE 4 MG: 4 INJECTION, SOLUTION INTRAMUSCULAR; INTRAVENOUS at 22:56

## 2025-05-29 RX ADMIN — OXYCODONE 10 MG: 5 TABLET ORAL at 21:29

## 2025-05-29 RX ADMIN — MORPHINE SULFATE 4 MG: 4 INJECTION, SOLUTION INTRAMUSCULAR; INTRAVENOUS at 12:50

## 2025-05-29 RX ADMIN — SODIUM CHLORIDE, PRESERVATIVE FREE 10 ML: 5 INJECTION INTRAVENOUS at 20:23

## 2025-05-29 RX ADMIN — Medication 10 ML: at 22:57

## 2025-05-29 RX ADMIN — MORPHINE SULFATE 4 MG: 4 INJECTION, SOLUTION INTRAMUSCULAR; INTRAVENOUS at 01:50

## 2025-05-29 RX ADMIN — CARVEDILOL 6.25 MG: 6.25 TABLET, FILM COATED ORAL at 09:30

## 2025-05-29 RX ADMIN — SODIUM CHLORIDE, PRESERVATIVE FREE 10 ML: 5 INJECTION INTRAVENOUS at 09:30

## 2025-05-29 RX ADMIN — OXYCODONE 10 MG: 5 TABLET ORAL at 16:17

## 2025-05-29 RX ADMIN — ROSUVASTATIN CALCIUM 40 MG: 10 TABLET, FILM COATED ORAL at 21:29

## 2025-05-29 ASSESSMENT — PAIN DESCRIPTION - ORIENTATION
ORIENTATION: LEFT

## 2025-05-29 ASSESSMENT — PAIN DESCRIPTION - DESCRIPTORS
DESCRIPTORS: DULL;THROBBING
DESCRIPTORS: ACHING;THROBBING
DESCRIPTORS: CRAMPING;THROBBING
DESCRIPTORS: ACHING;SHARP;THROBBING
DESCRIPTORS: ACHING;THROBBING
DESCRIPTORS: DULL;THROBBING

## 2025-05-29 ASSESSMENT — PAIN DESCRIPTION - LOCATION
LOCATION: LEG

## 2025-05-29 ASSESSMENT — PAIN SCALES - GENERAL
PAINLEVEL_OUTOF10: 9
PAINLEVEL_OUTOF10: 8
PAINLEVEL_OUTOF10: 5
PAINLEVEL_OUTOF10: 8
PAINLEVEL_OUTOF10: 7
PAINLEVEL_OUTOF10: 7
PAINLEVEL_OUTOF10: 8
PAINLEVEL_OUTOF10: 4
PAINLEVEL_OUTOF10: 8
PAINLEVEL_OUTOF10: 4

## 2025-05-29 ASSESSMENT — PAIN - FUNCTIONAL ASSESSMENT
PAIN_FUNCTIONAL_ASSESSMENT: PREVENTS OR INTERFERES SOME ACTIVE ACTIVITIES AND ADLS
PAIN_FUNCTIONAL_ASSESSMENT: ACTIVITIES ARE NOT PREVENTED
PAIN_FUNCTIONAL_ASSESSMENT: ACTIVITIES ARE NOT PREVENTED

## 2025-05-29 ASSESSMENT — PAIN SCALES - WONG BAKER
WONGBAKER_NUMERICALRESPONSE: NO HURT
WONGBAKER_NUMERICALRESPONSE: NO HURT

## 2025-05-29 NOTE — PROGRESS NOTES
Physical Therapy  [address]  Physical Therapy Initial Assessment/Treatment     Name: Tray Saavedra  : 1955  MRN: [id number]  Date of Service: 2025    Discharge Recommendations:  24 hour supervision or assist, Home with Home health PT   PT Equipment Recommendations  Equipment Needed: Yes  Mobility Devices: Walker  Walker: Rolling      Patient Diagnosis(es): The primary encounter diagnosis was Peripheral vascular disease, unspecified. A diagnosis of Peripheral vascular disease was also pertinent to this visit.  Past Medical History:  has a past medical history of CAD (coronary artery disease), Cardiomyopathy (HCC), Emphysema of lung (HCC), Heart attack (HCC), High cholesterol, HTN (hypertension), Prolonged emergence from general anesthesia, and Thoracic aortic aneurysm.  Past Surgical History:  has a past surgical history that includes Diagnostic Cardiac Cath Lab Procedure (12/10/2019); Thoracic aortic aneurysm repair (); Coronary angioplasty with stent (2021); Coronary angioplasty with stent (2013); Upper gastrointestinal endoscopy (N/A, 2021); Colonoscopy (N/A, 2021); femoral bypass (Right, 10/3/2022); femoral bypass (Left, 2025); and invasive vascular (N/A, 2025).    Assessment  Body Structures, Functions, Activity Limitations Requiring Skilled Therapeutic Intervention: Decreased functional mobility ;Decreased endurance;Increased pain;Decreased posture;Decreased strength;Decreased ROM;Decreased balance  Assessment: Pt to [facility] with diagnosis of PVD. Post-op Left common femoral endarterectomy and Left femoral to below-knee popliteal in situ bypass on . PTA pt lives alone in a first floor aparment and was independent with transfers and ambulation with a cane PRN. Pt currently presents below baseline function and is limited primarily by pain this date. Pt completes bed mobility with SBA, transfers with CGA progressing to SBA, and ambulates

## 2025-05-29 NOTE — CARE COORDINATION
Case Management Assessment  Initial Evaluation    Date/Time of Evaluation: 5/29/2025 10:08 AM  Assessment Completed by: Skyla Brand RN    If patient is discharged prior to next notation, then this note serves as note for discharge by case management.    Patient Name: Tray Saavedra                   YOB: 1955  Diagnosis: Peripheral vascular disease [I73.9]  PVD (peripheral vascular disease) [I73.9]                   Date / Time: 5/27/2025  6:02 AM    Patient Admission Status: Inpatient   Readmission Risk (Low < 19, Mod (19-27), High > 27): Readmission Risk Score: 11.9    Current PCP: No primary care provider on file.  PCP verified by CM? Yes ([facility])    Chart Reviewed: Yes      History Provided by: Patient  Patient Orientation: Alert and Oriented, Person, Place, Situation, Self    Patient Cognition: Alert    Hospitalization in the last 30 days (Readmission):  No    If yes, Readmission Assessment in CM Navigator will be completed.    Advance Directives:      Code Status: Full Code   Patient's Primary Decision Maker is: Named in Scanned ACP Document    Primary Decision Maker: Tucker Patel - Brother/Sister - [phone]    Secondary Decision Maker: Christopher Saavedra - Brother/Sister - [phone]    Discharge Planning:    Patient lives with: Alone Type of Home: Apartment  Primary Care Giver: Self  Patient Support Systems include: Family Members   Current Financial resources: Medicare  Current community resources: None  Current services prior to admission: Durable Medical Equipment            Current DME: Cane            Type of Home Care services:  None    ADLS  Prior functional level: Independent in ADLs/IADLs  Current functional level: Assistance with the following:, Housework, Shopping    PT AM-PAC:   /24  OT AM-PAC:   /24    Family can provide assistance at DC: Yes (Cousin= Alex Biggs)  Would you like Case Management to discuss the discharge plan with any other family members/significant  others, and if so, who? No  Plans to Return to Present Housing: Yes  Other Identified Issues/Barriers to RETURNING to current housing: none, has assistance  Potential Assistance needed at discharge: Home Care, Durable Medical Equipment            Potential DME: Walker  Patient expects to discharge to: Apartment  Plan for transportation at discharge: Self    Financial    Payor: MEDICARE / Plan: MEDICARE PART A AND B / Product Type: *No Product type* /     Does insurance require precert for SNF: No    Potential assistance Purchasing Medications: No  Meds-to-Beds request:        [facility] - [place], [place] - [address] - P [phone] - F [phone] [address]  [facility] [address]  Phone: [phone] Fax: [phone]    [facility] - [place], [place] - [address] - P [phone] - F [phone]  [address]  [address]  [facility] [address]  Phone: [phone] Fax: [phone]      Notes:    Factors facilitating achievement of predicted outcomes: Family support, Friend support, Motivated, Cooperative, and Pleasant    Barriers to discharge: Pain, Decreased endurance, Medical complications, Wound Care, and Medication managment    Additional Case Management Notes: CM met with pt at bedside. From home alone, [place], has cane. Would need RW for dc. Has used [facility] in the past. [facility] [place]. Attempting to get services thru passport and it is held up waiting for MD signature. Pt states cousin, Alex can transport.     [facility] to see if [facility] needs to be done through them and eligible benefits. Will also ask about getting it signed off on for passport services.       The Plan for Transition of Care is related to the following treatment goals of Peripheral vascular disease [I73.9]  PVD (peripheral vascular disease) [I73.9]    IF APPLICABLE: The Patient and/or patient representative Tray

## 2025-05-29 NOTE — ANESTHESIA POSTPROCEDURE EVALUATION
Department of Anesthesiology  Postprocedure Note    Patient: Tray Saavedra  MRN: [id number]  YOB: 1955  Date of evaluation: 5/29/2025    Procedure Summary       Date: 05/28/25 Room / Location: [place] [address] ([facility]) / [facility]    Anesthesia Start: 0730 Anesthesia Stop: 1142    Procedure: LEFT FEMORAL POPLITEAL BYPASS GRAFT (Left: Leg Lower) Diagnosis:       Peripheral vascular disease, unspecified      (Peripheral vascular disease, unspecified [I73.9])    Surgeons: Misbah Silva MD Responsible Provider: Benedicto Ng MD    Anesthesia Type: general ASA Status: 3            Anesthesia Type: No value filed.    Andres Phase I: Andres Score: 9    Andres Phase II:      Anesthesia Post Evaluation    Comments: Postoperative Anesthesia Note    Name:    Tray Saavedra  MRN:      [id number]    Patient Vitals in the past 12 hrs:  05/29/25 1600, BP:134/69, Temp:97.9 °F (36.6 °C), Pulse:74, Resp:18, SpO2:96 %  05/29/25 1320, Resp:18 05/29/25 1250, Resp:18 05/29/25 1245, BP:137/82, Temp:98.3 °F (36.8 °C), Pulse:65, Resp:18, SpO2:98 %  05/29/25 1033, Resp:18 05/29/25 1015, BP:(!) 157/82, Pulse:75, SpO2:95 %  05/29/25 0845, BP:(!) 149/72, Temp:97.6 °F (36.4 °C), Pulse:79, Resp:18, SpO2:96 %  05/29/25 0834, Pulse:70, Resp:18, SpO2:96 %  05/29/25 0512, Resp:18 05/29/25 0444, BP:(!) 144/84, Temp:97.7 °F (36.5 °C), Temp src:Oral, Pulse:67, Resp:20, SpO2:97 %  05/29/25 0435, Weight:59.5 kg (131 lb 3.2 oz)     LABS:    CBC  Lab Results       Component                Value               Date/Time                  WBC                      13.8 (H)            05/29/2025 05:42 AM        HGB                      11.7 (L)            05/29/2025 05:42 AM        HCT                      35.2 (L)            05/29/2025 05:42 AM        PLT                      338                 05/29/2025 05:42 AM   RENAL  Lab Results       Component                Value               Date/Time                  NA

## 2025-05-29 NOTE — CARE COORDINATION
DANNI [facility] with [facility] at [facility]. She provided pt's PCP office phone [phone] and fax [phone]    CM spoke with [facility] PCP office. They are aware pt will need PT/OT. SN, and aide at [facility]. They are aware Cm will provide walker through medicare. They will work on shower chair for pt. CM to fax orders to above fax #.     CM faxed orders for home care and shower chair to fax [phone]

## 2025-05-29 NOTE — PROGRESS NOTES
Vascular Surgery Progress Note      SUBJECTIVE:  c/o paraesthesia's LLE    OBJECTIVE    Physical  CURRENT VITALS:  BP (!) 144/84   Pulse 67   Temp 97.7 °F (36.5 °C) (Oral)   Resp 18   Ht 1.753 m (5' 9\")   Wt 59.5 kg (131 lb 3.2 oz)   SpO2 97%   BMI 19.37 kg/m²   24 HR INTAKE/OUTPUT:    Intake/Output Summary (Last 24 hours) at 5/29/2025 0719  Last data filed at 5/29/2025 0444  Gross per 24 hour   Intake 1820 ml   Output 1025 ml   Net 795 ml     Incisions and Prevena intact  Palpable L DP pulse  Foot pink and warm    Data  CBC:   Lab Results   Component Value Date/Time    WBC 13.8 05/29/2025 05:42 AM    RBC 3.65 05/29/2025 05:42 AM    HGB 11.7 05/29/2025 05:42 AM    HCT 35.2 05/29/2025 05:42 AM    MCV 96.4 05/29/2025 05:42 AM    MCH 32.0 05/29/2025 05:42 AM    MCHC 33.1 05/29/2025 05:42 AM    RDW 15.3 05/29/2025 05:42 AM     05/29/2025 05:42 AM    MPV 7.0 05/29/2025 05:42 AM     BMP:    Lab Results   Component Value Date/Time     05/29/2025 05:42 AM    K 4.5 05/29/2025 05:42 AM     05/29/2025 05:42 AM    CO2 23 05/29/2025 05:42 AM    BUN 25 05/29/2025 05:42 AM    CREATININE 1.1 05/29/2025 05:42 AM    CALCIUM 8.1 05/29/2025 05:42 AM    GFRAA >60 10/04/2022 08:42 AM    LABGLOM 73 05/29/2025 05:42 AM    LABGLOM >60 10/07/2023 06:19 AM    LABGLOM 52 07/31/2023 12:00 AM    GLUCOSE 123 05/29/2025 05:42 AM         ASSESSMENT AND PLAN    POD #1 L CFA endart and Fem-pop bypass   Graft patent with excellent distal perfusion.   Ambulate today- PT eval.

## 2025-05-29 NOTE — PROGRESS NOTES
Greeted pt, bedside report and assessment. Pt laying in bed, positive, calm, reviewed luciana rounding, pulses, peco vac. Pt was eagerly awaiting breakfast. Spoke of the possibility of him maybe going home later today. Will continue to monitor and assist.

## 2025-05-30 VITALS
RESPIRATION RATE: 16 BRPM | DIASTOLIC BLOOD PRESSURE: 83 MMHG | HEIGHT: 69 IN | HEART RATE: 89 BPM | TEMPERATURE: 98.2 F | OXYGEN SATURATION: 96 % | WEIGHT: 137.2 LBS | SYSTOLIC BLOOD PRESSURE: 171 MMHG | BODY MASS INDEX: 20.32 KG/M2

## 2025-05-30 PROCEDURE — 6360000002 HC RX W HCPCS: Performed by: SURGERY

## 2025-05-30 PROCEDURE — 2500000003 HC RX 250 WO HCPCS: Performed by: SURGERY

## 2025-05-30 PROCEDURE — 94640 AIRWAY INHALATION TREATMENT: CPT

## 2025-05-30 PROCEDURE — 6370000000 HC RX 637 (ALT 250 FOR IP): Performed by: SURGERY

## 2025-05-30 PROCEDURE — 99024 POSTOP FOLLOW-UP VISIT: CPT | Performed by: CLINICAL NURSE SPECIALIST

## 2025-05-30 RX ADMIN — OXYCODONE 10 MG: 5 TABLET ORAL at 02:46

## 2025-05-30 RX ADMIN — CARVEDILOL 6.25 MG: 6.25 TABLET, FILM COATED ORAL at 08:40

## 2025-05-30 RX ADMIN — TIOTROPIUM BROMIDE INHALATION SPRAY 2 PUFF: 3.12 SPRAY, METERED RESPIRATORY (INHALATION) at 08:37

## 2025-05-30 RX ADMIN — Medication 10 ML: at 01:11

## 2025-05-30 RX ADMIN — OXYCODONE 10 MG: 5 TABLET ORAL at 06:34

## 2025-05-30 RX ADMIN — Medication 10 ML: at 03:58

## 2025-05-30 RX ADMIN — MORPHINE SULFATE 4 MG: 4 INJECTION, SOLUTION INTRAMUSCULAR; INTRAVENOUS at 01:11

## 2025-05-30 RX ADMIN — SODIUM CHLORIDE, PRESERVATIVE FREE 10 ML: 5 INJECTION INTRAVENOUS at 08:40

## 2025-05-30 RX ADMIN — LOSARTAN POTASSIUM 25 MG: 25 TABLET, FILM COATED ORAL at 08:39

## 2025-05-30 RX ADMIN — MORPHINE SULFATE 4 MG: 4 INJECTION, SOLUTION INTRAMUSCULAR; INTRAVENOUS at 03:58

## 2025-05-30 RX ADMIN — MORPHINE SULFATE 4 MG: 4 INJECTION, SOLUTION INTRAMUSCULAR; INTRAVENOUS at 10:22

## 2025-05-30 ASSESSMENT — PAIN DESCRIPTION - ORIENTATION
ORIENTATION: LEFT

## 2025-05-30 ASSESSMENT — PAIN DESCRIPTION - LOCATION
LOCATION: LEG

## 2025-05-30 ASSESSMENT — PAIN SCALES - GENERAL
PAINLEVEL_OUTOF10: 8
PAINLEVEL_OUTOF10: 7
PAINLEVEL_OUTOF10: 6
PAINLEVEL_OUTOF10: 9
PAINLEVEL_OUTOF10: 7
PAINLEVEL_OUTOF10: 5
PAINLEVEL_OUTOF10: 7
PAINLEVEL_OUTOF10: 8

## 2025-05-30 ASSESSMENT — PAIN DESCRIPTION - DESCRIPTORS
DESCRIPTORS: ACHING;THROBBING

## 2025-05-30 NOTE — PROGRESS NOTES
Vascular Surgery Progress Note      POD#  2  S/P Lt common femoral endarterectomy, Lt femoral to below-knee popliteal insitu bypass (5/28/2025)    Chief Complaint: Postop follow up      SUBJECTIVE:  States his left leg feels better but  to touch. Anxious to go home    OBJECTIVE    Physical  CURRENT VITALS:  BP (!) 152/85   Pulse 76   Temp 98.5 °F (36.9 °C) (Oral)   Resp 16   Ht 1.753 m (5' 9\")   Wt 62.2 kg (137 lb 3.2 oz)   SpO2 94%   BMI 20.26 kg/m²   24 HR INTAKE/OUTPUT:    Intake/Output Summary (Last 24 hours) at 5/30/2025 0729  Last data filed at 5/30/2025 0200  Gross per 24 hour   Intake 1440 ml   Output 200 ml   Net 1240 ml     Left femoral incision clean dry and intact with Prevena dressing. Good seal with continuous green light   Left thigh and tibial dressings clean dry and intact  Left lower extremity warm and pink with palpable Left DP pulse    - PT  20/24 on 5/29  on the AM-PAC short mobility form (recommends walker at discharge)      Data  CBC:   Recent Labs     05/29/25  0542   WBC 13.8*   HGB 11.7*   HCT 35.2*   MCV 96.4        BMP:   Recent Labs     05/29/25  0542      K 4.5      CO2 23   GLUCOSE 123*   BUN 25*   CREATININE 1.1   CALCIUM 8.1*     Current Inpatient Medications  Current Facility-Administered Medications: sodium chloride flush 0.9 % injection 5-40 mL, 5-40 mL, IntraVENous, 2 times per day  sodium chloride flush 0.9 % injection 5-40 mL, 5-40 mL, IntraVENous, PRN  0.9 % sodium chloride infusion, , IntraVENous, PRN  hydrALAZINE (APRESOLINE) injection 10 mg, 10 mg, IntraVENous, Q1H PRN  oxyCODONE (ROXICODONE) immediate release tablet 5 mg, 5 mg, Oral, Q4H PRN **OR** oxyCODONE (ROXICODONE) immediate release tablet 10 mg, 10 mg, Oral, Q4H PRN  morphine (PF) injection 2 mg, 2 mg, IntraVENous, Q2H PRN **OR** morphine sulfate (PF) injection 4 mg, 4 mg, IntraVENous, Q2H PRN  promethazine (PHENERGAN) tablet 12.5 mg, 12.5 mg, Oral, Q6H PRN **OR** ondansetron

## 2025-05-30 NOTE — PROGRESS NOTES
Orders to discharge patient home. Removed heplock. Patient tolerated well. Gave prescriptions for  to patient and provided written lexicomp handouts on each prescription. Reviewed AVS summary with patient including home medications, new prescriptions, diet, activity, follow up appointments and when to call the doctor. Patient verbalized understanding of all material. Awaiting transportation home.

## 2025-05-30 NOTE — CARE COORDINATION
CASE MANAGEMENT DISCHARGE SUMMARY      Discharge to: Home with [facility] for SN/PT/OT with start of care slated for Monday, but may start on Sunday    Precertification completed: N/A  Hospital Exemption Notification (HENS) completed: N/A    IMM given: (date) 5/28/25    New Durable Medical Equipment ordered/agency: Rolling walker through [employer]; Shower chair and [facility]    Transportation:    Family/car: family       Confirmed discharge plan with: Home with Home care through [facility] for SN/PT/OT.     Patient: yes     Family:  In room during update:     Facility/Agency, name: The orders were faxed to [facility] and PCP office in [place]  BERTRAND/AVS does not need to be faxed         RN, name: Lito    Note: Discharging nurse to complete BERTRAND, reconcile AVS, and place final copy with patient's discharge packet. RN to ensure that written prescriptions for  Level II medications are sent with patient to the facility as per protocol.    .Keila Kuhn RN

## 2025-05-30 NOTE — PLAN OF CARE
Problem: Discharge Planning  Goal: Discharge to home or other facility with appropriate resources  Outcome: Completed     Problem: Pain  Goal: Verbalizes/displays adequate comfort level or baseline comfort level  Outcome: Completed     Problem: Skin/Tissue Integrity  Goal: Skin integrity remains intact  Description: 1.  Monitor for areas of redness and/or skin breakdown2.  Assess vascular access sites hourly3.  Every 4-6 hours minimum:  Change oxygen saturation probe site4.  Every 4-6 hours:  If on nasal continuous positive airway pressure, respiratory therapy assess nares and determine need for appliance change or resting period  Outcome: Completed     Problem: ABCDS Injury Assessment  Goal: Absence of physical injury  Outcome: Completed     Problem: Safety - Adult  Goal: Free from fall injury  Outcome: Completed

## 2025-05-30 NOTE — DISCHARGE INSTR - COC
Continuity of Care Form    Patient Name: Tray Saavedra   :  1955  MRN:  [id number]    Admit date:  2025  Discharge date:  ***    Code Status Order: Full Code   Advance Directives:    Date/Time Healthcare Directive Type of Healthcare Directive Copy in Chart Healthcare Agent Appointed Healthcare Agent's Name Healthcare Agent's Phone Number    25 0705 No, patient does not have an advance directive for healthcare treatment  --  --  --  --  --             Admitting Physician:  Misbah Silva MD  PCP: No primary care provider on file.    Discharging Nurse: ***  Discharging Hospital Unit/Room#: 0441/0441-01  Discharging Unit Phone Number: ***    Emergency Contact:   Extended Emergency Contact Information  Primary Emergency Contact: Alex Biggs  Home Phone: [phone]  Mobile Phone: [phone]  Relation: None  Secondary Emergency Contact: Tucker Patel  Home Phone: [phone]  Mobile Phone: [phone]  Relation: Brother/Sister    Past Surgical History:  Past Surgical History:   Procedure Laterality Date    COLONOSCOPY N/A 2021    COLONOSCOPY WITH BIOPSY performed by Bianca Reilly MD at [facility] ENDOSCOPY    CORONARY ANGIOPLASTY WITH STENT PLACEMENT  2021    SAMM- 3.0 x 38 and 3.0 x 34 to pRCA  (TOTAL 7 STENTS)    CORONARY ANGIOPLASTY WITH STENT PLACEMENT  2013    SAMM- 3.5 x 18 to RCA    DIAGNOSTIC CARDIAC CATH LAB PROCEDURE  12/10/2019    Non Obs CAD    FEMORAL BYPASS Right 10/3/2022    RIGHT FEMORAL POPLITEAL INSITU BYPASS GRAFT performed by Misbah Silva MD at [facility] OR    FEMORAL BYPASS Left 2025    LEFT FEMORAL POPLITEAL BYPASS GRAFT performed by Misbah Silva MD at [facility] OR    INVASIVE VASCULAR N/A 2025    Angiography lower ext left performed by Misbah Silva MD at [facility] CARDIAC CATH LAB    THORACIC AORTIC ANEURYSM REPAIR      UPPER GASTROINTESTINAL ENDOSCOPY N/A 2021    EGD BIOPSY performed by Bianca Reilly MD at [facility] ASC ENDOSCOPY       Immunization History:  Barium Swallow with Video (Video Swallowing Test): {Done Not Done Date:}    Treatments at the Time of Hospital Discharge:   Respiratory Treatments: ***  Oxygen Therapy:  {Therapy; copd oxygen:40323}  Ventilator:    {[facility] Vent List:659646566}    Rehab Therapies: {THERAPEUTIC INTERVENTION:2961340349}  Weight Bearing Status/Restrictions: {[facility] Weight Bearin}  Other Medical Equipment (for information only, NOT a DME order):  {EQUIPMENT:998386464}  Other Treatments: ***    Patient's personal belongings (please select all that are sent with patient):  {[facility] DME Belongings:002302647}    RN SIGNATURE:  {Esignature:386657248}    CASE MANAGEMENT/SOCIAL WORK SECTION    Inpatient Status Date: 25    Readmission Risk Assessment Score:  [facility] RISK OF UNPLANNED READMISSION 2.0             11.8 Total Score        Discharging to Facility/ Agency   [facility]  Services: Home Health Services   Address: [address] [place] [address]   Phone: [phone]       / signature: Electronically signed by Keila Kuhn RN on 25 at 10:24 AM EDT    PHYSICIAN SECTION    Prognosis: Good    Condition at Discharge: Stable    Rehab Potential (if transferring to Rehab): Good    Recommended Labs or Other Treatments After Discharge: wheeled walker, home PT, aide, RN to remove Left femoral Prevena dressing on      Physician Certification: I certify the above information and transfer of Tray Saavedra  is necessary for the continuing treatment of the diagnosis listed and that he requires Home Care for less 30 days.     Update Admission H&P: Changes in H&P as follows - S/P Lt common femoral endarterectomy, Lt femoral to below-knee popliteal insitu bypass (2025)    PHYSICIAN SIGNATURE:  Electronically signed by SKYLAR Valdes CNP on 25 at 8:42 AM EDT

## 2025-06-02 NOTE — DISCHARGE SUMMARY
[facility]     DISCHARGE SUMMARY    Patient ID: Tray Saavedra  MRN:  [id number]  YOB: 1955      Admission Date:  5/27/2025  6:02 AM  Discharge Date:  5/30/2025  2:35 PM     Principle Diagnosis:  PVD (peripheral vascular disease)    Secondary Diagnosis:  Principal Problem:    PVD (peripheral vascular disease)  Active Problems:    Peripheral vascular disease, unspecified  Resolved Problems:    * No resolved hospital problems. *    Procedure:    1. Left common femoral endarterectomy.  2. Left femoral to below-knee popliteal in situ bypass.    History:  The patient is a 69 y.o. male with ischemic rest pain of the left lower extremity. Angiograms had been performed showing occlusion of the common femoral artery with reconstitution of the deep femoral artery.  The superficial femoral artery was completely occluded with reconstitution of the popliteal vessel.  The patient was agreeable at this time to undergo the above procedures.     Hospital Course:  The patient underwent left common femoral endarterectomy and left femoral to popliteal bypass on May 28, 2025. His operative course was uncomplicated. He was extubated prior to transferring to PACU. His recovery phase was stable. He was admitted to PCU for ongoing care. Physical Therapy was consulted to assist with discharge planning. His postop course followed the usual clinical pathway. He was deemed medically ready for discharge on POD #2. Instructions were given regarding removal of Prevena dressing.      Consults:  IP CONSULT TO HOME CARE NEEDS     Treatments: IV hydration, PT/OT    LABS:  Lab Results   Component Value Date    WBC 13.8 (H) 05/29/2025    HGB 11.7 (L) 05/29/2025    HCT 35.2 (L) 05/29/2025    MCV 96.4 05/29/2025     05/29/2025     Lab Results   Component Value Date/Time     05/29/2025 05:42 AM    K 4.5 05/29/2025 05:42 AM     05/29/2025 05:42 AM    CO2 23 05/29/2025 05:42 AM    BUN 25

## 2025-06-20 ENCOUNTER — OFFICE VISIT (OUTPATIENT)
Dept: VASCULAR SURGERY | Age: 70
End: 2025-06-20

## 2025-06-20 VITALS
BODY MASS INDEX: 20.29 KG/M2 | SYSTOLIC BLOOD PRESSURE: 120 MMHG | DIASTOLIC BLOOD PRESSURE: 80 MMHG | HEIGHT: 69 IN | WEIGHT: 137 LBS

## 2025-06-20 DIAGNOSIS — Z09 POSTOPERATIVE EXAMINATION: Primary | ICD-10-CM

## 2025-06-20 PROCEDURE — 99024 POSTOP FOLLOW-UP VISIT: CPT | Performed by: SURGERY

## 2025-06-20 NOTE — PROGRESS NOTES
Postop Progress Note    Subjective    Tray Saavedra presents to the office for postop follow up s/p LLE bypass.     Objective    Vitals:    06/20/25 1421   BP: 120/80     General: alert, cooperative and no distress  Incisions: healing well  Palp pulses    HUMERA 1+    Assessment  Doing well postoperatively.    Plan  Graft duplex 3 months post-op    Electronically signed by Misbah Silva MD on 6/20/2025 at 3:03 PM

## 2025-06-23 DIAGNOSIS — I73.9 PVD (PERIPHERAL VASCULAR DISEASE): Primary | ICD-10-CM

## (undated) DEVICE — FOGARTY OCCLUSION CATHETER 5F 40CM: Brand: FOGARTY

## (undated) DEVICE — SUTURE MONOCRYL + SZ 4-0 L18IN ABSRB UD L19MM PS-2 3/8 CIR MCP496G

## (undated) DEVICE — SUTURE VCRL + SZ 2-0 L27IN ABSRB CLR CT-1 1/2 CIR TAPERCUT VCP259H

## (undated) DEVICE — SUTURE MCRYL SZ 4-0 L18IN ABSRB UD L19MM PS-2 3/8 CIR PRIM Y496G

## (undated) DEVICE — LOOP VES W13MM THK09MM MINI RED SIL FLD REPELLENT

## (undated) DEVICE — TAPE MEAS G N TALE

## (undated) DEVICE — FOGARTY - HYDRAGRIP SURGICAL - CLAMP INSERTS: Brand: FOGARTY SOFTJAW

## (undated) DEVICE — SUTURE NONABSORBABLE MONOFILAMENT 6-0 C-1 1X30 IN PROLENE 8706H

## (undated) DEVICE — PENCIL ES CRD L10FT HND SWCHING ROCK SWCH W/ EDGE COAT BLDE

## (undated) DEVICE — NAVICROSS SUPPORT CATHETER: Brand: NAVICROSS

## (undated) DEVICE — GEL US 20GM NONIRRITATING OVERWRAPPED FILE PCH TRNSMIT

## (undated) DEVICE — SUTURE PERMAHAND SZ 3-0 L30IN NONABSORBABLE BLK SILK BRAID A304H

## (undated) DEVICE — 1.5MM HYDRO LEMAITRE VALVULOTOME (98 CM): Brand: HYDRO LEMAITRE VALVULOTOME

## (undated) DEVICE — PINNACLE PRECISION ACCESS SYSTEM INTRODUCER SHEATH: Brand: PINNACLE PRECISION ACCESS SYSTEM

## (undated) DEVICE — CATHETER ANGIO 5FR L110CM GWIRE 0.035IN PGTL W/O SIDE H

## (undated) DEVICE — SYRINGE ANGIO 12ML COR CTRL ROT ADPT SLD PLUNG CLR BRL M

## (undated) DEVICE — SUTURE ABSORBABLE BRAIDED 2-0 CT-1 27 IN UD VICRYL J259H

## (undated) DEVICE — SHEET,DRAPE,53X77,STERILE: Brand: MEDLINE

## (undated) DEVICE — STAPLER EXT SKIN 35 WIDE S STL STPL SQUEEZE HNDL VISISTAT

## (undated) DEVICE — TR BAND RADIAL ARTERY COMPRESSION DEVICE: Brand: TR BAND

## (undated) DEVICE — SNAP KOVER: Brand: UNBRANDED

## (undated) DEVICE — GLOW 'N TELL 30CM TAPE (50 STRIPS): Brand: VASCUTAPE RADIOPAQUE TAPE

## (undated) DEVICE — SYRINGE MEDICAL 3ML CLEAR PLASTIC STANDARD NON CONTROL LUERLOCK TIP DISPOSABLE

## (undated) DEVICE — BANDAGE COBAN 6 IN WND 6INX5YD FOAM

## (undated) DEVICE — Device

## (undated) DEVICE — APPLIER CLP L9.375IN APER 2.1MM CLS L3.8MM 20 SM TI CLP

## (undated) DEVICE — NAMIC VASCULAR KIT: Brand: MEDLINE INDUSTRIES, INC.

## (undated) DEVICE — TOWEL,OR,DSP,ST,BLUE,STD,6/PK,12PK/CS: Brand: MEDLINE

## (undated) DEVICE — GLIDESHEATH SLENDER STAINLESS STEEL KIT: Brand: GLIDESHEATH SLENDER

## (undated) DEVICE — GLOVE SURG SZ 8 L11.77IN FNGR THK9.8MIL STRW LTX POLYMER

## (undated) DEVICE — CANNULA NSL L4M O2 AD FILTERLINE

## (undated) DEVICE — FORCEPS BX L240CM JAW DIA2.8MM L CAP W/ NDL MIC MESH TOOTH

## (undated) DEVICE — SUTURE VCRL + SZ 3-0 L27IN ABSRB UD L26MM SH 1/2 CIR VCP416H

## (undated) DEVICE — CHECK-FLO HEMOSTASIS ASSEMBLY: Brand: CHECK-FLO

## (undated) DEVICE — SYRINGE MED 20ML STD CLR PLAS LUERLOCK TIP N CTRL DISP

## (undated) DEVICE — GOWN SIRUS NONREIN XL W/TWL: Brand: MEDLINE INDUSTRIES, INC.

## (undated) DEVICE — GOWN SIRUS NONREIN LG W/TWL: Brand: MEDLINE INDUSTRIES, INC.

## (undated) DEVICE — GAUZE,SPONGE,4"X4",16PLY,XRAY,STRL,LF: Brand: MEDLINE

## (undated) DEVICE — SUTURE PROL SZ 5-0 L36IN NONABSORBABLE BLU L13MM C-1 3/8 8720H

## (undated) DEVICE — KIT INF CTRL 2OZ LUB TBNG L12FT DBL END BRSH SYR OP4

## (undated) DEVICE — ENDO CARRY-ON PROCEDURE KIT INCLUDES SUCTION TUBING, LUBRICANT, GAUZE, BIOHAZARD STICKER, TRANSPORT PAD AND INTERCEPT BEDSIDE KIT.: Brand: ENDO CARRY-ON PROCEDURE KIT

## (undated) DEVICE — BAG C ARM 22 IN LEN 22 IN W LTX FREE ST SNAP

## (undated) DEVICE — SNAP KAP: Brand: UNBRANDED

## (undated) DEVICE — EP VASCULAR PACK: Brand: MEDLINE INDUSTRIES, INC.

## (undated) DEVICE — SYSTEM SKIN CLSR 22CM DERMBND PRINEO

## (undated) DEVICE — CONMED SCOPE SAVER BITE BLOCK, 20X27 MM: Brand: SCOPE SAVER

## (undated) DEVICE — PUNCH AORT DIA4MM LNG HNDL

## (undated) DEVICE — PICO 7 10CM X 20CM: Brand: PICO™ 7

## (undated) DEVICE — Z DISCONTINUED USE 2276105 GOWN PROTCT UNIV CHST W28IN L49IN SL 24IN BLU SPUNBOND FLM

## (undated) DEVICE — KIT INCIS MGMT 13CM PEEL AND PLC DISPOSABLE PREVENA

## (undated) DEVICE — SUTURE VICRYL + SZ 3-0 L27IN ABSRB UD L26MM SH 1/2 CIR VCP416H

## (undated) DEVICE — SUTURE VICRYL + SZ 2-0 L27IN ABSRB CLR CT-1 1/2 CIR TAPERCUT VCP259H

## (undated) DEVICE — DISH PETRI ST LF

## (undated) DEVICE — SUTURE N ABSRB L 36 IN SZ 5-0 NDL L 13 MM POLYPRO OR PPL BLU

## (undated) DEVICE — TRAY,ERASE CAUTI,URN MTR,SILI,16FR10ML: Brand: MEDLINE

## (undated) DEVICE — SUTURE NONABSORBABLE MONOFILAMENT 7-0 BV-1 1X24 IN PROLENE 8702H

## (undated) DEVICE — INTENDED TO BE USED TO OCCLUDE, RETRACT AND IDENTIFY ARTERIES, VEINS, TENDONS AND NERVES IN SURGICAL PROCEDURES: Brand: STERION®  VESSEL LOOP

## (undated) DEVICE — DISPOSABLE DOPPLER PROBE

## (undated) DEVICE — BAG C ARM 48 IN LEN 36 IN W LTX FREE ST SNAP

## (undated) DEVICE — OPTIFOAM GENTLE SA, POSTOP, 4X8: Brand: MEDLINE

## (undated) DEVICE — PUNCH AORT OD5MM LNG HNDL

## (undated) DEVICE — GUIDEWIRE VASC L260CM DIA0.035IN L15CM STR TIP PTFE S STL

## (undated) DEVICE — ELECTRODE ECG MONITR FOAM TEAR DROP ADLT RED

## (undated) DEVICE — CHLORAPREP 26ML ORANGE

## (undated) DEVICE — LOOP VES L406MM DIA1MM MAXI BLU SIL RADPQ DISP

## (undated) DEVICE — DRESSING FOAM W3XL3IN GENTLE SIL FACE BORD ADH PD SUP ABSRB